# Patient Record
Sex: MALE | Race: BLACK OR AFRICAN AMERICAN | NOT HISPANIC OR LATINO | Employment: UNEMPLOYED | URBAN - METROPOLITAN AREA
[De-identification: names, ages, dates, MRNs, and addresses within clinical notes are randomized per-mention and may not be internally consistent; named-entity substitution may affect disease eponyms.]

---

## 2017-04-04 ENCOUNTER — TRANSFERRED RECORDS (OUTPATIENT)
Dept: HEALTH INFORMATION MANAGEMENT | Facility: CLINIC | Age: 42
End: 2017-04-04

## 2017-05-08 ENCOUNTER — TRANSFERRED RECORDS (OUTPATIENT)
Dept: HEALTH INFORMATION MANAGEMENT | Facility: CLINIC | Age: 42
End: 2017-05-08

## 2017-05-08 LAB
ALT SERPL-CCNC: 19 IU/L (ref 0–44)
AST SERPL-CCNC: 12 IU/L (ref 0–40)
CHOLEST SERPL-MCNC: 139 MG/DL (ref 100–199)
CREAT SERPL-MCNC: 0.93 MG/DL (ref 0.76–1.27)
GFR SERPL CREATININE-BSD FRML MDRD: 100 ML/MIN/1.73
GLUCOSE SERPL-MCNC: 181 MG/DL (ref 65–99)
HDLC SERPL-MCNC: 62 MG/DL
LDLC SERPL CALC-MCNC: 67 MG/DL (ref 0–99)
POTASSIUM SERPL-SCNC: 4.5 MMOL/L (ref 3.5–5.2)
TRIGL SERPL-MCNC: 51 MG/DL (ref 0–149)
TSH SERPL-ACNC: 1.59 UIU/ML (ref 0.45–4.5)

## 2017-07-17 ENCOUNTER — TRANSFERRED RECORDS (OUTPATIENT)
Dept: HEALTH INFORMATION MANAGEMENT | Facility: CLINIC | Age: 42
End: 2017-07-17

## 2017-09-27 ENCOUNTER — TRANSFERRED RECORDS (OUTPATIENT)
Dept: HEALTH INFORMATION MANAGEMENT | Facility: CLINIC | Age: 42
End: 2017-09-27

## 2017-10-04 ENCOUNTER — MEDICAL CORRESPONDENCE (OUTPATIENT)
Dept: HEALTH INFORMATION MANAGEMENT | Facility: CLINIC | Age: 42
End: 2017-10-04

## 2017-10-09 ENCOUNTER — TRANSFERRED RECORDS (OUTPATIENT)
Dept: HEALTH INFORMATION MANAGEMENT | Facility: CLINIC | Age: 42
End: 2017-10-09

## 2018-04-30 ENCOUNTER — TRANSFERRED RECORDS (OUTPATIENT)
Dept: HEALTH INFORMATION MANAGEMENT | Facility: CLINIC | Age: 43
End: 2018-04-30

## 2020-02-19 ENCOUNTER — OFFICE VISIT (OUTPATIENT)
Dept: FAMILY MEDICINE | Facility: CLINIC | Age: 45
End: 2020-02-19
Payer: COMMERCIAL

## 2020-02-19 VITALS
BODY MASS INDEX: 49.44 KG/M2 | WEIGHT: 315 LBS | OXYGEN SATURATION: 97 % | HEART RATE: 87 BPM | SYSTOLIC BLOOD PRESSURE: 136 MMHG | TEMPERATURE: 97.6 F | HEIGHT: 67 IN | DIASTOLIC BLOOD PRESSURE: 86 MMHG

## 2020-02-19 DIAGNOSIS — Z76.89 ENCOUNTER TO ESTABLISH CARE WITH NEW DOCTOR: ICD-10-CM

## 2020-02-19 DIAGNOSIS — G47.33 OSA (OBSTRUCTIVE SLEEP APNEA): ICD-10-CM

## 2020-02-19 DIAGNOSIS — E10.9 TYPE 1 DIABETES MELLITUS WITHOUT COMPLICATION (H): Primary | ICD-10-CM

## 2020-02-19 DIAGNOSIS — E66.01 MORBID OBESITY (H): ICD-10-CM

## 2020-02-19 DIAGNOSIS — Z23 NEED FOR VACCINATION: ICD-10-CM

## 2020-02-19 PROCEDURE — 99203 OFFICE O/P NEW LOW 30 MIN: CPT | Mod: 25 | Performed by: INTERNAL MEDICINE

## 2020-02-19 PROCEDURE — 90471 IMMUNIZATION ADMIN: CPT | Performed by: INTERNAL MEDICINE

## 2020-02-19 PROCEDURE — 90715 TDAP VACCINE 7 YRS/> IM: CPT | Performed by: INTERNAL MEDICINE

## 2020-02-19 SDOH — HEALTH STABILITY: MENTAL HEALTH: HOW OFTEN DO YOU HAVE A DRINK CONTAINING ALCOHOL?: NEVER

## 2020-02-19 ASSESSMENT — MIFFLIN-ST. JEOR: SCORE: 2264.06

## 2020-02-19 NOTE — PROGRESS NOTES
Chief Complaint:       Jose Apodaca is a 44 year old male who presents to clinic today for the following health issues:       New Patient/Transfer of Care  Establish care   Type 1 Diabetes   Request for Sleep clinic Referral for PHUONG        HPI:   Patient Jose Apodaca is a very pleasant 44 year old male with history of Type 1 Diabetes on insulin pump therapy who presents to Internal Medicine clinic today to establish care and for evaluation of multiple concerns including Type 1 Diabetes, PHUONG and morbid obeseity. Regarding the patient's Type 1 Diabetes, the patient reports that he was diagnosed with Type 1 Diabetes at age of 36. He last was diagnosed with DKA and required hospitalization in 1/2016. No DKA episodes since then. Regarding the patient's chronic morbid obesity, the patient reports difficulty with losing weight through diet and exercise in the past. He is interested in an evaluation by the nutrition clinic to help develop a diet and exercise treatment going forward. He reports that he does not require a refill of his insulin medication at this time. He is also interested in a sleep clinic evaluation for PHUONG going forward.      Current Medications:     Current Outpatient Medications   Medication Sig Dispense Refill     insulin aspart (NOVOLOG VIAL) 100 UNITS/ML SC vial Inject 70 units TID           Allergies:    No Known Allergies         Past Medical History:     Past Medical History:   Diagnosis Date     Morbid obesity (H)      PHUONG (obstructive sleep apnea)      T1DM (type 1 diabetes mellitus) (H)          Past Surgical History:   No past surgical history on file.      Family Medical History:     Family History   Problem Relation Age of Onset     Diabetes Maternal Grandmother      Diabetes Paternal Grandmother          Social History:     Social History     Socioeconomic History     Marital status:      Spouse name: Not on file     Number of children: Not on file     Years of  education: Not on file     Highest education level: Not on file   Occupational History     Not on file   Social Needs     Financial resource strain: Not on file     Food insecurity:     Worry: Not on file     Inability: Not on file     Transportation needs:     Medical: Not on file     Non-medical: Not on file   Tobacco Use     Smoking status: Never Smoker     Smokeless tobacco: Never Used   Substance and Sexual Activity     Alcohol use: Never     Frequency: Never     Drug use: Never     Sexual activity: Not on file   Lifestyle     Physical activity:     Days per week: Not on file     Minutes per session: Not on file     Stress: Not on file   Relationships     Social connections:     Talks on phone: Not on file     Gets together: Not on file     Attends Judaism service: Not on file     Active member of club or organization: Not on file     Attends meetings of clubs or organizations: Not on file     Relationship status: Not on file     Intimate partner violence:     Fear of current or ex partner: Not on file     Emotionally abused: Not on file     Physically abused: Not on file     Forced sexual activity: Not on file   Other Topics Concern     Not on file   Social History Narrative     Not on file           Review of System:     Constitutional: Negative for fever or chills, positive for chronic morbid obesity  Skin: Negative for rashes  Ears/Nose/Throat: Negative for nasal congestion, sore throat  Respiratory: No shortness of breath, dyspnea on exertion, cough, or hemoptysis  Cardiovascular: Negative for chest pain  Gastrointestinal: Negative for nausea, vomiting  Genitourinary: Negative for dysuria, hematuria  Musculoskeletal: Negative for myalgias  Neurologic: Negative for headaches  Psychiatric: Negative for depression, anxiety  Hematologic/Lymphatic/Immunologic: Negative  Endocrine: Negative for recent hypoglycemia events. Positive for chronic Type 1 Diabetes on insulin pump therapy.  Behavioral: Negative for  "tobacco use       Physical Exam:   /86 (BP Location: Right arm, Patient Position: Sitting, Cuff Size: Adult Large)   Pulse 87   Temp 97.6  F (36.4  C) (Tympanic)   Ht 1.689 m (5' 6.5\")   Wt 143.3 kg (316 lb)   SpO2 97%   BMI 50.24 kg/m      GENERAL: morbidly obese, alert and no distress  EYES: eyes grossly normal to inspection, and conjunctivae and sclerae normal  HENT: Normocephalic atraumatic. Nose and mouth without ulcers or lesions  NECK: supple  RESP: lungs clear to auscultation   CV: regular rate and rhythm, normal S1 S2  LYMPH: no peripheral edema   ABDOMEN: obese  MS: no gross musculoskeletal defects noted  SKIN: no suspicious lesions or rashes  NEURO: Alert & Oriented x 3.   PSYCH: mentation appears normal, affect normal        Diagnostic Test Results:     Diagnostic Test Results:  Outside labs reviewed today obtained at [facility] shows Hgb A1c of 8.6 from 9/4/2019.    DIABETES   Component Name  9/4/2019     8.6 (H)   Comment (A)   0.21 (L)   HEMOGLOBIN A1C MONITORING (POCT)   VELVET 65   C-PEPTIDE                       ASSESSMENT/PLAN:       (Z76.89) Encounter to establish care with new doctor  (primary encounter diagnosis)  (E10.9) Type 1 diabetes mellitus without complication (H)  Comment: Type 1 Diabetes on insulin pump therapy, no recent hypoglycemia events. No recent DKA episodes since last DKA hospitalization in 1/2016.   Plan: MED THERAPY MANAGE REFERRAL, ENDOCRINOLOGY         ADULT REFERRAL, AMBULATORY ADULT DIABETES         EDUCATOR REFERRAL      (G47.33) PHUONG (obstructive sleep apnea)  Comment: chronic PHUONG with snoring in the setting of morbid obesity not well controlled.  Plan: SLEEP EVALUATION & MANAGEMENT REFERRAL - [facility]         [phone]  (Age 18 and up)      (E66.01) Morbid obesity (H)  Comment: poorly controlled chronic morbid obesity with difficulty losing weight through diet and exercise.  Plan: NUTRITION " REFERRAL      (Z23) Need for vaccination  Comment: patient is due for a TDAP vaccine  Plan: TDAP VACCINE (ADACEL) [66233.002], 1st          Administration  [19951], CANCELED: TDAP VACCINE        (BOOSTRIX) [82700]    Follow Up Plan:     Patient is instructed to return to Internal Medicine clinic for follow-up visit in 1 week.        Radha Milligan MD  Internal Medicine  [facility]

## 2020-02-19 NOTE — PROGRESS NOTES
Prior to immunization administration, verified patients identity using patient s name and date of birth. Please see Immunization Activity for additional information.     Screening Questionnaire for Adult Immunization    Are you sick today?   No   Do you have allergies to medications, food, a vaccine component or latex?   No   Have you ever had a serious reaction after receiving a vaccination?   No   Do you have a long-term health problem with heart, lung, kidney, or metabolic disease (e.g., diabetes), asthma, a blood disorder, no spleen, complement component deficiency, a cochlear implant, or a spinal fluid leak?  Are you on long-term aspirin therapy?   Yes   Do you have cancer, leukemia, HIV/AIDS, or any other immune system problem?   No   Do you have a parent, brother, or sister with an immune system problem?   No   In the past 3 months, have you taken medications that affect  your immune system, such as prednisone, other steroids, or anticancer drugs; drugs for the treatment of rheumatoid arthritis, Crohn s disease, or psoriasis; or have you had radiation treatments?   No   Have you had a seizure, or a brain or other nervous system problem?   No   During the past year, have you received a transfusion of blood or blood    products, or been given immune (gamma) globulin or antiviral drug?   No   For women: Are you pregnant or is there a chance you could become       pregnant during the next month?   No   Have you received any vaccinations in the past 4 weeks?   No     Immunization questionnaire was positive for at least one answer.  Notified Chel.        Per orders of Dr. Milligan, injection of Tdap given by Nato Martin CMA. Patient instructed to remain in clinic for 15 minutes afterwards, and to report any adverse reaction to me immediately.       Screening performed by Nato Martin CMA on 2/19/2020 at 4:33 PM.

## 2020-02-24 ENCOUNTER — TELEPHONE (OUTPATIENT)
Dept: PHARMACY | Facility: CLINIC | Age: 45
End: 2020-02-24

## 2020-02-24 NOTE — TELEPHONE ENCOUNTER
Called patient to assess appropriateness of MTM appointment. He is only on insulin and no other medications, his primary questions are around diet and nutrition. He already has a visit scheduled with Diabetes Education next week. He is more appropriate to follow with them. MTM will look at diabetes ed visit notes next week to reassess if he is an appropriate patient to follow with us.    Madnie Bean, PharmD  PGY1 Medication Therapy Management Resident   [phone]

## 2020-03-01 ENCOUNTER — HEALTH MAINTENANCE LETTER (OUTPATIENT)
Age: 45
End: 2020-03-01

## 2020-03-03 ENCOUNTER — ALLIED HEALTH/NURSE VISIT (OUTPATIENT)
Dept: EDUCATION SERVICES | Facility: CLINIC | Age: 45
End: 2020-03-03
Payer: COMMERCIAL

## 2020-03-03 VITALS — BODY MASS INDEX: 50.05 KG/M2 | WEIGHT: 314.8 LBS

## 2020-03-03 DIAGNOSIS — E10.9 TYPE 1 DIABETES MELLITUS WITHOUT COMPLICATION (H): Primary | ICD-10-CM

## 2020-03-03 PROCEDURE — G0108 DIAB MANAGE TRN  PER INDIV: HCPCS

## 2020-03-03 NOTE — PATIENT INSTRUCTIONS
Connect with our [place] on [email or web address] - Go to Account Settings, My Practices & enter Practice ID [id number]    Upload your Tandem pump when you have a chance - send me a MyChart after you've uploaded    Try to eat breakfast every day - some carb with some protein and a little fat - and work towards consistent meal schedule     Call or send a InnerRewardshart message with any questions or concerns    Debra Rogers RD, LD, CDE   Diabetes Education Triage Line: [phone]  Diabetes Education Appointment [phone]

## 2020-03-03 NOTE — PROGRESS NOTES
"Diabetes Self-Management Education & Support    Presents for: Insulin Pump Review    SUBJECTIVE/OBJECTIVE:  Presents for: Insulin Pump Review  Accompanied by: Self  Diabetes education in the past 24mo: (P) No  Focus of Visit: Insulin Pump  Type of Pump visit: Pump Review  Diabetes type: (P) Type 1  Disease course: (P) Getting harder to manage  How confident are you filling out medical forms by yourself:: Not Assessed  Diabetes management related comments/concerns: I don't know how to eat healthfully   Transportation concerns: No  Other concerns:: None  Cultural Influences/Ethnic Background:  American    Diabetes Symptoms & Complications:  Fatigue: (P) No  Polydipsia: (P) Sometimes  Polyphagia: (P) Yes  Polyuria: (P) No  Visual change: (P) No  Slow healing wounds: (P) No  Autonomic neuropathy: (P) No  CVA: (P) No  Heart disease: (P) No  Nephropathy: (P) No  Peripheral neuropathy: (P) No  Peripheral Vascular Disease: (P) No  Retinopathy: (P) No  Sexual dysfunction: (P) No    Patient Problem List and Family Medical History reviewed for relevant medical history, current medical status, and diabetes risk factors.    Vitals:  Wt 142.8 kg (314 lb 12.8 oz)   BMI 50.05 kg/m    Estimated body mass index is 50.05 kg/m  as calculated from the following:    Height as of 2/19/20: 1.689 m (5' 6.5\").    Weight as of this encounter: 142.8 kg (314 lb 12.8 oz).   Last 3 BP:   BP Readings from Last 3 Encounters:   02/19/20 136/86       History   Smoking Status     Never Smoker   Smokeless Tobacco     Never Used       Labs:  No results found for: A1C  No results found for: GLC  No results found for: LDL  No results found for: HDL]  No results found for: GFRESTIMATED  No results found for: GFRESTBLACK  No results found for: CR  No results found for: MICROALBUMIN    Healthy Eating:  Healthy Eating Assessed Today: Yes  Cultural/Confucianist diet restrictions?: (P) No  Meal planning/habits: Carb counting, Frequent snacking  How many times a " week on average do you eat food made away from home (restaurant/take-out)?: (P) 2  Meals include: (P) Lunch, Dinner, Morning Snack, Afternoon Snack, Evening Snack  Breakfast: Hot chocolate ([place]) OR skips  Lunch: Cajun turkey sandwich with cheese, [place] Amanuel low carb bread (19g/2 slices), apple, prepackaged chips  Dinner: leftovers (roast with potatoes) OR steak, fries OR breaded chicken, ww noodles with pasta sauce OR Subway OR Mc's OR sandwich OR bowl of cereal OR pizza   Snacks: Christianity snack bars  Other: [place] cake for lows   Beverages: (P) Water, Diet soda  Has patient met with a dietitian in the past?: No    Being Active:  Being Active Assessed Today: Yes  Exercise:: Currently not exercising  Barrier to exercise: (P) None    Monitoring:  Monitoring Assessed Today: Yes  Did patient bring glucose meter to appointment? : Yes  Blood Glucose Meter: CGM  Times checking blood sugar at home (number): 4  Times checking blood sugar at home (per): (P) Day  Blood glucose trend: (P) Fluctuating    See pump report below.     Taking Medications:  Diabetes Medication(s)     Insulin       insulin aspart (NOVOLOG VIAL) 100 UNITS/ML SC vial    Inject 70 units TID          Taking Medication Assessed Today: Yes  Current Treatments: (P) Insulin Pump  Dose schedule: (P) Pre-breakfast, Pre-lunch, Pre-dinner  Given by: (P) Patient  Injection/Infusion sites: (P) Abdomen  Problems taking diabetes medications regularly?: No  Diabetes medication side effects?: Yes    Problem Solving:  Problem Solving Assessed Today: Yes  Is the patient at risk for hypoglycemia?: Yes  Hypoglycemia Frequency: Weekly  Hypoglycemia Treatment: Other food  Does patient have glucagon emergency kit?: Yes  Is the patient at risk for DKA?: Yes  Does patient have ketone test strips?: No  Does patient have DKA prevention plan?: No  Does patient have severe weather/disaster plan for diabetes management?: No  Does patient have sick day plan for diabetes  management?: No         Hypoglycemia Complications  Nocturnal hypoglycemia: Yes  Required assistance: Yes  Required glucagon injection: Yes    Reducing Risks:  Reducing Risks Assessed Today: Yes  Diabetes Risks: Sedentary Lifestyle, Ethnicity  CAD Risks: Diabetes Mellitus, Male sex, Obesity, Sedentary lifestyle  Has dilated eye exam at least once a year?: (P) Yes  Sees dentist every 6 months?: (P) Yes  Feet checked by healthcare provider in the last year?: (P) No    Healthy Coping:  Healthy Coping Assessed Today: Yes  Emotional response to diabetes: Fear/Anxiety, Helplessness, Guilt/Self-blame  Informal Support system:: (P) None  Stage of change: PREPARATION (Decided to change - considering how)  Support resources: None  Patient Activation Measure Survey Score:  No flowsheet data found.    Diabetes knowledge and skills assessment:   Patient is knowledgeable in diabetes management concepts related to: Healthy Eating, Monitoring, Taking Medication, Problem Solving and Insulin Pump Concepts Balancing glucose and insulin  Carbohydrate counting  Calculating boluses  Problem solving with insulin pump therapy (BG monitoring; hypoglycemia signs/symptoms, treatment (glucagon) and prevention; hyperglycemia signs/symptoms, treatment and prevention; ketones, DKA signs/symptoms and prevention)    Patient needs further education on the following diabetes management concepts: Healthy Eating, Being Active, Monitoring, Taking Medication, Problem Solving, Reducing Risks and Healthy Coping    Based on learning assessment above, most appropriate setting for further diabetes education would be: Group class or Individual setting.      INTERVENTIONS:    REPORTS:                      Insulin Pump Information  Insulin Pump Brand: Tandem  Infusion Set: Tandem  Does patient have an insulin multiple daily injection back-up plan?: No    Education provided today on:  AADE Self-Care Behaviors:  Healthy Eating: carbohydrate counting, consistency  "in amount, composition, and timing of food intake, weight reduction, portion control, plate planning method and label reading  Monitoring: individual blood glucose targets and frequency of monitoring  Problem Solving: low blood glucose - causes, signs/symptoms, treatment and prevention and carrying a carbohydrate source at all times  Healthy Coping: recognize feelings about diagnosis, benefits of making appropriate lifestyle changes, utilize support systems, methods for coping with stress and when to seek professional counseling    Education specific to insulin pump provided today on:   importance of bolusing before meals, importance of counting carbohydrates accurately and weight management, use of CGM (Dexcom) with T:slim pump - benefits of alarms, Basal/Control IQ    Opportunities for ongoing education and support in diabetes-self management were discussed.    Pt verbalized understanding of concepts discussed and recommendations provided today.       Education Materials Provided:  Carbohydrate Counting and My Plate Planner    Pump Education Materials: None    ASSESSMENT  Patient comes in today stating he grew up poor and so was never taught how to eat healthfully. As an adult, it's gotten better with having kids but still he doesn't always choose the right foods. He snacks frequently and feels this is getting worse. He is interested in trying Victoza or another GLP1 medication in the future to help with weight loss. He feels comfortable with carb counting. He frequently mentions things like \"bad foods\" or \"cheat days\" or food as reward for long day/week. Discussed challenges with emotional eating - not as easy as just having discipline and often requires further investigation with mental health professional. Patient is concerned that if he seeks out mental health care, that it could be possible that someone would \"take away my guns\" due to red flag laws. Explained that these laws exist to protect you and others " in the case that someone was concerned that he would harm himself or another person. Encouraged him to consider seeking out mental health. Additionally discussed use of pump + CGM options. Upload of both pump & Bhargav sensor happened after visit due to IT issues (see [employer] messages). He is looking forward to continuing to work on changes to pump settings to support improved blood sugar control + weight loss. Recommended he discuss use of GLP1 with Endocrinology at visit in May - he is agreeable.     Patient would benefit from increase in basal rate daytime and evening to see below.    Changes made to pump settings:  basal rate:   0500 1.75 --> 1.9  1200 2 --> 2.2  1600 1.75 --> 1.9   Pump report reflects changes made during visit today      PLAN  See Patient Instructions for co-developed, patient-stated behavior change goals.  AVS printed and provided to patient today. See Follow-Up section for recommended follow-up.  At next visit, could consider adjustment to correction factor - per rule of 1700, patient should be taking 1 unit: 13. Right now he is taking 1:25. Will hold off for now and see how trends look with adjustment in basal rates.     Debra Rogers, RD, LD, CDE   Time Spent: 60 minutes  Encounter Type: Individual    Any diabetes medication dose changes were made via the CDE Protocol and Collaborative Practice Agreement with the patient's referring provider. A copy of this encounter was shared with the provider.

## 2020-03-23 ENCOUNTER — MYC MEDICAL ADVICE (OUTPATIENT)
Dept: EDUCATION SERVICES | Facility: CLINIC | Age: 45
End: 2020-03-23

## 2020-03-26 NOTE — TELEPHONE ENCOUNTER
Diabetes Follow-up    Subjective/Objective:    Jose Apodaca sent in blood glucose log for review. Last date of communication was: 3/3.    Diabetes is being managed with   Diabetes Medications   Diabetes Medication(s)     Insulin       insulin aspart (NOVOLOG VIAL) 100 UNITS/ML SC vial    Inject 70 units TID          BG/Food Log:               Assessment:    Patient seeing consistently high blood sugars during the day. Would recommend increase in basal rates at 0500, 1200, 1600 by 0.2. Recommend strengthening of correction factor 1:20 mg/dL.     Plan/Response:  See above for recommendation & MyChart message for info to patient    Debra Rogers RD, LD, CDE     Any diabetes medication dose changes were made via the CDE Protocol and Collaborative Practice Agreement with the patient's referring provider. A copy of this encounter was shared with the provider.

## 2020-04-14 ENCOUNTER — ALLIED HEALTH/NURSE VISIT (OUTPATIENT)
Dept: EDUCATION SERVICES | Facility: CLINIC | Age: 45
End: 2020-04-14
Payer: COMMERCIAL

## 2020-04-14 DIAGNOSIS — E10.9 TYPE 1 DIABETES MELLITUS WITHOUT COMPLICATION (H): Primary | ICD-10-CM

## 2020-04-14 PROCEDURE — G0108 DIAB MANAGE TRN  PER INDIV: HCPCS

## 2020-04-14 NOTE — PROGRESS NOTES
"Diabetes Self-Management Education & Support    Presents for: Insulin Pump Review    SUBJECTIVE/OBJECTIVE:  Presents for: Insulin Pump Review  Accompanied by: Self(telephone visit)  Diabetes education in the past 24mo: Yes  Focus of Visit: Insulin Pump  Type of Pump visit: Pump Review  Diabetes type: Type 1  Disease course: Getting harder to manage  How confident are you filling out medical forms by yourself:: Not Assessed  Diabetes management related comments/concerns: It's hard to avoid snacking while at home.  Transportation concerns: No  Difficulty affording diabetes medication?: No  Difficulty affording diabetes testing supplies?: No  Other concerns:: None  Cultural Influences/Ethnic Background:  American      Diabetes Symptoms & Complications:  Fatigue: No  Polydipsia: Sometimes  Polyphagia: Yes  Polyuria: No  Visual change: No  Slow healing wounds: No  Autonomic neuropathy: No  CVA: No  Heart disease: No  Nephropathy: No  Peripheral neuropathy: No  Peripheral Vascular Disease: No  Retinopathy: No  Sexual dysfunction: No    Patient Problem List and Family Medical History reviewed for relevant medical history, current medical status, and diabetes risk factors.    Vitals:  There were no vitals taken for this visit.  Estimated body mass index is 50.05 kg/m  as calculated from the following:    Height as of 2/19/20: 1.689 m (5' 6.5\").    Weight as of 3/3/20: 142.8 kg (314 lb 12.8 oz).   Last 3 BP:   BP Readings from Last 3 Encounters:   02/19/20 136/86       History   Smoking Status     Never Smoker   Smokeless Tobacco     Never Used       Labs:  No results found for: A1C  No results found for: GLC  No results found for: LDL  No results found for: HDL]  No results found for: GFRESTIMATED  No results found for: GFRESTBLACK  No results found for: CR  No results found for: MICROALBUMIN    Healthy Eating:  Healthy Eating Assessed Today: Yes  Cultural/Moravian diet restrictions?: No  Meal planning/habits: Carb " counting, Frequent snacking  How many times a week on average do you eat food made away from home (restaurant/take-out)?: 2  Meals include: Lunch, Dinner, Morning Snack, Afternoon Snack, Evening Snack  Breakfast: Hot chocolate ([place]) OR skips  Lunch: Cajun turkey sandwich with cheese, [place] Amanuel low carb bread (19g/2 slices), apple, prepackaged chips  Dinner: leftovers (roast with potatoes) OR steak, fries OR breaded chicken, ww noodles with pasta sauce OR Subway OR Mc's OR sandwich OR bowl of cereal OR pizza   Snacks: Mandaen snack bars, candy bars, cereal  Other: Little Amna cake for lows   Beverages: Water, Diet soda  Has patient met with a dietitian in the past?: No    Being Active:  Being Active Assessed Today: Yes  Exercise:: Yes  How intense was your typical exercise? : Light (like stretching or slow walking)  Barrier to exercise: None    Monitoring:  Monitoring Assessed Today: Yes  Did patient bring glucose meter to appointment? : Yes  Blood Glucose Meter: CGM  Times checking blood sugar at home (number): 4  Times checking blood sugar at home (per): Day  Blood glucose trend: Fluctuating    See LibreView data below.     Taking Medications:  Diabetes Medication(s)     Insulin       insulin aspart (NOVOLOG VIAL) 100 UNITS/ML SC vial    Inject 70 units TID          Taking Medication Assessed Today: Yes  Current Treatments: Insulin Pump  Dose schedule: Pre-breakfast, Pre-lunch, Pre-dinner, At bedtime  Given by: Patient  Injection/Infusion sites: Abdomen  Problems taking diabetes medications regularly?: No  Diabetes medication side effects?: Yes    Problem Solving:  Problem Solving Assessed Today: Yes  Is the patient at risk for hypoglycemia?: Yes  Hypoglycemia Frequency: Weekly  Hypoglycemia Treatment: Other food  Does patient have glucagon emergency kit?: Yes  Is the patient at risk for DKA?: Yes  Does patient have ketone test strips?: No  Does patient have DKA prevention plan?: No  Does patient have  severe weather/disaster plan for diabetes management?: No  Does patient have sick day plan for diabetes management?: No      Hypoglycemia Complications  Nocturnal hypoglycemia: Yes  Required assistance: Yes  Required glucagon injection: Yes    Reducing Risks:  Reducing Risks Assessed Today: No  Diabetes Risks: Sedentary Lifestyle, Ethnicity  CAD Risks: Diabetes Mellitus, Male sex, Obesity, Sedentary lifestyle  Has dilated eye exam at least once a year?: Yes  Sees dentist every 6 months?: Yes  Feet checked by healthcare provider in the last year?: No    Healthy Coping:  Healthy Coping Assessed Today: Yes  Emotional response to diabetes: Fear/Anxiety, Helplessness, Guilt/Self-blame  Informal Support system:: None  Stage of change: PREPARATION (Decided to change - considering how)  Support resources: None  Patient Activation Measure Survey Score:  No flowsheet data found.    Diabetes knowledge and skills assessment:   Patient is knowledgeable in diabetes management concepts related to: Monitoring, Taking Medication, Problem Solving and Insulin Pump Concepts Carbohydrate counting  Calculating boluses  Problem solving with insulin pump therapy (BG monitoring; hypoglycemia signs/symptoms, treatment (glucagon) and prevention; hyperglycemia signs/symptoms, treatment and prevention; ketones, DKA signs/symptoms and prevention)    Patient needs further education on the following diabetes management concepts: Healthy Eating, Being Active and Monitoring    Based on learning assessment above, most appropriate setting for further diabetes education would be: Group class or Individual setting.      INTERVENTIONS:    REPORTS:                  Insulin Pump Information  Insulin Pump Brand: Tandem  Infusion Set: Tandem  Does patient have an insulin multiple daily injection back-up plan?: No    Education provided today on:  AADE Self-Care Behaviors:  Healthy Eating: consistency in amount, composition, and timing of food intake, weight  reduction, portion control and discussed strategies to avoid frequent snacking while at home - avoiding buying certain foods, finding another activity like exercise to fill time instead of snacking   Problem Solving: low blood glucose - causes, signs/symptoms, treatment and prevention and discussed potential for rebound highs     Education specific to insulin pump provided today on:   exercise recommendations and discussed Basal IQ, Control IQ technology as patient will be starting on Dexcom G6 CGM soon. He is familiar with this and is looking forward to getting started.     Opportunities for ongoing education and support in diabetes-self management were discussed.    Pt verbalized understanding of concepts discussed and recommendations provided today.       Education Materials Provided:  No new materials provided today    Pump Education Materials: none    ASSESSMENT  Patient is trying his best to stay home, avoid COVID-19. He is hoping to start walking soon and realizes that it is harder than he thought since it's been awhile since exercising. He is looking forward to getting started with Dexcom G6, expects it to arrive on MOnday. Has appointment with Endocrinology in ~1 month and is looking forward to establishing care with new doctor. Wants to utilize Victoza for weight loss & decreased appetite. Found someone selling this on jigl - informed him that he should not buy medications off of the internet as he cannot guarantee that it would be safe, that he should wait until he sees Endocrinology next month. He acknowledges risk.     Glucose Patterns & Trends:  Weekday Nocturnal hypoglycemia and Weekday Post-meal hyperglycemia    Patient would benefit from decrease in correction/sensitivity, bolusing before meals, treating low BG correctly and utilizing sensor data to better assess appropriateness of insulin dosing. Plan to review basal & bolus rates in ~4 weeks with Dexcom data.     Changes made to pump  settings:  correction/sensitivity: 1u:25 mg/dL at 2000 & 0000    Changes made to sensor settings:   Switch to Dexcom G6 ASAP, reviewed reuse of transmitter x 90 days, 10 day sensor wear, need to link with Tandem pump and initiation of Basal IQ technology.     PLAN  Start Dexcom G6 and link with pump  Avoid buying candy, sweets  Start walking ideally once daily for physical & mental health     Debra Rogers RD, LD, CDE   Time Spent: 45 minutes  Encounter Type: Individual    Any diabetes medication dose changes were made via the CDE Protocol and Collaborative Practice Agreement with the patient's referring provider. A copy of this encounter was shared with the provider.

## 2020-05-12 ENCOUNTER — ALLIED HEALTH/NURSE VISIT (OUTPATIENT)
Dept: EDUCATION SERVICES | Facility: CLINIC | Age: 45
End: 2020-05-12
Payer: COMMERCIAL

## 2020-05-12 DIAGNOSIS — E10.9 TYPE 1 DIABETES MELLITUS WITHOUT COMPLICATION (H): Primary | ICD-10-CM

## 2020-05-12 PROCEDURE — 98968 PH1 ASSMT&MGMT NQHP 21-30: CPT

## 2020-05-12 NOTE — PROGRESS NOTES
"Diabetes Self-Management Education & Support    Telephone Visit for: Insulin Pump Review    Patient verbally consented to the telephone visit service today: yes    SUBJECTIVE/OBJECTIVE:  Presents for: Insulin Pump Review  Accompanied by: Self(telephone visit)  Diabetes education in the past 24mo: Yes  Focus of Visit: Insulin Pump  Type of Pump visit: Pump Review  Diabetes type: Type 1  Disease course: Getting harder to manage  How confident are you filling out medical forms by yourself:: Not Assessed  Diabetes management related comments/concerns: It's hard to avoid snacking while at home.  Transportation concerns: No  Difficulty affording diabetes medication?: No  Difficulty affording diabetes testing supplies?: No  Other concerns:: None  Cultural Influences/Ethnic Background:  American    Diabetes Symptoms & Complications:  Fatigue: No  Polydipsia: Sometimes  Polyphagia: Yes  Polyuria: No  Visual change: No  Slow healing wounds: No  Autonomic neuropathy: No  CVA: No  Heart disease: No  Nephropathy: No  Peripheral neuropathy: No  Peripheral Vascular Disease: No  Retinopathy: No  Sexual dysfunction: No    Patient Problem List and Family Medical History reviewed for relevant medical history, current medical status, and diabetes risk factors.    Vitals:  There were no vitals taken for this visit.  Estimated body mass index is 50.05 kg/m  as calculated from the following:    Height as of 2/19/20: 1.689 m (5' 6.5\").    Weight as of 3/3/20: 142.8 kg (314 lb 12.8 oz).   Last 3 BP:   BP Readings from Last 3 Encounters:   02/19/20 136/86       History   Smoking Status     Never Smoker   Smokeless Tobacco     Never Used       Labs:  No results found for: A1C  No results found for: GLC  No results found for: LDL  No results found for: HDL]  No results found for: GFRESTIMATED  No results found for: GFRESTBLACK  No results found for: CR  No results found for: MICROALBUMIN    Healthy Eating:  Healthy Eating Assessed Today: " No  Cultural/Hinduism diet restrictions?: No  Meal planning/habits: Carb counting, Frequent snacking  How many times a week on average do you eat food made away from home (restaurant/take-out)?: 2  Meals include: Lunch, Dinner, Morning Snack, Afternoon Snack, Evening Snack  Breakfast: Hot chocolate ([place]) OR skips  Lunch: Cajun turkey sandwich with cheese, [place] Amanuel low carb bread (19g/2 slices), apple, prepackaged chips  Dinner: leftovers (roast with potatoes) OR steak, fries OR breaded chicken, ww noodles with pasta sauce OR Subway OR Mc's OR sandwich OR bowl of cereal OR pizza   Snacks: Episcopal snack bars, candy bars, cereal  Other: Little Amna cake for lows   Beverages: Water, Diet soda  Has patient met with a dietitian in the past?: No    Being Active:  Being Active Assessed Today: Yes  Exercise:: Yes  How intense was your typical exercise? : Light (like stretching or slow walking)  Barrier to exercise: None    Monitoring:  Monitoring Assessed Today: Yes  Did patient bring glucose meter to appointment? : Yes  Blood Glucose Meter: CGM  Times checking blood sugar at home (number): 4  Times checking blood sugar at home (per): Day  Blood glucose trend: Fluctuating    See pump report below.     Taking Medications:  Diabetes Medication(s)     Insulin       insulin aspart (NOVOLOG VIAL) 100 UNITS/ML SC vial    Inject 70 units TID          Taking Medication Assessed Today: Yes  Current Treatments: Insulin Pump  Dose schedule: Pre-breakfast, Pre-lunch, Pre-dinner, At bedtime, Other  Given by: Patient  Injection/Infusion sites: Abdomen  Problems taking diabetes medications regularly?: No  Diabetes medication side effects?: Yes    Problem Solving:  Problem Solving Assessed Today: Yes  Is the patient at risk for hypoglycemia?: Yes  Hypoglycemia Frequency: Weekly  Hypoglycemia Treatment: Other food  Does patient have glucagon emergency kit?: Yes  Is the patient at risk for DKA?: Yes  Does patient have ketone test  strips?: No  Does patient have DKA prevention plan?: No  Does patient have severe weather/disaster plan for diabetes management?: No  Does patient have sick day plan for diabetes management?: No         Hypoglycemia Complications  Nocturnal hypoglycemia: Yes  Required assistance: Yes  Required glucagon injection: Yes    Reducing Risks:  Reducing Risks Assessed Today: No  Diabetes Risks: Sedentary Lifestyle, Ethnicity  CAD Risks: Diabetes Mellitus, Male sex, Obesity, Sedentary lifestyle  Has dilated eye exam at least once a year?: Yes  Sees dentist every 6 months?: Yes  Feet checked by healthcare provider in the last year?: No    Healthy Coping:  Healthy Coping Assessed Today: No  Emotional response to diabetes: Fear/Anxiety, Helplessness, Guilt/Self-blame  Informal Support system:: None  Stage of change: PREPARATION (Decided to change - considering how)  Support resources: None  Patient Activation Measure Survey Score:  No flowsheet data found.    Diabetes knowledge and skills assessment:   Patient is knowledgeable in diabetes management concepts related to: Healthy Eating, Being Active, Monitoring, Taking Medication, Problem Solving and Insulin Pump Concepts Balancing glucose and insulin  Carbohydrate counting  Calculating boluses  Problem solving with insulin pump therapy (BG monitoring; hypoglycemia signs/symptoms, treatment (glucagon) and prevention; hyperglycemia signs/symptoms, treatment and prevention; ketones, DKA signs/symptoms and prevention)    Patient needs further education on the following diabetes management concepts: Healthy Eating, Taking Medication and Problem Solving    Based on learning assessment above, most appropriate setting for further diabetes education would be: Group class or Individual setting.      INTERVENTIONS:    REPORTS:                Insulin Pump Information  Insulin Pump Brand: Tandem  Infusion Set: Tandem  Does patient have an insulin multiple daily injection back-up plan?:  No    Education provided today on:  AADE Self-Care Behaviors:  Healthy Eating: carbohydrate counting, weight reduction, portion control and discussed always taking insulin with food, trying to bolus before he eats   Being Active: relationship to blood glucose, describe appropriate activity program, precautions to take and recommended working towards >150 minutes/week moderate physical activity   Taking Medication: use of Control IQ in pump, basal + bolus adjustments and need to take insulin with food, not rely on Control IQ auto-boluses.     Education specific to insulin pump provided today on:   importance of bolusing before meals and importance of counting carbohydrates accurately    Opportunities for ongoing education and support in diabetes-self management were discussed.    Pt verbalized understanding of concepts discussed and recommendations provided today.       Education Materials Provided:  No new materials provided today    Pump Education Materials: none    ASSESSMENT  Patient feels things are going well. He has enjoyed using the Dexcom G6. He has tight target range set - recommended adjusting range to  so he can see improvements in his blood sugars. Also discussed sleep activity and how it works, it has clearly helped to bring blood sugars into range in the morning, without dropping too low. He is agreeable to giving it more time to see where adjustments need to be made. Recommended he focus on always bolusing for foods, even when he knows he's not eating what he should be, to avoid hyperglycemia after breakfast and late night. He is agreeable.     Glucose Patterns & Trends:  Hyperglycemia, weekend- postmeal and nocturnal and weekday- postmeal    Patient would benefit from increase in BG target morning, daytime and evening, bolusing before meals and counting carbohydrates accurately.    Changes made to pump settings:  none    Changes made to sensor settings:   BG target range:  within Dexcom  Clarity     PLAN  Ensure bolusing before meals and bolusing for all carb containing meals & snacks   Increase activity   Adjust Dexcom Clarity target range to   Send [employer] message after next pump upload for review     Debra Rogers RD, LD, CDE   Time Spent: 24 minutes  Encounter Type: Individual, telephone    Any diabetes medication dose changes were made via the CDE Protocol and Collaborative Practice Agreement with the patient's referring provider. A copy of this encounter was shared with the provider.

## 2020-05-26 ENCOUNTER — VIRTUAL VISIT (OUTPATIENT)
Dept: ENDOCRINOLOGY | Facility: CLINIC | Age: 45
End: 2020-05-26
Payer: COMMERCIAL

## 2020-05-26 DIAGNOSIS — Z96.41 INSULIN PUMP STATUS: ICD-10-CM

## 2020-05-26 DIAGNOSIS — E10.9 TYPE 1 DIABETES MELLITUS WITHOUT COMPLICATION (H): Primary | ICD-10-CM

## 2020-05-26 PROCEDURE — 95251 CONT GLUC MNTR ANALYSIS I&R: CPT | Performed by: INTERNAL MEDICINE

## 2020-05-26 PROCEDURE — 99243 OFF/OP CNSLTJ NEW/EST LOW 30: CPT | Mod: 95 | Performed by: INTERNAL MEDICINE

## 2020-05-26 NOTE — PROGRESS NOTES
"Jose Apodaca is a 44 year old male who is being evaluated via a billable video visit.      The patient has been notified of following:     \"This video visit will be conducted via a call between you and your physician/provider. We have found that certain health care needs can be provided without the need for an in-person physical exam.  This service lets us provide the care you need with a video conversation.  If a prescription is necessary we can send it directly to your pharmacy.  If lab work is needed we can place an order for that and you can then stop by our lab to have the test done at a later time.    Video visits are billed at different rates depending on your insurance coverage.  Please reach out to your insurance provider with any questions.    If during the course of the call the physician/provider feels a video visit is not appropriate, you will not be charged for this service.\"    Patient has given verbal consent for Video visit? Yes    How would you like to obtain your AVS? Reviewed verbally    Patient would like the video invitation sent by: Text to cell phone: [phone]    Will anyone else be joining your video visit? No      Name: Jose Apodaca is a 44 year old man, seen at the request of Dr. ERIKA Milligan for evaluation of     Chief Complaint   Patient presents with     Diabetes       HPI:  Recent issues:  Here for evaluation of diabetes.  Reviewed medical history from patient and [facility] chart record        2011. Diagnosis of diabetes mellitus while living in [place]  Acute symptoms increased thirst, frequent urination, fatigue, weight loss (265 to 203#/30 days)  Hospitalized and diagnosis of DKA  Initial treatment with Apidra and Lantus insulin  Met with an endocrinologist Dr. Lina Fish  ~6 mo later, switched to [place] Ping pump    2012. Continued using this pump for 1 year, then moved to [facility]  Saw Dr. Beena Johnson/Endocrinologist in " [place]  Switched to Tandem insulin pump  Previous [facility] labs include:      Subsequent move back to [place]  Recalls taking Victoza medication along with pump management, success with significant weight loss  Moved to [place]  9/2019. Moved to [place], [place]    Medical evaluations with Dr. 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 Started Tandem insulin pump    Previous [facility] labs include:        2/19/20. Med evaluation with Dr. ERIKA Milligan/[facility]  No lab tests ordered  Started DexcomG6 CGMS sensor  4/2020. Upgrade to the Control IQ pump?    5/26/20. Initial diabetes evaluation with me,  [facility]  Using Tandem TSlim pump with the DexcomG6   Novolog in pump    Recent pump data:      Recent pump data:        Blood glucose (BG) meter:  Accu-check    Tests infrequently  Fam Hx DM: MA, Clement, MGM, PGM    Recent [facility] labs:  none    DM Complications: none     Recent symptoms:   Mild fatigue   Intemittent right leg paresthesias/numbness    Other chronic illnesses include:   Allergic rhinits:  Takes Allegra, Flonase meds, followed by PCP   PHUONG:     Followed by PCP   Cataracts:  Managed by ophthalmologist      , lives in [facility], works with computer networking  Sees Dr. ERIKA Milligan/[facility] for general medicine evaluations.    PMH/PSH:  Past Medical History:   Diagnosis Date     Cataracts, bilateral      DKA (diabetic ketoacidoses) (H)      Morbid obesity (H)      PHUONG (obstructive sleep apnea)      T1DM (type 1 diabetes mellitus) (H)      No past surgical history on file.    Family Hx:  Family History   Problem Relation Age of Onset     Diabetes Maternal Grandmother      Diabetes Paternal Grandmother          Social Hx:  Social History     Socioeconomic History     Marital status:      Spouse name: Not on file     Number of children: Not on file     Years of education: Not on file     Highest education  level: Not on file   Occupational History     Not on file   Social Needs     Financial resource strain: Not on file     Food insecurity     Worry: Not on file     Inability: Not on file     Transportation needs     Medical: Not on file     Non-medical: Not on file   Tobacco Use     Smoking status: Never Smoker     Smokeless tobacco: Never Used   Substance and Sexual Activity     Alcohol use: Never     Frequency: Never     Drug use: Never     Sexual activity: Not on file   Lifestyle     Physical activity     Days per week: Not on file     Minutes per session: Not on file     Stress: Not on file   Relationships     Social connections     Talks on phone: Not on file     Gets together: Not on file     Attends Quaker service: Not on file     Active member of club or organization: Not on file     Attends meetings of clubs or organizations: Not on file     Relationship status: Not on file     Intimate partner violence     Fear of current or ex partner: Not on file     Emotionally abused: Not on file     Physically abused: Not on file     Forced sexual activity: Not on file   Other Topics Concern     Not on file   Social History Narrative     Not on file          MEDICATIONS:  has a current medication list which includes the following prescription(s): dexcom g6 , dexcom g6 sensor, dexcom g6 transmitter, fexofenadine, fluticasone, and insulin aspart.    ROS:     ROS: 10 point ROS neg other than the symptoms noted above in the HPI.    GENERAL: mild fatigue, wt stable; denies fevers, chills, night sweats.   HEENT: no dysphagia, odonophagia, diplopia, neck pain  THYROID:  no apparent hyper or hypothyroid symptoms  CV: no chest pain, pressure, palpitations  LUNGS: no SOB, AGARWAL, cough, wheezing   ABDOMEN: no diarrhea, constipation, abdominal pain  EXTREMITIES: no rashes, ulcers, edema  NEUROLOGY: no headaches, denies changes in vision, tingling, extremitiy numbness   MSK: no muscle aches or pains, weakness  SKIN: no  rashes or lesions  : occasional nocturia  PSYCH:  stable mood, no significant anxiety or depression  ENDOCRINE: no heat or cold intolerance    Physical Exam (visual exam)  VS:  no vital signs taken for video visit  GENERAL: healthy, alert and NAD, well dressed, answering questions appropriately  EYES: eyes grossly normal to inspection, conjunctivae and sclerae normal, no exophthalmos or proptosis  THYROID:  no apparent nodules or goiter  LUNGS: no audible wheeze, cough or visible cyanosis, no visible retractions or increased work of breathing  ABDOMEN: abdomen obese  EXTREMITIES: no hand tremors, limited exam  NEUROLOGY: CN grossly intact, mentation intact and speech normal   PSYCH: mentation appears normal, affect normal/bright, judgement and insight intact, normal speech and appearance well groomed  SKIN:  Dark complexion, scalp balding    LABS:    All pertinent notes, labs, and images personally reviewed by me.     A/P:  Encounter Diagnosis   Name Primary?     Type 1 diabetes mellitus without complication (H) Yes     Comments:  Reviewed health history and diabetes issues.  Unclear why no diabetes review or lab testing with PCP in 2/2020.    Plan:  Reviewed the overall T1DM management and insulin pump use.  Discussed optimal BG testing to assess glucose trends.  We reviewed insulin pump settings, basal rate and bolus dosing  Use of automated pump bolus dosing for meal/snack carb & correction dosing  Reviewed recent Tandem pump and DexcomG6 CGMS glucose trend data, in detail    Recommend:  Continue insulin pump management plan.  No pump setting changes at this time  Confirm patient linked to our [employer] data base  Needs fasting lab testing  Keep focus on diet, exercise, and weight management  Would benefit from f/u Diabetes Education evaluation(s)  Briefly discussed use of off-label GLP1RA medication, such as Victoza  Consider use of antihypertensive medication (ACEI or ARB) and lipid (statin)  medications  Arrange annual dilated eye exam, fasting lipid panel testing.    Addressed patient's questions today.    Future labs ordered today:   Orders Placed This Encounter   Procedures     Hemoglobin A1c     Basic metabolic panel     Lipid panel reflex to direct LDL Fasting     TSH     Albumin Random Urine Quantitative with Creat Ratio     C-peptide     Glutamic acid decarboxylase antibody     Radiology/Consults ordered today: None    More than 50% of the time spent with Mr. Apodaca on counseling / coordinating his care.  Total appointment time was 30 minutes.    Follow-up:  6/3/2020 at 2:30 pm    DAVIN Heaton MD, MS  Endocrinology  [facility]    CC: ERIKA Milligan        Video-Visit Details    Type of service:  Video Visit    Video Start Time: 2:40 pm  Video End Time: 3:10 pm    Originating Location (pt. Location): home    Distant Location (provider location):  [facility]     Platform used for Video Visit: [employer]

## 2020-05-26 NOTE — LETTER
"    5/26/2020         RE: Jose Apodaca  [address] [place]  [facility] [address]        Dear Colleague,    Thank you for referring your patient, Jose Apodaca, to the [facility]. Please see a copy of my visit note below.    Jose Apodaca is a 44 year old male who is being evaluated via a billable video visit.      The patient has been notified of following:     \"This video visit will be conducted via a call between you and your physician/provider. We have found that certain health care needs can be provided without the need for an in-person physical exam.  This service lets us provide the care you need with a video conversation.  If a prescription is necessary we can send it directly to your pharmacy.  If lab work is needed we can place an order for that and you can then stop by our lab to have the test done at a later time.    Video visits are billed at different rates depending on your insurance coverage.  Please reach out to your insurance provider with any questions.    If during the course of the call the physician/provider feels a video visit is not appropriate, you will not be charged for this service.\"    Patient has given verbal consent for Video visit? Yes    How would you like to obtain your AVS? Reviewed verbally    Patient would like the video invitation sent by: Text to cell phone: [phone]    Will anyone else be joining your video visit? No      Name: Jose Apodaca is a 44 year old man, seen at the request of Dr. ERIKA Milligan for evaluation of     Chief Complaint   Patient presents with     Diabetes       HPI:  Recent issues:  Here for evaluation of diabetes.  Reviewed medical history from patient and [facility] chart record        2011. Diagnosis of diabetes mellitus while living in [place]  Acute symptoms increased thirst, frequent urination, fatigue, weight loss (265 to 203#/30 days)  Hospitalized and diagnosis of DKA  Initial treatment with Apidra " and Lantus insulin  Met with an endocrinologist Dr. Lina Fish  ~6 mo later, switched to [place] Ping pump    2012. Continued using this pump for 1 year, then moved to [facility]  Saw Dr. Beena Johnson/Endocrinologist in [place]  Switched to Tandem insulin pump  Previous [facility] labs include:      Subsequent move back to [place]  Recalls taking Victoza medication along with pump management, success with significant weight loss  Moved to [place]  9/2019. Moved to [place], [place]    Medical evaluations with Dr. 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 Started Tandem insulin pump    Previous [facility] labs include:        2/19/20. Med evaluation with Dr. ERIKA Milligan/[facility]  No lab tests ordered  Started DexcomG6 CGMS sensor  4/2020. Upgrade to the Control IQ pump?    5/26/20. Initial diabetes evaluation with me,  [facility]  Using Tandem TSlim pump with the DexcomG6   Novolog in pump    Recent pump data:      Recent pump data:        Blood glucose (BG) meter:  Accu-check    Tests infrequently  Fam Hx DM: MA, Clement, MGM, PGM    Recent [facility] labs:  none    DM Complications: none     Recent symptoms:   Mild fatigue   Intemittent right leg paresthesias/numbness    Other chronic illnesses include:   Allergic rhinits:  Takes Allegra, Flonase meds, followed by PCP   PHUONG:     Followed by PCP   Cataracts:  Managed by ophthalmologist      , lives in [facility], works with computer networking  Sees Dr. ERIKA Milligan/[facility] for general medicine evaluations.    PMH/PSH:  Past Medical History:   Diagnosis Date     Cataracts, bilateral      DKA (diabetic ketoacidoses) (H)      Morbid obesity (H)      PHUONG (obstructive sleep apnea)      T1DM (type 1 diabetes mellitus) (H)      No past surgical history on file.    Family Hx:  Family History   Problem Relation Age of Onset     Diabetes Maternal Grandmother       Diabetes Paternal Grandmother          Social Hx:  Social History     Socioeconomic History     Marital status:      Spouse name: Not on file     Number of children: Not on file     Years of education: Not on file     Highest education level: Not on file   Occupational History     Not on file   Social Needs     Financial resource strain: Not on file     Food insecurity     Worry: Not on file     Inability: Not on file     Transportation needs     Medical: Not on file     Non-medical: Not on file   Tobacco Use     Smoking status: Never Smoker     Smokeless tobacco: Never Used   Substance and Sexual Activity     Alcohol use: Never     Frequency: Never     Drug use: Never     Sexual activity: Not on file   Lifestyle     Physical activity     Days per week: Not on file     Minutes per session: Not on file     Stress: Not on file   Relationships     Social connections     Talks on phone: Not on file     Gets together: Not on file     Attends Episcopalian service: Not on file     Active member of club or organization: Not on file     Attends meetings of clubs or organizations: Not on file     Relationship status: Not on file     Intimate partner violence     Fear of current or ex partner: Not on file     Emotionally abused: Not on file     Physically abused: Not on file     Forced sexual activity: Not on file   Other Topics Concern     Not on file   Social History Narrative     Not on file          MEDICATIONS:  has a current medication list which includes the following prescription(s): dexcom g6 , dexcom g6 sensor, dexcom g6 transmitter, fexofenadine, fluticasone, and insulin aspart.    ROS:     ROS: 10 point ROS neg other than the symptoms noted above in the HPI.    GENERAL: mild fatigue, wt stable; denies fevers, chills, night sweats.   HEENT: no dysphagia, odonophagia, diplopia, neck pain  THYROID:  no apparent hyper or hypothyroid symptoms  CV: no chest pain, pressure, palpitations  LUNGS: no SOB, AGARWAL,  cough, wheezing   ABDOMEN: no diarrhea, constipation, abdominal pain  EXTREMITIES: no rashes, ulcers, edema  NEUROLOGY: no headaches, denies changes in vision, tingling, extremitiy numbness   MSK: no muscle aches or pains, weakness  SKIN: no rashes or lesions  : occasional nocturia  PSYCH:  stable mood, no significant anxiety or depression  ENDOCRINE: no heat or cold intolerance    Physical Exam (visual exam)  VS:  no vital signs taken for video visit  GENERAL: healthy, alert and NAD, well dressed, answering questions appropriately  EYES: eyes grossly normal to inspection, conjunctivae and sclerae normal, no exophthalmos or proptosis  THYROID:  no apparent nodules or goiter  LUNGS: no audible wheeze, cough or visible cyanosis, no visible retractions or increased work of breathing  ABDOMEN: abdomen obese  EXTREMITIES: no hand tremors, limited exam  NEUROLOGY: CN grossly intact, mentation intact and speech normal   PSYCH: mentation appears normal, affect normal/bright, judgement and insight intact, normal speech and appearance well groomed  SKIN:  Dark complexion, scalp balding    LABS:    All pertinent notes, labs, and images personally reviewed by me.     A/P:  Encounter Diagnosis   Name Primary?     Type 1 diabetes mellitus without complication (H) Yes     Comments:  Reviewed health history and diabetes issues.  Unclear why no diabetes review or lab testing with PCP in 2/2020.    Plan:  Reviewed the overall T1DM management and insulin pump use.  Discussed optimal BG testing to assess glucose trends.  We reviewed insulin pump settings, basal rate and bolus dosing  Use of automated pump bolus dosing for meal/snack carb & correction dosing  Reviewed recent Tandem pump and DexcomG6 CGMS glucose trend data, in detail    Recommend:  Continue insulin pump management plan.  No pump setting changes at this time  Confirm patient linked to our [employer] data base  Needs fasting lab testing  Keep focus on diet,  exercise, and weight management  Would benefit from f/u Diabetes Education evaluation(s)  Briefly discussed use of off-label GLP1RA medication, such as Victoza  Consider use of antihypertensive medication (ACEI or ARB) and lipid (statin) medications  Arrange annual dilated eye exam, fasting lipid panel testing.    Addressed patient's questions today.    Future labs ordered today:   Orders Placed This Encounter   Procedures     Hemoglobin A1c     Basic metabolic panel     Lipid panel reflex to direct LDL Fasting     TSH     Albumin Random Urine Quantitative with Creat Ratio     C-peptide     Glutamic acid decarboxylase antibody     Radiology/Consults ordered today: None    More than 50% of the time spent with Mr. Apodaca on counseling / coordinating his care.  Total appointment time was 30 minutes.    Follow-up:  6/3/2020 at 2:30 pm    DAVIN Heaton MD, MS  Endocrinology  [facility]    CC: ERIKA Milligan        Video-Visit Details    Type of service:  Video Visit    Video Start Time: 2:40 pm  Video End Time: 3:10 pm    Originating Location (pt. Location): home    Distant Location (provider location):  [facility]     Platform used for Video Visit: AmWell              Again, thank you for allowing me to participate in the care of your patient.        Sincerely,        Ryley Heaton MD

## 2020-05-26 NOTE — Clinical Note
Thanks for the referral.  Why didn't you order any labs to assist with the diabetes management in 2/2020?  TL

## 2020-05-28 DIAGNOSIS — E10.9 TYPE 1 DIABETES MELLITUS WITHOUT COMPLICATION (H): ICD-10-CM

## 2020-05-28 LAB
ANION GAP SERPL CALCULATED.3IONS-SCNC: 6 MMOL/L (ref 3–14)
BUN SERPL-MCNC: 10 MG/DL (ref 7–30)
CALCIUM SERPL-MCNC: 8.5 MG/DL (ref 8.5–10.1)
CHLORIDE SERPL-SCNC: 107 MMOL/L (ref 94–109)
CHOLEST SERPL-MCNC: 150 MG/DL
CO2 SERPL-SCNC: 25 MMOL/L (ref 20–32)
CREAT SERPL-MCNC: 0.77 MG/DL (ref 0.66–1.25)
CREAT UR-MCNC: 241 MG/DL
GFR SERPL CREATININE-BSD FRML MDRD: >90 ML/MIN/{1.73_M2}
GLUCOSE SERPL-MCNC: 164 MG/DL (ref 70–99)
HBA1C MFR BLD: 8.5 % (ref 0–5.6)
HDLC SERPL-MCNC: 62 MG/DL
LDLC SERPL CALC-MCNC: 78 MG/DL
MICROALBUMIN UR-MCNC: 12 MG/L
MICROALBUMIN/CREAT UR: 4.94 MG/G CR (ref 0–17)
NONHDLC SERPL-MCNC: 88 MG/DL
POTASSIUM SERPL-SCNC: 4.2 MMOL/L (ref 3.4–5.3)
SODIUM SERPL-SCNC: 138 MMOL/L (ref 133–144)
TRIGL SERPL-MCNC: 51 MG/DL
TSH SERPL DL<=0.005 MIU/L-ACNC: 1.16 MU/L (ref 0.4–4)

## 2020-05-28 PROCEDURE — 82043 UR ALBUMIN QUANTITATIVE: CPT | Performed by: INTERNAL MEDICINE

## 2020-05-28 PROCEDURE — 99000 SPECIMEN HANDLING OFFICE-LAB: CPT | Performed by: INTERNAL MEDICINE

## 2020-05-28 PROCEDURE — 84681 ASSAY OF C-PEPTIDE: CPT | Performed by: INTERNAL MEDICINE

## 2020-05-28 PROCEDURE — 84443 ASSAY THYROID STIM HORMONE: CPT | Performed by: INTERNAL MEDICINE

## 2020-05-28 PROCEDURE — 80061 LIPID PANEL: CPT | Performed by: INTERNAL MEDICINE

## 2020-05-28 PROCEDURE — 80048 BASIC METABOLIC PNL TOTAL CA: CPT | Performed by: INTERNAL MEDICINE

## 2020-05-28 PROCEDURE — 36415 COLL VENOUS BLD VENIPUNCTURE: CPT | Performed by: INTERNAL MEDICINE

## 2020-05-28 PROCEDURE — 86341 ISLET CELL ANTIBODY: CPT | Mod: 90 | Performed by: INTERNAL MEDICINE

## 2020-05-28 PROCEDURE — 83036 HEMOGLOBIN GLYCOSYLATED A1C: CPT | Performed by: INTERNAL MEDICINE

## 2020-05-29 LAB — C PEPTIDE SERPL-MCNC: <0.1 NG/ML (ref 0.9–6.9)

## 2020-05-30 LAB — GAD65 AB SER IA-ACNC: >250 IU/ML (ref 0–5)

## 2020-06-03 ENCOUNTER — VIRTUAL VISIT (OUTPATIENT)
Dept: ENDOCRINOLOGY | Facility: CLINIC | Age: 45
End: 2020-06-03
Payer: COMMERCIAL

## 2020-06-03 DIAGNOSIS — E66.01 MORBID OBESITY (H): ICD-10-CM

## 2020-06-03 DIAGNOSIS — E10.9 TYPE 1 DIABETES MELLITUS WITHOUT COMPLICATION (H): Primary | ICD-10-CM

## 2020-06-03 DIAGNOSIS — Z96.41 INSULIN PUMP STATUS: ICD-10-CM

## 2020-06-03 PROBLEM — E11.9 DIABETES MELLITUS, TYPE 2 (H): Status: ACTIVE | Noted: 2020-06-03

## 2020-06-03 PROCEDURE — 99214 OFFICE O/P EST MOD 30 MIN: CPT | Mod: 95 | Performed by: INTERNAL MEDICINE

## 2020-06-03 RX ORDER — FLURBIPROFEN SODIUM 0.3 MG/ML
SOLUTION/ DROPS OPHTHALMIC
Qty: 100 EACH | Refills: 3 | Status: SHIPPED | OUTPATIENT
Start: 2020-06-03 | End: 2021-01-20

## 2020-06-03 RX ORDER — LIRAGLUTIDE 6 MG/ML
INJECTION SUBCUTANEOUS
Qty: 6 ML | Refills: 5 | Status: SHIPPED | OUTPATIENT
Start: 2020-06-03 | End: 2020-06-15

## 2020-06-04 ENCOUNTER — TELEPHONE (OUTPATIENT)
Dept: ENDOCRINOLOGY | Facility: CLINIC | Age: 45
End: 2020-06-04

## 2020-06-04 NOTE — PROGRESS NOTES
"Jose Apodaca is a 44 year old male who is being evaluated via a billable video visit.      The patient has been notified of following:     \"This video visit will be conducted via a call between you and your physician/provider. We have found that certain health care needs can be provided without the need for an in-person physical exam.  This service lets us provide the care you need with a video conversation.  If a prescription is necessary we can send it directly to your pharmacy.  If lab work is needed we can place an order for that and you can then stop by our lab to have the test done at a later time.    Video visits are billed at different rates depending on your insurance coverage.  Please reach out to your insurance provider with any questions.    If during the course of the call the physician/provider feels a video visit is not appropriate, you will not be charged for this service.\"    Patient has given verbal consent for Video visit? Yes    How would you like to obtain your AVS? Reviewed verbally    Patient would like the video invitation sent by: Text to cell phone: [phone]     Will anyone else be joining your video visit? No      Recent issues:  Diabetes followup  Reviewed health history and recent lab results           2011. Diagnosis of diabetes mellitus while living in [place]  Acute symptoms increased thirst, frequent urination, fatigue, weight loss (265 to 203#/30 days)  Hospitalized and diagnosis of DKA  Initial treatment with Apidra and Lantus insulin  Met with an endocrinologist Dr. Lina Fish  ~6 mo later, switched to [place] Ping pump     2012. Continued using this pump for 1 year, then moved to [facility]  Saw Dr. Beena Johnson/Endocrinologist in [place]  Switched to Tandem insulin pump  Previous [facility] labs include:       Subsequent move back to [place]  Recalls taking Victoza medication along with pump management, success with significant weight " loss  Moved to [place]  9/2019. Moved to [place], [place]     Medical evaluations with Dr. 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 Started Tandem insulin pump     Previous [facility] labs include:          2/19/20. Med evaluation with Dr. ERIKA Milligan/[facility]  No lab tests ordered  Started DexcomG6 CGMS sensor  ~4/2020. Upgrade to the Control IQ pump     5/26/20. Initial diabetes evaluation with me, [facility]  Using Tandem TSlim pump with the DexcomG6              Novolog in pump     Previous pump data:  Recent pump data:        Blood glucose (BG) meter:  Accu-check               Tests infrequently  Fam Hx DM:    MA, Mcousin, MGM, PGM     Recent  labs include:  5/28/20 GAD65 Ab >250     Lab Results   Component Value Date    A1C 8.5 (H) 05/28/2020     05/28/2020    POTASSIUM 4.2 05/28/2020    CHLORIDE 107 05/28/2020    CO2 25 05/28/2020    ANIONGAP 6 05/28/2020     (H) 05/28/2020    BUN 10 05/28/2020    CR 0.77 05/28/2020    GFRESTIMATED >90 05/28/2020    GFRESTBLACK >90 05/28/2020    MICK 8.5 05/28/2020    CPEPT <0.1 (L) 05/28/2020    CHOL 150 05/28/2020    TRIG 51 05/28/2020    HDL 62 05/28/2020    LDL 78 05/28/2020    NHDL 88 05/28/2020    UCRR 241 05/28/2020    MICROL 12 05/28/2020    UMALCR 4.94 05/28/2020     DM Complications: none       , lives in [facility], works with [employer]  Sees Dr. ERIKA Milligan/[facility] for general medicine evaluations.       ROS:      ROS: 10 point ROS neg other than the symptoms noted above in the HPI.     GENERAL: mild fatigue, wt stable; denies fevers, chills, night sweats.   HEENT: no dysphagia, odonophagia, diplopia, neck pain  THYROID:  no apparent hyper or hypothyroid symptoms  CV: no chest pain, pressure, palpitations  LUNGS: no SOB, AGARWAL, cough, wheezing   ABDOMEN: no diarrhea, constipation, abdominal pain  EXTREMITIES: no rashes, ulcers, edema  NEUROLOGY: right  leg tingling; no headaches, denies changes in vision, extremitiy numbness   MSK: no muscle aches or pains, weakness  SKIN: no rashes or lesions  : occasional nocturia  PSYCH:  stable mood, no significant anxiety or depression  ENDOCRINE: no heat or cold intolerance     Physical Exam (visual exam)  VS:  no vital signs taken for video visit  GENERAL: healthy, alert and NAD, well dressed, answering questions appropriately  EYES: eyes grossly normal to inspection, conjunctivae and sclerae normal, no exophthalmos or proptosis  THYROID:  no apparent nodules or goiter  LUNGS: no audible wheeze, cough or visible cyanosis, no visible retractions or increased work of breathing  ABDOMEN: abdomen obese  EXTREMITIES: no hand tremors, limited exam  NEUROLOGY: CN grossly intact, mentation intact and speech normal   PSYCH: mentation appears normal, affect normal/bright, judgement and insight intact, normal speech and appearance well groomed  SKIN:  Dark complexion, scalp balding     LABS:     All pertinent notes, labs, and images personally reviewed by me.      A/P:  Encounter Diagnosis   Name      Type 1 diabetes mellitus without complication (H)  Insulin pump status  Morbid obesity      Comments:  Reviewed health history and diabetes issues.  Health history and lab testing indicates T1DM.     Plan:  Reviewed the overall T1DM management and insulin pump use.  Discussed optimal BG testing to assess glucose trends.  We reviewed insulin pump settings, basal rate and bolus dosing  Use of automated pump bolus dosing for meal/snack carb & correction dosing  Reviewed recent Tandem pump and DexcomG6 CGMS glucose trend data, in detail     Recommend:  Continue insulin pump management plan.  No pump setting changes now  Confirm patient linked to our [employer] data base  No lab testing needed at this time  Keep focus on diet, exercise, and weight management  Would benefit from f/u Diabetes Education evaluation(s)   Review pump  settings, Dexcom trends, Control IQ use   Diab Ed Referral placed  We reviewed his interest in restarting (off label) Victoza medication, and I agreed   Discussed the possible effects and side effects of GLP1RA meds such as Victoza   Start Victoza 0.6 mg subcutaneous daily, new Rx sent to pharmacy  Consider use of antihypertensive medication (ACEI or ARB) and lipid (statin) medications  Arrange annual dilated eye exam, fasting lipid panel testing.     Addressed patient's questions today.     More than 50% of the time spent with Mr. Apodaca on counseling / coordinating his care.  Total appointment time was 30 minutes.     Follow-up:   8/2020     DAVIN Heaton MD, MS  Endocrinology  [facility]        Video-Visit Details    Type of service:  Video Visit    Video Start Time: 2:35 pm  Video End Time: 3:05 pm    Originating Location (pt. Location): home    Distant Location (provider location):  [facility]    Platform used for Video Visit: Bindo

## 2020-06-04 NOTE — TELEPHONE ENCOUNTER
Prior Authorization Retail Medication Request    Medication/Dose: Victoza 18 mg/3mL  ICD code (if different than what is on RX):  E10.9, E66.01  Previously Tried and Failed:  None  Rationale:  Patient on 70 units TID of Novolog requiring additional medication to help bring blood sugars under control    Insurance Name:  [facility]  Insurance ID:  [id number]      Pharmacy Information (if different than what is on RX)  [id number]  Phone:  [phone]    [facility] [id number]

## 2020-06-05 NOTE — TELEPHONE ENCOUNTER
Central Prior Authorization Team   Phone: [phone]    PA Initiation    Medication: Victoza 18 mg/3mL  Insurance Company: [employer] - Phone [phone] Fax [phone]  Pharmacy Filling the Rx: [id number] - [place], [place] - [address] [facility] [address]  Filling Pharmacy Phone: [phone]  Filling Pharmacy Fax: [phone]  Start Date: 6/5/2020

## 2020-06-05 NOTE — TELEPHONE ENCOUNTER
PRIOR AUTHORIZATION DENIED    Medication: Victoza 18 mg/3mL-DENIED    Denial Date: 6/5/2020    Denial Rational: PATIENT MUST TRY/FAIL FORMULARY ALTERNATIVE - METFORMIN.        Appeal Information: N/A

## 2020-06-06 ENCOUNTER — MYC MEDICAL ADVICE (OUTPATIENT)
Dept: ENDOCRINOLOGY | Facility: CLINIC | Age: 45
End: 2020-06-06

## 2020-06-07 ENCOUNTER — MYC MEDICAL ADVICE (OUTPATIENT)
Dept: ENDOCRINOLOGY | Facility: CLINIC | Age: 45
End: 2020-06-07

## 2020-06-07 DIAGNOSIS — E10.9 TYPE 1 DIABETES MELLITUS WITHOUT COMPLICATION (H): Primary | ICD-10-CM

## 2020-06-07 DIAGNOSIS — E66.01 MORBID OBESITY (H): ICD-10-CM

## 2020-06-07 RX ORDER — METFORMIN HCL 500 MG
500 TABLET, EXTENDED RELEASE 24 HR ORAL
Qty: 14 TABLET | Refills: 1 | Status: SHIPPED | OUTPATIENT
Start: 2020-06-07 | End: 2020-06-15 | Stop reason: ALTCHOICE

## 2020-06-08 NOTE — TELEPHONE ENCOUNTER
Message noted.  Will try metformin medication to see if any benefit with his type of diabetes mellitus, then consider changing treatment to include Victoza medication.    DAVIN Heaton MD, MS  Endocrinology  [facility]

## 2020-06-13 ENCOUNTER — MYC MEDICAL ADVICE (OUTPATIENT)
Dept: ENDOCRINOLOGY | Facility: CLINIC | Age: 45
End: 2020-06-13

## 2020-06-15 DIAGNOSIS — E10.9 TYPE 1 DIABETES MELLITUS WITHOUT COMPLICATION (H): ICD-10-CM

## 2020-06-15 DIAGNOSIS — E66.01 MORBID OBESITY (H): ICD-10-CM

## 2020-06-15 RX ORDER — LIRAGLUTIDE 6 MG/ML
INJECTION SUBCUTANEOUS
Qty: 6 ML | Refills: 5 | Status: SHIPPED | OUTPATIENT
Start: 2020-06-15 | End: 2020-09-17

## 2020-06-16 ENCOUNTER — TELEPHONE (OUTPATIENT)
Dept: ENDOCRINOLOGY | Facility: CLINIC | Age: 45
End: 2020-06-16

## 2020-06-16 DIAGNOSIS — E10.9 TYPE 1 DIABETES MELLITUS WITHOUT COMPLICATION (H): Primary | ICD-10-CM

## 2020-06-16 DIAGNOSIS — E13.9 DIABETES MELLITUS TYPE 1.5, MANAGED AS TYPE 2 (H): ICD-10-CM

## 2020-07-01 ENCOUNTER — ALLIED HEALTH/NURSE VISIT (OUTPATIENT)
Dept: EDUCATION SERVICES | Facility: CLINIC | Age: 45
End: 2020-07-01
Payer: COMMERCIAL

## 2020-07-01 DIAGNOSIS — E10.9 TYPE 1 DIABETES MELLITUS WITHOUT COMPLICATION (H): Primary | ICD-10-CM

## 2020-07-01 PROCEDURE — G0108 DIAB MANAGE TRN  PER INDIV: HCPCS

## 2020-07-01 NOTE — Clinical Note
SAMANTHAI, basal rate increased at 12 am 1.270 --> 1.40 unit(s)/hr.     Also, he told me he tried metformin but did not tolerate it.  Massive diarrhea and horrible stomach pain.  SO then he stopped it about a week ago and bought victoza online (not sure from who) and he has been taking this for about a week. I did caution him against this from the standpoint of not knowing how it is stored or transported and if it is kept cold, etc.  Not sure if there is a way to try the PA again for the victoza to document his intolerance to Metformin to see if they approve it?  Let me know if you have questions.  I have a followup again in 4 weeks with him.     Xiomy Fitzgerald, RD LD CDE

## 2020-07-01 NOTE — PROGRESS NOTES
"    Diabetes Self-Management Education & Support    Presents for: Insulin Pump Review    Patient verbally consented to the telephone visit service today: yes        SUBJECTIVE/OBJECTIVE:  Presents for: Insulin Pump Review  Accompanied by: Self(telephone visit)  Diabetes education in the past 24mo: Yes  Focus of Visit: Insulin Pump  Type of Pump visit: Pump Review  Diabetes type: Type 1  Disease course: Stable  How confident are you filling out medical forms by yourself:: Not Assessed  Diabetes management related comments/concerns: Stopped taking the Metformin d/t side effects of it. Reports he knows Victoza works for him. Bought victoza from someone online. Has been taking 1 week  Transportation concerns: No  Difficulty affording diabetes medication?: No  Difficulty affording diabetes testing supplies?: No  Other concerns:: None  Cultural Influences/Ethnic Background:  American    Diabetes Symptoms & Complications:  Fatigue: No  Polydipsia: Sometimes  Polyphagia: No  Polyuria: No  Visual change: No  Slow healing wounds: No  Complications assessed today?: Yes  Autonomic neuropathy: No  CVA: No  Heart disease: No  Nephropathy: No  Peripheral neuropathy: No  Peripheral Vascular Disease: No  Retinopathy: No  Sexual dysfunction: No    Patient Problem List and Family Medical History reviewed for relevant medical history, current medical status, and diabetes risk factors.    Vitals:  There were no vitals taken for this visit.  Estimated body mass index is 50.05 kg/m  as calculated from the following:    Height as of 2/19/20: 1.689 m (5' 6.5\").    Weight as of 3/3/20: 142.8 kg (314 lb 12.8 oz).   Last 3 BP:   BP Readings from Last 3 Encounters:   02/19/20 136/86       History   Smoking Status     Never Smoker   Smokeless Tobacco     Never Used       Labs:  Lab Results   Component Value Date    A1C 8.5 05/28/2020     Lab Results   Component Value Date     05/28/2020     Lab Results   Component Value Date    LDL 78 " 05/28/2020     HDL Cholesterol   Date Value Ref Range Status   05/28/2020 62 >39 mg/dL Final   ]  GFR Estimate   Date Value Ref Range Status   05/28/2020 >90 >60 mL/min/[1.73_m2] Final     Comment:     Non  GFR Calc  Starting 12/18/2018, serum creatinine based estimated GFR (eGFR) will be   calculated using the Chronic Kidney Disease Epidemiology Collaboration   (CKD-EPI) equation.       GFR Estimate If Black   Date Value Ref Range Status   05/28/2020 >90 >60 mL/min/[1.73_m2] Final     Comment:      GFR Calc  Starting 12/18/2018, serum creatinine based estimated GFR (eGFR) will be   calculated using the Chronic Kidney Disease Epidemiology Collaboration   (CKD-EPI) equation.       Lab Results   Component Value Date    CR 0.77 05/28/2020     No results found for: MICROALBUMIN    Healthy Eating:  Healthy Eating Assessed Today: Yes  Cultural/Mormonism diet restrictions?: No  Meal planning/habits: Carb counting, Frequent snacking  How many times a week on average do you eat food made away from home (restaurant/take-out)?: 2  Meals include: Lunch, Dinner, Morning Snack, Afternoon Snack, Evening Snack  Breakfast: Hot chocolate (Ramer) OR skips  Lunch: Cajun turkey sandwich with cheese, Alexandria Amanuel low carb bread (19g/2 slices), apple, prepackaged chips  Dinner: leftovers (roast with potatoes) OR steak, fries OR breaded chicken, ww noodles with pasta sauce OR Subway OR Mc's OR sandwich OR bowl of cereal OR pizza   Snacks: Shinto snack bars, candy bars, cereal  Other: Little Amna cake for lows   Beverages: Water, Diet soda  Has patient met with a dietitian in the past?: No    Eating smaller meals. Does not eat junk food for dinner.  Reports he is a snacker though when working on his computer.     Being Active:  Being Active Assessed Today: Yes  Exercise:: Yes  How intense was your typical exercise? : Light (like stretching or slow walking)  Barrier to exercise:  None    Monitoring:  Monitoring Assessed Today: Yes  Did patient bring glucose meter to appointment? : Yes  Blood Glucose Meter: CGM  Times checking blood sugar at home (number): 4  Times checking blood sugar at home (per): Day  Blood glucose trend: Fluctuating    Taking Medications:  Diabetes Medication(s)     Biguanides       metFORMIN (GLUCOPHAGE) 1000 MG tablet    Take 1 tablet (1,000 mg) by mouth 2 times daily (with meals)    Insulin       insulin aspart (NOVOLOG VIAL) 100 UNITS/ML SC vial    Inject 70 units TID    Incretin Mimetic Agents (GLP-1 Receptor Agonists)       liraglutide (VICTOZA PEN) 18 MG/3ML solution    Inject 0.6 to 1.2 mg subcutaneous daily as directed        Current pump settings:        Taking Medication Assessed Today: Yes  Current Treatments: Insulin Pump  Dose schedule: Pre-breakfast, Pre-lunch, Pre-dinner, At bedtime, Other  Given by: Patient  Injection/Infusion sites: Abdomen  Problems taking diabetes medications regularly?: No  Diabetes medication side effects?: Yes    Problem Solving:  Problem Solving Assessed Today: Yes  Is the patient at risk for hypoglycemia?: Yes  Hypoglycemia Frequency: Weekly  Hypoglycemia Treatment: Other food  Does patient have glucagon emergency kit?: Yes  Is the patient at risk for DKA?: Yes  Does patient have ketone test strips?: No  Does patient have DKA prevention plan?: No  Does patient have severe weather/disaster plan for diabetes management?: No  Does patient have sick day plan for diabetes management?: No    Hypoglycemia Complications  Nocturnal hypoglycemia: Yes  Required assistance: Yes  Required glucagon injection: Yes    Reducing Risks:  Reducing Risks Assessed Today: No  Diabetes Risks: Sedentary Lifestyle, Ethnicity  CAD Risks: Diabetes Mellitus, Male sex, Obesity, Sedentary lifestyle  Has dilated eye exam at least once a year?: Yes  Sees dentist every 6 months?: Yes  Feet checked by healthcare provider in the last year?: No    Healthy  "Coping:  Healthy Coping Assessed Today: Yes  Emotional response to diabetes: Ready to learn  Informal Support system:: None  Stage of change: ACTION (Actively working towards change)  Support resources: None  Patient Activation Measure Survey Score:  No flowsheet data found.    Diabetes knowledge and skills assessment:   Patient is knowledgeable in diabetes management concepts related to: Healthy Eating, Being Active, Monitoring, Problem Solving, Reducing Risks and Healthy Coping    Patient needs further education on the following diabetes management concepts: Taking Medication    Based on learning assessment above, most appropriate setting for further diabetes education would be: Group class or Individual setting.      INTERVENTIONS:    REPORTS:                Insulin Pump Information  Insulin Pump Brand: Tandem  Infusion Set: Tandem  Does patient have an insulin multiple daily injection back-up plan?: No    Education provided today on:  AADE Self-Care Behaviors:  Taking Medication: storing injectable medications and the concerns with buying a medication such as a GLP1 online.     Education specific to insulin pump provided today on:   Helped him figure out how to change his BG target in his T connect account so it is no longer 120-130 (now ).  Reviewed his pump report with him and his time in range.      Opportunities for ongoing education and support in diabetes-self management were discussed.    Pt verbalized understanding of concepts discussed and recommendations provided today.       Education Materials Provided:  No new materials provided today    ASSESSMENT  Insurance wants him to try Metformin first before he can do Victoza. He started the Metformin but then stopped it about a week ago. Reports it was going \"terrible\". Made sure to eat with taking it and had very bad stomach pain. +diarrhea as well (\"massive\").  Also reports headaches with it.     Since stopping the Metformin, he has been taking " Victoza which he bought from someone online (unclear who or how).  Explained the dangers of this in that we don't know where it is coming from or how it has been stored.     His time in range was ~37% 2 weeks ago and that has improved to 67% this last week.  The main change has been stopping Metformin and starting Victoza.     Has been using control IQ with his pump and likes this.  Control IQ is often having to do increase his insulin overnight to help bring him down.    Glucose Patterns & Trends:  Was having some post meal hyperglycemia but this is improved with Victoza    Patient would benefit from increase in basal rate overnight slightly.    Changes made to pump settings:  basal rate: 12 am 1.270 --> 1.40 unit(s)/hr  Also changed his BG target to  in his Tconnect account so his TIR is accurate.     Changes made to sensor settings:   none    PLAN  See Patient Instructions for co-developed, patient-stated behavior change goals.  Follow up scheduled in 4 weeks.   AVS printed and provided to patient today. See Follow-Up section for recommended follow-up.    CARMINE Pereira CDE    Time Spent: 37 minutes  Encounter Type: Individual    Any diabetes medication dose changes were made via the CDE Protocol and Collaborative Practice Agreement with the patient's endocrinology provider. A copy of this encounter was shared with the provider.

## 2020-07-02 DIAGNOSIS — E10.9 TYPE 1 DIABETES MELLITUS WITHOUT COMPLICATION (H): Primary | ICD-10-CM

## 2020-07-03 NOTE — TELEPHONE ENCOUNTER
Recent diab.education and endo visits.  Novolog is listed as historical, as it has not been prescribed by [facility] provider. It IS on current medication list.  (last dispensed 6/14/20; previously authorized by Landy Dasilva)    To  to review and sign; future refills per RN protocol.  Thank you,  Catherine Latham RN on 7/3/2020 at 1:00 PM

## 2020-07-04 ENCOUNTER — MYC MEDICAL ADVICE (OUTPATIENT)
Dept: ENDOCRINOLOGY | Facility: CLINIC | Age: 45
End: 2020-07-04

## 2020-07-30 ENCOUNTER — PATIENT OUTREACH (OUTPATIENT)
Dept: EDUCATION SERVICES | Facility: CLINIC | Age: 45
End: 2020-07-30
Payer: COMMERCIAL

## 2020-07-30 DIAGNOSIS — E10.9 TYPE 1 DIABETES MELLITUS WITHOUT COMPLICATION (H): Primary | ICD-10-CM

## 2020-07-30 PROCEDURE — 97802 MEDICAL NUTRITION INDIV IN: CPT | Mod: 95 | Performed by: DIETITIAN, REGISTERED

## 2020-07-30 NOTE — PROGRESS NOTES
"  Diabetes Self-Management Education & Support    Presents for: Insulin Pump and CGM Review    Patient verbally consented to the telephone visit service today: yes        SUBJECTIVE/OBJECTIVE:  Presents for: Insulin Pump and CGM Review  Accompanied by: Self(telephone visit)  Diabetes education in the past 24mo: Yes  Focus of Visit: Insulin Pump  Type of Pump visit: Pump Review  Diabetes type: Type 1  Disease course: Stable  How confident are you filling out medical forms by yourself:: Not Assessed  Diabetes management related comments/concerns: Still taking the Victoza and is taking 1.2 mg/day now (buys online currently because insurance wanted him to try Metformin first but he did not tolerate this).   Transmitter  last night for Dexcom so is not wearing a sensor today.  Battery  in his pump the other night so he was quite high and > 400.     Transportation concerns: No  Difficulty affording diabetes medication?: No  Difficulty affording diabetes testing supplies?: No  Other concerns:: None  Cultural Influences/Ethnic Background:  American    Diabetes Symptoms & Complications:  Fatigue: No  Polydipsia: Sometimes  Polyphagia: No  Polyuria: No  Visual change: No  Slow healing wounds: No  Weight trend: Stable  Complications assessed today?: Yes  Autonomic neuropathy: No  CVA: No  Heart disease: No  Nephropathy: No  Peripheral neuropathy: No  Peripheral Vascular Disease: No  Retinopathy: No  Sexual dysfunction: No    Patient Problem List and Family Medical History reviewed for relevant medical history, current medical status, and diabetes risk factors.    Vitals:  There were no vitals taken for this visit.  Estimated body mass index is 50.05 kg/m  as calculated from the following:    Height as of 20: 1.689 m (5' 6.5\").    Weight as of 3/3/20: 142.8 kg (314 lb 12.8 oz).   Last 3 BP:   BP Readings from Last 3 Encounters:   20 136/86       History   Smoking Status     Never Smoker   Smokeless Tobacco "     Never Used       Labs:  Lab Results   Component Value Date    A1C 8.5 05/28/2020     Lab Results   Component Value Date     05/28/2020     Lab Results   Component Value Date    LDL 78 05/28/2020     HDL Cholesterol   Date Value Ref Range Status   05/28/2020 62 >39 mg/dL Final   ]  GFR Estimate   Date Value Ref Range Status   05/28/2020 >90 >60 mL/min/[1.73_m2] Final     Comment:     Non  GFR Calc  Starting 12/18/2018, serum creatinine based estimated GFR (eGFR) will be   calculated using the Chronic Kidney Disease Epidemiology Collaboration   (CKD-EPI) equation.       GFR Estimate If Black   Date Value Ref Range Status   05/28/2020 >90 >60 mL/min/[1.73_m2] Final     Comment:      GFR Calc  Starting 12/18/2018, serum creatinine based estimated GFR (eGFR) will be   calculated using the Chronic Kidney Disease Epidemiology Collaboration   (CKD-EPI) equation.       Lab Results   Component Value Date    CR 0.77 05/28/2020     No results found for: MICROALBUMIN    Healthy Eating:  Healthy Eating Assessed Today: Yes  Cultural/Episcopalian diet restrictions?: No  Meal planning/habits: Carb counting, Frequent snacking  How many times a week on average do you eat food made away from home (restaurant/take-out)?: 2  Meals include: Lunch, Dinner, Morning Snack, Afternoon Snack, Evening Snack  Breakfast: Hot chocolate (Donley) OR skips  Lunch: Cajun turkey sandwich with cheese, Alexandria Amanuel low carb bread (19g/2 slices), apple, prepackaged chips  Dinner: leftovers (roast with potatoes) OR steak, fries OR breaded chicken, ww noodles with pasta sauce OR Subway OR Mc's OR sandwich OR bowl of cereal OR pizza   Snacks: Adventism snack bars, candy bars, cereal  Other: Little Amna cake for lows   Beverages: Water, Diet soda  Has patient met with a dietitian in the past?: No    Being Active:  Being Active Assessed Today: Yes  Exercise:: Yes  How intense was your typical exercise? : Light (like  stretching or slow walking)  Barrier to exercise: None    Monitoring:  Monitoring Assessed Today: Yes  Did patient bring glucose meter to appointment? : Yes  Blood Glucose Meter: CGM  Times checking blood sugar at home (number): 4  Times checking blood sugar at home (per): Day  Blood glucose trend: Fluctuating    Taking Medications:  Diabetes Medication(s)     Biguanides       metFORMIN (GLUCOPHAGE) 1000 MG tablet    Take 1 tablet (1,000 mg) by mouth 2 times daily (with meals)    Insulin       insulin aspart (NOVOLOG VIAL) 100 UNITS/ML vial    Use with insulin pump, total daily dose approx 100 units    Incretin Mimetic Agents (GLP-1 Receptor Agonists)       liraglutide (VICTOZA PEN) 18 MG/3ML solution    Inject 0.6 to 1.2 mg subcutaneous daily as directed          Taking Medication Assessed Today: Yes  Current Treatments: Insulin Pump  Dose schedule: Pre-breakfast, Pre-lunch, Pre-dinner, At bedtime, Other  Given by: Patient  Injection/Infusion sites: Abdomen  Problems taking diabetes medications regularly?: No  Diabetes medication side effects?: Yes    Problem Solving:  Problem Solving Assessed Today: Yes  Is the patient at risk for hypoglycemia?: Yes  Hypoglycemia Frequency: Daily  Hypoglycemia Treatment: Candy  Does patient have glucagon emergency kit?: Yes  Is the patient at risk for DKA?: Yes  Does patient have ketone test strips?: No  Does patient have DKA prevention plan?: No  Does patient have severe weather/disaster plan for diabetes management?: No  Does patient have sick day plan for diabetes management?: No      Hypoglycemia Complications  Nocturnal hypoglycemia: Yes  Required assistance: Yes  Required glucagon injection: Yes    Reducing Risks:  Reducing Risks Assessed Today: No  Diabetes Risks: Sedentary Lifestyle, Ethnicity  CAD Risks: Diabetes Mellitus, Male sex, Obesity, Sedentary lifestyle  Has dilated eye exam at least once a year?: Yes  Sees dentist every 6 months?: Yes  Feet checked by healthcare  provider in the last year?: No    Healthy Coping:  Healthy Coping Assessed Today: Yes  Emotional response to diabetes: Ready to learn  Informal Support system:: None  Stage of change: ACTION (Actively working towards change)  Support resources: None  Patient Activation Measure Survey Score:  No flowsheet data found.    Diabetes knowledge and skills assessment:   Patient is knowledgeable in diabetes management concepts related to: Healthy Eating, Being Active, Monitoring, Taking Medication, Reducing Risks and Healthy Coping    Patient needs further education on the following diabetes management concepts: Problem Solving    Based on learning assessment above, most appropriate setting for further diabetes education would be: Group class or Individual setting.      INTERVENTIONS:    REPORTS:                Insulin Pump Information  Insulin Pump Brand: Tandem  Infusion Set: Tandem  Does patient have an insulin multiple daily injection back-up plan?: No    Education provided today on:  AADE Self-Care Behaviors:  Monitoring: reviewed his sensor data with him and TIR.     Education specific to insulin pump provided today on:   steps to take when blood glucose is about 250 mg/dL, educated on the T connect mobile joaquín and how it will connect to his pump so he no longer has to upload.     Opportunities for ongoing education and support in diabetes-self management were discussed.    Pt verbalized understanding of concepts discussed and recommendations provided today.       Education Materials Provided:  No new materials provided today    ASSESSMENT  Don is going low in the early evening (about 6 pm) when he eats dinner later even with the pump suspending his insulin and reducing his basal rate.     Current TIR is 58% the past 2 weeks.     Glucose Patterns & Trends:  post meal hyperglycemia following breakfast    Patient would benefit from reducing his basal rate in the last afternoon and strengthening his I:C at  breakfast.    Changes made to pump settings:  Basal at 4 pm reduced 2.30 --> 2.10 unit(s)/hr  I:C at 5 am 3 --> 2.7    Changes made to sensor settings:   none    PLAN  See Patient Instructions for co-developed, patient-stated behavior change goals.  Due for follow up with endo next month.  Will route to endo's MA to help schedule this.   Follow up scheduled with this writer in 2 months for pump review.  Pt to download T connect mobile joaquín to his phone and connect his pump so he does not have to upload in the future.   AVS printed and provided to patient today. See Follow-Up section for recommended follow-up.    CARMINE Pereira CDE    Time Spent: 25 minutes  Encounter Type: Individual    Any diabetes medication dose changes were made via the CDE Protocol and Collaborative Practice Agreement with the patient's endocrinology provider. A copy of this encounter was shared with the provider.

## 2020-08-14 ENCOUNTER — MYC MEDICAL ADVICE (OUTPATIENT)
Dept: ENDOCRINOLOGY | Facility: CLINIC | Age: 45
End: 2020-08-14

## 2020-08-14 DIAGNOSIS — R68.82 DECREASED LIBIDO: ICD-10-CM

## 2020-08-14 DIAGNOSIS — E10.9 TYPE 1 DIABETES MELLITUS WITHOUT COMPLICATION (H): Primary | ICD-10-CM

## 2020-08-20 ENCOUNTER — MYC MEDICAL ADVICE (OUTPATIENT)
Dept: ENDOCRINOLOGY | Facility: CLINIC | Age: 45
End: 2020-08-20

## 2020-08-20 DIAGNOSIS — R68.82 DECREASED LIBIDO: ICD-10-CM

## 2020-08-20 DIAGNOSIS — E10.9 TYPE 1 DIABETES MELLITUS WITHOUT COMPLICATION (H): ICD-10-CM

## 2020-08-20 LAB — HBA1C MFR BLD: 7.5 % (ref 0–5.6)

## 2020-08-20 PROCEDURE — 84403 ASSAY OF TOTAL TESTOSTERONE: CPT | Performed by: INTERNAL MEDICINE

## 2020-08-20 PROCEDURE — 84270 ASSAY OF SEX HORMONE GLOBUL: CPT | Performed by: INTERNAL MEDICINE

## 2020-08-20 PROCEDURE — 36415 COLL VENOUS BLD VENIPUNCTURE: CPT | Performed by: INTERNAL MEDICINE

## 2020-08-20 PROCEDURE — 83036 HEMOGLOBIN GLYCOSYLATED A1C: CPT | Performed by: INTERNAL MEDICINE

## 2020-08-24 LAB
SHBG SERPL-SCNC: 43 NMOL/L (ref 11–80)
TESTOST FREE SERPL-MCNC: 8.68 NG/DL (ref 4.7–24.4)
TESTOST SERPL-MCNC: 491 NG/DL (ref 240–950)

## 2020-08-24 NOTE — TELEPHONE ENCOUNTER
Dr. Heaton,    Since it hasn't been a full 3 months since previous prior authorization attempt, please write letter of medical necessity to file for appeal.    Chris Huff, CMA on 8/24/2020 at 9:12 AM

## 2020-08-25 NOTE — TELEPHONE ENCOUNTER
Dr Heaton - see below question from patient (and MyChart message 8/14) - testosterone levels were normal but asking about decreased sex drive - working on exercise/ weight loss and asking if any further recommendations?     Scarlett TOSCANO RN

## 2020-09-01 ENCOUNTER — OFFICE VISIT (OUTPATIENT)
Dept: OPHTHALMOLOGY | Facility: CLINIC | Age: 45
End: 2020-09-01
Attending: OPHTHALMOLOGY
Payer: COMMERCIAL

## 2020-09-01 DIAGNOSIS — H52.212 IRREGULAR ASTIGMATISM OF LEFT EYE: ICD-10-CM

## 2020-09-01 DIAGNOSIS — H01.01B ULCERATIVE BLEPHARITIS OF UPPER AND LOWER EYELIDS OF BOTH EYES: ICD-10-CM

## 2020-09-01 DIAGNOSIS — H01.01A ULCERATIVE BLEPHARITIS OF UPPER AND LOWER EYELIDS OF BOTH EYES: ICD-10-CM

## 2020-09-01 DIAGNOSIS — H25.813 COMBINED FORMS OF AGE-RELATED CATARACT OF BOTH EYES: ICD-10-CM

## 2020-09-01 DIAGNOSIS — H18.603 KERATOCONUS OF BOTH EYES: ICD-10-CM

## 2020-09-01 DIAGNOSIS — E10.69 TYPE 1 DIABETES MELLITUS WITH OTHER SPECIFIED COMPLICATION (H): Primary | ICD-10-CM

## 2020-09-01 PROCEDURE — G0463 HOSPITAL OUTPT CLINIC VISIT: HCPCS | Mod: ZF

## 2020-09-01 PROCEDURE — 92025 CPTRIZED CORNEAL TOPOGRAPHY: CPT | Mod: ZF | Performed by: OPHTHALMOLOGY

## 2020-09-01 PROCEDURE — 76519 ECHO EXAM OF EYE: CPT | Mod: ZF | Performed by: OPHTHALMOLOGY

## 2020-09-01 PROCEDURE — 92015 DETERMINE REFRACTIVE STATE: CPT | Mod: ZF

## 2020-09-01 ASSESSMENT — CUP TO DISC RATIO
OD_RATIO: 0.3
OS_RATIO: 0.3

## 2020-09-01 ASSESSMENT — REFRACTION_MANIFEST
OS_ADD: +1.50
OD_AXIS: 153
OS_SPHERE: -6.00
OD_CYLINDER: +1.00
OS_AXIS: 080
OD_SPHERE: -4.00
OD_ADD: +1.50
OS_CYLINDER: +1.00

## 2020-09-01 ASSESSMENT — CONF VISUAL FIELD
METHOD: COUNTING FINGERS
OS_NORMAL: 1
OD_NORMAL: 1

## 2020-09-01 ASSESSMENT — VISUAL ACUITY
OS_PH_CC: 20/20
OS_CC+: +2
OD_CC+: -3
CORRECTION_TYPE: CONTACTS
OS_CC: 20/30
OD_CC: 20/20
OS_PH_CC+: -3
METHOD: SNELLEN - LINEAR

## 2020-09-01 ASSESSMENT — TONOMETRY
OD_IOP_MMHG: 13
OS_IOP_MMHG: 14
IOP_METHOD: TONOPEN

## 2020-09-01 ASSESSMENT — EXTERNAL EXAM - RIGHT EYE: OD_EXAM: NORMAL

## 2020-09-01 NOTE — PROGRESS NOTES
"Chief Complaint(s) and History of Present Illness(es)     Diabetic Eye Exam     Vision: is stable    Associated symptoms: discharge.  Negative for tearing and flashes    Diabetes Type: Type 1    Blood Sugars: is controlled    Pain scale: 0/10              Comments     Pt notes last eye exam was 1 year ago at [facility].  Feels his vision BE is good - thinks he put his CTL is the opposite eyes   today by accident.  Complains of the last 2 weeks having more gunk in the corners of BE.  Denies any flashes, floaters, pain, pressure, irritation, and tearing.  Ocular meds: None  Type 1 DM- checks BS daily, was 123 this morning  Lab Results       Component                Value               Date                       A1C                      7.5                 08/20/2020                 A1C                      8.5                 05/28/2020              Uzma Gunn OT 9:58 AM September 1, 2020            Review of systems for the eyes was negative other than the pertinent positives/negatives listed in the HPI.      Assessment & Plan      Jose Apodaca is a 44 year old male with the following diagnoses:   1. Type 1 diabetes mellitus with other specified complication (H)    2. Ulcerative blepharitis of upper and lower eyelids of both eyes    3. Combined forms of age-related cataract of both eyes    4. Irregular astigmatism of left eye    5. Keratoconus of both eyes         Relocated one year ago to [place]. T1DM x 10 years, has insulin pump and latest A1C 7.5 8/20/20. No signs of diabetic retinopathy today on dilated eye exam.  Stressed good glycemic and hypertensive control  Monitor yearly     Left eye has always been his \"worse eye\" since childhood. No history of patching. Possible amblyopia in the left eye from uncorrected astigmatism.     Bilateral cortical cataracts left eye > right eye.   R/b/a of cataract surgery discussed, consent obtained, patient would like to proceed with cataract " surgery.   - IOL calcs and brianda today    Cataract, left eye > right eye   Visually significant both eyes   Risks, benefits and alternatives to cataract extraction/IOL implantation discussed; consent obtained.  Will phone patient to schedule surgery.     Special equipment/needs:     Anesthesia:Topical  Dilation:Good  Iris expansion:Not needed  Pseudoexfoliation: No pseudoexfoliation  Trypan Blue: yes, left eye   Goal -1.25 both eyes (call to confirm, uses computer 12+ hours per day - work/malena)  Left eye first  Limited visual potential reviewed, may still require contact lenses  Bilateral blepharitis and crusting. Start artificial tears and warm compresses daily.      Patient disposition:   Will phone to schedule cataract surgery    Emilie Nicole MD  Ophthalmology, PGY2    Attending Physician Attestation:  Complete documentation of historical and exam elements from today's encounter can be found in the full encounter summary report (not reduplicated in this progress note).  I personally obtained the chief complaint(s) and history of present illness.  I confirmed and edited as necessary the review of systems, past medical/surgical history, family history, social history, and examination findings as documented by others; and I examined the patient myself.  I personally reviewed the relevant tests, images, and reports as documented above.  I formulated and edited as necessary the assessment and plan and discussed the findings and management plan with the patient and family. .Attending Physician Image/Tesing Attestation: I personally reviewed the ophthalmic test(s) associated with this encounter, agree with the interpretation(s) as documented by the resident/fellow, and have edited the corresponding report(s) as necessary.   - Andre Jackson MD

## 2020-09-01 NOTE — NURSING NOTE
Chief Complaints and History of Present Illnesses   Patient presents with     Diabetic Eye Exam     Chief Complaint(s) and History of Present Illness(es)     Diabetic Eye Exam     Vision: is stable    Associated symptoms: discharge.  Negative for tearing and flashes    Diabetes Type: Type 1    Blood Sugars: is controlled    Pain scale: 0/10              Comments     Pt notes last eye exam was 1 year ago at [facility].  Feels his vision BE is good - thinks he put his CTL is the opposite eyes today by accident.  Complains of the last 2 weeks having more gunk in the corners of BE.  Denies any flashes, floaters, pain, pressure, irritation, and tearing.  Ocular meds: None  Type 1 DM- checks BS daily, was 123 this morning  Lab Results       Component                Value               Date                       A1C                      7.5                 08/20/2020                 A1C                      8.5                 05/28/2020              Uzma Gunn OT 9:58 AM September 1, 2020

## 2020-09-01 NOTE — LETTER
9/1/2020       RE: Jose Apodaca  [address] [facility] [address]     Dear Colleague,    Thank you for referring your patient, Jose Apodaca, to the EYE CLINIC at [facility]. Please see a copy of my visit note below.    Chief Complaint(s) and History of Present Illness(es)     Diabetic Eye Exam     Vision: is stable    Associated symptoms: discharge.  Negative for tearing and flashes    Diabetes Type: Type 1    Blood Sugars: is controlled    Pain scale: 0/10              Comments     Pt notes last eye exam was 1 year ago at [facility].  Feels his vision BE is good - thinks he put his CTL is the opposite eyes   today by accident.  Complains of the last 2 weeks having more gunk in the corners of BE.  Denies any flashes, floaters, pain, pressure, irritation, and tearing.  Ocular meds: None  Type 1 DM- checks BS daily, was 123 this morning  Lab Results       Component                Value               Date                       A1C                      7.5                 08/20/2020                 A1C                      8.5                 05/28/2020              Uzma Gunn OT 9:58 AM September 1, 2020            Review of systems for the eyes was negative other than the pertinent positives/negatives listed in the HPI.      Assessment & Plan      Jose Apodaca is a 44 year old male with the following diagnoses:   1. Type 1 diabetes mellitus with other specified complication (H)    2. Ulcerative blepharitis of upper and lower eyelids of both eyes    3. Combined forms of age-related cataract of both eyes    4. Irregular astigmatism of left eye    5. Keratoconus of both eyes         Relocated one year ago to [place]. T1DM x 10 years, has insulin pump and latest A1C 7.5 8/20/20. No signs of diabetic retinopathy today on dilated eye exam.  Stressed good glycemic and hypertensive control  Monitor yearly     Left eye has always been his  "\"worse eye\" since childhood. No history of patching. Possible amblyopia in the left eye from uncorrected astigmatism.     Bilateral cortical cataracts left eye > right eye.   R/b/a of cataract surgery discussed, consent obtained, patient would like to proceed with cataract surgery.   - IOL calcs and brianda today    Cataract, left eye > right eye   Visually significant both eyes   Risks, benefits and alternatives to cataract extraction/IOL implantation discussed; consent obtained.  Will phone patient to schedule surgery.     Special equipment/needs:     Anesthesia:Topical  Dilation:Good  Iris expansion:Not needed  Pseudoexfoliation: No pseudoexfoliation  Trypan Blue: yes, left eye   Goal -1.25 both eyes (call to confirm, uses computer 12+ hours per day - work/malena)  Left eye first  Limited visual potential reviewed, may still require contact lenses  Bilateral blepharitis and crusting. Start artificial tears and warm compresses daily.      Patient disposition:   Will phone to schedule cataract surgery    Emilie Nicole MD  Ophthalmology, PGY2    Attending Physician Attestation:  Complete documentation of historical and exam elements from today's encounter can be found in the full encounter summary report (not reduplicated in this progress note).  I personally obtained the chief complaint(s) and history of present illness.  I confirmed and edited as necessary the review of systems, past medical/surgical history, family history, social history, and examination findings as documented by others; and I examined the patient myself.  I personally reviewed the relevant tests, images, and reports as documented above.  I formulated and edited as necessary the assessment and plan and discussed the findings and management plan with the patient and family. .Attending Physician Image/Tesing Attestation: I personally reviewed the ophthalmic test(s) associated with this encounter, agree with the interpretation(s) as documented " by the resident/fellow, and have edited the corresponding report(s) as necessary.   - Andre Jackson MD       Again, thank you for allowing me to participate in the care of your patient.      Sincerely,    Andre Jackson MD

## 2020-09-02 ENCOUNTER — TELEPHONE (OUTPATIENT)
Dept: OPTOMETRY | Facility: CLINIC | Age: 45
End: 2020-09-02

## 2020-09-02 NOTE — TELEPHONE ENCOUNTER
SWP - per Dr. Jackson pt needs spare lenses until cataract surgery. He was previously in AV Lamar Astig.    Will get sample pair of AV Oasys Astig for pt based on yesterday's MR    Right eye: -3.00-0.75x060  Left eye: -4.50-0.75x170    If surgery delayed a while would recommend formal evaluation in office prior to any ordering/further trials.     He is leaving town for the weekend and would like to pick them up from clinic

## 2020-09-04 ENCOUNTER — TELEPHONE (OUTPATIENT)
Dept: OPHTHALMOLOGY | Facility: CLINIC | Age: 45
End: 2020-09-04

## 2020-09-04 NOTE — TELEPHONE ENCOUNTER
Called patient to schedule procedure with Dr. Jackson, there was no answer.  Left message with my direct line [phone].

## 2020-09-07 ENCOUNTER — MYC MEDICAL ADVICE (OUTPATIENT)
Dept: ENDOCRINOLOGY | Facility: CLINIC | Age: 45
End: 2020-09-07

## 2020-09-07 DIAGNOSIS — N52.9 ERECTILE DYSFUNCTION, UNSPECIFIED ERECTILE DYSFUNCTION TYPE: Primary | ICD-10-CM

## 2020-09-08 DIAGNOSIS — Z11.59 ENCOUNTER FOR SCREENING FOR OTHER VIRAL DISEASES: Primary | ICD-10-CM

## 2020-09-08 PROBLEM — H18.603 KERATOCONUS OF BOTH EYES: Status: ACTIVE | Noted: 2020-09-08

## 2020-09-08 PROBLEM — H25.813 COMBINED FORMS OF AGE-RELATED CATARACT OF BOTH EYES: Status: ACTIVE | Noted: 2020-09-08

## 2020-09-08 RX ORDER — SILDENAFIL CITRATE 20 MG/1
TABLET ORAL
Qty: 30 TABLET | Refills: 3 | Status: SHIPPED | OUTPATIENT
Start: 2020-09-08 | End: 2020-09-22

## 2020-09-08 NOTE — TELEPHONE ENCOUNTER
Spoke with patient to schedule left eye surgery with Dr. Andre Jackson.    Surgery was scheduled on 9/21/20 at [facility]  Patient will have H&P at [facility] on 9/17/20.     Patient is aware a COVID-19 test is needed before their procedure. The test should be with-in 4 days of their procedure.   Test Details: Date 9/17/20 Location  LAB.    Post-Op visit was scheduled on 9/21/20 and 10/13/20     Patient was advised a / is needed day of surgery. As well as, for 24 hours after their surgery procedure.    Surgery packet was mailed 9/8, patient has my direct contact information for any further questions [phone].

## 2020-09-08 NOTE — TELEPHONE ENCOUNTER
FUTURE VISIT INFORMATION      SURGERY INFORMATION:    Date: 9/21/20    [facility]    Surgeon:  Andre Jackson MD     Anesthesia Type:  Combined MAC with Topical     Procedure: PHACOEMULSIFICATION, CATARACT, WITH INTRAOCULAR LENS IMPLANT     Consult: ov 9/1    RECORDS REQUESTED FROM:       Primary Care Provider: Radha Milligan MD- [place]    Most recent EKG+ Tracing: 10/25/11- [facility]

## 2020-09-08 NOTE — TELEPHONE ENCOUNTER
Spoke with patient to schedule right eye surgery with Dr. Andre Jackson.    Surgery was scheduled on 9/28/20 at [facility]  Patient will have H&P at [facility] on 9/17/20.     Patient is aware a COVID-19 test is needed before their procedure. The test should be with-in 4 days of their procedure.   Test Details: Date 9/24/20 Location  LAB.    Post-Op visit was scheduled on 9/28/20 and 10/13/20.    Patient was advised a / is needed day of surgery. As well as, for 24 hours after their surgery procedure.     Surgery packet was mailed 9/8, patient has my direct contact information for any further questions [phone].

## 2020-09-08 NOTE — TELEPHONE ENCOUNTER
Dr Heaton - see below     Response to prior message regarding trying an ED med    Scarlett TOSCANO RN

## 2020-09-17 ENCOUNTER — ANESTHESIA EVENT (OUTPATIENT)
Dept: SURGERY | Facility: AMBULATORY SURGERY CENTER | Age: 45
End: 2020-09-17

## 2020-09-17 ENCOUNTER — OFFICE VISIT (OUTPATIENT)
Dept: SURGERY | Facility: CLINIC | Age: 45
End: 2020-09-17
Payer: COMMERCIAL

## 2020-09-17 ENCOUNTER — APPOINTMENT (OUTPATIENT)
Dept: LAB | Facility: CLINIC | Age: 45
End: 2020-09-17
Payer: COMMERCIAL

## 2020-09-17 ENCOUNTER — PRE VISIT (OUTPATIENT)
Dept: SURGERY | Facility: CLINIC | Age: 45
End: 2020-09-17

## 2020-09-17 ENCOUNTER — MYC MEDICAL ADVICE (OUTPATIENT)
Dept: ENDOCRINOLOGY | Facility: CLINIC | Age: 45
End: 2020-09-17

## 2020-09-17 VITALS
DIASTOLIC BLOOD PRESSURE: 83 MMHG | OXYGEN SATURATION: 98 % | HEIGHT: 67 IN | HEART RATE: 91 BPM | SYSTOLIC BLOOD PRESSURE: 123 MMHG | WEIGHT: 315 LBS | BODY MASS INDEX: 49.44 KG/M2 | TEMPERATURE: 99.2 F | RESPIRATION RATE: 24 BRPM

## 2020-09-17 DIAGNOSIS — N52.9 ERECTILE DYSFUNCTION, UNSPECIFIED ERECTILE DYSFUNCTION TYPE: ICD-10-CM

## 2020-09-17 DIAGNOSIS — E66.01 MORBID OBESITY (H): ICD-10-CM

## 2020-09-17 DIAGNOSIS — Z11.59 ENCOUNTER FOR SCREENING FOR OTHER VIRAL DISEASES: ICD-10-CM

## 2020-09-17 DIAGNOSIS — E10.9 TYPE 1 DIABETES MELLITUS WITHOUT COMPLICATION (H): ICD-10-CM

## 2020-09-17 DIAGNOSIS — Z01.818 PREOP EXAMINATION: Primary | ICD-10-CM

## 2020-09-17 RX ORDER — LIRAGLUTIDE 6 MG/ML
INJECTION SUBCUTANEOUS
Qty: 6 ML | Refills: 5 | Status: SHIPPED | OUTPATIENT
Start: 2020-09-17 | End: 2020-10-13

## 2020-09-17 RX ORDER — SILDENAFIL 100 MG/1
TABLET, FILM COATED ORAL
Qty: 15 TABLET | Refills: 3 | Status: SHIPPED | OUTPATIENT
Start: 2020-09-17 | End: 2021-04-20

## 2020-09-17 ASSESSMENT — MIFFLIN-ST. JEOR: SCORE: 2300.34

## 2020-09-17 ASSESSMENT — LIFESTYLE VARIABLES: TOBACCO_USE: 0

## 2020-09-17 ASSESSMENT — PAIN SCALES - GENERAL: PAINLEVEL: NO PAIN (0)

## 2020-09-17 NOTE — PATIENT INSTRUCTIONS
Preparing for Your Surgery      Name:  Jose Apodaca   MRN:  [id number]   :  1975   Today's Date:  2020         Arriving for surgery:  Surgery date:  20  Arrival time:  11 am    Restrictions due to COVID 19:  Allowed one visitor with you.  Visitor must wear a mask.  Visitor must be over the age of 18 years old.  Visitor must not be ill.   parking is not available     Please come to:    [facility]  [address] [place] [id number]    Please check in on the 5th floor at the Ambulatory Surgery [facility]     What can I eat or drink?    -  You may eat and drink normally until 8 hours before surgery. (Until 4:30 am)  -  You may have clear liquids up to 4 hours before surgery. (Until 8:30 am)  Examples of clear liquids:  Water  Clear broth  Juices (apple, white grape, white cranberry  and cider) without pulp  Noncarbonated, powder based beverages  (lemonade and Ld-Aid)  Sodas (Sprite, 7-Up, ginger ale and seltzer)  Coffee or tea (without milk or cream)  Gatorade    --No alcohol for at least 24 hours before surgery    Which medicines can I take?    Hold aspirin for 7 days before surgery.   Hold multivitamins for 7 days before surgery.  Hold supplements for 7 days before surgery.  Hold Ibuprofen (Advil, Motrin) for 1 day before surgery--unless otherwise directed by surgeon.  Hold Naproxen (Aleve) for 4 days before surgery.    Hold Sildenafil (Revatio) the day prior to surgery.    -  DO NOT take the following medications the day of surgery:  Liraglutide (Victoza),     -  PLEASE TAKE the following medications the day of surgery   Fexofenadine (Allegra), Fluticasone (Flonase) nasal spray,   Acetaminophen (Tylenol) if needed    How do I prepare myself?  -  Bring CPAP.  - Please take 2 showers before surgery using Scrubcare or Hibiclens soap.    Use this soap only from the neck to your toes.     Leave the soap on your skin for one minute--then rinse thoroughly.       You may use your own shampoo and conditioner; no other hair products.   - Please remove all jewelry and body piercings.  - No lotions, deodorants or fragrance.  - Bring your ID and insurance card.        - All patients are required to have a Covid-19 test within 4 days of surgery/procedure.      -Patients will be contacted by the [facility] scheduling team within 1 week of surgery to make an appointment.      - Patients may call the Scheduling team at [phone] if they have not been scheduled within 4 days of  surgery.      ALL PATIENTS ARE REQUIRED TO HAVE A RESPONSIBLE ADULT TO DRIVE AND BE IN ATTENDANCE WITH THEM FOR 24 HOURS FOLLOWING SURGERY       Questions or Concerns:    -For questions regarding the day of surgery please contact the Ambulatory Surgery Center at [phone].    -If you have health changes between today and your surgery please contact your surgeon.     For questions after surgery please call your surgeons office.    AFTER YOUR SURGERY  Breathing exercises   Breathing exercises help you recover faster. Take deep breaths and let the air out slowly. This will:     Help you wake up after surgery.    Help prevent complications like pneumonia.  Preventing complications will help you go home sooner.   Nausea and vomiting   You may feel sick to your stomach after surgery; if so, let your nurse know.    Pain control:  After surgery, you may have pain. Our goal is to help you manage your pain. Pain medicine will help you feel comfortable enough to do activities that will help you heal.  These activities may include breathing exercises, walking and physical therapy.   To help your health care team treat your pain we will ask: 1) If you have pain  2) where it is located 3) describe your pain in your words  Methods of pain control include medications given by mouth, vein or by nerve block for some surgeries.  Sequential Compression Device (SCD):  You may need to wear SCD S (also called pneumo  boots)on your legs or feet. These are wraps connected to a machine that pumps in air and releases it. The repeated pumping helps prevent blood clots from forming.

## 2020-09-17 NOTE — H&P
Pre-Operative H & P     CC:  Preoperative exam to assess for increased cardiopulmonary risk while undergoing surgery and anesthesia.    Date of Encounter: 9/17/2020  Primary Care Physician:  Radha Milligan  Associated Diagnosis: Keratoconus of both eyes, and Combined forms of age-related cataract of both eyes    HPI  Jose Apodaca is a 44 year old male who presents for pre-operative H & P in preparation for PHACOEMULSIFICATION, CATARACT, WITH INTRAOCULAR LENS IMPLANT, left eye on 9/21/20 and right eye on 9/28/20 with Dr. Jackson on at [facility]. MAC with topical.    This is a 44 year old male with PMH significant for obesity, PHUONG on CPAP, allergic rhinitis, and Type 1 DM.  No diabetic retinopathy on most recent exam.  Patient has bilateral cortical cataracts Left eye> Right eye.  He denies any flashes, floaters, pain or pressure of the eyes.  The above procedures are now planned.     History is obtained from the patient and medical record.    Past Medical History  Past Medical History:   Diagnosis Date     Cataracts, bilateral      DKA (diabetic ketoacidoses) (H)      Morbid obesity (H)      PHUONG (obstructive sleep apnea)      T1DM (type 1 diabetes mellitus) (H)        Past Surgical History  Past Surgical History:   Procedure Laterality Date     COLONOSCOPY       EXTRACTION(S) DENTAL       NECK SURGERY  2010    to remove a cyst       Hx of Blood transfusions/reactions: denies     Hx of abnormal bleeding or anti-platelet use: denies    Menstrual history: No LMP for male patient.:     Steroid use in the last year: denies    Personal or FH with difficulty with Anesthesia:  denies    Prior to Admission Medications  Current Outpatient Medications   Medication Sig Dispense Refill     fexofenadine (ALLEGRA) 180 MG tablet Take 1 tablet (180 mg) by mouth daily (Patient taking differently: Take 180 mg by mouth every morning ) 90 tablet 3     fluticasone (FLONASE) 50 MCG/ACT nasal spray Spray 1  spray into both nostrils daily (Patient taking differently: Spray 1 spray into both nostrils every morning ) 16 g 11     insulin aspart (NOVOLOG VIAL) 100 UNITS/ML vial Use with insulin pump, total daily dose approx 100 units 30 mL 11     Continuous Blood Gluc  (DEXCOM G6 ) TESHA Use to read blood sugars as per 's instructions. 1 each 0     Continuous Blood Gluc Sensor (DEXCOM G6 SENSOR) MISC Change every 10 days. 3 each 11     Continuous Blood Gluc Transmit (DEXCOM G6 TRANSMITTER) MISC Change every 3 months. 1 each 3     insulin pen needle (B-D U/F) 31G X 5 MM miscellaneous Use 1 pen needles daily or as directed. 100 each 3     liraglutide (VICTOZA PEN) 18 MG/3ML solution Inject 0.6 to 1.2 mg subcutaneous daily as directed (Patient not taking: Reported on 9/17/2020) 6 mL 5     sildenafil (REVATIO) 20 MG tablet Take 2-3 tablets by mouth pre intercourse, as directed (Patient not taking: Reported on 9/17/2020) 30 tablet 3       Allergies  Allergies   Allergen Reactions     Cats      Seasonal Allergies      grass       Social History  Social History     Socioeconomic History     Marital status:      Spouse name: Not on file     Number of children: Not on file     Years of education: Not on file     Highest education level: Not on file   Occupational History     Not on file   Social Needs     Financial resource strain: Not on file     Food insecurity     Worry: Not on file     Inability: Not on file     Transportation needs     Medical: Not on file     Non-medical: Not on file   Tobacco Use     Smoking status: Never Smoker     Smokeless tobacco: Never Used   Substance and Sexual Activity     Alcohol use: Never     Frequency: Never     Drug use: Never     Sexual activity: Not on file   Lifestyle     Physical activity     Days per week: Not on file     Minutes per session: Not on file     Stress: Not on file   Relationships     Social connections     Talks on phone: Not on file     Gets  together: Not on file     Attends Buddhism service: Not on file     Active member of club or organization: Not on file     Attends meetings of clubs or organizations: Not on file     Relationship status: Not on file     Intimate partner violence     Fear of current or ex partner: Not on file     Emotionally abused: Not on file     Physically abused: Not on file     Forced sexual activity: Not on file   Other Topics Concern     Not on file   Social History Narrative     Not on file       Family History  Family History   Problem Relation Age of Onset     Diabetes Maternal Grandmother      Diabetes Paternal Grandmother      Glaucoma Brother      Macular Degeneration No family hx of          Anesthesia Evaluation     . Pt has had prior anesthetic.     No history of anesthetic complications          ROS/MED HX  The complete review of systems is negative other than noted in the HPI or here.   ENT/Pulmonary:     (+)sleep apnea, allergic rhinitis, uses CPAP , . .   (-) tobacco use and asthma   Neurologic:  - neg neurologic ROS     Cardiovascular:  - neg cardiovascular ROS   (+) ----. : . . . :. . No previous cardiac testing       METS/Exercise Tolerance:  >4 METS   Hematologic:  - neg hematologic  ROS       Musculoskeletal:  - neg musculoskeletal ROS       GI/Hepatic:     (+) Other GI/Hepatic h/o anal fistula      Renal/Genitourinary:  - ROS Renal section negative       Endo:     (+) type I DM, Last HgA1c: 7.5 date: 8/20/20 Using insulin Obesity, .      Psychiatric:  - neg psychiatric ROS       Infectious Disease:  - neg infectious disease ROS       Malignancy:      - no malignancy   Other:    (+) no H/O Chronic Pain,           PHYSICAL EXAM:   Mental Status/Neuro: A/A/O; Age Appropriate   Airway: Facies: Thick Neck  Mallampati: II  Mouth/Opening: Full  TM distance: > 6 cm  Neck ROM: Full   Respiratory: Auscultation: CTAB     Resp. Rate: Normal     Resp. Effort: Normal      CV: Rhythm: Regular  Rate: Age  "appropriate  Heart: Normal Sounds  Edema: None   Comments:      Dental: Normal Dentition              Temp: 99.2  F (37.3  C) Temp src: Oral BP: 123/83 Pulse: 91   Resp: 24 SpO2: 98 %         324 lbs 0 oz  5' 6.5\"   Body mass index is 51.51 kg/m .       Physical Exam  Constitutional: Awake, alert, cooperative, no apparent distress, and appears stated age.  Eyes: Pupils equal, round and reactive to light, extra ocular muscles intact, sclera clear, conjunctiva normal.  HENT: Normocephalic, oral pharynx with moist mucus membranes, good dentition. Very thick neck.  No goiter appreciated.   Respiratory: Clear to auscultation bilaterally, no crackles or wheezing.  Cardiovascular: Regular rate and rhythm, normal S1 and S2, and no murmur noted.  Carotids +2, no bruits. No edema. Palpable pulses to radial  DP and PT arteries.   GI:not assessed  Lymph/Hematologic: No cervical lymphadenopathy and no supraclavicular lymphadenopathy.  Genitourinary:  deferred  Skin: Warm and dry.  No rashes at anticipated surgical site.   Musculoskeletal: Full ROM of neck. There is no redness, warmth, or swelling of the joints. Gross motor strength is normal.    Neurologic: Awake, alert, oriented to name, place and time. Cranial nerves II-XII are grossly intact. Gait is normal.   Neuropsychiatric: Calm, cooperative. Normal affect.     Labs: (personally reviewed)  8/20/2020  Hemoglobin A1C  0 - 5.6 %  7.5High          Outside records reviewed from: care everywhere    ASSESSMENT and PLAN  \"Don\" Paulie is a 44 year old male scheduled for PHACOEMULSIFICATION, CATARACT, WITH INTRAOCULAR LENS IMPLANT, left eye on 9/21/20 and right eye on 9/28/20 by Dr. Jackson in treatment of Keratoconus of both eyes, and Combined forms of age-related cataract of both eyes.  PAC referral for risk assessment and optimization for anesthesia with comorbid conditions of PHUONG, allergic rhinitis, and type 1 DM:    Pre-operative considerations:  1.  Cardiac:  Functional " status- METS >4.  Low risk surgery with 0.9% (RCRI #) risk of major adverse cardiac event. Denies cardiac history or symptoms.  2.  Pulm:  Airway feasible.  PHUONG risk: Known PHUONG, uses CPAP consistently.  Never smoker.  3.  GI:  Risk of PONV score = 1.  If > 2, anti-emetic intervention recommended.  4.  Endo: Type 1 DM, has insulin pump with Novolog.   A1c of 7.5% on 8/20/20.  Obesity, BMI ~ 51  5.  ENT: allergic rhinitis, continue Allegra and flonase, will use DOS      VTE risk: 0.5%    Patient is optimized and is acceptable candidate for the proposed procedure.  No further diagnostic evaluation is needed.     Note: I contacted Dr. Sushant Tamez with anesthesia to confirm patients with BMI >45 can have cataract surgery at [facility].  He confirmed yes.      Alexandria Trejo PA-C  Preoperative Assessment Center  [facility]  Phone: [phone]  Fax: [phone]

## 2020-09-17 NOTE — ANESTHESIA PREPROCEDURE EVALUATION
"Anesthesia Pre-Procedure Evaluation    Patient: Jose Apodaca   MRN:     [id number] Gender:   male   Age:    44 year old :      1975        Preoperative Diagnosis: * No surgery found *        LABS:  CBC: No results found for: WBC, HGB, HCT, PLT  BMP:   Lab Results   Component Value Date     2020    POTASSIUM 4.2 2020    CHLORIDE 107 2020    CO2 25 2020    BUN 10 2020    CR 0.77 2020     (H) 2020     COAGS: No results found for: PTT, INR, FIBR  POC: No results found for: BGM, HCG, HCGS  OTHER:   Lab Results   Component Value Date    A1C 7.5 (H) 2020    MICK 8.5 2020    TSH 1.16 2020        Preop Vitals    BP Readings from Last 3 Encounters:   20 136/86    Pulse Readings from Last 3 Encounters:   20 87      Resp Readings from Last 3 Encounters:   No data found for Resp    SpO2 Readings from Last 3 Encounters:   20 97%      Temp Readings from Last 1 Encounters:   20 97.6  F (36.4  C) (Tympanic)    Ht Readings from Last 1 Encounters:   20 1.689 m (5' 6.5\")      Wt Readings from Last 1 Encounters:   20 142.8 kg (314 lb 12.8 oz)    Estimated body mass index is 50.05 kg/m  as calculated from the following:    Height as of 20: 1.689 m (5' 6.5\").    Weight as of 3/3/20: 142.8 kg (314 lb 12.8 oz).     LDA:        Past Medical History:   Diagnosis Date     Cataracts, bilateral      DKA (diabetic ketoacidoses) (H)      Morbid obesity (H)      PHUONG (obstructive sleep apnea)      T1DM (type 1 diabetes mellitus) (H)       No past surgical history on file.   No Known Allergies     Anesthesia Evaluation     . Pt has had prior anesthetic.     No history of anesthetic complications          ROS/MED HX    ENT/Pulmonary:     (+)sleep apnea, allergic rhinitis, uses CPAP , . .   (-) tobacco use and asthma   Neurologic:  - neg neurologic ROS     Cardiovascular:  - neg cardiovascular ROS   (+) ----. : . . . :. . No " previous cardiac testing       METS/Exercise Tolerance:  >4 METS   Hematologic:  - neg hematologic  ROS       Musculoskeletal:  - neg musculoskeletal ROS       GI/Hepatic:     (+) Other GI/Hepatic h/o anal fistula      Renal/Genitourinary:  - ROS Renal section negative       Endo:     (+) type I DM, Last HgA1c: 7.5 date: 8/20/20 Using insulin Obesity, .      Psychiatric:  - neg psychiatric ROS       Infectious Disease:  - neg infectious disease ROS       Malignancy:      - no malignancy   Other:    (+) no H/O Chronic Pain,                       PHYSICAL EXAM:   Mental Status/Neuro: A/A/O; Age Appropriate   Airway: Facies: Thick Neck  Mallampati: II  Mouth/Opening: Full  TM distance: > 6 cm  Neck ROM: Full   Respiratory: Auscultation: CTAB     Resp. Rate: Normal     Resp. Effort: Normal      CV: Rhythm: Regular  Rate: Age appropriate  Heart: Normal Sounds  Edema: None   Comments:      Dental: Normal Dentition                Assessment:   ASA SCORE: 3    H&P: History and physical reviewed and following examination; no interval change.   Smoking Status:  Non-Smoker/Unknown   NPO Status: NPO Appropriate     Plan:   Anes. Type:  General   Pre-Medication: None   Induction:  IV (Standard)   Airway: ETT; Oral   Access/Monitoring: PIV   Maintenance: Balanced     Postop Plan:   Postop Pain: Opioids  Postop Sedation/Airway: Not planned  Disposition: Outpatient     PONV Management:   Adult Risk Factors:, Non-Smoker, Postop Opioids   Prevention: Ondansetron     CONSENT: Direct conversation   Plan and risks discussed with: Patient   Blood Products: N/a                PAC Discussion and Assessment    ASA Classification: 2  Case is suitable for: ASC  Anesthetic techniques and relevant risks discussed: MAC with GA as backup  Invasive monitoring and risk discussed: No  Types:   Possibility and Risk of blood transfusion discussed: No  NPO instructions given:   Additional anesthetic preparation and risks discussed:   Needs early  "admission to pre-op area:   Other:     PAC Resident/NP Anesthesia Assessment:  \"Don\" Paulie is a 44 year old male scheduled for PHACOEMULSIFICATION, CATARACT, WITH INTRAOCULAR LENS IMPLANT, left eye on 9/21/20 and right eye on 9/28/20 by Dr. Jackson in treatment of Keratoconus of both eyes, and Combined forms of age-related cataract of both eyes.  PAC referral for risk assessment and optimization for anesthesia with comorbid conditions of PHUONG, allergic rhinitis, and type 1 DM:    Pre-operative considerations:  1.  Cardiac:  Functional status- METS >4.  Low risk surgery with 0.9% (RCRI #) risk of major adverse cardiac event. Denies cardiac history or symptoms.  2.  Pulm:  Airway feasible.  PHUONG risk: Known PHUONG, uses CPAP consistently.  Never smoker.  3.  GI:  Risk of PONV score = 1.  If > 2, anti-emetic intervention recommended.  4.  Endo: Type 1 DM, has insulin pump with Novolog.   A1c of 7.5% on 8/20/20.    5.  ENT: allergic rhinitis, continue Allegra and flonase, will use DOS      VTE risk: 0.5%    Patient is optimized and is acceptable candidate for the proposed procedure.  No further diagnostic evaluation is needed.       **For further details of assessment, testing, and physical exam please see H and P completed on same date.          Alexandria Trejo PA-C, [facility]      Reviewed and Signed by PAC Mid-Level Provider/Resident  Mid-Level Provider/Resident: Alexandria Trejo  Date: 9/17/2020  Time:     Attending Anesthesiologist Anesthesia Assessment:        Anesthesiologist:   Date:   Time:   Pass/Fail:   Disposition:     PAC Pharmacist Assessment:        Pharmacist:   Date:   Time:    Alexandria Trejo PA-C  "

## 2020-09-17 NOTE — TELEPHONE ENCOUNTER
Dr Seth - see below message from patient - still unable to fill Victoza     Sildenafil was sent 9/8/2020 #30 with 3 refills 9/8/2020 [facility] #[id number] - [place], [place] - [address] [facility] [address] - should have refills     per prior messages an appeal letter was needed for the Victoza    Please advise     Scarlett TOSCANO RN

## 2020-09-18 ENCOUNTER — OFFICE VISIT (OUTPATIENT)
Dept: FAMILY MEDICINE | Facility: CLINIC | Age: 45
End: 2020-09-18
Payer: COMMERCIAL

## 2020-09-18 VITALS
HEIGHT: 67 IN | OXYGEN SATURATION: 98 % | TEMPERATURE: 97.1 F | WEIGHT: 315 LBS | BODY MASS INDEX: 49.44 KG/M2 | DIASTOLIC BLOOD PRESSURE: 78 MMHG | SYSTOLIC BLOOD PRESSURE: 122 MMHG | HEART RATE: 91 BPM

## 2020-09-18 DIAGNOSIS — Z12.5 SCREENING FOR PROSTATE CANCER: ICD-10-CM

## 2020-09-18 DIAGNOSIS — Z23 NEED FOR PROPHYLACTIC VACCINATION AND INOCULATION AGAINST INFLUENZA: ICD-10-CM

## 2020-09-18 DIAGNOSIS — K60.30 ANAL FISTULA: ICD-10-CM

## 2020-09-18 DIAGNOSIS — Z00.00 ROUTINE HISTORY AND PHYSICAL EXAMINATION OF ADULT: Primary | ICD-10-CM

## 2020-09-18 DIAGNOSIS — Z86.0100 HISTORY OF COLONIC POLYPS: ICD-10-CM

## 2020-09-18 LAB
SARS-COV-2 RNA SPEC QL NAA+PROBE: NOT DETECTED
SPECIMEN SOURCE: NORMAL

## 2020-09-18 PROCEDURE — 90686 IIV4 VACC NO PRSV 0.5 ML IM: CPT | Performed by: INTERNAL MEDICINE

## 2020-09-18 PROCEDURE — 90471 IMMUNIZATION ADMIN: CPT | Performed by: INTERNAL MEDICINE

## 2020-09-18 PROCEDURE — 99396 PREV VISIT EST AGE 40-64: CPT | Mod: 25 | Performed by: INTERNAL MEDICINE

## 2020-09-18 ASSESSMENT — MIFFLIN-ST. JEOR: SCORE: 2318.26

## 2020-09-18 NOTE — PROGRESS NOTES
SUBJECTIVE:   CC: Jose Apodaca is an 44 year old male who presents for preventative health visit.       Patient has been advised of split billing requirements and indicates understanding: Yes  Healthy Habits:     Getting at least 3 servings of Calcium per day:  NO    Bi-annual eye exam:  Yes    Dental care twice a year:  Yes    Sleep apnea or symptoms of sleep apnea:  Daytime drowsiness, Excessive snoring and Sleep apnea    Diet:  Carbohydrate counting    Frequency of exercise:  2-3 days/week    Duration of exercise:  45-60 minutes    Taking medications regularly:  Yes    Medication side effects:  None    PHQ-2 Total Score: 0    Additional concerns today:  No      Today's PHQ-2 Score:   PHQ-2 ( 1999 [employer]) 9/18/2020   Q1: Little interest or pleasure in doing things 0   Q2: Feeling down, depressed or hopeless 0   PHQ-2 Score 0   Q1: Little interest or pleasure in doing things Not at all   Q2: Feeling down, depressed or hopeless Not at all   PHQ-2 Score 0       Abuse: Current or Past(Physical, Sexual or Emotional)- No  Do you feel safe in your environment? Yes        Social History     Tobacco Use     Smoking status: Never Smoker     Smokeless tobacco: Never Used   Substance Use Topics     Alcohol use: Never     Frequency: Never     If you drink alcohol do you typically have >3 drinks per day or >7 drinks per week? No    Alcohol Use 9/18/2020   Prescreen: >3 drinks/day or >7 drinks/week? Not Applicable       Last PSA: No results found for: PSA    Reviewed orders with patient. Reviewed health maintenance and updated orders accordingly - Yes  Lab work is in process    Reviewed and updated as needed this visit by clinical staff  Tobacco  Allergies  Meds         Reviewed and updated as needed this visit by Provider        Past Medical History:   Diagnosis Date     Cataracts, bilateral      DKA (diabetic ketoacidoses) (H)      Morbid obesity (H)      PHUONG (obstructive sleep apnea)      T1DM (type 1 diabetes  "mellitus) (H)         Review of Systems  CONSTITUTIONAL: NEGATIVE for fever, chills, change in weight  INTEGUMENTARY/SKIN: NEGATIVE for worrisome rashes, moles or lesions  EYES: NEGATIVE for vision changes or irritation  ENT: NEGATIVE for ear, mouth and throat problems  RESP: NEGATIVE for significant cough or SOB  CV: NEGATIVE for chest pain, palpitations or peripheral edema  GI: NEGATIVE for nausea, abdominal pain, heartburn, or change in bowel habits   male: negative for dysuria, hematuria, decreased urinary stream, erectile dysfunction, urethral discharge  MUSCULOSKELETAL: NEGATIVE for significant arthralgias or myalgia  NEURO: NEGATIVE for weakness, dizziness or paresthesias  PSYCHIATRIC: NEGATIVE for changes in mood or affect  Endocrine: positive for Type 1 Diabetes     OBJECTIVE:   /78 (BP Location: Right arm, Patient Position: Sitting, Cuff Size: Adult Large)   Pulse 91   Temp 97.1  F (36.2  C) (Temporal)   Ht 1.702 m (5' 7\")   Wt 148 kg (326 lb 3.2 oz)   SpO2 98%   BMI 51.09 kg/m      Physical Exam  GENERAL: alert and no distress  EYES: Eyes grossly normal to inspection, PERRL and conjunctivae and sclerae normal  HENT: ear canals and TM's normal, nose and mouth without ulcers or lesions  NECK: no adenopathy, no asymmetry, masses, or scars and thyroid normal to palpation  RESP: lungs clear to auscultation - no rales, rhonchi or wheezes  CV: regular rate and rhythm, normal S1 S2, no S3 or S4, no murmur, click or rub, no peripheral edema and peripheral pulses strong  ABDOMEN: soft, nontender, no hepatosplenomegaly, no masses and bowel sounds normal  MS: no gross musculoskeletal defects noted, no edema  SKIN: no suspicious lesions or rashes  NEURO: Normal strength and tone, mentation intact and speech normal  PSYCH: mentation appears normal, affect normal/bright    Diagnostic Test Results:  Labs reviewed in Epic    ASSESSMENT/PLAN:   (Z00.00) Routine history and physical examination of adult  " "(primary encounter diagnosis)  (Z23) Need for prophylactic vaccination and inoculation against influenza  Comment: patient is due for flu vaccine.  Plan: INFLUENZA VACCINE IM > 6 MONTHS VALENT IIV4         [46097], Vaccine Administration, Initial         [02894]      (K60.3) Anal fistula  (Z86.010) History of colonic polyps  Comment: patient is due for colonoscopy  Plan: COLORECTAL SURGERY REFERRAL, GASTROENTEROLOGY         ADULT REF PROCEDURE ONLY      (Z12.5) Screening for prostate cancer  Comment: patient is due for PSA lab  Plan: **Prostate spec antigen screen FUTURE anytime      Patient has been advised of split billing requirements and indicates understanding: Yes  COUNSELING:   Reviewed preventive health counseling, as reflected in patient instructions  Special attention given to:        Regular exercise       Healthy diet/nutrition    Estimated body mass index is 51.09 kg/m  as calculated from the following:    Height as of this encounter: 1.702 m (5' 7\").    Weight as of this encounter: 148 kg (326 lb 3.2 oz).     Weight management plan: Discussed healthy diet and exercise guidelines    He reports that he has never smoked. He has never used smokeless tobacco.      Counseling Resources:  ATP IV Guidelines  Pooled Cohorts Equation Calculator  FRAX Risk Assessment  ICSI Preventive Guidelines  Dietary Guidelines for Americans, 2010  USDA's MyPlate  ASA Prophylaxis  Lung CA Screening    Radha Milligan MD  [facility]  "

## 2020-09-21 ENCOUNTER — OFFICE VISIT (OUTPATIENT)
Dept: OPHTHALMOLOGY | Facility: CLINIC | Age: 45
End: 2020-09-21
Payer: COMMERCIAL

## 2020-09-21 ENCOUNTER — ANESTHESIA (OUTPATIENT)
Dept: SURGERY | Facility: AMBULATORY SURGERY CENTER | Age: 45
End: 2020-09-21

## 2020-09-21 ENCOUNTER — HOSPITAL ENCOUNTER (OUTPATIENT)
Facility: AMBULATORY SURGERY CENTER | Age: 45
End: 2020-09-21
Attending: OPHTHALMOLOGY
Payer: COMMERCIAL

## 2020-09-21 ENCOUNTER — TELEPHONE (OUTPATIENT)
Dept: FAMILY MEDICINE | Facility: CLINIC | Age: 45
End: 2020-09-21

## 2020-09-21 VITALS
WEIGHT: 315 LBS | HEIGHT: 67 IN | RESPIRATION RATE: 16 BRPM | TEMPERATURE: 98 F | HEART RATE: 98 BPM | DIASTOLIC BLOOD PRESSURE: 74 MMHG | BODY MASS INDEX: 49.44 KG/M2 | OXYGEN SATURATION: 94 % | SYSTOLIC BLOOD PRESSURE: 108 MMHG

## 2020-09-21 DIAGNOSIS — Z98.890 POSTOPERATIVE EYE STATE: Primary | ICD-10-CM

## 2020-09-21 DIAGNOSIS — H25.813 COMBINED FORMS OF AGE-RELATED CATARACT OF BOTH EYES: ICD-10-CM

## 2020-09-21 DIAGNOSIS — H18.603 KERATOCONUS OF BOTH EYES: ICD-10-CM

## 2020-09-21 DEVICE — EYE IMP IOL AMO PCL TECNIS ZCB00 17.5: Type: IMPLANTABLE DEVICE | Site: EYE | Status: FUNCTIONAL

## 2020-09-21 RX ORDER — PROPARACAINE HYDROCHLORIDE 5 MG/ML
1 SOLUTION/ DROPS OPHTHALMIC ONCE
Status: COMPLETED | OUTPATIENT
Start: 2020-09-21 | End: 2020-09-21

## 2020-09-21 RX ORDER — TETRACAINE HYDROCHLORIDE 5 MG/ML
SOLUTION OPHTHALMIC PRN
Status: DISCONTINUED | OUTPATIENT
Start: 2020-09-21 | End: 2020-09-21 | Stop reason: HOSPADM

## 2020-09-21 RX ORDER — CYCLOPENTOLAT/TROPIC/PHENYLEPH 1%-1%-2.5%
1 DROPS (EA) OPHTHALMIC (EYE)
Status: COMPLETED | OUTPATIENT
Start: 2020-09-21 | End: 2020-09-21

## 2020-09-21 RX ORDER — LIDOCAINE HYDROCHLORIDE 10 MG/ML
INJECTION, SOLUTION EPIDURAL; INFILTRATION; INTRACAUDAL; PERINEURAL PRN
Status: DISCONTINUED | OUTPATIENT
Start: 2020-09-21 | End: 2020-09-21 | Stop reason: HOSPADM

## 2020-09-21 RX ORDER — MOXIFLOXACIN 5 MG/ML
1 SOLUTION/ DROPS OPHTHALMIC 3 TIMES DAILY
Qty: 1 BOTTLE | Refills: 1 | Status: SHIPPED | OUTPATIENT
Start: 2020-09-21 | End: 2021-03-22

## 2020-09-21 RX ORDER — TIMOLOL 5 MG/ML
SOLUTION/ DROPS OPHTHALMIC PRN
Status: DISCONTINUED | OUTPATIENT
Start: 2020-09-21 | End: 2020-09-21 | Stop reason: HOSPADM

## 2020-09-21 RX ORDER — LIDOCAINE 40 MG/G
CREAM TOPICAL
Status: DISCONTINUED | OUTPATIENT
Start: 2020-09-21 | End: 2020-09-21 | Stop reason: HOSPADM

## 2020-09-21 RX ORDER — MOXIFLOXACIN IN NACL,ISO-OS/PF 0.3MG/0.3
SYRINGE (ML) INTRAOCULAR PRN
Status: DISCONTINUED | OUTPATIENT
Start: 2020-09-21 | End: 2020-09-21 | Stop reason: HOSPADM

## 2020-09-21 RX ORDER — BALANCED SALT SOLUTION 6.4; .75; .48; .3; 3.9; 1.7 MG/ML; MG/ML; MG/ML; MG/ML; MG/ML; MG/ML
SOLUTION OPHTHALMIC PRN
Status: DISCONTINUED | OUTPATIENT
Start: 2020-09-21 | End: 2020-09-21 | Stop reason: HOSPADM

## 2020-09-21 RX ORDER — PREDNISOLONE ACETATE 10 MG/ML
1 SUSPENSION/ DROPS OPHTHALMIC 4 TIMES DAILY
Qty: 1 BOTTLE | Refills: 1 | Status: SHIPPED | OUTPATIENT
Start: 2020-09-21 | End: 2021-03-22

## 2020-09-21 RX ORDER — SODIUM CHLORIDE, SODIUM LACTATE, POTASSIUM CHLORIDE, CALCIUM CHLORIDE 600; 310; 30; 20 MG/100ML; MG/100ML; MG/100ML; MG/100ML
INJECTION, SOLUTION INTRAVENOUS CONTINUOUS
Status: DISCONTINUED | OUTPATIENT
Start: 2020-09-21 | End: 2020-09-22 | Stop reason: HOSPADM

## 2020-09-21 RX ORDER — FENTANYL CITRATE 50 UG/ML
INJECTION, SOLUTION INTRAMUSCULAR; INTRAVENOUS PRN
Status: DISCONTINUED | OUTPATIENT
Start: 2020-09-21 | End: 2020-09-21

## 2020-09-21 RX ORDER — SODIUM CHLORIDE, SODIUM LACTATE, POTASSIUM CHLORIDE, CALCIUM CHLORIDE 600; 310; 30; 20 MG/100ML; MG/100ML; MG/100ML; MG/100ML
INJECTION, SOLUTION INTRAVENOUS CONTINUOUS
Status: DISCONTINUED | OUTPATIENT
Start: 2020-09-21 | End: 2020-09-21 | Stop reason: HOSPADM

## 2020-09-21 RX ADMIN — SODIUM CHLORIDE, SODIUM LACTATE, POTASSIUM CHLORIDE, CALCIUM CHLORIDE: 600; 310; 30; 20 INJECTION, SOLUTION INTRAVENOUS at 11:50

## 2020-09-21 RX ADMIN — FENTANYL CITRATE 50 MCG: 50 INJECTION, SOLUTION INTRAMUSCULAR; INTRAVENOUS at 12:19

## 2020-09-21 RX ADMIN — Medication 1 DROP: at 11:31

## 2020-09-21 RX ADMIN — PROPARACAINE HYDROCHLORIDE 1 DROP: 5 SOLUTION/ DROPS OPHTHALMIC at 11:31

## 2020-09-21 RX ADMIN — SODIUM CHLORIDE, SODIUM LACTATE, POTASSIUM CHLORIDE, CALCIUM CHLORIDE: 600; 310; 30; 20 INJECTION, SOLUTION INTRAVENOUS at 11:39

## 2020-09-21 RX ADMIN — FENTANYL CITRATE 50 MCG: 50 INJECTION, SOLUTION INTRAMUSCULAR; INTRAVENOUS at 12:13

## 2020-09-21 RX ADMIN — Medication 1 DROP: at 11:46

## 2020-09-21 RX ADMIN — Medication 1 DROP: at 11:36

## 2020-09-21 ASSESSMENT — TONOMETRY
OD_IOP_MMHG: 14
OS_IOP_MMHG: 18
IOP_METHOD: TONOPEN

## 2020-09-21 ASSESSMENT — VISUAL ACUITY
OS_SC: 20/60
OD_SC: 20/30
METHOD: SNELLEN - LINEAR

## 2020-09-21 ASSESSMENT — MIFFLIN-ST. JEOR: SCORE: 2308.28

## 2020-09-21 ASSESSMENT — SLIT LAMP EXAM - LIDS: COMMENTS: NORMAL

## 2020-09-21 NOTE — OP NOTE
PREOPERATIVE DIAGNOSIS:   1. Keratoconus of both eyes    2. Combined forms of age-related cataract of both eyes    3.    4.       POSTOPERATIVE DIAGNOSIS: Same   PROCEDURES:   1. Complex cataract extraction with intraocular lens implant Left eye.  SURGEON: Andre Jackson M.D.  INDICATIONS: The patient Jose Apodaca presented to the eye clinic with decreased vision secondary to cataract in the Left eye. The risks, benefits and alternatives to cataract extraction were discussed. The patient elected to proceed. All questions were answered to the patient's satisfaction.   DESCRIPTION OF PROCEDURE:   Prior to the procedure, appropriate cardiac and respiratory monitors were applied to the patient.  In the pre-operative holding area, a drop of topical tetracaine followed by lidocaine gel followed by povidone iodine.  The patient was brought to the operating room where a surgical pause was carried out to identify with all members of the surgical team the correct surgical site.  With adequate anesthesia, the Left eye was prepped and draped in the usual sterile fashion. A lid speculum was placed, and the operating microscope was rotated into position. A paracentesis was created.  Through this limbal paracentesis, the anterior chamber was filled with preservative-free lidocaine followed by viscoelastic.  Trypan blue stain was injected under air to stain the anterior lens capsule.  Trypan blue stain was elected due to a poor red reflex in the setting of dense cataract. A temporal wound was created at the limbus using a 2.6 mm blade. A capsulorrhexis was initiated using a bent 25-gauge needle and was completed in continuous and circular fashion using the capsulorrhexis forceps. The lens nucleus was hydrodissected using balanced salt solution.  The lens nucleus was rotated and removed using phacoemulsification in a stop and chop technique.  Residual cortical material was removed using irrigation-aspiration.  The capsular  bag was reinflated to its maximal extent with cohesive viscoelastic.  A 17.5 diopter ZCBOO inserted into the capsular bag.  The lens power selected was reviewed using the intraocular lens power measurements that were obtained preoperatively to confirm that the correct lens was selected for the desired post-operative refractive state. The residual viscoelastic was removed in its entirety, the wound were hydrated and found to be self-sealing.  Intracameral moxifloxacin was administered. Tactile pressure was confirmed to be in a normal range.  The lid speculum was removed and a patch and shield were applied.  The patient tolerated the procedure well, and there were no complications.    PLAN: The patient will be discharged to home and will follow up tomorrow morning in the eye clinic.  EBL:  None  Complications:  None  Implant Name Type Inv. Item Serial No.  Lot No. LRB No. Used Action   EYE IMP IOL ALLEN PCL TECNIS ZCB00 17.5 Lens/Eye Implant EYE IMP IOL ALLEN PCL TECNIS ZCB00 17.5 [id number] [employer]  Left 1 Implanted

## 2020-09-21 NOTE — DISCHARGE INSTRUCTIONS
[facility]  Home Care Following Anesthesia  For 24 hours after surgery:  1. Get plenty of rest.  A responsible adult must stay with you for at least 24 hours after you leave the surgery center.  2. Do not drive or use heavy equipment.  If you have weakness or tingling, don't drive or use heavy equipment until this feeling goes away.   3. Do not drink alcohol.   4. Avoid strenuous or risky activities.  Ask for help when climbing stairs.  5. You may feel lightheaded.  IF so, sit for a few minutes before standing.  Have someone help you get up.   6. If you have nausea (feel sick to your stomach): Drink only clear liquids such as apple juice, ginger ale, broth or 7-Up.  Rest may also help.  Be sure to drink enough fluids.  Move to a regular diet as you feel able.   7. You may have a slight fever.  Call the doctor if your fever is over 100 F (37.7 C) (taken under the tongue) or lasts longer than 24 hours.  8. You may have a dry mouth, a sore throat, muscle aches or trouble sleeping. These should go away after 24 hours.  9. Do not make important or legal decisions.               Tips for taking pain medications  To get the best pain relief possible, remember these points:    Take pain medications as directed, before pain becomes severe.    Pain medication can upset your stomach: taking it with food may help.    Constipation is a common side effect of pain medication. Drink plenty of  fluids.    Eat foods high in fiber. Take a stool softener if recommended by your doctor or pharmacist.    Do not drink alcohol, drive or operate machinery while taking pain medications.    Ask about other ways to control pain, such as with heat, ice or relaxation.    Tylenol/Acetaminophen Consumption  To help encourage the safe use of acetaminophen, the makers of TYLENOL  have lowered the maximum daily dose for single-ingredient Extra Strength TYLENOL  (acetaminophen) products sold in the [place] from 8  pills per day (4,000 mg) to 6 pills per day (3,000 mg). The dosing interval has also changed from 2 pills every 4-6 hours to 2 pills every 6 hours.    If you feel your pain relief is insufficient, you may take Tylenol/Acetaminophen in addition to your narcotic pain medication.     Be careful not to exceed 3,000 mg of Tylenol/Acetaminophen in a 24 hour period from all sources.    If you are taking extra strength Tylenol/acetaminophen (500 mg), the maximum dose is 6 tablets in 24 hours.    If you are taking regular strength acetaminophen (325 mg), the maximum dose is 9 tablets in 24 hours.    Call a doctor for any of the followin. Signs of infection (fever, growing tenderness at the surgery site, a large amount of drainage or bleeding, severe pain, foul-smelling drainage, redness, swelling).  2. It has been over 8 to 10 hours since surgery and you are still not able to urinate (pass water).  3. Headache for over 24 hours.  4. Numbness, tingling or weakness the day after surgery (if you had spinal anesthesia).  5. Signs of Covid-19 infection (temperature over 100 degrees, shortness of breath, cough, loss of taste/smell, generalized body aches, persistent headache, chills, sore throat, nausea/vomiting/diarrhea)  Your doctor is:       Dr. Andre Jackson, Ophthalmology: [phone]               Or dial [phone] and ask for the resident on call for:  Ophthalmology  For emergency care, call the:  [facility]:  [phone] (TTY for hearing impaired: [phone])

## 2020-09-21 NOTE — ANESTHESIA POSTPROCEDURE EVALUATION
Anesthesia POST Procedure Evaluation    Patient: Jose Apodaca   MRN:     [id number] Gender:   male   Age:    44 year old :      1975        Preoperative Diagnosis: Keratoconus of both eyes [H18.603]  Combined forms of age-related cataract of both eyes [H25.813]   Procedure(s):  COMPLEX PHACOEMULSIFICATION, CATARACT, WITH INTRAOCULAR LENS IMPLANT   Postop Comments: No value filed.     Anesthesia Type: General       Disposition: Outpatient   Postop Pain Control: Uneventful            Sign Out: Well controlled pain   PONV: No   Neuro/Psych: Uneventful            Sign Out: Acceptable/Baseline neuro status   Airway/Respiratory: Uneventful            Sign Out: Acceptable/Baseline resp. status   CV/Hemodynamics: Uneventful            Sign Out: Acceptable CV status   Other NRE: NONE   DID A NON-ROUTINE EVENT OCCUR? No         Last Anesthesia Record Vitals:  CRNA VITALS  2020 1158 - 2020 1258      2020             Pulse:  76    Ht Rate:  74    SpO2:  100 %    Resp Rate (set):  10          Last PACU Vitals:  Vitals Value Taken Time   BP     Temp     Pulse     Resp     SpO2     Temp src     NIBP 89/51 2020 12:25 PM   Pulse 76 2020 12:28 PM   SpO2 100 % 2020 12:28 PM   Resp     Temp     Ht Rate 74 2020 12:28 PM   Temp 2           Electronically Signed By: Lázaro Benitez DO, 2020, 1:23 PM

## 2020-09-21 NOTE — ANESTHESIA CARE TRANSFER NOTE
Patient: Jose Apodaca    Procedure(s):  COMPLEX PHACOEMULSIFICATION, CATARACT, WITH INTRAOCULAR LENS IMPLANT    Diagnosis: Keratoconus of both eyes [H18.603]  Combined forms of age-related cataract of both eyes [H25.813]  Diagnosis Additional Information: No value filed.    Anesthesia Type:   General     Note:  Airway :Room Air  Patient transferred to:Phase II  Handoff Report: Identifed the Patient, Identified the Reponsible Provider, Reviewed the pertinent medical history, Discussed the surgical course, Reviewed Intra-OP anesthesia mangement and issues during anesthesia, Set expectations for post-procedure period and Allowed opportunity for questions and acknowledgement of understanding      Vitals: (Last set prior to Anesthesia Care Transfer)    CRNA VITALS  9/21/2020 1158 - 9/21/2020 1240      9/21/2020             Pulse:  76    Ht Rate:  74    SpO2:  100 %    Resp Rate (set):  10                Electronically Signed By: URBANO Agarwal CRNA  September 21, 2020  12:40 PM  
Adult

## 2020-09-21 NOTE — NURSING NOTE
Chief Complaints and History of Present Illnesses   Patient presents with     Post Op (Ophthalmology) Left Eye     Chief Complaint(s) and History of Present Illness(es)     Post Op (Ophthalmology) Left Eye     Laterality: left eye    Frequency: constantly    Course: stable    Pain scale: 0/10              Comments     S/p Complex cataract extraction with intraocular lens implant Left eye on 9/21/2020.    KAIDEN Monique COT 1:45 PM September 21, 2020

## 2020-09-21 NOTE — LETTER
September 28, 2020      Re: Jose Apodaca   1975    To Whom It May Concern:    This is to confirm that the above patient was seen on 9/21/2020.  Jose Apodaca is unable to return to work from 9/21/2020 to 10/5/2020.  He is ok to return without restrictions at that time    Thank you for your cooperation in this matter.  Please do not hesitate to contact me if you have any further questions.    Sincerely,        Andre Jackson MD  , Comprehensive Ophthalmology  Department of Ophthalmology and Visual Neurosciences  [facility]     
45 y/o F c/o HA x 4 weeks, presents at [facility] for neurology consult. Pt states she had imaging done this evening and was referred to see a neurologist based on the results. Pt states the headache has been intermittent for approximately 1 month, seeing movement in her peripheral vision, intermittent vertigo, denies diplopia, denies n/v/d/fever/chills/cp/sob. Pt in NAD.

## 2020-09-21 NOTE — TELEPHONE ENCOUNTER
Central Prior Authorization Team   Phone: [phone]      PA Initiation    Medication: liraglutide (VICTOZA PEN) 18 MG/3ML solution   Insurance Company: [employer] - Phone [phone] Fax [phone]  Pharmacy Filling the Rx: [id number] - [place], [place] - [address] AT [facility] [address]  Filling Pharmacy Phone: [phone]  Filling Pharmacy Fax:    Start Date: 9/21/2020

## 2020-09-21 NOTE — PROGRESS NOTES
PostOp, left eye, day 0    Doing well  Keep patch in place at night for 5 days  Start post-operative drops and taper according to instructions  Post-operative do's and don'ts reviewed, questions answered    Recheck 2-3 weeks with refraction    Angella High MD  Ophthalmology Resident, PGY-4      Not seen by staff during this visit, available should need have arisen.  Plan appropriate as above.    Andre Jackson MD  , Comprehensive Ophthalmology  Department of Ophthalmology and Visual Neurosciences  [facility]

## 2020-09-22 ENCOUNTER — VIRTUAL VISIT (OUTPATIENT)
Dept: SLEEP MEDICINE | Facility: CLINIC | Age: 45
End: 2020-09-22
Attending: INTERNAL MEDICINE
Payer: COMMERCIAL

## 2020-09-22 VITALS — BODY MASS INDEX: 49.44 KG/M2 | HEIGHT: 67 IN | WEIGHT: 315 LBS

## 2020-09-22 DIAGNOSIS — E10.9 TYPE 1 DIABETES MELLITUS WITHOUT COMPLICATION (H): Primary | ICD-10-CM

## 2020-09-22 DIAGNOSIS — G47.33 OSA (OBSTRUCTIVE SLEEP APNEA): Primary | ICD-10-CM

## 2020-09-22 PROCEDURE — 99203 OFFICE O/P NEW LOW 30 MIN: CPT | Mod: 95 | Performed by: INTERNAL MEDICINE

## 2020-09-22 ASSESSMENT — MIFFLIN-ST. JEOR: SCORE: 2308.28

## 2020-09-22 NOTE — PATIENT INSTRUCTIONS
Your BMI is Body mass index is 50.75 kg/m .  Weight management is a personal decision.  If you are interested in exploring weight loss strategies, the following discussion covers the approaches that may be successful. Body mass index (BMI) is one way to tell whether you are at a healthy weight, overweight, or obese. It measures your weight in relation to your height.  A BMI of 18.5 to 24.9 is in the healthy range. A person with a BMI of 25 to 29.9 is considered overweight, and someone with a BMI of 30 or greater is considered obese. More than two-thirds of American adults are considered overweight or obese.  Being overweight or obese increases the risk for further weight gain. Excess weight may lead to heart disease and diabetes.  Creating and following plans for healthy eating and physical activity may help you improve your health.  Weight control is part of healthy lifestyle and includes exercise, emotional health, and healthy eating habits. Careful eating habits lifelong are the mainstay of weight control. Though there are significant health benefits from weight loss, long-term weight loss with diet alone may be very difficult to achieve- studies show long-term success with dietary management in less than 10% of people. Attaining a healthy weight may be especially difficult to achieve in those with severe obesity. In some cases, medications, devices and surgical management might be considered.  What can you do?  If you are overweight or obese and are interested in methods for weight loss, you should discuss this with your provider.     Consider reducing daily calorie intake by 500 calories.     Keep a food journal.     Avoiding skipping meals, consider cutting portions instead.    Diet combined with exercise helps maintain muscle while optimizing fat loss. Strength training is particularly important for building and maintaining muscle mass. Exercise helps reduce stress, increase energy, and improves fitness.  Increasing exercise without diet control, however, may not burn enough calories to loose weight.       Start walking three days a week 10-20 minutes at a time    Work towards walking thirty minutes five days a week     Eventually, increase the speed of your walking for 1-2 minutes at time    In addition, we recommend that you review healthy lifestyles and methods for weight loss available through the National Institutes of Health patient information sites:  http://win.niddk.nih.gov/publications/index.htm    And look into health and wellness programs that may be available through your health insurance provider, employer, local community center, or lauren club.    Weight management plan: Patient was referred to their PCP to discuss a diet and exercise plan.

## 2020-09-22 NOTE — PROGRESS NOTES
"Jose Apodaca is a 44 year old male who is being evaluated via a billable video visit.      The patient has been notified of following:     \"This video visit will be conducted via a call between you and your physician/provider. We have found that certain health care needs can be provided without the need for an in-person physical exam.  This service lets us provide the care you need with a video conversation.  If a prescription is necessary we can send it directly to your pharmacy.  If lab work is needed we can place an order for that and you can then stop by our lab to have the test done at a later time.    Video visits are billed at different rates depending on your insurance coverage.  Please reach out to your insurance provider with any questions.    If during the course of the call the physician/provider feels a video visit is not appropriate, you will not be charged for this service.\"    Patient has given verbal consent for Video visit? Yes  How would you like to obtain your AVS? MyChart  If you are dropped from the video visit, the video invite should be resent to: Send to e-mail at: [email or web address]  Will anyone else be joining your video visit? No        Video-Visit Details    Type of service:  Video Visit    Video Start Time: 12:35 PM  Video End Time: 1:11 PM    Originating Location (pt. Location): Home    Distant Location (provider location):  [facility]     Platform used for Video Visit: Tasneem Salomon MD      Sleep Consultation:    Date on this visit: 9/22/2020    Jose Apodaca is sent by Radha Milligan for a sleep consultation regarding sleep apnea.    Primary Physician: Radha Milligan     Chief complaint: sleep apnea     Presenting History:     Jose Apodaca presents to clinic for management of sleep apnea.     Medical history is significant for DM-1, obesity and sleep apnea.     He was diagnosed with severe sleep apnea in 2004 with sleep study in " [place]. He had a good response to CPAP which resolved snoring and daytime sleepiness. Previous sleep study result is not available. He reports that he had another sleep study about 5 years ago in [place] which again confirmed severe sleep apnea.     Overall, he rates the experience with PAP as 10 (0 poor, 10 great). The mask is comfortable. The mask is not leaking.  He is not snoring with the mask on. He is not having gasp arousals.  He is not having significant oral/nasal dryness. The pressure settings are comfortable.     He uses full-face mask.     He does feel rested in the morning.    Respironics (formal download is not available. I was able to check report on his device)  CPAP 14 cmH2O download:   Average use 7.6 hours  per day.  100% days with >4 hours use. AHI 1.6.     Jose goes to sleep at 11:30 PM during the week. He wakes up at 6:45 AM with an alarm. He falls asleep in 15 minutes.  oJse denies difficulty falling asleep.  He wakes up 0 times a night . On weekends, Jose goes to sleep at 1:00- 2:00 AM.  He wakes up at 7:00 - 8:00 AM without an alarm. He falls asleep in 10 minutes.  Patient gets an average of 6 hours of sleep per night.     Patient does watch TV in bed.     He denies no morning headaches and restless legs. Jose denies any bruxism, sleep walking, sleep talking, dream enactment, sleep paralysis, cataplexy and hypnogogic/hypnopompic hallucinations.    Jose denies difficulty breathing through his nose.      Patient's [place] Sleepiness score 0/24 consistent with no daytime sleepiness.      Jose naps 0-1 times per week for 30-60 minutes, feels refreshed after naps. He takes no inadvertant naps.  He denies closing eyes, dozing and falling asleep while driving. Patient was counseled on the importance of driving while alert, to pull over if drowsy, or nap before getting into the vehicle if sleepy.        Allergies:    Allergies   Allergen Reactions     Cats      Seasonal  Allergies      grass       Medications:    Current Outpatient Medications   Medication Sig Dispense Refill     Continuous Blood Gluc  (DEXCOM G6 ) TESHA Use to read blood sugars as per 's instructions. 1 each 0     Continuous Blood Gluc Sensor (DEXCOM G6 SENSOR) MISC Change every 10 days. 3 each 11     Continuous Blood Gluc Transmit (DEXCOM G6 TRANSMITTER) MISC Change every 3 months. 1 each 3     fexofenadine (ALLEGRA) 180 MG tablet Take 1 tablet (180 mg) by mouth daily (Patient taking differently: Take 180 mg by mouth every morning ) 90 tablet 3     fluticasone (FLONASE) 50 MCG/ACT nasal spray Spray 1 spray into both nostrils daily (Patient taking differently: Spray 1 spray into both nostrils every morning ) 16 g 11     insulin aspart (NOVOLOG VIAL) 100 UNITS/ML vial Use with insulin pump, total daily dose approx 100 units 30 mL 11     insulin pen needle (B-D U/F) 31G X 5 MM miscellaneous Use 1 pen needles daily or as directed. 100 each 3     liraglutide (VICTOZA PEN) 18 MG/3ML solution Inject 0.6 to 1.2 mg subcutaneous daily as directed 6 mL 5     moxifloxacin (VIGAMOX) 0.5 % ophthalmic solution Apply 1 drop to eye 3 times daily To operative eye 1 Bottle 1     prednisoLONE acetate (PRED FORTE) 1 % ophthalmic suspension Apply 1 drop to eye 4 times daily To operative eye 1 Bottle 1     sildenafil (VIAGRA) 100 MG tablet Take 1/2 to full tablet by mouth 30 min prior to intercourse, as directed 15 tablet 3       Problem List:  Patient Active Problem List    Diagnosis Date Noted     Keratoconus of both eyes 09/08/2020     Priority: Medium     Added automatically from request for surgery [id number]       Combined forms of age-related cataract of both eyes 09/08/2020     Priority: Medium     Added automatically from request for surgery [id number]       Diabetes mellitus, type 2 (H) 06/03/2020     Priority: Medium     Morbid obesity (H) 02/19/2020     Priority: Medium        Past Medical/Surgical  History:  Past Medical History:   Diagnosis Date     Cataracts, bilateral      DKA (diabetic ketoacidoses) (H)      Morbid obesity (H)      PHUONG (obstructive sleep apnea)      T1DM (type 1 diabetes mellitus) (H)      Past Surgical History:   Procedure Laterality Date     COLONOSCOPY       ENT SURGERY  10 years ago    cyst on neck     EXTRACTION(S) DENTAL       NECK SURGERY  2010    to remove a cyst     PHACOEMULSIFICATION CLEAR CORNEA WITH STANDARD INTRAOCULAR LENS IMPLANT Left 9/21/2020    Procedure: COMPLEX PHACOEMULSIFICATION, CATARACT, WITH INTRAOCULAR LENS IMPLANT;  Surgeon: Andre Jackson MD;  Location:  OR       Social History:  Social History     Socioeconomic History     Marital status:      Spouse name: Not on file     Number of children: Not on file     Years of education: Not on file     Highest education level: Not on file   Occupational History     Not on file   Social Needs     Financial resource strain: Not on file     Food insecurity     Worry: Not on file     Inability: Not on file     Transportation needs     Medical: Not on file     Non-medical: Not on file   Tobacco Use     Smoking status: Never Smoker     Smokeless tobacco: Never Used   Substance and Sexual Activity     Alcohol use: Never     Frequency: Never     Drug use: Never     Sexual activity: Not on file   Lifestyle     Physical activity     Days per week: Not on file     Minutes per session: Not on file     Stress: Not on file   Relationships     Social connections     Talks on phone: Not on file     Gets together: Not on file     Attends Hoahaoism service: Not on file     Active member of club or organization: Not on file     Attends meetings of clubs or organizations: Not on file     Relationship status: Not on file     Intimate partner violence     Fear of current or ex partner: Not on file     Emotionally abused: Not on file     Physically abused: Not on file     Forced sexual activity: Not on file   Other Topics  "Concern     Parent/sibling w/ CABG, MI or angioplasty before 65F 55M? Not Asked   Social History Narrative     Not on file       Family History:  Family History   Problem Relation Age of Onset     Diabetes Maternal Grandmother      Other Cancer Maternal Grandmother         Lung Cancer     Diabetes Paternal Grandmother      Glaucoma Brother      Macular Degeneration No family hx of        Review of Systems:  A complete review of systems reviewed by me is negative with the exeption of what has been mentioned in the history of present illness.  CONSTITUTIONAL: NEGATIVE for weight gain/loss, fever, chills, sweats or night sweats, drug allergies.  EYES: NEGATIVE for changes in vision, blind spots, double vision.  ENT: NEGATIVE for ear pain, sore throat, sinus pain, post-nasal drip, runny nose, bloody nose  CARDIAC: NEGATIVE for fast heartbeats or fluttering in chest, chest pain or pressure, breathlessness when lying flat, swollen legs or swollen feet.  NEUROLOGIC: NEGATIVE headaches, weakness or numbness in the arms or legs.  DERMATOLOGIC: NEGATIVE for rashes, new moles or change in mole(s)  PULMONARY: NEGATIVE SOB at rest, SOB with activity, dry cough, productive cough, coughing up blood, wheezing or whistling when breathing.    GASTROINTESTINAL: NEGATIVE for nausea or vomitting, loose or watery stools, fat or grease in stools, constipation, abdominal pain, bowel movements black in color or blood noted.  GENITOURINARY: NEGATIVE for pain during urination, blood in urine, urinating more frequently than usual, irregular menstrual periods.  MUSCULOSKELETAL: NEGATIVE for muscle pain, bone or joint pain, swollen joints.  ENDOCRINE: NEGATIVE for increased thirst or urination, diabetes.  LYMPHATIC: NEGATIVE for swollen lymph nodes, lumps or bumps in the breasts or nipple discharge.    Physical Examination:  Vitals: Ht 1.702 m (5' 7\")   Wt 147 kg (324 lb)   BMI 50.75 kg/m    BMI= Body mass index is 50.75 kg/m .         [place] " Total Score 9/22/2020   Total score - [place] 0       HODAN Total Score: 2 (09/22/20 1200)    GENERAL APPEARANCE: healthy, alert, active and no distress  EYES: Eyes grossly normal to inspection  HENT: Normocephalic  RESP: lungs clear to auscultation - no rales, rhonchi or wheezes  MS: extremities normal- no gross deformities noted  SKIN: no suspicious lesions or rashes  NEURO: mentation intact and speech normal  PSYCH: mentation appears normal and affect normal/bright    Impression/Plan:    1. Obstructive sleep apnea    Patient is on CPAP therapy for obstructive sleep apnea. Initial diagnosis was in 2004 and per his report, he has severe sleep apnea. Previous test result are not available but has been requested. He is on CPAP therapy at 14 cm H2O which is adequately treating his sleep apnea symptoms. He is in need ofd replacement mask and other supplies. He was advised to bring his device to clinic for a download.     Plan:     1. Continue CPAP therapy. Replacement supplies ordered      Obstructive sleep apnea reviewed.  Complications of untreated sleep apnea were reviewed.    I spent a total of 30 minutes for this appointment today which include time spent before, during and after the visit for patient care, counseling and coordination of care.    Dr. Tobi Salomon     CC: Radha Milligan

## 2020-09-22 NOTE — TELEPHONE ENCOUNTER
PRIOR AUTHORIZATION DENIED    Medication: liraglutide (VICTOZA PEN) 18 MG/3ML solution     Denial Date: 9/22/2020    Denial Rational:  Need an updated A1C. Current A1C that is on file is from 8/20/2020. At that time patient was only on Metformin for two months. Insurance needs to know what the A1C is currently after the 3 month trial of Metformin.     Appeal Information:    If you would like to appeal, please supply P/A team with a letter of medical necessity with clinical reason.

## 2020-09-23 ENCOUNTER — TELEPHONE (OUTPATIENT)
Dept: SLEEP MEDICINE | Facility: CLINIC | Age: 45
End: 2020-09-23

## 2020-09-23 RX ORDER — LANCETS
EACH MISCELLANEOUS
Qty: 100 EACH | Refills: 3 | Status: SHIPPED | OUTPATIENT
Start: 2020-09-23 | End: 2020-12-17

## 2020-09-24 DIAGNOSIS — Z11.59 ENCOUNTER FOR SCREENING FOR OTHER VIRAL DISEASES: Primary | ICD-10-CM

## 2020-09-24 LAB
SARS-COV-2 RNA SPEC QL NAA+PROBE: NOT DETECTED
SPECIMEN SOURCE: NORMAL

## 2020-09-25 ENCOUNTER — ANESTHESIA EVENT (OUTPATIENT)
Dept: SURGERY | Facility: AMBULATORY SURGERY CENTER | Age: 45
End: 2020-09-25

## 2020-09-28 ENCOUNTER — OFFICE VISIT (OUTPATIENT)
Dept: OPHTHALMOLOGY | Facility: CLINIC | Age: 45
End: 2020-09-28
Payer: COMMERCIAL

## 2020-09-28 ENCOUNTER — ANESTHESIA (OUTPATIENT)
Dept: SURGERY | Facility: AMBULATORY SURGERY CENTER | Age: 45
End: 2020-09-28

## 2020-09-28 ENCOUNTER — HOSPITAL ENCOUNTER (OUTPATIENT)
Facility: AMBULATORY SURGERY CENTER | Age: 45
End: 2020-09-28
Attending: OPHTHALMOLOGY
Payer: COMMERCIAL

## 2020-09-28 VITALS
HEIGHT: 67 IN | BODY MASS INDEX: 49.44 KG/M2 | HEART RATE: 70 BPM | RESPIRATION RATE: 16 BRPM | TEMPERATURE: 98 F | WEIGHT: 315 LBS | SYSTOLIC BLOOD PRESSURE: 134 MMHG | OXYGEN SATURATION: 97 % | DIASTOLIC BLOOD PRESSURE: 83 MMHG

## 2020-09-28 DIAGNOSIS — H25.813 COMBINED FORMS OF AGE-RELATED CATARACT OF BOTH EYES: Primary | ICD-10-CM

## 2020-09-28 DIAGNOSIS — H18.603 KERATOCONUS OF BOTH EYES: ICD-10-CM

## 2020-09-28 DIAGNOSIS — Z98.890 POSTOPERATIVE EYE STATE: Primary | ICD-10-CM

## 2020-09-28 DEVICE — EYE IMP IOL AMO PCL TECNIS ZCB00 20.0: Type: IMPLANTABLE DEVICE | Site: EYE | Status: FUNCTIONAL

## 2020-09-28 RX ORDER — HYDROMORPHONE HYDROCHLORIDE 1 MG/ML
.3-.5 INJECTION, SOLUTION INTRAMUSCULAR; INTRAVENOUS; SUBCUTANEOUS EVERY 10 MIN PRN
Status: DISCONTINUED | OUTPATIENT
Start: 2020-09-28 | End: 2020-09-29 | Stop reason: HOSPADM

## 2020-09-28 RX ORDER — MOXIFLOXACIN IN NACL,ISO-OS/PF 0.3MG/0.3
SYRINGE (ML) INTRAOCULAR PRN
Status: DISCONTINUED | OUTPATIENT
Start: 2020-09-28 | End: 2020-09-28 | Stop reason: HOSPADM

## 2020-09-28 RX ORDER — SODIUM CHLORIDE, SODIUM LACTATE, POTASSIUM CHLORIDE, CALCIUM CHLORIDE 600; 310; 30; 20 MG/100ML; MG/100ML; MG/100ML; MG/100ML
INJECTION, SOLUTION INTRAVENOUS CONTINUOUS
Status: DISCONTINUED | OUTPATIENT
Start: 2020-09-28 | End: 2020-09-29 | Stop reason: HOSPADM

## 2020-09-28 RX ORDER — TETRACAINE HYDROCHLORIDE 5 MG/ML
SOLUTION OPHTHALMIC PRN
Status: DISCONTINUED | OUTPATIENT
Start: 2020-09-28 | End: 2020-09-28 | Stop reason: HOSPADM

## 2020-09-28 RX ORDER — SODIUM CHLORIDE, SODIUM LACTATE, POTASSIUM CHLORIDE, CALCIUM CHLORIDE 600; 310; 30; 20 MG/100ML; MG/100ML; MG/100ML; MG/100ML
INJECTION, SOLUTION INTRAVENOUS CONTINUOUS
Status: DISCONTINUED | OUTPATIENT
Start: 2020-09-28 | End: 2020-09-28 | Stop reason: HOSPADM

## 2020-09-28 RX ORDER — PREDNISOLONE ACETATE 10 MG/ML
1 SUSPENSION/ DROPS OPHTHALMIC 4 TIMES DAILY
Qty: 1 BOTTLE | Refills: 1 | Status: SHIPPED | OUTPATIENT
Start: 2020-09-28 | End: 2021-03-22

## 2020-09-28 RX ORDER — BALANCED SALT SOLUTION 6.4; .75; .48; .3; 3.9; 1.7 MG/ML; MG/ML; MG/ML; MG/ML; MG/ML; MG/ML
SOLUTION OPHTHALMIC PRN
Status: DISCONTINUED | OUTPATIENT
Start: 2020-09-28 | End: 2020-09-28 | Stop reason: HOSPADM

## 2020-09-28 RX ORDER — ONDANSETRON 4 MG/1
4 TABLET, ORALLY DISINTEGRATING ORAL EVERY 30 MIN PRN
Status: DISCONTINUED | OUTPATIENT
Start: 2020-09-28 | End: 2020-09-29 | Stop reason: HOSPADM

## 2020-09-28 RX ORDER — OXYCODONE HYDROCHLORIDE 5 MG/1
5 TABLET ORAL EVERY 4 HOURS PRN
Status: DISCONTINUED | OUTPATIENT
Start: 2020-09-28 | End: 2020-09-29 | Stop reason: HOSPADM

## 2020-09-28 RX ORDER — ACETAMINOPHEN 325 MG/1
975 TABLET ORAL ONCE
Status: COMPLETED | OUTPATIENT
Start: 2020-09-28 | End: 2020-09-28

## 2020-09-28 RX ORDER — KETOROLAC TROMETHAMINE 30 MG/ML
30 INJECTION, SOLUTION INTRAMUSCULAR; INTRAVENOUS EVERY 6 HOURS PRN
Status: DISCONTINUED | OUTPATIENT
Start: 2020-09-28 | End: 2020-09-29 | Stop reason: HOSPADM

## 2020-09-28 RX ORDER — PROPARACAINE HYDROCHLORIDE 5 MG/ML
1 SOLUTION/ DROPS OPHTHALMIC ONCE
Status: COMPLETED | OUTPATIENT
Start: 2020-09-28 | End: 2020-09-28

## 2020-09-28 RX ORDER — LIDOCAINE HYDROCHLORIDE 10 MG/ML
INJECTION, SOLUTION EPIDURAL; INFILTRATION; INTRACAUDAL; PERINEURAL PRN
Status: DISCONTINUED | OUTPATIENT
Start: 2020-09-28 | End: 2020-09-28 | Stop reason: HOSPADM

## 2020-09-28 RX ORDER — LIDOCAINE 40 MG/G
CREAM TOPICAL
Status: DISCONTINUED | OUTPATIENT
Start: 2020-09-28 | End: 2020-09-28 | Stop reason: HOSPADM

## 2020-09-28 RX ORDER — MOXIFLOXACIN 5 MG/ML
1 SOLUTION/ DROPS OPHTHALMIC 3 TIMES DAILY
Qty: 1 BOTTLE | Refills: 1 | Status: SHIPPED | OUTPATIENT
Start: 2020-09-28 | End: 2021-03-22

## 2020-09-28 RX ORDER — MEPERIDINE HYDROCHLORIDE 25 MG/ML
12.5 INJECTION INTRAMUSCULAR; INTRAVENOUS; SUBCUTANEOUS
Status: DISCONTINUED | OUTPATIENT
Start: 2020-09-28 | End: 2020-09-29 | Stop reason: HOSPADM

## 2020-09-28 RX ORDER — NALOXONE HYDROCHLORIDE 0.4 MG/ML
.1-.4 INJECTION, SOLUTION INTRAMUSCULAR; INTRAVENOUS; SUBCUTANEOUS
Status: DISCONTINUED | OUTPATIENT
Start: 2020-09-28 | End: 2020-09-29 | Stop reason: HOSPADM

## 2020-09-28 RX ORDER — TIMOLOL 5 MG/ML
SOLUTION/ DROPS OPHTHALMIC PRN
Status: DISCONTINUED | OUTPATIENT
Start: 2020-09-28 | End: 2020-09-28 | Stop reason: HOSPADM

## 2020-09-28 RX ORDER — CYCLOPENTOLAT/TROPIC/PHENYLEPH 1%-1%-2.5%
1 DROPS (EA) OPHTHALMIC (EYE)
Status: COMPLETED | OUTPATIENT
Start: 2020-09-28 | End: 2020-09-28

## 2020-09-28 RX ORDER — FENTANYL CITRATE 50 UG/ML
25-50 INJECTION, SOLUTION INTRAMUSCULAR; INTRAVENOUS
Status: DISCONTINUED | OUTPATIENT
Start: 2020-09-28 | End: 2020-09-28 | Stop reason: HOSPADM

## 2020-09-28 RX ORDER — ONDANSETRON 2 MG/ML
4 INJECTION INTRAMUSCULAR; INTRAVENOUS EVERY 30 MIN PRN
Status: DISCONTINUED | OUTPATIENT
Start: 2020-09-28 | End: 2020-09-29 | Stop reason: HOSPADM

## 2020-09-28 RX ORDER — FENTANYL CITRATE 50 UG/ML
INJECTION, SOLUTION INTRAMUSCULAR; INTRAVENOUS PRN
Status: DISCONTINUED | OUTPATIENT
Start: 2020-09-28 | End: 2020-09-28

## 2020-09-28 RX ADMIN — SODIUM CHLORIDE, SODIUM LACTATE, POTASSIUM CHLORIDE, CALCIUM CHLORIDE: 600; 310; 30; 20 INJECTION, SOLUTION INTRAVENOUS at 07:19

## 2020-09-28 RX ADMIN — Medication 1 DROP: at 06:46

## 2020-09-28 RX ADMIN — FENTANYL CITRATE 50 MCG: 50 INJECTION, SOLUTION INTRAMUSCULAR; INTRAVENOUS at 07:28

## 2020-09-28 RX ADMIN — ACETAMINOPHEN 975 MG: 325 TABLET ORAL at 07:07

## 2020-09-28 RX ADMIN — PROPARACAINE HYDROCHLORIDE 1 DROP: 5 SOLUTION/ DROPS OPHTHALMIC at 06:34

## 2020-09-28 RX ADMIN — Medication 1 DROP: at 07:03

## 2020-09-28 RX ADMIN — Medication 1 DROP: at 06:34

## 2020-09-28 ASSESSMENT — REFRACTION_MANIFEST
OS_CYLINDER: +1.00
OS_AXIS: 005
OS_SPHERE: +1.75

## 2020-09-28 ASSESSMENT — VISUAL ACUITY
OD_SC: 20/40
OS_PH_SC: 20/40
METHOD: SNELLEN - LINEAR
OS_SC: 20/50

## 2020-09-28 ASSESSMENT — TONOMETRY
OS_IOP_MMHG: 10
OD_IOP_MMHG: 20
IOP_METHOD: TONOPEN

## 2020-09-28 ASSESSMENT — EXTERNAL EXAM - RIGHT EYE: OD_EXAM: NORMAL

## 2020-09-28 ASSESSMENT — MIFFLIN-ST. JEOR: SCORE: 2300.34

## 2020-09-28 ASSESSMENT — SLIT LAMP EXAM - LIDS
COMMENTS: NORMAL
COMMENTS: NORMAL

## 2020-09-28 ASSESSMENT — EXTERNAL EXAM - LEFT EYE: OS_EXAM: NORMAL

## 2020-09-28 NOTE — PROGRESS NOTES
POD#0, status post cataract surgery, RIGHT eye    No complaints.  Denies eye pain.    Va sc 20/40    IOP 20 mm Hg by applanation    Impression/Plan:  Pseudophakia, OD: POD0, good post-operative appearance. IOP reasonable.    Start Vigamox/Ocuflox QID x7 days  Start Prednisolone 4/3/2/1 weekly taper OD    Eye protection at all times and eye shield at night for 1 week.    Limited activities with no exercise or heavy lifting for 1 week.    Instructed patient to contact us for decreasing vision, eye pain, new floaters or flashes of light or other concerning symptoms.    Written instructions given     Pseudophakia OS, POD7   - Surprise hyperopic refractive outcome of +1.75 +1.00 @ 005 but BCVA 20/20.    -Discussed results of refraction and MRx given. Advised patient he can fill prescription or wait until follow up appointment for OD MRx.    - Okay to continue prednisolone taper and stop vigamox OS    Return to clinic as scheduled on 10/13 for VT, MRx, DFE.    Zak Zazueta MD  Ophthalmology PGY-3  [facility]     Not seen by staff during this visit, available should need have arisen.  Plan appropriate as above.    Andre Jackson MD  , Comprehensive Ophthalmology  Department of Ophthalmology and Visual Neurosciences  [facility]

## 2020-09-28 NOTE — ANESTHESIA PREPROCEDURE EVALUATION
"Anesthesia Pre-Procedure Evaluation    Patient: Jose Apodaca   MRN:     [id number] Gender:   male   Age:    44 year old :      1975        Preoperative Diagnosis: Combined forms of age-related cataract of both eyes [H25.813]  Keratoconus of both eyes [H18.603]   Procedure(s):  PHACOEMULSIFICATION, CATARACT, WITH INTRAOCULAR LENS IMPLANT     LABS:  CBC: No results found for: WBC, HGB, HCT, PLT  BMP:   Lab Results   Component Value Date     2020    POTASSIUM 4.2 2020    CHLORIDE 107 2020    CO2 25 2020    BUN 10 2020    CR 0.77 2020     (H) 2020     COAGS: No results found for: PTT, INR, FIBR  POC: No results found for: BGM, HCG, HCGS  OTHER:   Lab Results   Component Value Date    A1C 7.5 (H) 2020    MICK 8.5 2020    TSH 1.16 2020        Preop Vitals    BP Readings from Last 3 Encounters:   20 (!) 164/96   20 108/74   20 122/78    Pulse Readings from Last 3 Encounters:   20 78   20 98   20 91      Resp Readings from Last 3 Encounters:   20 16   20 16   20 24    SpO2 Readings from Last 3 Encounters:   20 98%   20 94%   20 98%      Temp Readings from Last 1 Encounters:   20 36.9  C (98.5  F) (Oral)    Ht Readings from Last 1 Encounters:   20 1.689 m (5' 6.5\")      Wt Readings from Last 1 Encounters:   20 147 kg (324 lb)    Estimated body mass index is 51.51 kg/m  as calculated from the following:    Height as of this encounter: 1.689 m (5' 6.5\").    Weight as of this encounter: 147 kg (324 lb).     LDA:  Peripheral IV 20 Right Hand (Active)   Number of days: 0        Past Medical History:   Diagnosis Date     Cataracts, bilateral      DKA (diabetic ketoacidoses) (H)      Morbid obesity (H)      PHUONG (obstructive sleep apnea)      T1DM (type 1 diabetes mellitus) (H)       Past Surgical History:   Procedure Laterality Date     COLONOSCOPY   "     ENT SURGERY  10 years ago    cyst on neck     EXTRACTION(S) DENTAL       NECK SURGERY  2010    to remove a cyst     PHACOEMULSIFICATION CLEAR CORNEA WITH STANDARD INTRAOCULAR LENS IMPLANT Left 9/21/2020    Procedure: COMPLEX PHACOEMULSIFICATION, CATARACT, WITH INTRAOCULAR LENS IMPLANT;  Surgeon: Andre Jackson MD;  Location: [facility] OR      Allergies   Allergen Reactions     Cats      Seasonal Allergies      grass        Anesthesia Evaluation     . Pt has had prior anesthetic. Type: General and MAC           ROS/MED HX    ENT/Pulmonary:     (+)sleep apnea, , . .    Neurologic:  - neg neurologic ROS     Cardiovascular:  - neg cardiovascular ROS       METS/Exercise Tolerance:  4 - Raking leaves, gardening   Hematologic:         Musculoskeletal:  - neg musculoskeletal ROS       GI/Hepatic:  - neg GI/hepatic ROS       Renal/Genitourinary:  - ROS Renal section negative       Endo:     (+) type I DM, Obesity, .      Psychiatric:  - neg psychiatric ROS       Infectious Disease:  - neg infectious disease ROS       Malignancy:      - no malignancy   Other:    - neg other ROS                     PHYSICAL EXAM:   Mental Status/Neuro: A/A/O   Airway: Facies: Feasible  Mallampati: II  Mouth/Opening: Full  TM distance: > 6 cm  Neck ROM: Full   Respiratory: Auscultation: CTAB     Resp. Rate: Normal     Resp. Effort: Normal      CV: Rhythm: Regular  Rate: Age appropriate  Heart: Normal Sounds  Edema: None   Comments:      Dental: Normal Dentition                Assessment:   ASA SCORE: 2    H&P: History and physical reviewed and following examination; no interval change.   Smoking Status:  Non-Smoker/Unknown   NPO Status: NPO Appropriate     Plan:   Anes. Type:  MAC   Pre-Medication: None   Induction:  N/a   Airway: Native Airway   Access/Monitoring: PIV   Maintenance: N/a     Postop Plan:   Postop Pain: None  Postop Sedation/Airway: Not planned     PONV Management:   Adult Risk Factors:, Non-Smoker                   Prashanth  MD Pauline

## 2020-09-28 NOTE — OP NOTE
PREOPERATIVE DIAGNOSIS:   1. Combined forms of age-related cataract right eye    2. Keratoconus of both eyes      POSTOPERATIVE DIAGNOSIS: Same   PROCEDURES:   1. Cataract extraction with intraocular lens implant Right eye.  SURGEON: Andre Jackson M.D.  Assistant: Zak Zazueta MD          INDICATIONS: The patient Jose Apodaca presented to the eye clinic with decreased vision secondary to cataract in the Right eye. The risks, benefits and alternatives to cataract extraction were discussed. The patient elected to proceed. All questions were answered to the patient's satisfaction.   DESCRIPTION OF PROCEDURE:   Prior to the procedure, appropriate cardiac and respiratory monitors were applied to the patient.  In the pre-operative holding area, a drop of topical tetracaine followed by lidocaine gel followed by povidone iodine.  The patient was brought to the operating room where a surgical pause was carried out to identify with all members of the surgical team the correct surgical site.  With adequate anesthesia, the Right eye was prepped and draped in the usual sterile fashion. A lid speculum was placed, and the operating microscope was rotated into position. A paracentesis was created.  Through this limbal paracentesis, the anterior chamber was filled with preservative-free lidocaine followed by viscoelastic.  A temporal wound was created at the limbus using a 2.6 mm blade. A capsulorrhexis was initiated using a bent 25-gauge needle and was completed in continuous and circular fashion using the capsulorrhexis forceps. The lens nucleus was hydrodissected using balanced salt solution.  The lens nucleus was rotated and removed using phacoemulsification in a stop and chop technique.  Residual cortical material was removed using irrigation-aspiration.  The capsular bag was reinflated to its maximal extent with cohesive viscoelastic.  A 20.0 diopter ZCBOO inserted into the capsular bag.  The lens power selected was  reviewed using the intraocular lens power measurements that were obtained preoperatively to confirm that the correct lens was selected for the desired post-operative refractive state. The residual viscoelastic was removed in its entirety, the wound were hydrated and found to be self-sealing.  Intracameral moxifloxacin was administered. Tactile pressure was confirmed to be in a normal range.  The lid speculum was removed and a patch and shield were applied.  The patient tolerated the procedure well, and there were no complications.    PLAN: The patient will be discharged to home and will follow up tomorrow morning in the eye clinic.  EBL:  None  Complications:  None  Implant Name Type Inv. Item Serial No.  Lot No. LRB No. Used Action   EYE IMP IOL ALLEN PCL TECNIS ZCB00 20.0 Lens/Eye Implant EYE IMP IOL ALLEN PCL TECNIS ZCB00 20.0 [id number] [employer]  Right 1 Implanted        Attending Physician Procedure Attestation: I was present for the entire procedure       Andre Jackson MD  , Comprehensive Ophthalmology  Department of Ophthalmology and Visual Neurosciences  [facility]

## 2020-09-28 NOTE — DISCHARGE INSTRUCTIONS
[facility]  Home Care Following Anesthesia  For 24 hours after surgery:  1. Get plenty of rest.  A responsible adult must stay with you for at least 24 hours after you leave the surgery center.  2. Do not drive or use heavy equipment.  If you have weakness or tingling, don't drive or use heavy equipment until this feeling goes away.   3. Do not drink alcohol.   4. Avoid strenuous or risky activities.  Ask for help when climbing stairs.  5. You may feel lightheaded.  IF so, sit for a few minutes before standing.  Have someone help you get up.   6. If you have nausea (feel sick to your stomach): Drink only clear liquids such as apple juice, ginger ale, broth or 7-Up.  Rest may also help.  Be sure to drink enough fluids.  Move to a regular diet as you feel able.   7. You may have a slight fever.  Call the doctor if your fever is over 100 F (37.7 C) (taken under the tongue) or lasts longer than 24 hours.  8. You may have a dry mouth, a sore throat, muscle aches or trouble sleeping. These should go away after 24 hours.  9. Do not make important or legal decisions.               Tips for taking pain medications  To get the best pain relief possible, remember these points:    Take pain medications as directed, before pain becomes severe.    Pain medication can upset your stomach: taking it with food may help.    Constipation is a common side effect of pain medication. Drink plenty of  fluids.    Eat foods high in fiber. Take a stool softener if recommended by your doctor or pharmacist.    Do not drink alcohol, drive or operate machinery while taking pain medications.    Ask about other ways to control pain, such as with heat, ice or relaxation.    Tylenol/Acetaminophen Consumption  To help encourage the safe use of acetaminophen, the makers of TYLENOL  have lowered the maximum daily dose for single-ingredient Extra Strength TYLENOL  (acetaminophen) products sold in the [place] from 8  pills per day (4,000 mg) to 6 pills per day (3,000 mg). The dosing interval has also changed from 2 pills every 4-6 hours to 2 pills every 6 hours.    If you feel your pain relief is insufficient, you may take Tylenol/Acetaminophen in addition to your narcotic pain medication.     Be careful not to exceed 3,000 mg of Tylenol/Acetaminophen in a 24 hour period from all sources.    If you are taking extra strength Tylenol/acetaminophen (500 mg), the maximum dose is 6 tablets in 24 hours.    If you are taking regular strength acetaminophen (325 mg), the maximum dose is 9 tablets in 24 hours.    Call a doctor for any of the followin. Signs of infection (fever, growing tenderness at the surgery site, a large amount of drainage or bleeding, severe pain, foul-smelling drainage, redness, swelling).  2. It has been over 8 to 10 hours since surgery and you are still not able to urinate (pass water).  3. Headache for over 24 hours.  4. Numbness, tingling or weakness the day after surgery (if you had spinal anesthesia).  5. Signs of Covid-19 infection (temperature over 100 degrees, shortness of breath, cough, loss of taste/smell, generalized body aches, persistent headache, chills, sore throat, nausea/vomiting/diarrhea)  Your doctor is:       Dr. Andre Jackson, Ophthalmology: [phone]               Or dial [phone] and ask for the resident on call for:  Ophthalmology  For emergency care, call the:  [place] [facility]:  [phone] (TTY for hearing impaired: [phone])

## 2020-09-28 NOTE — ANESTHESIA POSTPROCEDURE EVALUATION
Anesthesia POST Procedure Evaluation    Patient: Jose Apodaca   MRN:     [id number] Gender:   male   Age:    44 year old :      1975        Preoperative Diagnosis: Combined forms of age-related cataract of both eyes [H25.813]  Keratoconus of both eyes [H18.603]   Procedure(s):  PHACOEMULSIFICATION, CATARACT, WITH INTRAOCULAR LENS IMPLANT   Postop Comments: No value filed.     Anesthesia Type: MAC          Postop Pain Control: Uneventful            Sign Out: Well controlled pain   PONV: No   Neuro/Psych: Uneventful            Sign Out: Acceptable/Baseline neuro status   Airway/Respiratory: Uneventful            Sign Out: Acceptable/Baseline resp. status   CV/Hemodynamics: Uneventful            Sign Out: Acceptable CV status   Other NRE: NONE   DID A NON-ROUTINE EVENT OCCUR? No         Last Anesthesia Record Vitals:  CRNA VITALS  2020 0715 - 2020 0815      2020             Resp Rate (set):  10          Last PACU Vitals:  Vitals Value Taken Time   BP     Temp     Pulse     Resp     SpO2     Temp src     NIBP 144/87 2020  7:40 AM   Pulse 74 2020  7:44 AM   SpO2 100 % 2020  7:44 AM   Resp     Temp     Ht Rate 71 2020  7:43 AM   Temp 2           Electronically Signed By: Prashanth Carpenter MD, 2020, 11:38 AM

## 2020-09-28 NOTE — NURSING NOTE
Chief Complaints and History of Present Illnesses   Patient presents with     Post Op (Ophthalmology) Right Eye     Chief Complaint(s) and History of Present Illness(es)     Post Op (Ophthalmology) Right Eye     Laterality: right eye    Onset: days ago    Frequency: constantly    Course: stable    Pain scale: 0/10              Comments     S/p Cataract extraction with intraocular lens implant Right eye 9/28/2020.    KAIDEN Monique COT 9:09 AM September 28, 2020

## 2020-09-28 NOTE — ANESTHESIA CARE TRANSFER NOTE
Patient: Jose Apodaca    Procedure(s):  PHACOEMULSIFICATION, CATARACT, WITH INTRAOCULAR LENS IMPLANT    Diagnosis: Combined forms of age-related cataract of both eyes [H25.813]  Keratoconus of both eyes [H18.603]  Diagnosis Additional Information: No value filed.    Anesthesia Type:   MAC     Note:  Airway :Room Air  Patient transferred to:Phase II  Comments: Pascual Report: Identifed the Patient, Identified the Reponsible Provider, Reviewed the pertinent medical history, Discussed the surgical course, Reviewed Intra-OP anesthesia mangement and issues during anesthesia, Set expectations for post-procedure period and Allowed opportunity for questions and acknowledgement of understanding      Vitals: (Last set prior to Anesthesia Care Transfer)    CRNA VITALS  9/28/2020 0715 - 9/28/2020 0745      9/28/2020             Resp Rate (set):  10                Electronically Signed By: URBANO Swanson CRNA  September 28, 2020  7:45 AM

## 2020-09-29 ENCOUNTER — VIRTUAL VISIT (OUTPATIENT)
Dept: EDUCATION SERVICES | Facility: CLINIC | Age: 45
End: 2020-09-29
Payer: COMMERCIAL

## 2020-09-29 DIAGNOSIS — E10.9 TYPE 1 DIABETES MELLITUS WITHOUT COMPLICATION (H): Primary | ICD-10-CM

## 2020-09-29 PROCEDURE — 97803 MED NUTRITION INDIV SUBSEQ: CPT | Mod: 95

## 2020-09-29 NOTE — PROGRESS NOTES
"Diabetes Self-Management Education & Support    Presents for: Insulin Pump and CGM Review  Patient verbally consented to the telephone visit service today: yes        SUBJECTIVE/OBJECTIVE:  Presents for: Insulin Pump and CGM Review  Accompanied by: Self  Diabetes education in the past 24mo: Yes  Focus of Visit: Insulin Pump  Type of Pump visit: Pump Review  Diabetes type: Type 1  Disease course: Stable  How confident are you filling out medical forms by yourself:: Not Assessed  Diabetes management related comments/concerns: Was not able to get the Victoza yet from his pharmacy.  Still getting it online.  Transportation concerns: No  Difficulty affording diabetes medication?: No  Difficulty affording diabetes testing supplies?: No  Other concerns:: None  Cultural Influences/Ethnic Background:  American    Diabetes Symptoms & Complications:  Fatigue: No  Polydipsia: Sometimes  Polyphagia: No  Polyuria: No  Visual change: No  Slow healing wounds: No  Weight trend: Stable  Complications assessed today?: Yes  Autonomic neuropathy: No  CVA: No  Heart disease: No  Nephropathy: No  Peripheral neuropathy: No  Peripheral Vascular Disease: No  Retinopathy: No  Sexual dysfunction: No    Patient Problem List and Family Medical History reviewed for relevant medical history, current medical status, and diabetes risk factors.    Vitals:  There were no vitals taken for this visit.  Estimated body mass index is 51.51 kg/m  as calculated from the following:    Height as of 9/28/20: 1.689 m (5' 6.5\").    Weight as of 9/28/20: 147 kg (324 lb).   Last 3 BP:   BP Readings from Last 3 Encounters:   09/28/20 134/83   09/21/20 108/74   09/18/20 122/78       History   Smoking Status     Never Smoker   Smokeless Tobacco     Never Used       Labs:  Lab Results   Component Value Date    A1C 7.5 08/20/2020     Lab Results   Component Value Date     05/28/2020     Lab Results   Component Value Date    LDL 78 05/28/2020     HDL Cholesterol "   Date Value Ref Range Status   05/28/2020 62 >39 mg/dL Final   ]  GFR Estimate   Date Value Ref Range Status   05/28/2020 >90 >60 mL/min/[1.73_m2] Final     Comment:     Non  GFR Calc  Starting 12/18/2018, serum creatinine based estimated GFR (eGFR) will be   calculated using the Chronic Kidney Disease Epidemiology Collaboration   (CKD-EPI) equation.       GFR Estimate If Black   Date Value Ref Range Status   05/28/2020 >90 >60 mL/min/[1.73_m2] Final     Comment:      GFR Calc  Starting 12/18/2018, serum creatinine based estimated GFR (eGFR) will be   calculated using the Chronic Kidney Disease Epidemiology Collaboration   (CKD-EPI) equation.       Lab Results   Component Value Date    CR 0.77 05/28/2020     No results found for: MICROALBUMIN    Healthy Eating:  Healthy Eating Assessed Today: Yes  Cultural/Taoist diet restrictions?: No  Meal planning/habits: Carb counting, Frequent snacking  How many times a week on average do you eat food made away from home (restaurant/take-out)?: 2  Meals include: Lunch, Dinner, Morning Snack, Afternoon Snack, Evening Snack  Breakfast: Hot chocolate ([place]) OR skips  Lunch: Cajun turkey sandwich with cheese, [place] Amanuel low carb bread (19g/2 slices), apple, prepackaged chips  Dinner: leftovers (roast with potatoes) OR steak, fries OR breaded chicken, ww noodles with pasta sauce OR Subway OR Mc's OR sandwich OR bowl of cereal OR pizza   Snacks: Restorationism snack bars, candy bars, cereal  Other: [place] cake for lows   Beverages: Water, Diet soda  Has patient met with a dietitian in the past?: No    Being Active:  Being Active Assessed Today: Yes  Exercise:: Yes  How intense was your typical exercise? : Light (like stretching or slow walking)  Barrier to exercise: None    Monitoring:  Monitoring Assessed Today: Yes  Did patient bring glucose meter to appointment? : Yes  Blood Glucose Meter: CGM  Times checking blood sugar at home (number):  4  Times checking blood sugar at home (per): Day  Blood glucose trend: Fluctuating      Taking Medications:  Diabetes Medication(s)     Insulin       insulin aspart (NOVOLOG VIAL) 100 UNITS/ML vial    Use with insulin pump, total daily dose approx 100 units    Incretin Mimetic Agents (GLP-1 Receptor Agonists)       liraglutide (VICTOZA PEN) 18 MG/3ML solution    Inject 0.6 to 1.2 mg subcutaneous daily as directed          Taking Medication Assessed Today: Yes  Current Treatments: Insulin Pump  Dose schedule: Pre-breakfast, Pre-lunch, Pre-dinner, At bedtime, Other  Given by: Patient  Injection/Infusion sites: Abdomen  Problems taking diabetes medications regularly?: No  Diabetes medication side effects?: Yes    Problem Solving:  Problem Solving Assessed Today: Yes  Is the patient at risk for hypoglycemia?: Yes  Hypoglycemia Frequency: Weekly  Hypoglycemia Treatment: Candy  Does patient have glucagon emergency kit?: Yes  Is the patient at risk for DKA?: Yes  Does patient have ketone test strips?: No  Does patient have DKA prevention plan?: No  Does patient have severe weather/disaster plan for diabetes management?: No  Does patient have sick day plan for diabetes management?: No         Hypoglycemia Complications  Nocturnal hypoglycemia: Yes  Required assistance: Yes  Required glucagon injection: Yes    Reducing Risks:  Reducing Risks Assessed Today: No  Diabetes Risks: Sedentary Lifestyle, Ethnicity  CAD Risks: Diabetes Mellitus, Male sex, Obesity, Sedentary lifestyle  Has dilated eye exam at least once a year?: Yes  Sees dentist every 6 months?: Yes  Feet checked by healthcare provider in the last year?: No    Healthy Coping:  Healthy Coping Assessed Today: Yes  Emotional response to diabetes: Ready to learn  Informal Support system:: None  Stage of change: ACTION (Actively working towards change)  Support resources: None  Patient Activation Measure Survey Score:  No flowsheet data found.    Diabetes knowledge and  skills assessment:   Patient is knowledgeable in diabetes management concepts related to: Healthy Eating, Being Active, Monitoring, Problem Solving, Reducing Risks and Healthy Coping    Patient needs further education on the following diabetes management concepts: Taking Medication    Based on learning assessment above, most appropriate setting for further diabetes education would be: Group class or Individual setting.      INTERVENTIONS:    REPORTS:                    Insulin Pump Information  Insulin Pump Brand: Tandem  Infusion Set: Tandem  Does patient have an insulin multiple daily injection back-up plan?: No    Education provided today on:  AADE Self-Care Behaviors:  Monitoring: reviewed his time in range with him and discussed that we would like to see it improve to > 70% if we can.  This last week it was 61% so it is getting better.  Praised him on the improvement.   Taking Medication: reviewed with him that it looks like his PA for Victoza was denied because they want another A1c drawn when he is at the end of his 3 month trial of Metformin.     Education specific to insulin pump provided today on:   Educated that the auto boluses with his pump are only 60% of a calculated bolus.  So if his BG remains high then would recommend he use his bolus calculator for a full bolus to help bring his BG back down.     Opportunities for ongoing education and support in diabetes-self management were discussed.    Pt verbalized understanding of concepts discussed and recommendations provided today.       Education Materials Provided:  No new materials provided today      ASSESSMENT  Don continues to use his Tandem pump with Control IQ and Victoza (which he buys online).  He has not been taking the Metformin as he does not tolerate it but his insurance requires him to try it for 3 months before they will cover the Victoza.     He is often just letting his pump do the auto boluses for correction. Explained that this is not  a full bolus but is actually just 60% of a calculated bolus so he should still use his calculator for corrections so he can get 100% of his calculated dose.  The auto corrections help to prevent how high he spikes but do not optimally bring down his BG when it is high.    Glucose Patterns & Trends:  post meal hyperglycemia regularly    Patient would benefit from decrease in correction/sensitivity and decrease in carbohydrate ratio.    Changes made to pump settings:  carb ratio: 12 pm, 4 pm, and 8 pm 3 --> 2.7  correction/sensitivity: overnight 25--> 20; daytime 20 --> 15    Changes made to sensor settings:   none    PLAN  See Patient Instructions for co-developed, patient-stated behavior change goals.  Follow up scheduled in 2 months to review his pump data again.   Recommend follow up with endo in the next month so he can follow up on the Metformin/Victoza. Will route to endo and also his LENNY Cat to help schedule this.   AVS printed and provided to patient today. See Follow-Up section for recommended follow-up.    CARMINE Pereira CDE    Time Spent: 18 minutes  Encounter Type: Individual    Any diabetes medication dose changes were made via the CDE Protocol and Collaborative Practice Agreement with the patient's endocrinology provider. A copy of this encounter was shared with the provider.

## 2020-09-29 NOTE — Clinical Note
Can you call this patient to try to schedule him with dr Heaton in the next month? Thanks!  Xiomy Fitzgerald, RD LD CDE

## 2020-09-29 NOTE — Clinical Note
FYI, it has been > 3 months since his last appt with you so I recommended he follow up.  It looks like the PA for victoza was denied and he needs a new A1c drawn (even though his last one was just a month ago).  I will ask raffy if she can call him to schedule him with you.   Also, I changed his noon, 4pm, 8 pm I:C from 3 to 2.7 and his ISF overnight from 25 to 20 and daytime 20 to 15.     He continues to get his victoza from online sources in the meantime.     Xiomy Fitzgerald, CARMINE LD CDE

## 2020-10-01 ENCOUNTER — MYC MEDICAL ADVICE (OUTPATIENT)
Dept: EDUCATION SERVICES | Facility: CLINIC | Age: 45
End: 2020-10-01

## 2020-10-01 DIAGNOSIS — E13.9 DIABETES MELLITUS TYPE 1.5, MANAGED AS TYPE 2 (H): Primary | ICD-10-CM

## 2020-10-01 DIAGNOSIS — E10.9 TYPE 1 DIABETES MELLITUS WITHOUT COMPLICATION (H): Primary | ICD-10-CM

## 2020-10-01 NOTE — TELEPHONE ENCOUNTER
Message noted.  I placed the future (hgbA1c) lab order in his chart this morning.  After we document the hgbA1c result, we can appeal the Victoza Rx coverage with his insurance plan.  Thanks.    DAVIN Heaton MD, MS  Endocrinology  [facility]

## 2020-10-01 NOTE — TELEPHONE ENCOUNTER
Xiomy,     I don't see an A1C order from Dr. Heaton -- will he need a recheck at his scheduled lab appt on 10/2?     Thank you,   Berenice BAH RN      Last checked:   Lab Results   Component Value Date    A1C 7.5 08/20/2020    A1C 8.5 05/28/2020

## 2020-10-02 DIAGNOSIS — E13.9 DIABETES MELLITUS TYPE 1.5, MANAGED AS TYPE 2 (H): ICD-10-CM

## 2020-10-02 DIAGNOSIS — Z12.5 SCREENING FOR PROSTATE CANCER: ICD-10-CM

## 2020-10-02 LAB
HBA1C MFR BLD: 7.3 % (ref 0–5.6)
PSA SERPL-ACNC: 1.51 UG/L (ref 0–4)

## 2020-10-02 PROCEDURE — G0103 PSA SCREENING: HCPCS | Performed by: INTERNAL MEDICINE

## 2020-10-02 PROCEDURE — 83036 HEMOGLOBIN GLYCOSYLATED A1C: CPT | Performed by: INTERNAL MEDICINE

## 2020-10-02 PROCEDURE — 36415 COLL VENOUS BLD VENIPUNCTURE: CPT | Performed by: INTERNAL MEDICINE

## 2020-10-06 ENCOUNTER — OFFICE VISIT (OUTPATIENT)
Dept: OPHTHALMOLOGY | Facility: CLINIC | Age: 45
End: 2020-10-06
Attending: OPHTHALMOLOGY
Payer: COMMERCIAL

## 2020-10-06 DIAGNOSIS — Z98.890 POSTOPERATIVE EYE STATE: Primary | ICD-10-CM

## 2020-10-06 DIAGNOSIS — Z98.890 POSTOPERATIVE EYE STATE: ICD-10-CM

## 2020-10-06 PROCEDURE — 99024 POSTOP FOLLOW-UP VISIT: CPT | Performed by: OPHTHALMOLOGY

## 2020-10-06 PROCEDURE — 92015 DETERMINE REFRACTIVE STATE: CPT

## 2020-10-06 PROCEDURE — 99207 PR NO BILLABLE SERVICE THIS VISIT: CPT | Performed by: OPHTHALMOLOGY

## 2020-10-06 PROCEDURE — G0463 HOSPITAL OUTPT CLINIC VISIT: HCPCS

## 2020-10-06 ASSESSMENT — REFRACTION_MANIFEST
OD_ADD: +2.50
OS_CYLINDER: +1.00
OS_ADD: +2.50
OD_CYLINDER: +1.50
OD_SPHERE: -1.50
OS_SPHERE: -2.75
OS_AXIS: 115
OD_AXIS: 145

## 2020-10-06 ASSESSMENT — REFRACTION_WEARINGRX
OS_CYLINDER: +1.00
OS_SPHERE: +1.75
OS_AXIS: 005
OD_SPHERE: PLANO

## 2020-10-06 ASSESSMENT — VISUAL ACUITY
OD_SC: 20/30
OS_SC+: -2
OS_SC: 20/70
METHOD: SNELLEN - LINEAR
OS_PH_SC: 20/50
OD_PH_SC+: +2
OD_PH_SC: 20/30
OD_SC+: -2

## 2020-10-06 ASSESSMENT — EXTERNAL EXAM - LEFT EYE: OS_EXAM: NORMAL

## 2020-10-06 ASSESSMENT — CONF VISUAL FIELD
OD_NORMAL: 1
METHOD: COUNTING FINGERS
OS_NORMAL: 1

## 2020-10-06 ASSESSMENT — EXTERNAL EXAM - RIGHT EYE: OD_EXAM: NORMAL

## 2020-10-06 ASSESSMENT — TONOMETRY
OD_IOP_MMHG: 15
OS_IOP_MMHG: 18
IOP_METHOD: TONOPEN

## 2020-10-06 ASSESSMENT — SLIT LAMP EXAM - LIDS
COMMENTS: MGD
COMMENTS: MGD

## 2020-10-06 NOTE — PROGRESS NOTES
Chief Complaint(s) and History of Present Illness(es)     Post Op (Ophthalmology) Right Eye     Laterality: both eyes    Comments: POW#1 s/p CE/IOL Right Eye 09/28/2020   POW#2 s/p CE/IOL Left Eye 09/21/2020              Comments     Pt states vision has been ok since last visit. No eye pain today. No   flashes or floaters.  No redness or dryness.  DM1 BS:101 currently.  Lab Results       Component                Value               Date                       A1C                      7.3                 10/02/2020                 A1C                      7.5                 08/20/2020                 A1C                      8.5                 05/28/2020              Wade Albarado,  COMT October 6, 2020 8:11 AM    Patient is a 44 y.o M with a hx of T2DM, hx of Keratoconus now s/p CE IOL each eye, with left eye performed 9/21/2020, and right eye 9/28/2020.    Patient reports that he is not having any eye pain. He reports that the vision isn't as sharp - he reports tat the left eye is always worse than the right. Overall the vision is much better.     Still using drops. Denies flashes of lights of floaters. No distortions.    OCT Mac 10/06/20  right eye: No edema seen  left eye: No edema seen      Review of systems for the eyes was negative other than the pertinent positives/negatives listed in the HPI.      Assessment & Plan      Jose Apodaca is a 44 year old male with the following diagnoses:   1. Postoperative eye state - Both Eyes    2. Postoperative eye state          Left eye had been noted to have hyperopic outcome at 1 week PostOp  Now closer to goal.    Visual acuity decrease unclear left eye.  No edema on OCT and anterior chamber looks to be healing well  Suspect dry eye syndrome and fluctuate refraction    Increase left eye prednisolone back to three times a day.  Taper simultaneously weekly with right eye schedule  Increase artificial tears to 3-4x daily   Ok to get temporary glasses for now  Ok  to resume normal activities next week      Patient disposition:   Return for Please coordinate next PO visit to 2-3 weeks on same day as contact eval with [facility].    Khalif Garcia MD  Department of Ophthalmology  Pager: [phone]    Attending Physician Attestation:  Complete documentation of historical and exam elements from today's encounter can be found in the full encounter summary report (not reduplicated in this progress note).  I personally obtained the chief complaint(s) and history of present illness.  I confirmed and edited as necessary the review of systems, past medical/surgical history, family history, social history, and examination findings as documented by others; and I examined the patient myself.  I personally reviewed the relevant tests, images, and reports as documented above.  I formulated and edited as necessary the assessment and plan and discussed the findings and management plan with the patient and family. . - Andre Jackson MD

## 2020-10-06 NOTE — NURSING NOTE
Chief Complaints and History of Present Illnesses   Patient presents with     Post Op (Ophthalmology) Right Eye     POW#1 s/p CE/IOL Right Eye 09/28/2020   POW#2 s/p CE/IOL Left Eye 09/21/2020     Chief Complaint(s) and History of Present Illness(es)     Post Op (Ophthalmology) Right Eye     Laterality: both eyes    Comments: POW#1 s/p CE/IOL Right Eye 09/28/2020   POW#2 s/p CE/IOL Left Eye 09/21/2020              Comments     Pt states vision has been ok since last visit. No eye pain today. No flashes or floaters.  No redness or dryness.  DM1 BS:101 currently.  Lab Results       Component                Value               Date                       A1C                      7.3                 10/02/2020                 A1C                      7.5                 08/20/2020                 A1C                      8.5                 05/28/2020              BRENNA Mendoza October 6, 2020 8:11 AM

## 2020-10-06 NOTE — LETTER
October 8, 2020 October 6, 2020      Re: Jose Apodaca   1975    To Whom It May Concern:    This is to confirm that the above patient was seen on 10/6/2020.  Jose Apodaca is able to return to work 10/19/2020.    Thank you for your cooperation in this matter.  Please do not hesitate to contact me if you have any further questions.    Sincerely,        Andre Jackson MD  , Comprehensive Ophthalmology  Department of Ophthalmology and Visual Neurosciences  [facility]

## 2020-10-06 NOTE — LETTER
October 6, 2020      Re: Jose Apodaca   1975    To Whom It May Concern:    This is to confirm that the above patient was seen on 10/6/2020.  Jose Apodaca is able to return to work 10/12/2020.    Thank you for your cooperation in this matter.  Please do not hesitate to contact me if you have any further questions.    Sincerely,        Andre Jackson MD  , Comprehensive Ophthalmology  Department of Ophthalmology and Visual Neurosciences  [facility]

## 2020-10-06 NOTE — TELEPHONE ENCOUNTER
Patient had hemoglobin A1C done on 10/02/2020. Please write letter of medical necessity now that patient has had A1C done after being on Metformin.    Chris Huff CMA on 10/6/2020 at 3:29 PM

## 2020-10-08 ENCOUNTER — TELEPHONE (OUTPATIENT)
Dept: ENDOCRINOLOGY | Facility: CLINIC | Age: 45
End: 2020-10-08

## 2020-10-08 NOTE — TELEPHONE ENCOUNTER
[address], [address]    [facility] [address]    Phone:  [phone]                                       After Visit Summary   4/13/2017    Sherita Kenney    MRN: [id number]           After Visit Summary Signature Page     I have received my discharge instructions, and my questions have been answered. I have discussed any challenges I see with this plan with the nurse or doctor.    ..........................................................................................................................................  Patient/Patient Representative Signature      ..........................................................................................................................................  Patient Representative Print Name and Relationship to Patient    ..................................................               ................................................  Date                                            Time    ..........................................................................................................................................  Reviewed by Signature/Title    ...................................................              ..............................................  Date                                                            Time           Prior Authorization Retail Medication Request    Medication/Dose: liraglutide (VICTOZA PEN) 18 MG/3ML solution  ICD code (if different than what is on RX):  E10.9, E66.01  Previously Tried and Failed:    Rationale:      Insurance Name:  [employer]  Insurance ID:  [id number]  Key: [id number]      Pharmacy Information (if different than what is on RX)  Name:  [facility]  Phone:  [phone]

## 2020-10-08 NOTE — TELEPHONE ENCOUNTER
Central Prior Authorization Team   Phone: [phone]      PA Initiation    Medication: liraglutide (VICTOZA PEN) 18 MG/3ML solution-Initiated  Insurance Company: [employer] - Phone [phone] Fax [phone]  Pharmacy Filling the Rx: [id number] - [place], [place] - [address] AT [facility] [address]  Filling Pharmacy Phone: [phone]  Filling Pharmacy Fax:    Start Date: 10/8/2020

## 2020-10-08 NOTE — TELEPHONE ENCOUNTER
Prior Authorization Approval    Authorization Effective Date: 10/8/2020  Authorization Expiration Date: 10/7/2021  Medication: liraglutide (VICTOZA PEN) 18 MG/3ML solution-APPROVED  Approved Dose/Quantity:   Reference #:     Insurance Company: [employer] - Phone [phone] Fax [phone]  Expected CoPay:       CoPay Card Available:      Foundation Assistance Needed:    Which Pharmacy is filling the prescription (Not needed for infusion/clinic administered): [id number] - [place], [place] - [address] [place] [facility] [address]  Pharmacy Notified: Yes  Patient Notified: No    Received a call from Lyle at [facility] with the approval.  Pharmacy will notify patient when medication is ready.

## 2020-10-13 ENCOUNTER — OFFICE VISIT (OUTPATIENT)
Dept: OPHTHALMOLOGY | Facility: CLINIC | Age: 45
End: 2020-10-13
Attending: OPHTHALMOLOGY
Payer: COMMERCIAL

## 2020-10-13 ENCOUNTER — VIRTUAL VISIT (OUTPATIENT)
Dept: ENDOCRINOLOGY | Facility: CLINIC | Age: 45
End: 2020-10-13
Payer: COMMERCIAL

## 2020-10-13 ENCOUNTER — OFFICE VISIT (OUTPATIENT)
Dept: OPTOMETRY | Facility: CLINIC | Age: 45
End: 2020-10-13
Payer: COMMERCIAL

## 2020-10-13 DIAGNOSIS — E10.69 TYPE 1 DIABETES MELLITUS WITH OTHER SPECIFIED COMPLICATION (H): ICD-10-CM

## 2020-10-13 DIAGNOSIS — E10.9 TYPE 1 DIABETES MELLITUS WITHOUT COMPLICATION (H): Primary | ICD-10-CM

## 2020-10-13 DIAGNOSIS — E66.01 MORBID OBESITY (H): ICD-10-CM

## 2020-10-13 DIAGNOSIS — H18.613 STABLE KERATOCONUS OF BOTH EYES: Primary | ICD-10-CM

## 2020-10-13 DIAGNOSIS — Z98.890 POSTOPERATIVE EYE STATE: Primary | ICD-10-CM

## 2020-10-13 DIAGNOSIS — Z96.1 PSEUDOPHAKIA: ICD-10-CM

## 2020-10-13 DIAGNOSIS — Z96.41 INSULIN PUMP STATUS: ICD-10-CM

## 2020-10-13 DIAGNOSIS — H18.603 KERATOCONUS OF BOTH EYES: ICD-10-CM

## 2020-10-13 PROCEDURE — 95251 CONT GLUC MNTR ANALYSIS I&R: CPT | Performed by: INTERNAL MEDICINE

## 2020-10-13 PROCEDURE — G0463 HOSPITAL OUTPT CLINIC VISIT: HCPCS

## 2020-10-13 PROCEDURE — 99213 OFFICE O/P EST LOW 20 MIN: CPT | Mod: 95 | Performed by: INTERNAL MEDICINE

## 2020-10-13 PROCEDURE — 99024 POSTOP FOLLOW-UP VISIT: CPT | Mod: GC | Performed by: OPHTHALMOLOGY

## 2020-10-13 RX ORDER — LIRAGLUTIDE 6 MG/ML
INJECTION SUBCUTANEOUS
Qty: 9 ML | Refills: 5 | Status: SHIPPED | OUTPATIENT
Start: 2020-10-13 | End: 2021-05-26

## 2020-10-13 ASSESSMENT — REFRACTION_CURRENTRX
OD_BRAND: BF TORIC TRIAL
OD_CYLINDER: -1.75
OS_SPHERE: -2.00
OS_BRAND: BF TORIC TRIAL
OD_BASECURVE: 8.7
OD_SPHERE: +1.50
OS_AXIS: 110
OS_CYLINDER: -1.25
OS_DIAMETER: 14.5
OS_BASECURVE: 8.7
OD_AXIS: 070
OD_DIAMETER: 14.5

## 2020-10-13 ASSESSMENT — EXTERNAL EXAM - LEFT EYE
OS_EXAM: NORMAL
OS_EXAM: NORMAL

## 2020-10-13 ASSESSMENT — REFRACTION_MANIFEST
OS_CYLINDER: +1.50
OS_AXIS: 025
OD_SPHERE: -0.75
OD_CYLINDER: +2.25
OS_SPHERE: -3.50
OD_AXIS: 160

## 2020-10-13 ASSESSMENT — SLIT LAMP EXAM - LIDS
COMMENTS: MGD

## 2020-10-13 ASSESSMENT — VISUAL ACUITY
OD_SC: 20/30-1
METHOD: SNELLEN - LINEAR
OS_SC: 20/50
OD_SC: 20/30
METHOD: SNELLEN - LINEAR
OS_SC: 20/50
OD_SC+: -1

## 2020-10-13 ASSESSMENT — CUP TO DISC RATIO
OS_RATIO: 0.3
OD_RATIO: 0.3

## 2020-10-13 ASSESSMENT — EXTERNAL EXAM - RIGHT EYE
OD_EXAM: NORMAL
OD_EXAM: NORMAL

## 2020-10-13 ASSESSMENT — TONOMETRY
IOP_METHOD: TONOPEN
OS_IOP_MMHG: 17
OD_IOP_MMHG: 17

## 2020-10-13 NOTE — PROGRESS NOTES
Review of systems for the eyes was negative other than the pertinent positives/negatives listed in the HPI.      Assessment & Plan      Jose Apodaca is a 44 year old male with the following diagnoses:   1. Postoperative eye state - Both Eyes    2. Type 1 diabetes mellitus with other specified complication (H)    3. Keratoconus of both eyes          Left eye had been noted to have hyperopic outcome at 1 week PostOp  Repeat MR today with Dr. Simon near target.   VA left eye improved today, 20/30 in CTLs, right eye 20/20-    Taper both eyes simultaneously weekly with right eye schedule, 2/1/STOP  Continue artificial tears to 3-4x daily   Ok to resume normal activities now  Received MRx for glasses and CTLs from Dr. Simon this morning    Patient disposition:   Return in about 1 year (around 10/13/2021) for Dr. Simon.  Manuel as needed     Angella High MD  Ophthalmology Resident, PGY-4      Attending Physician Attestation:  Complete documentation of historical and exam elements from today's encounter can be found in the full encounter summary report (not reduplicated in this progress note).  I personally obtained the chief complaint(s) and history of present illness.  I confirmed and edited as necessary the review of systems, past medical/surgical history, family history, social history, and examination findings as documented by others; and I examined the patient myself.  I personally reviewed the relevant tests, images, and reports as documented above.  I formulated and edited as necessary the assessment and plan and discussed the findings and management plan with the patient and family. . - Andre Jackson MD

## 2020-10-13 NOTE — LETTER
10/13/2020       RE: Jose Apodaca  [address] [place] [address]     Dear Dr. Simon,    Thank you for referring your patient, Jose Apodaca, to the [facility] at [facility]. Please see a copy of my visit note below.         Review of systems for the eyes was negative other than the pertinent positives/negatives listed in the HPI.      Assessment & Plan      Jose Apodaca is a 44 year old male with the following diagnoses:   1. Postoperative eye state - Both Eyes    2. Type 1 diabetes mellitus with other specified complication (H)    3. Keratoconus of both eyes          Left eye had been noted to have hyperopic outcome at 1 week PostOp  Repeat MR today with Dr. Simon near target.   VA left eye improved today, 20/30 in CTLs, right eye 20/20-    Taper both eyes simultaneously weekly with right eye schedule, 2/1/STOP  Continue artificial tears to 3-4x daily   Ok to resume normal activities now  Received MRx for glasses and CTLs from Dr. Simon this morning    Patient disposition:   Return in about 1 year (around 10/13/2021) for Dr. Simon.  Manuel as needed           Again, thank you for allowing me to participate in the care of your patient.      Sincerely,    Andre Jackson MD  , Comprehensive Ophthalmology  Department of Ophthalmology and Visual Neurosciences  [facility]

## 2020-10-13 NOTE — PROGRESS NOTES
A/P  1.) Keratoconus each eye, s/p CE/IOL each eye  -Previously in soft torics with good success. Needs refit after surgery  -Failed hybrid lens previously. BCVA with Rx/soft torics today 20/20 right eye, 20/30 left eye  -Reviewed left eye may improve with rigid lens if he is ever interested, but he is doing well with soft torics currently    CLRx updated, will mail trials in exact Rx. Okay to order if working well. If VA continues to change can RTC here prn    Seeing Dr. Jackson today for f/u

## 2020-10-13 NOTE — LETTER
"    10/13/2020         RE: Jose Apodaca  [address] [place] [address]        Dear Colleague,    Thank you for referring your patient, Jose Apodaca, to the [facility]. Please see a copy of my visit note below.    Jose Apodaca is a 44 year old male who is being evaluated via a billable video visit.      The patient has been notified of following:     \"This video visit will be conducted via a call between you and your physician/provider. We have found that certain health care needs can be provided without the need for an in-person physical exam.  This service lets us provide the care you need with a video conversation.  If a prescription is necessary we can send it directly to your pharmacy.  If lab work is needed we can place an order for that and you can then stop by our lab to have the test done at a later time.    Video visits are billed at different rates depending on your insurance coverage.  Please reach out to your insurance provider with any questions.    If during the course of the call the physician/provider feels a video visit is not appropriate, you will not be charged for this service.\"    Patient has given verbal consent for Video visit? Yes  How would you like to obtain your AVS? Verbally Reviewed  If you are dropped from the video visit, the video invite should be resent to: Text to cell phone: [phone]  Will anyone else be joining your video visit? No        Recent issues:  Diabetes follow-up evaluation  Has been using (off label) Victoza, tolerated well with perceived glucose and appetite benefits  Reviewed health history and recent lab results           2011. Diagnosis of diabetes mellitus while living in [place]  Acute symptoms increased thirst, frequent urination, fatigue, weight loss (265 to 203#/30 days)  Hospitalized and diagnosis of DKA  Initial treatment with Apidra and Lantus insulin  Met with an endocrinologist Dr. Lina Fish  ~6 mo " later, switched to [place] Ping pump     2012. Continued using this pump for 1 year, then moved to [facility]  Saw Dr. Beena Johnson/Endocrinologist in [place]  Switched to Tandem insulin pump    Previous [facility] labs include:       Subsequent move back to [place]  Recalls taking Victoza medication along with pump management, success with significant weight loss  Moved to [place]  9/2019. Moved to [place], [place]     Medical evaluations with Dr. 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 Started Tandem insulin pump     Previous [facility] labs include:          2/19/20. Med evaluation with Dr. ERIKA Milligan/[facility]  No lab tests ordered  Started DexcomG6 CGMS sensor  ~4/2020. Upgrade to the Control IQ pump       5/26/20. Initial diabetes evaluation with me, [facility]  Continued use of the Tandem TSlim pump with the DexcomG6  ~6/2020. Restarted (off label) Victoza  10/2020. Insurance coverage for Victoza    Using Tandem TSlim pump with DexcomG6 sensor              Novolog in pump   Victoza 1.2 mg subcutaneous daily    ~6/2020. Started (off label) Victoza  10/2020. Insurance coverage for Victoza     Current pump settings:      Recent pump data:           Blood glucose (BG) meter:  Accu-check               Tests infrequently  Fam Hx DM:    MA, Clement, MGM, PGM     Recent  labs include:  5/28/20 GAD65 Ab >250      Lab Results   Component Value Date    A1C 7.3 (H) 10/02/2020     05/28/2020    POTASSIUM 4.2 05/28/2020    CHLORIDE 107 05/28/2020    CO2 25 05/28/2020    ANIONGAP 6 05/28/2020     (H) 05/28/2020    BUN 10 05/28/2020    CR 0.77 05/28/2020    GFRESTIMATED >90 05/28/2020    GFRESTBLACK >90 05/28/2020    MICK 8.5 05/28/2020    CPEPT <0.1 (L) 05/28/2020    CHOL 150 05/28/2020    TRIG 51 05/28/2020    HDL 62 05/28/2020    LDL 78 05/28/2020    NHDL 88 05/28/2020    UCRR 241 05/28/2020    MICROL 12 05/28/2020     UMALCR 4.94 05/28/2020     DM Complications: none        , lives in [facility], works with computer networking  Sees Dr. ERIKA Milligan/[facility] for general medicine evaluations.          ROS: 10 point ROS neg other than the symptoms noted above in the HPI.     GENERAL: mild fatigue, wt stable; denies fevers, chills, night sweats.   HEENT: no dysphagia, odonophagia, diplopia, neck pain  THYROID:  no apparent hyper or hypothyroid symptoms  CV: no chest pain, pressure, palpitations  LUNGS: no SOB, AGARWAL, cough, wheezing   ABDOMEN: no diarrhea, constipation, abdominal pain  EXTREMITIES: no rashes, ulcers, edema  NEUROLOGY: right leg tingling; no headaches, denies changes in vision, extremitiy numbness   MSK: no muscle aches or pains, weakness  SKIN: no rashes or lesions  : occasional nocturia  PSYCH:  stable mood, no significant anxiety or depression  ENDOCRINE: no heat or cold intolerance     Physical Exam (visual exam)  VS:  no vital signs taken for video visit  GENERAL: healthy, alert and NAD, well dressed, answering questions appropriately  EYES: eyes grossly normal to inspection, conjunctivae and sclerae normal, no exophthalmos or proptosis  THYROID:  no apparent nodules or goiter  LUNGS: no audible wheeze, cough or visible cyanosis, no visible retractions or increased work of breathing  ABDOMEN: abdomen obese  EXTREMITIES: no hand tremors, limited exam  NEUROLOGY: CN grossly intact, mentation intact and speech normal   PSYCH: mentation appears normal, affect normal/bright, judgement and insight intact, normal speech and appearance well groomed  SKIN:  Dark complexion, scalp balding     LABS:     All pertinent notes, labs, and images personally reviewed by me.      A/P:       Encounter Diagnosis   Name       Type 1 diabetes mellitus without complication (H)  Insulin pump status  Morbid obesity        Comments:  Reviewed health history and diabetes issues.  Health history and lab testing  indicates T1DM.     Plan:  Reviewed the overall T1DM management and insulin pump use.  Discussed optimal BG testing to assess glucose trends.  We reviewed insulin pump settings, basal rate and bolus dosing  Use of automated pump bolus dosing for meal/snack carb & correction dosing  Reviewed recent Tandem pump and DexcomG6 CGMS glucose trend data, in detail     Recommend:  Continue insulin pump and diabetes management plan.  Pump setting changes:   Basals:  5a 2.7 to 2.6 unit(s)/hr   Bolus ICR:  12p 2.7 to 2.5, 4p 2.7 to 2.5    No lab testing needed at this time  Discussed goal target hgbA1c 6.5-7%  Monitor for symptom changes, including GI symptoms  Keep focus on diet, exercise, and weight management  Continue aerobic exercise.  He enjoys [employer]   Consider use of antihypertensive medication (ACEI or ARB) and lipid (statin) medications  Arrange annual dilated eye exam, fasting lipid panel testing.     Addressed patient's questions today.     More than 50% of the time spent with Mr. Apodaca on counseling / coordinating his care.  Total appointment time was 20 minutes.     Follow-up:  1/2021     DAVIN Heaton MD, MS  Endocrinology  [facility]      Video-Visit Details    Type of service:  Video Visit    Video Start Time: 11:30 am  Video End Time: 11:50 am    Originating Location (pt. Location): home    Distant Location (provider location):  [facility]/home     Platform used for Video Visit: Tasneem              Again, thank you for allowing me to participate in the care of your patient.        Sincerely,        Ryley Heaton MD

## 2020-10-13 NOTE — PROGRESS NOTES
"Jose Apodaca is a 44 year old male who is being evaluated via a billable video visit.      The patient has been notified of following:     \"This video visit will be conducted via a call between you and your physician/provider. We have found that certain health care needs can be provided without the need for an in-person physical exam.  This service lets us provide the care you need with a video conversation.  If a prescription is necessary we can send it directly to your pharmacy.  If lab work is needed we can place an order for that and you can then stop by our lab to have the test done at a later time.    Video visits are billed at different rates depending on your insurance coverage.  Please reach out to your insurance provider with any questions.    If during the course of the call the physician/provider feels a video visit is not appropriate, you will not be charged for this service.\"    Patient has given verbal consent for Video visit? Yes  How would you like to obtain your AVS? Verbally Reviewed  If you are dropped from the video visit, the video invite should be resent to: Text to cell phone: [phone]  Will anyone else be joining your video visit? No        Recent issues:  Diabetes follow-up evaluation  Has been using (off label) Victoza, tolerated well with perceived glucose and appetite benefits  Reviewed health history and recent lab results           2011. Diagnosis of diabetes mellitus while living in [place]  Acute symptoms increased thirst, frequent urination, fatigue, weight loss (265 to 203#/30 days)  Hospitalized and diagnosis of DKA  Initial treatment with Apidra and Lantus insulin  Met with an endocrinologist Dr. Lina Fish  ~6 mo later, switched to [place] Ping pump     2012. Continued using this pump for 1 year, then moved to [facility]  Saw Dr. Beena Johnson/Endocrinologist in [place]  Switched to Tandem insulin pump    Previous [facility] " include:       Subsequent move back to [place]  Recalls taking Victoza medication along with pump management, success with significant weight loss  Moved to [place]  9/2019. Moved to [place], [place]     Medical evaluations with Dr. 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 Started Tandem insulin pump     Previous [facility] labs include:          2/19/20. Med evaluation with Dr. ERIKA Milligan/[facility]  No lab tests ordered  Started DexcomG6 CGMS sensor  ~4/2020. Upgrade to the Control IQ pump       5/26/20. Initial diabetes evaluation with me, [facility]  Continued use of the Tandem TSlim pump with the DexcomG6  ~6/2020. Restarted (off label) Victoza  10/2020. Insurance coverage for Victoza    Using Tandem TSlim pump with DexcomG6 sensor              Novolog in pump   Victoza 1.2 mg subcutaneous daily    ~6/2020. Started (off label) Victoza  10/2020. Insurance coverage for Victoza     Current pump settings:      Recent pump data:           Blood glucose (BG) meter:  Accu-check               Tests infrequently  Fam Hx DM:    MA, Clement, MGM, PGM     Recent  labs include:  5/28/20 GAD65 Ab >250      Lab Results   Component Value Date    A1C 7.3 (H) 10/02/2020     05/28/2020    POTASSIUM 4.2 05/28/2020    CHLORIDE 107 05/28/2020    CO2 25 05/28/2020    ANIONGAP 6 05/28/2020     (H) 05/28/2020    BUN 10 05/28/2020    CR 0.77 05/28/2020    GFRESTIMATED >90 05/28/2020    GFRESTBLACK >90 05/28/2020    MICK 8.5 05/28/2020    CPEPT <0.1 (L) 05/28/2020    CHOL 150 05/28/2020    TRIG 51 05/28/2020    HDL 62 05/28/2020    LDL 78 05/28/2020    NHDL 88 05/28/2020    UCRR 241 05/28/2020    MICROL 12 05/28/2020    UMALCR 4.94 05/28/2020     DM Complications: none        , lives in [facility], works with computer networking  Sees Dr. ERIKA Milligan/[facility] for general medicine evaluations.          ROS: 10 point ROS neg other than  the symptoms noted above in the HPI.     GENERAL: mild fatigue, wt stable; denies fevers, chills, night sweats.   HEENT: no dysphagia, odonophagia, diplopia, neck pain  THYROID:  no apparent hyper or hypothyroid symptoms  CV: no chest pain, pressure, palpitations  LUNGS: no SOB, AGARWAL, cough, wheezing   ABDOMEN: no diarrhea, constipation, abdominal pain  EXTREMITIES: no rashes, ulcers, edema  NEUROLOGY: right leg tingling; no headaches, denies changes in vision, extremitiy numbness   MSK: no muscle aches or pains, weakness  SKIN: no rashes or lesions  : occasional nocturia  PSYCH:  stable mood, no significant anxiety or depression  ENDOCRINE: no heat or cold intolerance     Physical Exam (visual exam)  VS:  no vital signs taken for video visit  GENERAL: healthy, alert and NAD, well dressed, answering questions appropriately  EYES: eyes grossly normal to inspection, conjunctivae and sclerae normal, no exophthalmos or proptosis  THYROID:  no apparent nodules or goiter  LUNGS: no audible wheeze, cough or visible cyanosis, no visible retractions or increased work of breathing  ABDOMEN: abdomen obese  EXTREMITIES: no hand tremors, limited exam  NEUROLOGY: CN grossly intact, mentation intact and speech normal   PSYCH: mentation appears normal, affect normal/bright, judgement and insight intact, normal speech and appearance well groomed  SKIN:  Dark complexion, scalp balding     LABS:     All pertinent notes, labs, and images personally reviewed by me.      A/P:       Encounter Diagnosis   Name       Type 1 diabetes mellitus without complication (H)  Insulin pump status  Morbid obesity        Comments:  Reviewed health history and diabetes issues.  Health history and lab testing indicates T1DM.     Plan:  Reviewed the overall T1DM management and insulin pump use.  Discussed optimal BG testing to assess glucose trends.  We reviewed insulin pump settings, basal rate and bolus dosing  Use of automated pump bolus dosing for  meal/snack carb & correction dosing  Reviewed recent Tandem pump and DexcomG6 CGMS glucose trend data, in detail     Recommend:  Continue insulin pump and diabetes management plan.  Pump setting changes:   Basals:  5a 2.7 to 2.6 unit(s)/hr   Bolus ICR:  12p 2.7 to 2.5, 4p 2.7 to 2.5    No lab testing needed at this time  Discussed goal target hgbA1c 6.5-7%  Monitor for symptom changes, including GI symptoms  Keep focus on diet, exercise, and weight management  Continue aerobic exercise.  He enjoys [employer]   Consider use of antihypertensive medication (ACEI or ARB) and lipid (statin) medications  Arrange annual dilated eye exam, fasting lipid panel testing.     Addressed patient's questions today.     More than 50% of the time spent with Mr. Apodaca on counseling / coordinating his care.  Total appointment time was 20 minutes.     Follow-up:  1/2021     DAVIN Heaton MD, MS  Endocrinology  [facility]      Video-Visit Details    Type of service:  Video Visit    Video Start Time: 11:30 am  Video End Time: 11:50 am    Originating Location (pt. Location): home    Distant Location (provider location):  [facility]/home     Platform used for Video Visit: [email or web address]

## 2020-10-14 NOTE — TELEPHONE ENCOUNTER
Message noted.  I spoke with patient today at his virtual visit appointment and he said his pharmacy was able to dispense the Victoza medication... now covered by his insurance plan.    DAVIN Heaton MD, MS  Endocrinology  [facility]

## 2020-10-22 DIAGNOSIS — E66.01 MORBID OBESITY (H): ICD-10-CM

## 2020-10-22 DIAGNOSIS — E10.9 TYPE 1 DIABETES MELLITUS WITHOUT COMPLICATION (H): ICD-10-CM

## 2020-10-22 RX ORDER — FLURBIPROFEN SODIUM 0.3 MG/ML
SOLUTION/ DROPS OPHTHALMIC
Start: 2020-10-22

## 2020-10-23 ENCOUNTER — MYC MEDICAL ADVICE (OUTPATIENT)
Dept: SLEEP MEDICINE | Facility: CLINIC | Age: 45
End: 2020-10-23

## 2020-11-24 ENCOUNTER — VIRTUAL VISIT (OUTPATIENT)
Dept: EDUCATION SERVICES | Facility: CLINIC | Age: 45
End: 2020-11-24
Payer: COMMERCIAL

## 2020-11-24 DIAGNOSIS — E10.9 TYPE 1 DIABETES MELLITUS WITHOUT COMPLICATION (H): Primary | ICD-10-CM

## 2020-11-24 PROCEDURE — 98967 PH1 ASSMT&MGMT NQHP 11-20: CPT

## 2020-11-24 NOTE — Clinical Note
FYI, no pump changes made today as I can't see his recent data. Sent an Email to [employer] asking why this is. Will follow up with pt via Flipps when I hear from [employer] and also scheduled a follow up in 1 month.   Xiomy Fitzgerald, RD LD CDE

## 2020-11-24 NOTE — PATIENT INSTRUCTIONS
I will e-mail [employer] to see if there is something else that needs to be done to see your pump data. Will keep you posted.     Remember to call to make an appointment with Dr Heaton for January. Let me know if you have troubles getting in this month.     Keep up the great work with your diet and cooking more at home and smaller portions!

## 2020-11-24 NOTE — PROGRESS NOTES
"  Diabetes Self-Management Education & Support    Presents for: Insulin Pump and CGM Review  Patient verbally consented to the telephone visit service today: yes      SUBJECTIVE/OBJECTIVE:  Presents for: Insulin Pump and CGM Review  Accompanied by: Self (telephone visit)  Diabetes education in the past 24mo: Yes  Focus of Visit: Insulin Pump  Type of Pump visit: Pump Review  Diabetes type: Type 1  Disease course: Stable  How confident are you filling out medical forms by yourself:: Not Assessed  Diabetes management related comments/concerns: A few days where he was higher than normal but otherwise feels like things are going well.  Transportation concerns: No  Difficulty affording diabetes medication?: No  Difficulty affording diabetes testing supplies?: No  Other concerns:: None  Cultural Influences/Ethnic Background:  American    Diabetes Symptoms & Complications:  Fatigue: No  Polydipsia: Sometimes  Polyphagia: No  Polyuria: No  Visual change: No  Slow healing wounds: No  Weight trend: Stable  Complications assessed today?: Yes  Autonomic neuropathy: No  CVA: No  Heart disease: No  Nephropathy: No  Peripheral neuropathy: No  Peripheral Vascular Disease: No  Retinopathy: No  Sexual dysfunction: No    Patient Problem List and Family Medical History reviewed for relevant medical history, current medical status, and diabetes risk factors.    Vitals:  There were no vitals taken for this visit.  Estimated body mass index is 51.51 kg/m  as calculated from the following:    Height as of 9/28/20: 1.689 m (5' 6.5\").    Weight as of 9/28/20: 147 kg (324 lb).     Last 3 BP:   BP Readings from Last 3 Encounters:   09/28/20 134/83   09/21/20 108/74   09/18/20 122/78       History   Smoking Status     Never Smoker   Smokeless Tobacco     Never Used       Labs:  Lab Results   Component Value Date    A1C 7.3 10/02/2020     Lab Results   Component Value Date     05/28/2020     Lab Results   Component Value Date    LDL 78 " 05/28/2020     HDL Cholesterol   Date Value Ref Range Status   05/28/2020 62 >39 mg/dL Final   ]  GFR Estimate   Date Value Ref Range Status   05/28/2020 >90 >60 mL/min/[1.73_m2] Final     Comment:     Non  GFR Calc  Starting 12/18/2018, serum creatinine based estimated GFR (eGFR) will be   calculated using the Chronic Kidney Disease Epidemiology Collaboration   (CKD-EPI) equation.       GFR Estimate If Black   Date Value Ref Range Status   05/28/2020 >90 >60 mL/min/[1.73_m2] Final     Comment:      GFR Calc  Starting 12/18/2018, serum creatinine based estimated GFR (eGFR) will be   calculated using the Chronic Kidney Disease Epidemiology Collaboration   (CKD-EPI) equation.       Lab Results   Component Value Date    CR 0.77 05/28/2020     No results found for: MICROALBUMIN    Healthy Eating:  Healthy Eating Assessed Today: Yes  Cultural/[employer] diet restrictions?: No  Meal planning/habits: Carb counting, Smaller portions  How many times a week on average do you eat food made away from home (restaurant/take-out)?: 2  Meals include: Lunch, Dinner, Morning Snack, Afternoon Snack, Evening Snack  Breakfast: Hot chocolate ([place]) OR skips  Lunch: Cajun turkey sandwich with cheese, Alexandria Amanuel low carb bread (19g/2 slices), apple, prepackaged chips OR might just have a sandwich  Dinner: chicken and dumpling stew OR other meals in his crock pot  Snacks: having more Kind bars instead of his bag of chips  Other: Little Amna cake for lows   Beverages: Water, Diet soda  Has patient met with a dietitian in the past?: No    Has not weighed himself recently to see if he has lost weight.     Has reduced his eating out/junk food. Doing more home meals and smaller portions. Would like to get a grill for his patio now. Is using his crock pot a lot now.     Being Active:  Being Active Assessed Today: Yes  Exercise:: Yes  How intense was your typical exercise? : Light (like stretching or slow  walking)(tries to go for walks)  Barrier to exercise: None    Monitoring:  Monitoring Assessed Today: Yes  Did patient bring glucose meter to appointment? : Yes  Blood Glucose Meter: CGM  Times checking blood sugar at home (number): 4  Times checking blood sugar at home (per): Day  Blood glucose trend: Fluctuating      Taking Medications:  Diabetes Medication(s)     Insulin       insulin aspart (NOVOLOG VIAL) 100 UNITS/ML vial    Use with insulin pump, total daily dose approx 100 units    Incretin Mimetic Agents (GLP-1 Receptor Agonists)       liraglutide (VICTOZA PEN) 18 MG/3ML solution    Inject 1.8 mg subcutaneous daily as directed          Taking Medication Assessed Today: Yes  Current Treatments: Insulin Pump  Dose schedule: Pre-breakfast, Pre-lunch, Pre-dinner, At bedtime, Other  Given by: Patient  Injection/Infusion sites: Abdomen  Problems taking diabetes medications regularly?: No  Diabetes medication side effects?: Yes    Problem Solving:  Problem Solving Assessed Today: Yes  Is the patient at risk for hypoglycemia?: Yes  Hypoglycemia Frequency: Weekly  Hypoglycemia Treatment: Candy  Does patient have glucagon emergency kit?: Yes  Is the patient at risk for DKA?: Yes  Does patient have ketone test strips?: No  Does patient have DKA prevention plan?: No  Does patient have severe weather/disaster plan for diabetes management?: No  Does patient have sick day plan for diabetes management?: No         Hypoglycemia Complications  Nocturnal hypoglycemia: Yes  Required assistance: Yes  Required glucagon injection: Yes    Reducing Risks:  Reducing Risks Assessed Today: No  Diabetes Risks: Sedentary Lifestyle, Ethnicity  CAD Risks: Diabetes Mellitus, Male sex, Obesity, Sedentary lifestyle  Has dilated eye exam at least once a year?: Yes  Sees dentist every 6 months?: Yes  Feet checked by healthcare provider in the last year?: No    Healthy Coping:  Healthy Coping Assessed Today: Yes  Emotional response to diabetes:  Ready to learn  Informal Support system:: None  Stage of change: ACTION (Actively working towards change)  Support resources: None  Patient Activation Measure Survey Score:  No flowsheet data found.    Diabetes knowledge and skills assessment:   Patient is knowledgeable in diabetes management concepts related to: Healthy Eating, Being Active, Monitoring, Taking Medication, Problem Solving, Reducing Risks and Healthy Coping    Patient needs further education on the following diabetes management concepts: Insulin Pump Concepts T connect mobile joaquín    Based on learning assessment above, most appropriate setting for further diabetes education would be: Individual setting.      INTERVENTIONS:    REPORTS:  No data to review    Insulin Pump Information  Insulin Pump Brand: Tandem  Infusion Set: Tandem  Does patient have an insulin multiple daily injection back-up plan?: No    Education provided today on:  AADE Self-Care Behaviors:  Healthy Eating: Praised him on cooking more at home, eating less junk food, and also eating smaller portions. Encouraged him to keep this up.   Being Active: Praised him on trying to walk more.     Education specific to insulin pump provided today on:   Educated that there was an update done to the T connect mobile joaquín and he needs to re-login to it.  He did this while we were on the phone.     Opportunities for ongoing education and support in diabetes-self management were discussed.    Pt verbalized understanding of concepts discussed and recommendations provided today.       Education Materials Provided:  No new materials provided today    ASSESSMENT  Spring is now covered by his insurance which he is excited about.     Had him re-login to the Tandem mobile joaquín but still cannot see his recent pump data (data is from 2 months ago).  Will e-mail [employer] about this to see if he needs to call IT or if we just need to wait longer to have the system update his data.     He has been making positive  diet changes lately. Would benefit from keeping this up.    Changes made to pump settings:  none    Changes made to sensor settings:   none    PLAN  See Patient Instructions for co-developed, patient-stated behavior change goals.  E-mail sent to Tandem reps Rolly and Teodora to see why we are not able to see his recent Tandem data on T-Connect. Will update pt via [employer] when I hear back.  Follow up scheduled in 1 month.   Pt to schedule an endo follow up for January.  Continue to cook more at home and eat smaller portions.   AVS printed and provided to patient today. See Follow-Up section for recommended follow-up.    CARMINE Pereira CDE    Time Spent: 20 minutes  Encounter Type: Individual    Any diabetes medication dose changes were made via the CDE Protocol and Collaborative Practice Agreement with the patient's endocrinology provider. A copy of this encounter was shared with the provider.

## 2020-12-16 DIAGNOSIS — E10.9 TYPE 1 DIABETES MELLITUS WITHOUT COMPLICATION (H): ICD-10-CM

## 2020-12-17 RX ORDER — LANCETS
EACH MISCELLANEOUS
Qty: 400 EACH | Refills: 3 | Status: SHIPPED | OUTPATIENT
Start: 2020-12-17 | End: 2022-03-24

## 2020-12-18 ENCOUNTER — OFFICE VISIT (OUTPATIENT)
Dept: OPTOMETRY | Facility: CLINIC | Age: 45
End: 2020-12-18
Payer: COMMERCIAL

## 2020-12-18 DIAGNOSIS — Z96.1 PSEUDOPHAKIA: ICD-10-CM

## 2020-12-18 DIAGNOSIS — H18.613 STABLE KERATOCONUS OF BOTH EYES: Primary | ICD-10-CM

## 2020-12-18 ASSESSMENT — REFRACTION_CURRENTRX
OS_DIAMETER: 14.5
OS_CYLINDER: -1.25
OS_BASECURVE: 8.6
OS_BASECURVE: 7.1
OD_SPHERE: +1.50
OD_SPHERE: -3.00
OD_BRAND: ACUVUE OASYS ASTIGMATISM
OD_DIAMETER: 14.5
OS_AXIS: 110
OD_BRAND: ACUVUE OASYS ASTIGMATISM
OD_AXIS: 070
OS_DIAMETER: 14.5
OS_BRAND: ACUVUE OASYS ASTIGMATISM
OS_BASECURVE: 8.6
OD_DIAMETER: 14.5
OS_BRAND: DUETTE TRIAL
OD_CYLINDER: -2.25
OD_BASECURVE: 8.6
OD_BASECURVE: 7.5
OS_DIAMETER: 14.5
OS_SPHERE: -2.00
OD_SPHERE: +1.00
OD_CYLINDER: -1.75
OS_SPHERE: -2.50
OS_CYLINDER: -1.25
OD_AXIS: 070
OS_BRAND: ACUVUE OASYS ASTIGMATISM
OS_AXIS: 110
OD_BASECURVE: 8.6
OS_SPHERE: -3.00
OD_BRAND: DUETTE TRIAL
OD_DIAMETER: 14.5

## 2020-12-18 ASSESSMENT — VISUAL ACUITY
OS_CC+: +2
OS_CC: 20/40
METHOD: SNELLEN - LINEAR
CORRECTION_TYPE: CONTACTS
OD_CC: 20/30
OD_CC+: -1

## 2020-12-18 ASSESSMENT — REFRACTION_MANIFEST
OS_AXIS: 025
OD_CYLINDER: +2.00
OD_AXIS: 160
OS_SPHERE: -3.75
OS_CYLINDER: +1.25
OD_SPHERE: -1.00

## 2020-12-18 ASSESSMENT — SLIT LAMP EXAM - LIDS
COMMENTS: MGD
COMMENTS: MGD

## 2020-12-18 ASSESSMENT — REFRACTION_WEARINGRX
OD_CYLINDER: +2.25
OD_AXIS: 160
OD_SPHERE: -0.75
OS_SPHERE: -3.50
OS_AXIS: 025
OS_CYLINDER: +1.50

## 2020-12-18 ASSESSMENT — EXTERNAL EXAM - RIGHT EYE: OD_EXAM: NORMAL

## 2020-12-18 ASSESSMENT — EXTERNAL EXAM - LEFT EYE: OS_EXAM: NORMAL

## 2020-12-18 NOTE — PATIENT INSTRUCTIONS
"We are switching to \"hybrid lenses\" - these have a hard middle part and soft outside part. With keratoconus, most people see much better out of some kind of hard lens compared to glasses or soft contact lenses. Please see instruction sheet on insertion/removal or their website if you have any issues    You will still need reading glasses over for things up close. You liked a +2.00 over-the-counter reading glasses. If you would like you can also get a progressive (no line) bifocal for use over them which will allow you to see distance and near through one pair of glasses and not have to take them on an off all the time.   "

## 2020-12-18 NOTE — PROGRESS NOTES
A/P  1.) Keratoconus each eye, s/p CE/IOL each eye  -Previously in soft torics prior to surgery. Refit after surgery but noticing blurred vision overall. No change in glasses Rx noted today  -Vision does improve somewhat with +0.50 OR in soft torics but still only 20/30 right, 20/40 left  -Rigid lens trial today, BCVA 20/25 right eye and left eye, improved clarity per pt trial. He would like to pursue  -Rec starting with +2.00 OTC readers, option for progressives overif doing well  -He demo'd removal in office easily today    Order hybrid lenses and mail direct. Rec f/u 1 month for recheck    Mail to:    [id number]   [address]  [place] [address]    I have confirmed the patient's CC, HPI and reviewed Past Medical History, Past Surgical History, Social History, Family History, Problem List, Medication List and agree with Tech note.     Adalgisa Simon, OD FAAO FSLS    Contact Lens Billing  V-Code:  - CL, other  Final Contact Lens Rx       Brand Base Curve Diameter Sphere Lens    Right Duette 7.5 14.5 +0.25 Flat skirt    Left Ultrahealth 350 14.5 [place] Flat skirt         # of units: 1 right lens @ $95 per lens, 1 left lens @ $200 per lens    This patient requires contact lenses that are medically necessary for either improvement in vision over spectacles, support of the ocular surface, or other therapeutic benefit. These are not cosmetic contact lenses.     Encounter Diagnoses   Name Primary?     Stable keratoconus of both eyes Yes     Pseudophakia

## 2020-12-23 ENCOUNTER — VIRTUAL VISIT (OUTPATIENT)
Dept: EDUCATION SERVICES | Facility: CLINIC | Age: 45
End: 2020-12-23
Payer: COMMERCIAL

## 2020-12-23 DIAGNOSIS — E10.9 TYPE 1 DIABETES MELLITUS WITHOUT COMPLICATION (H): Primary | ICD-10-CM

## 2020-12-23 PROCEDURE — 98967 PH1 ASSMT&MGMT NQHP 11-20: CPT

## 2020-12-23 NOTE — Clinical Note
PIPPA, Changes made to pump settings:  carb ratio: 12 am 3 --> 2.8, 5 am 3 -->2.8, 12 pm 2.5 --> 2.2, 4 pm 2.5 --> 2.2  Correction factor 12 am 20 --> 15, 8 pm 25 --> 15    Told him to schedule a follow up with you in Jan/feb as he will be due and I scheduled a follow up with him in April.     Xiomy Fitzgerald, RD LD CDE

## 2020-12-23 NOTE — PROGRESS NOTES
"    Diabetes Self-Management Education & Support    Presents for: Insulin Pump and CGM Review  Patient verbally consented to the telephone visit service today: yes      SUBJECTIVE/OBJECTIVE:  Presents for: Insulin Pump and CGM Review  Accompanied by: Self  Diabetes education in the past 24mo: Yes  Focus of Visit: Insulin Pump  Type of Pump visit: Pump Review  Diabetes type: Type 1  Disease course: Stable  How confident are you filling out medical forms by yourself:: Not Assessed  Diabetes management related comments/concerns: no issues today. Reports lows are not bad. Reports he just pays attention to his pump when it \"yells\" at him to do something.  Transportation concerns: No  Difficulty affording diabetes medication?: No  Difficulty affording diabetes testing supplies?: No  Other concerns:: None  Cultural Influences/Ethnic Background:  American    Diabetes Symptoms & Complications:  Fatigue: No  Polydipsia: Sometimes  Polyphagia: No  Polyuria: No  Visual change: No  Slow healing wounds: No  Weight trend: Stable  Complications assessed today?: Yes  Autonomic neuropathy: No  CVA: No  Heart disease: No  Nephropathy: No  Peripheral neuropathy: No  Peripheral Vascular Disease: No  Retinopathy: No  Sexual dysfunction: No    Patient Problem List and Family Medical History reviewed for relevant medical history, current medical status, and diabetes risk factors.    Vitals:  There were no vitals taken for this visit.  Estimated body mass index is 51.51 kg/m  as calculated from the following:    Height as of 9/28/20: 1.689 m (5' 6.5\").    Weight as of 9/28/20: 147 kg (324 lb).     Last 3 BP:   BP Readings from Last 3 Encounters:   09/28/20 134/83   09/21/20 108/74   09/18/20 122/78       History   Smoking Status     Never Smoker   Smokeless Tobacco     Never Used       Labs:  Lab Results   Component Value Date    A1C 7.3 10/02/2020     Lab Results   Component Value Date     05/28/2020     Lab Results   Component " Value Date    LDL 78 05/28/2020     HDL Cholesterol   Date Value Ref Range Status   05/28/2020 62 >39 mg/dL Final   ]  GFR Estimate   Date Value Ref Range Status   05/28/2020 >90 >60 mL/min/[1.73_m2] Final     Comment:     Non  GFR Calc  Starting 12/18/2018, serum creatinine based estimated GFR (eGFR) will be   calculated using the Chronic Kidney Disease Epidemiology Collaboration   (CKD-EPI) equation.       GFR Estimate If Black   Date Value Ref Range Status   05/28/2020 >90 >60 mL/min/[1.73_m2] Final     Comment:      GFR Calc  Starting 12/18/2018, serum creatinine based estimated GFR (eGFR) will be   calculated using the Chronic Kidney Disease Epidemiology Collaboration   (CKD-EPI) equation.       Lab Results   Component Value Date    CR 0.77 05/28/2020     No results found for: MICROALBUMIN    Healthy Eating:  Healthy Eating Assessed Today: Yes  Cultural/[facility] diet restrictions?: No  Meal planning/habits: Carb counting, Smaller portions  How many times a week on average do you eat food made away from home (restaurant/take-out)?: 2  Meals include: Lunch, Dinner, Morning Snack, Afternoon Snack, Evening Snack  Breakfast: Hot chocolate ([place]) OR skips  Lunch: Cajun turkey sandwich with cheese, [place] Amanuel low carb bread (19g/2 slices), apple, prepackaged chips OR might just have a sandwich  Dinner: chicken and dumpling stew OR other meals in his crock pot  Snacks: having more Kind bars instead of his bag of chips  Other: Little Amna cake for lows   Beverages: Water, Diet soda  Has patient met with a dietitian in the past?: No    Being Active:  Being Active Assessed Today: Yes  Exercise:: Yes  How intense was your typical exercise? : Light (like stretching or slow walking)(tries to go for walks)  Barrier to exercise: None    Monitoring:  Monitoring Assessed Today: Yes  Did patient bring glucose meter to appointment? : Yes  Blood Glucose Meter: CGM  Blood glucose trend:  Fluctuating      Taking Medications:  Diabetes Medication(s)     Insulin       insulin aspart (NOVOLOG VIAL) 100 UNITS/ML vial    Use with insulin pump, total daily dose approx 100 units    Incretin Mimetic Agents (GLP-1 Receptor Agonists)       liraglutide (VICTOZA PEN) 18 MG/3ML solution    Inject 1.8 mg subcutaneous daily as directed          Taking Medication Assessed Today: Yes  Current Treatments: Insulin Pump  Dose schedule: Pre-breakfast, Pre-lunch, Pre-dinner, At bedtime, Other  Given by: Patient  Injection/Infusion sites: Abdomen  Problems taking diabetes medications regularly?: No  Diabetes medication side effects?: Yes    Problem Solving:  Problem Solving Assessed Today: Yes  Is the patient at risk for hypoglycemia?: Yes  Hypoglycemia Frequency: Weekly  Hypoglycemia Treatment: Candy  Does patient have glucagon emergency kit?: Yes  Is the patient at risk for DKA?: Yes  Does patient have ketone test strips?: No  Does patient have DKA prevention plan?: No  Does patient have severe weather/disaster plan for diabetes management?: No  Does patient have sick day plan for diabetes management?: No         Hypoglycemia Complications  Nocturnal hypoglycemia: Yes  Required assistance: Yes  Required glucagon injection: Yes    Reducing Risks:  Reducing Risks Assessed Today: No  Diabetes Risks: Sedentary Lifestyle, Ethnicity  CAD Risks: Diabetes Mellitus, Male sex, Obesity, Sedentary lifestyle  Has dilated eye exam at least once a year?: Yes  Sees dentist every 6 months?: Yes  Feet checked by healthcare provider in the last year?: No    Healthy Coping:  Healthy Coping Assessed Today: Yes  Emotional response to diabetes: Ready to learn  Informal Support system:: None  Stage of change: ACTION (Actively working towards change)  Support resources: None  Patient Activation Measure Survey Score:  No flowsheet data found.    Diabetes knowledge and skills assessment:   Patient is knowledgeable in diabetes management concepts  related to: Healthy Eating, Being Active, Monitoring, Taking Medication, Problem Solving, Reducing Risks and Healthy Coping    Patient needs further education on the following diabetes management concepts: Healthy Eating    Based on learning assessment above, most appropriate setting for further diabetes education would be: Group class or Individual setting.      INTERVENTIONS:    REPORTS:                  Insulin Pump Information  Insulin Pump Brand: Tandem  Infusion Set: Tandem  Does patient have an insulin multiple daily injection back-up plan?: No    Education provided today on:  AADE Self-Care Behaviors:  Monitoring: reviewed his CGM results with him    Education specific to insulin pump provided today on:   Walked him through the pump changes made today.     Opportunities for ongoing education and support in diabetes-self management were discussed.    Pt verbalized understanding of concepts discussed and recommendations provided today.       Education Materials Provided:  No new materials provided today    ASSESSMENT  Don's lows on his pump report are labeled as < 80 mg/dL but it appears that often this is when his BG's are in the 70's so his hypoglycemia % is a little skewed.     Overall, his glucose spikes after meals. He does well with entering his carbs into his pump.     Unsure why has 3 correction factors so will simplify this to just 1.     Glucose Patterns & Trends:  Hyperglycemia, weekend- postmeal and weekday- postmeal    Patient would benefit from decrease in carbohydrate ratio.    Changes made to pump settings:  carb ratio: 12 am 3 --> 2.8, 5 am 3 -->2.8, 12 pm 2.5 --> 2.2, 4 pm 2.5 --> 2.2  Correction factor 12 am 20 --> 15, 8 pm 25 --> 15    Changes made to sensor settings:   none    PLAN  See Patient Instructions for co-developed, patient-stated behavior change goals.  Pt to call and schedule an endo follow up for jan/feb.   CDE follow up scheduled in April.   Updated med list with pump settings  today.  AVS printed and provided to patient today. See Follow-Up section for recommended follow-up.    CARMINE Pereira CDE    Time Spent: 18 minutes  Encounter Type: Individual    Any diabetes medication dose changes were made via the CDE Protocol and Collaborative Practice Agreement with the patient's endocrinology provider. A copy of this encounter was shared with the provider.

## 2021-01-11 ENCOUNTER — OFFICE VISIT (OUTPATIENT)
Dept: OPTOMETRY | Facility: CLINIC | Age: 46
End: 2021-01-11
Payer: COMMERCIAL

## 2021-01-11 DIAGNOSIS — Z96.1 PSEUDOPHAKIA: ICD-10-CM

## 2021-01-11 DIAGNOSIS — H18.613 STABLE KERATOCONUS OF BOTH EYES: Primary | ICD-10-CM

## 2021-01-11 ASSESSMENT — SLIT LAMP EXAM - LIDS
COMMENTS: MGD
COMMENTS: MGD

## 2021-01-11 ASSESSMENT — REFRACTION_CURRENTRX
OS_SPHERE: PLANO
OS_BRAND: ULTRAHEALTH
OD_DIAMETER: 14.5
OD_BASECURVE: 7.5
OS_DIAMETER: 14.5
OD_BRAND: DUETTE
OS_BASECURVE: 350
OD_SPHERE: +0.25

## 2021-01-11 ASSESSMENT — VISUAL ACUITY
OS_CC: 20/100
METHOD: SNELLEN - LINEAR
CORRECTION_TYPE: CONTACTS
OD_CC: 20/50
OD_CC+: +1

## 2021-01-11 ASSESSMENT — EXTERNAL EXAM - LEFT EYE: OS_EXAM: NORMAL

## 2021-01-11 ASSESSMENT — EXTERNAL EXAM - RIGHT EYE: OD_EXAM: NORMAL

## 2021-01-11 NOTE — PROGRESS NOTES
A/P  1.) Keratoconus each eye, s/p CE/IOL each eye  -Previously in soft torics prior to surgery. Refit after surgery but noticing blurred vision overall. No change in glasses Rx noted today  -BCVA in soft torics only 20/30 right, 20/40 left  -Fit in hybrid lenses last exam. Largely doing well but some issues with removal each eye and monocular diplopia left eye  -Monocular diplopia left eye 2' to insertion bubble. Cannot reduce vault further. Reviewed how to insert (similar to scleral ) with few drops saline inside. If any monocular diplopia remove and reinsert again  -Small OR right eye>left eye. Will loosen skirt curve left eye  -BCVA 20/25 right eye and left eye, 20/20-3 OU with rigid lens optics  -Rec starting with +2.00 OTC readers, option for progressives overif doing well    Order hybrid lenses and mail direct. Rec f/u 1-2 month for recheck    Mail to:    [id number]   [address]  [facility] [address]    I have confirmed the patient's CC, HPI and reviewed Past Medical History, Past Surgical History, Social History, Family History, Problem List, Medication List and agree with Tech note.     Adalgisa Simon, [place]APAO FSLS

## 2021-01-15 ENCOUNTER — HEALTH MAINTENANCE LETTER (OUTPATIENT)
Age: 46
End: 2021-01-15

## 2021-01-19 DIAGNOSIS — E66.01 MORBID OBESITY (H): ICD-10-CM

## 2021-01-19 DIAGNOSIS — E10.9 TYPE 1 DIABETES MELLITUS WITHOUT COMPLICATION (H): ICD-10-CM

## 2021-01-20 RX ORDER — FLURBIPROFEN SODIUM 0.3 MG/ML
SOLUTION/ DROPS OPHTHALMIC
Qty: 100 EACH | Refills: 1 | Status: SHIPPED | OUTPATIENT
Start: 2021-01-20 | End: 2021-12-15

## 2021-01-20 NOTE — TELEPHONE ENCOUNTER
insulin pen needle b-d mini 31g x 5 mm    Summary: Use 1 pen needles daily or as directed.  Disp-100 each,R-3  E-Prescribe   Start: 6/3/2020  Ord/Sold: 6/3/2020

## 2021-02-22 ENCOUNTER — OFFICE VISIT (OUTPATIENT)
Dept: OPTOMETRY | Facility: CLINIC | Age: 46
End: 2021-02-22
Payer: COMMERCIAL

## 2021-02-22 DIAGNOSIS — H18.613 STABLE KERATOCONUS OF BOTH EYES: Primary | ICD-10-CM

## 2021-02-22 DIAGNOSIS — Z96.1 PSEUDOPHAKIA: ICD-10-CM

## 2021-02-22 ASSESSMENT — VISUAL ACUITY
OS_CC: 20/40+2
OD_CC: 20/30-2
METHOD: SNELLEN - LINEAR
CORRECTION_TYPE: CONTACTS

## 2021-02-22 ASSESSMENT — REFRACTION_CURRENTRX
OD_BASECURVE: 7.5
OS_DIAMETER: 15.2
OD_BRAND: ONEFIT
OS_SPHERE: +0.50
OD_BRAND: DUETTE
OD_DIAMETER: 14.5
OS_SPHERE: -3.00
OS_BASECURVE: 350
OD_SPHERE: +1.25
OD_BASECURVE: 7.7
OS_BRAND: ONEFIT
OD_SPHERE: -2.50
OD_DIAMETER: 15.2
OS_DIAMETER: 14.5
OS_BRAND: ULTRAHEALTH
OS_BASECURVE: 7.5

## 2021-02-22 ASSESSMENT — REFRACTION_WEARINGRX
OS_SPHERE: PLANO
OD_ADD: +2.25
OD_SPHERE: PLANO
OS_ADD: +2.25

## 2021-02-22 ASSESSMENT — TONOMETRY
OD_IOP_MMHG: 11
IOP_METHOD: ICARE
OS_IOP_MMHG: 10

## 2021-02-22 ASSESSMENT — EXTERNAL EXAM - RIGHT EYE: OD_EXAM: NORMAL

## 2021-02-22 ASSESSMENT — SLIT LAMP EXAM - LIDS
COMMENTS: MGD
COMMENTS: MGD

## 2021-02-22 ASSESSMENT — EXTERNAL EXAM - LEFT EYE: OS_EXAM: NORMAL

## 2021-02-22 NOTE — PROGRESS NOTES
A/P  1.) Keratoconus each eye, s/p CE/IOL each eye  -Previously in soft torics prior to surgery. Refit after surgery but noticing blurred vision overall.   -BCVA in soft torics only 20/30 right, 20/40 left  -Fit in hybrid lenses last exam. Doing okay right eye, but irritated left eye. This is likely due to air bubble under lens, as he had one on initial presentation today. Removal still difficult sometimes  -Reviewed findings with pt including other options. We should consider scleral lenses each eye, which he is interested in trying. Good initial vision/comfort/fit. Successful I&R today with scleral lens. Reviewed CL care and hygiene with pt.    Order lenses, mail direct to pt. RTC 3 weeks wearing lenses for fit recheck. Call/MyChart with any concerns in the meantime    30min spent in direct care/counseling/Rx lens prescribing

## 2021-03-16 ENCOUNTER — IMMUNIZATION (OUTPATIENT)
Dept: NURSING | Facility: CLINIC | Age: 46
End: 2021-03-16
Payer: COMMERCIAL

## 2021-03-16 DIAGNOSIS — E10.9 TYPE 1 DIABETES MELLITUS WITHOUT COMPLICATION (H): ICD-10-CM

## 2021-03-16 PROCEDURE — 0001A PR COVID VAC PFIZER DIL RECON 30 MCG/0.3 ML IM: CPT

## 2021-03-16 PROCEDURE — 91300 PR COVID VAC PFIZER DIL RECON 30 MCG/0.3 ML IM: CPT

## 2021-03-17 ENCOUNTER — MYC MEDICAL ADVICE (OUTPATIENT)
Dept: ENDOCRINOLOGY | Facility: CLINIC | Age: 46
End: 2021-03-17

## 2021-03-18 NOTE — TELEPHONE ENCOUNTER
pt is overdue for appt OK to work in or first available ?    Please advise.    Thanks!    Stephania Jamison MA

## 2021-03-18 NOTE — TELEPHONE ENCOUNTER
Routing refill request to provider for review/approval because:  CGM not on the [facility] refill protocol

## 2021-03-19 RX ORDER — PROCHLORPERAZINE 25 MG/1
SUPPOSITORY RECTAL
Qty: 3 EACH | Refills: 11 | Status: SHIPPED | OUTPATIENT
Start: 2021-03-19 | End: 2022-02-17

## 2021-03-19 RX ORDER — PROCHLORPERAZINE 25 MG/1
SUPPOSITORY RECTAL
Qty: 1 EACH | Refills: 3 | Status: SHIPPED | OUTPATIENT
Start: 2021-03-19 | End: 2022-04-13

## 2021-03-22 ENCOUNTER — OFFICE VISIT (OUTPATIENT)
Dept: OPTOMETRY | Facility: CLINIC | Age: 46
End: 2021-03-22
Payer: COMMERCIAL

## 2021-03-22 DIAGNOSIS — H18.613 STABLE KERATOCONUS OF BOTH EYES: Primary | ICD-10-CM

## 2021-03-22 ASSESSMENT — REFRACTION_WEARINGRX
OD_ADD: +1.50
OD_AXIS: 160
OS_AXIS: 025
OD_CYLINDER: +2.25
OS_ADD: +1.50
OD_SPHERE: PLANO
OS_ADD: +2.25
OS_SPHERE: -3.50
OS_CYLINDER: +1.50
OD_SPHERE: -0.75
OS_SPHERE: PLANO
OD_ADD: +2.25

## 2021-03-22 ASSESSMENT — REFRACTION_CURRENTRX
OD_SPHERE: +2.00
OS_BASECURVE: 7.5
OD_BASECURVE: 7.7
OS_SPHERE: +2.00
OD_ADDL_SPECS: OPT EXTRA CLEAR
OS_BRAND: ONEFIT
OD_BRAND: ONEFIT
OS_ADDL_SPECS: OPT EXTRA BLUE
OD_DIAMETER: 15.2
OS_DIAMETER: 15.2

## 2021-03-22 ASSESSMENT — VISUAL ACUITY
CORRECTION_TYPE: CONTACTS
OD_CC: 20/40-2
OS_CC: 20/40
METHOD: SNELLEN - LINEAR

## 2021-03-22 ASSESSMENT — SLIT LAMP EXAM - LIDS
COMMENTS: MGD
COMMENTS: MGD

## 2021-03-22 ASSESSMENT — EXTERNAL EXAM - LEFT EYE: OS_EXAM: NORMAL

## 2021-03-22 ASSESSMENT — EXTERNAL EXAM - RIGHT EYE: OD_EXAM: NORMAL

## 2021-03-22 NOTE — PROGRESS NOTES
A/P  1.) Keratoconus each eye, s/p CE/IOL each eye  -Previously in soft torics prior to surgery. Refit after surgery but noticing blurred vision overall.   -BCVA in soft torics was only 20/30 right, 20/40 left  -Overall doing well with switch to scleral lenses. Excellent comfort, easier I&R. Mild fit changes only today to help with lens removal.  -Overall decline in VA over past several months - this may be related to PCO formation (viewed undilated only), as he previously corrected better in both glasses and CL's  -No prescription change recommended in CL's    Reviewed with pt, rec eval with Dr. Jackson in 1-2 months to dilate/eval if visually significant PCO present. Will order new lenses with adjusted fit/same Rx and mail direct. Keep f/u with me in fall.

## 2021-03-22 NOTE — TELEPHONE ENCOUNTER
"Message noted.  Patient was due back for a follow-up evaluation in 1/2021.  Please offer him the first routine follow-up virtual visit appointment on my schedule... not a workin since not an \"urgent\" issue.  His DexcomG6 supplies Rx's have already been sent.  Thanks.    DAVIN Heaton MD, MS  Endocrinology  [facility]        "

## 2021-03-26 DIAGNOSIS — J30.2 SEASONAL ALLERGIC RHINITIS, UNSPECIFIED TRIGGER: ICD-10-CM

## 2021-03-26 RX ORDER — FLUTICASONE PROPIONATE 50 MCG
1 SPRAY, SUSPENSION (ML) NASAL DAILY
Qty: 16 G | Refills: 11 | Status: SHIPPED | OUTPATIENT
Start: 2021-03-26 | End: 2022-04-22

## 2021-04-06 ENCOUNTER — IMMUNIZATION (OUTPATIENT)
Dept: NURSING | Facility: CLINIC | Age: 46
End: 2021-04-06
Attending: INTERNAL MEDICINE
Payer: COMMERCIAL

## 2021-04-06 PROCEDURE — 91300 PR COVID VAC PFIZER DIL RECON 30 MCG/0.3 ML IM: CPT

## 2021-04-06 PROCEDURE — 0002A PR COVID VAC PFIZER DIL RECON 30 MCG/0.3 ML IM: CPT

## 2021-04-17 ENCOUNTER — HEALTH MAINTENANCE LETTER (OUTPATIENT)
Age: 46
End: 2021-04-17

## 2021-04-20 ENCOUNTER — VIRTUAL VISIT (OUTPATIENT)
Dept: ENDOCRINOLOGY | Facility: CLINIC | Age: 46
End: 2021-04-20
Payer: COMMERCIAL

## 2021-04-20 DIAGNOSIS — E10.9 TYPE 1 DIABETES MELLITUS WITHOUT COMPLICATION (H): Primary | ICD-10-CM

## 2021-04-20 DIAGNOSIS — Z96.41 INSULIN PUMP STATUS: ICD-10-CM

## 2021-04-20 DIAGNOSIS — E66.01 MORBID OBESITY (H): ICD-10-CM

## 2021-04-20 DIAGNOSIS — N52.9 ERECTILE DYSFUNCTION, UNSPECIFIED ERECTILE DYSFUNCTION TYPE: ICD-10-CM

## 2021-04-20 PROCEDURE — 99214 OFFICE O/P EST MOD 30 MIN: CPT | Mod: 95 | Performed by: INTERNAL MEDICINE

## 2021-04-20 PROCEDURE — 95251 CONT GLUC MNTR ANALYSIS I&R: CPT | Performed by: INTERNAL MEDICINE

## 2021-04-20 RX ORDER — SILDENAFIL 100 MG/1
TABLET, FILM COATED ORAL
Qty: 15 TABLET | Refills: 3 | Status: SHIPPED | OUTPATIENT
Start: 2021-04-20 | End: 2021-04-29

## 2021-04-20 NOTE — PROGRESS NOTES
Don is a 45 year old who is being evaluated via a billable video visit.      How would you like to obtain your AVS? Verbally Reviewed  If the video visit is dropped, the invitation should be resent by: Cellphone  Will anyone else be joining your video visit? No      Video Start Time:  2:40 pm    Video-Visit Details    Type of service:  Video Visit    Video End Time:  3:00 pm    Originating Location (pt. Location): home    Distant Location (provider location):  [facility]    Platform used for Video Visit:  Tasneem    Recent issues:  Diabetes follow-up evaluation  Has been using (off label) Victoza, tolerated well with perceived glucose and appetite benefits  Reviewed health history and recent lab results           2011. Diagnosis of diabetes mellitus while living in [place]  Acute symptoms increased thirst, frequent urination, fatigue, weight loss (265 to 203#/30 days)  Hospitalized and diagnosis of DKA  Initial treatment with Apidra and Lantus insulin  Met with an endocrinologist Dr. Lina Fish  ~6 mo later, switched to [place] Ping pump     2012. Continued using this pump for 1 year, then moved to [facility]  Saw Dr. Beena Johnson/Endocrinologist in [place]  Switched to Tandem insulin pump    Previous [facility] labs include:       Subsequent move back to [place]  Recalls taking Victoza medication along with pump management, success with significant weight loss  Moved to [place]  9/2019. Moved to [place], [place]     Medical evaluations with Dr. 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 Started Tandem insulin pump     Previous [facility] labs include:          2/19/20. Med evaluation with Dr. ERIKA Milligan/[facility]  No lab tests ordered  Started DexcomG6 CGMS sensor  ~4/2020. Upgrade to the Control IQ pump       5/26/20. Initial diabetes evaluation with me,  [facility]  Continued use of the  Tandem TSlim pump with the DexcomG6  ~6/2020. Restarted (off label) Victoza  10/2020. Insurance coverage for Victoza    Using Tandem TSlim pump with DexcomG6 sensor              Novolog in pump   Victoza 1.8 mg subcutaneous daily    ~6/2020. Started (off label) Victoza  10/2020. Insurance coverage for Victoza     Current pump settings:      Recent pump and sensor data:             Blood glucose (BG) meter:  Accu-check               Tests infrequently  Fam Hx DM:    MA, Mcousin, MGM, PGM     Recent [facility] labs include:  5/28/20 GAD65 Ab >250      Lab Results   Component Value Date    A1C 7.3 (H) 10/02/2020     05/28/2020    POTASSIUM 4.2 05/28/2020    CHLORIDE 107 05/28/2020    CO2 25 05/28/2020    ANIONGAP 6 05/28/2020     (H) 05/28/2020    BUN 10 05/28/2020    CR 0.77 05/28/2020    GFRESTIMATED >90 05/28/2020    GFRESTBLACK >90 05/28/2020    MICK 8.5 05/28/2020    CPEPT <0.1 (L) 05/28/2020    CHOL 150 05/28/2020    TRIG 51 05/28/2020    HDL 62 05/28/2020    LDL 78 05/28/2020    NHDL 88 05/28/2020    UCRR 241 05/28/2020    MICROL 12 05/28/2020    UMALCR 4.94 05/28/2020     DM Complications: none        , lives in [facility], works with computer networking  Sees Dr. ERIKA Milligan/[facility] for general medicine evaluations.          ROS: 10 point ROS neg other than the symptoms noted above in the HPI.     GENERAL: mild fatigue, wt stable; denies fevers, chills, night sweats.   HEENT: no dysphagia, odonophagia, diplopia, neck pain  THYROID:  no apparent hyper or hypothyroid symptoms  CV: no chest pain, pressure, palpitations  LUNGS: no SOB, AGARWAL, cough, wheezing   ABDOMEN: no diarrhea, constipation, abdominal pain  EXTREMITIES: no rashes, ulcers, edema  NEUROLOGY: right leg tingling; no headaches, denies changes in vision, extremitiy numbness   MSK: no muscle aches or pains, weakness  SKIN: no rashes or lesions  : decreased erection function; occasional nocturia  PSYCH:  stable mood, no  significant anxiety or depression  ENDOCRINE: no heat or cold intolerance     Physical Exam (visual exam)  VS:  no vital signs taken for video visit  GENERAL: healthy, alert and NAD, well dressed, answering questions appropriately  EYES: eyes grossly normal to inspection, conjunctivae and sclerae normal, no exophthalmos or proptosis  THYROID:  no apparent nodules or goiter  LUNGS: no audible wheeze, cough or visible cyanosis, no visible retractions or increased work of breathing  ABDOMEN: abdomen obese  EXTREMITIES: no hand tremors, limited exam  NEUROLOGY: CN grossly intact, mentation intact and speech normal   PSYCH: mentation appears normal, affect normal/bright, judgement and insight intact, normal speech and appearance well groomed  SKIN:  Dark complexion, scalp balding     LABS:     All pertinent notes, labs, and images personally reviewed by me.      A/P:       Encounter Diagnosis   Name       Type 1 diabetes mellitus without complication (H)  Insulin pump status  Morbid obesity        Comments:  Reviewed health history and diabetes issues.  Health history and lab testing indicates T1DM.  Reviewed and interpreted tests that I previously ordered.   Ordered appropriate tests for the endocrinology disease management.    Management options discussed and implemented after shared medical decision making with the patient.     Plan:  Reviewed the overall T1DM management and insulin pump use.  Discussed optimal BG testing to assess glucose trends.  We reviewed insulin pump settings, basal rate and bolus dosing  Use of automated pump bolus dosing for meal/snack carb & correction dosing  Reviewed recent Tandem pump and DexcomG6 CGMS glucose trend data, in detail     Recommend:  Continue the Tandem insulin pump, DexcomG6 sensor, and diabetes management plan.  No pump setting changes at this time.    Check fasting labs soon   Discussed the [facility] clinic lab option   Lab orders placed  Discussed goal target hgbA1c  6.5-7%  Monitor for symptom changes, including GI symptoms  Keep focus on diet, exercise, and weight management  Continue aerobic exercise with the [employer] or walking plan   Consider use of antihypertensive medication (ACEI or ARB) and lipid (statin) medications  Arrange annual dilated eye exam, fasting lipid panel testing.     Addressed patient's questions today.     Total time spent in with the patient evaluation:  20 min  Additional time spent reviewing pertinent lab tests and chart notes, and documentation:  10 min    Follow-up:  7/2021    DAVIN Heaton MD, MS  Endocrinology  [facility]    CC:  ERIKA Milligan

## 2021-04-20 NOTE — LETTER
4/20/2021         RE: Jose Apodaca  [address]  [address]  [place] [address]        Dear Colleague,    Thank you for referring your patient, Jose Apodaca, to the [facility]. Please see a copy of my visit note below.    Don is a 45 year old who is being evaluated via a billable video visit.      How would you like to obtain your AVS? Verbally Reviewed  If the video visit is dropped, the invitation should be resent by: Cellphone  Will anyone else be joining your video visit? No      Video Start Time:  2:40 pm    Video-Visit Details    Type of service:  Video Visit    Video End Time:  3:00 pm    Originating Location (pt. Location): home    Distant Location (provider location):  [facility]    Platform used for Video Visit:  Embedly    Recent issues:  Diabetes follow-up evaluation  Has been using (off label) Victoza, tolerated well with perceived glucose and appetite benefits  Reviewed health history and recent lab results           2011. Diagnosis of diabetes mellitus while living in [place]  Acute symptoms increased thirst, frequent urination, fatigue, weight loss (265 to 203#/30 days)  Hospitalized and diagnosis of DKA  Initial treatment with Apidra and Lantus insulin  Met with an endocrinologist Dr. Lina Fish  ~6 mo later, switched to [place] Ping pump     2012. Continued using this pump for 1 year, then moved to [facility]  Saw Dr. Beena Johnson/Endocrinologist in [place]  Switched to Tandem insulin pump    Previous [facility] labs include:       Subsequent move back to [place]  Recalls taking Victoza medication along with pump management, success with significant weight loss  Moved to [place]  9/2019. Moved to [place], [place]     Medical evaluations with Dr. 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 Started Tandem insulin pump     Previous [facility] labs  include:          2/19/20. Med evaluation with Dr. ERIKA Milligan/[email or web address]  No lab tests ordered  Started DexcomG6 CGMS sensor  ~4/2020. Upgrade to the Control IQ pump       5/26/20. Initial diabetes evaluation with me, [facility]  Continued use of the Tandem TSlim pump with the DexcomG6  ~6/2020. Restarted (off label) Victoza  10/2020. Insurance coverage for Victoza    Using Tandem TSlim pump with DexcomG6 sensor              Novolog in pump   Victoza 1.8 mg subcutaneous daily    ~6/2020. Started (off label) Victoza  10/2020. Insurance coverage for Victoza     Current pump settings:      Recent pump and sensor data:             Blood glucose (BG) meter:  Accu-check               Tests infrequently  Fam Hx DM:    MA, Mcousin, MGM, PGM     Recent [facility] labs include:  5/28/20 GAD65 Ab >250      Lab Results   Component Value Date    A1C 7.3 (H) 10/02/2020     05/28/2020    POTASSIUM 4.2 05/28/2020    CHLORIDE 107 05/28/2020    CO2 25 05/28/2020    ANIONGAP 6 05/28/2020     (H) 05/28/2020    BUN 10 05/28/2020    CR 0.77 05/28/2020    GFRESTIMATED >90 05/28/2020    GFRESTBLACK >90 05/28/2020    MICK 8.5 05/28/2020    CPEPT <0.1 (L) 05/28/2020    CHOL 150 05/28/2020    TRIG 51 05/28/2020    HDL 62 05/28/2020    LDL 78 05/28/2020    NHDL 88 05/28/2020    UCRR 241 05/28/2020    MICROL 12 05/28/2020    UMALCR 4.94 05/28/2020     DM Complications: none        , lives in [facility], works with computer networking  Sees Dr. ERIKA Milligan/[email or web address] for general medicine evaluations.          ROS: 10 point ROS neg other than the symptoms noted above in the HPI.     GENERAL: mild fatigue, wt stable; denies fevers, chills, night sweats.   HEENT: no dysphagia, odonophagia, diplopia, neck pain  THYROID:  no apparent hyper or hypothyroid symptoms  CV: no chest pain, pressure, palpitations  LUNGS: no SOB, AGARWAL, cough, wheezing   ABDOMEN: no diarrhea, constipation, abdominal  pain  EXTREMITIES: no rashes, ulcers, edema  NEUROLOGY: right leg tingling; no headaches, denies changes in vision, extremitiy numbness   MSK: no muscle aches or pains, weakness  SKIN: no rashes or lesions  : decreased erection function; occasional nocturia  PSYCH:  stable mood, no significant anxiety or depression  ENDOCRINE: no heat or cold intolerance     Physical Exam (visual exam)  VS:  no vital signs taken for video visit  GENERAL: healthy, alert and NAD, well dressed, answering questions appropriately  EYES: eyes grossly normal to inspection, conjunctivae and sclerae normal, no exophthalmos or proptosis  THYROID:  no apparent nodules or goiter  LUNGS: no audible wheeze, cough or visible cyanosis, no visible retractions or increased work of breathing  ABDOMEN: abdomen obese  EXTREMITIES: no hand tremors, limited exam  NEUROLOGY: CN grossly intact, mentation intact and speech normal   PSYCH: mentation appears normal, affect normal/bright, judgement and insight intact, normal speech and appearance well groomed  SKIN:  Dark complexion, scalp balding     LABS:     All pertinent notes, labs, and images personally reviewed by me.      A/P:       Encounter Diagnosis   Name       Type 1 diabetes mellitus without complication (H)  Insulin pump status  Morbid obesity        Comments:  Reviewed health history and diabetes issues.  Health history and lab testing indicates T1DM.  Reviewed and interpreted tests that I previously ordered.   Ordered appropriate tests for the endocrinology disease management.    Management options discussed and implemented after shared medical decision making with the patient.     Plan:  Reviewed the overall T1DM management and insulin pump use.  Discussed optimal BG testing to assess glucose trends.  We reviewed insulin pump settings, basal rate and bolus dosing  Use of automated pump bolus dosing for meal/snack carb & correction dosing  Reviewed recent Tandem pump and DexcomG6 CGMS glucose  trend data, in detail     Recommend:  Continue the Tandem insulin pump, DexcomG6 sensor, and diabetes management plan.  No pump setting changes at this time.    Check fasting labs soon   Discussed the [facility] lab option   Lab orders placed  Discussed goal target hgbA1c 6.5-7%  Monitor for symptom changes, including GI symptoms  Keep focus on diet, exercise, and weight management  Continue aerobic exercise with the [employer]) or walking plan   Consider use of antihypertensive medication (ACEI or ARB) and lipid (statin) medications  Arrange annual dilated eye exam, fasting lipid panel testing.     Addressed patient's questions today.     Total time spent in with the patient evaluation:  20 min  Additional time spent reviewing pertinent lab tests and chart notes, and documentation:  10 min    Follow-up:  7/2021    DAVIN Heaton MD, MS  Endocrinology  [facility]    CC:  ERIKA Milligan                          Again, thank you for allowing me to participate in the care of your patient.        Sincerely,        Ryley Heaton MD

## 2021-04-27 ENCOUNTER — OFFICE VISIT (OUTPATIENT)
Dept: OPHTHALMOLOGY | Facility: CLINIC | Age: 46
End: 2021-04-27
Attending: OPTOMETRIST
Payer: COMMERCIAL

## 2021-04-27 DIAGNOSIS — Z96.1 PSEUDOPHAKIA OF BOTH EYES: Primary | ICD-10-CM

## 2021-04-27 DIAGNOSIS — H26.493 PCO (POSTERIOR CAPSULAR OPACIFICATION), BILATERAL: ICD-10-CM

## 2021-04-27 PROCEDURE — 92014 COMPRE OPH EXAM EST PT 1/>: CPT | Mod: GC | Performed by: OPHTHALMOLOGY

## 2021-04-27 PROCEDURE — G0463 HOSPITAL OUTPT CLINIC VISIT: HCPCS

## 2021-04-27 ASSESSMENT — VISUAL ACUITY
OD_CC+: -1
OS_PH_CC+: -2
OS_PH_CC: 20/25
OD_CC: 20/40
OS_CC: 20/40
METHOD: SNELLEN - LINEAR
CORRECTION_TYPE: GLASSES, CONTACTS

## 2021-04-27 ASSESSMENT — CONF VISUAL FIELD
OS_NORMAL: 1
OD_NORMAL: 1
METHOD: COUNTING FINGERS

## 2021-04-27 ASSESSMENT — REFRACTION_WEARINGRX
OS_ADD: +2.25
OD_SPHERE: PLANO
OD_ADD: +2.25
OS_SPHERE: PLANO

## 2021-04-27 ASSESSMENT — TONOMETRY
OD_IOP_MMHG: 15
OS_IOP_MMHG: 20
IOP_METHOD: TONOPEN

## 2021-04-27 ASSESSMENT — EXTERNAL EXAM - LEFT EYE: OS_EXAM: NORMAL

## 2021-04-27 ASSESSMENT — CUP TO DISC RATIO
OS_RATIO: 0.3
OD_RATIO: 0.3

## 2021-04-27 ASSESSMENT — EXTERNAL EXAM - RIGHT EYE: OD_EXAM: NORMAL

## 2021-04-27 ASSESSMENT — SLIT LAMP EXAM - LIDS
COMMENTS: MGD
COMMENTS: MGD

## 2021-04-27 NOTE — NURSING NOTE
Chief Complaints and History of Present Illnesses   Patient presents with     Consult For     Posterior capsule opacities (PCO)     Chief Complaint(s) and History of Present Illness(es)     Consult For     Laterality: both eyes    Course: stable    Associated symptoms: Negative for eye pain, dryness, redness and tearing    Treatments tried: artificial tears    Pain scale: 0/10    Comments: Posterior capsule opacities (PCO)              Comments     Patient was referred by Dr Simon for PCO in both eyes.  He states that his vision has seemed fairly stable in both eyes over the past 2 months.  Glare seems to have improved with the cataract surgeries.     Eliana Recio, COT 3:26 PM  April 27, 2021

## 2021-04-27 NOTE — PROGRESS NOTES
Chief Complaint(s) and History of Present Illness(es)     Consult For     Laterality: both eyes    Course: stable    Associated symptoms: Negative for eye pain, dryness, redness and tearing    Treatments tried: artificial tears    Pain scale: 0/10    Comments: Posterior capsule opacities (PCO)              Comments     Patient was referred by Dr Simon for PCO in both eyes.  He states   that his vision has seemed fairly stable in both eyes over the past 2   months.  Glare seems to have improved with the cataract surgeries.     Eliana Recio, COT 3:26 PM  April 27, 2021               Review of systems for the eyes was negative other than the pertinent positives/negatives listed in the HPI.      Assessment & Plan      Jose Apodaca is a 45 year old male with the following diagnoses:   1. Pseudophakia of both eyes    2. PCO (posterior capsular opacification), bilateral         Referral to assess possible posterior capsular opacity (PCO)   Doing well with new scleral lenses and reading overwear  Not visually significant posterior capsular opacity (PCO) at this time  Return precautions reviewed   Monitor         Attending Physician Attestation:  Complete documentation of historical and exam elements from today's encounter can be found in the full encounter summary report (not reduplicated in this progress note).  I personally obtained the chief complaint(s) and history of present illness.  I confirmed and edited as necessary the review of systems, past medical/surgical history, family history, social history, and examination findings as documented by others; and I examined the patient myself.  I personally reviewed the relevant tests, images, and reports as documented above.  I formulated and edited as necessary the assessment and plan and discussed the findings and management plan with the patient and family. . - Andre Jackson MD

## 2021-04-28 ENCOUNTER — VIRTUAL VISIT (OUTPATIENT)
Dept: EDUCATION SERVICES | Facility: CLINIC | Age: 46
End: 2021-04-28
Payer: COMMERCIAL

## 2021-04-28 DIAGNOSIS — E10.9 TYPE 1 DIABETES MELLITUS WITHOUT COMPLICATION (H): Primary | ICD-10-CM

## 2021-04-28 PROCEDURE — G0108 DIAB MANAGE TRN  PER INDIV: HCPCS | Mod: 95

## 2021-04-28 NOTE — Clinical Note
FYI, lowered his basal rate from 4-8 pm 2.1 --> 1.9 unit(s)/hr and strengthened his IC at breakfast from 3 to 2.8. will follow up in 4 weeks. Also suggested he try the exercise function when he goes for walks. Can you place a new CDE referral for him? Thanks!  CARMINE Pereira CDE

## 2021-04-28 NOTE — PATIENT INSTRUCTIONS
Keep up your walking!  Let's try to use the exercise mode when you go for a walk.     Today we lowered your carb ratio at breakfast form 3 to 2.8.   We also lowered your basal rate a bit between 4-8 pm. If you notice you are too high with using the exercise function when walking, we can always raise this back up. We lowered it from 2.1 to 1.9 unit(s)/hr.     Check out [email or web address] or [email or web address] for some recipe/meal ideas

## 2021-04-28 NOTE — PROGRESS NOTES
"    Diabetes Self-Management Education & Support    Presents for: Insulin Pump and CGM Review  Type of Service: Telephone Visit    How would patient like to obtain AVS? Gisella        SUBJECTIVE/OBJECTIVE:  Presents for: Insulin Pump and CGM Review  Accompanied by: Self  Diabetes education in the past 24mo: Yes  Focus of Visit: Insulin Pump, CGM, Being Active  Type of Pump visit: Pump Review  Type of CGM visit: Personal CGM Follow-up  Diabetes type: Type 1  How confident are you filling out medical forms by yourself:: Extremely  Diabetes management related comments/concerns: Trying to choose healthier foods and smaller amounts. Overall, feels better. Wants to reduce his A1c to 6.5% if he can. Is nervous about his A1c will be now.  Difficulty affording diabetes medication?: No  Difficulty affording diabetes testing supplies?: No  Cultural Influences/Ethnic Background:  American    Diabetes Symptoms & Complications:  Weight trend: Stable  Complications assessed today?: Yes  Autonomic neuropathy: No  CVA: No  Heart disease: No  Nephropathy: No  Peripheral neuropathy: No  Peripheral Vascular Disease: No  Retinopathy: No    Patient Problem List and Family Medical History reviewed for relevant medical history, current medical status, and diabetes risk factors.    Vitals:  There were no vitals taken for this visit.  Estimated body mass index is 51.51 kg/m  as calculated from the following:    Height as of 9/28/20: 1.689 m (5' 6.5\").    Weight as of 9/28/20: 147 kg (324 lb). Weight has been evening out since starting the Victoza.     Last 3 BP:   BP Readings from Last 3 Encounters:   09/28/20 134/83   09/21/20 108/74   09/18/20 122/78       History   Smoking Status     Never Smoker   Smokeless Tobacco     Never Used       Labs:  Lab Results   Component Value Date    A1C 7.3 10/02/2020     Lab Results   Component Value Date     05/28/2020     Lab Results   Component Value Date    LDL 78 05/28/2020     HDL Cholesterol "   Date Value Ref Range Status   05/28/2020 62 >39 mg/dL Final   ]  GFR Estimate   Date Value Ref Range Status   05/28/2020 >90 >60 mL/min/[1.73_m2] Final     Comment:     Non  GFR Calc  Starting 12/18/2018, serum creatinine based estimated GFR (eGFR) will be   calculated using the Chronic Kidney Disease Epidemiology Collaboration   (CKD-EPI) equation.       GFR Estimate If Black   Date Value Ref Range Status   05/28/2020 >90 >60 mL/min/[1.73_m2] Final     Comment:      GFR Calc  Starting 12/18/2018, serum creatinine based estimated GFR (eGFR) will be   calculated using the Chronic Kidney Disease Epidemiology Collaboration   (CKD-EPI) equation.       Lab Results   Component Value Date    CR 0.77 05/28/2020     No results found for: MICROALBUMIN    Healthy Eating:  Healthy Eating Assessed Today: Yes  Cultural/Mandaen diet restrictions?: No  Meal planning/habits: Carb counting  Often eats the same things for meals. Works long hours and does not have a lot of time to make things.     Being Active:  Being Active Assessed Today: Yes  Exercise:: Yes  How intense was your typical exercise? : Moderate (like brisk walking)  Barrier to exercise: Time(works long days ())  Goes for a mile walk daily. Usually between 4-8 pm.     Monitoring:  Monitoring Assessed Today: Yes  Did patient bring glucose meter to appointment? : Yes  Blood Glucose Meter: CGM  Blood glucose trend: Fluctuating    Taking Medications:  Diabetes Medication(s)     Insulin       insulin aspart (NOVOLOG VIAL) 100 UNITS/ML vial    Use with insulin pump, total daily dose approx 100 units     INSULIN PUMP - OUTPATIENT    Date Last Updated: 12/23/20  Tandem X2 with control IQ and Dexcom  BASAL RATES and times:  12   AM (midnight): 1.40 units/hour    5     AM: 2.6 units/hour   12  PM (noon): 2.5 units/hour   4    PM: 2.1 units/hour   8    PM: 2.2 units/hour   CARB RATIO and times:  12   AM (midnight): 2.8  12  PM (noon):   2.2  8    PM:  2.7  Corection Factor (Sensitivity) and times:  12   AM (midnight): 15 mg/dL  BG target 125 mg/dL  Active Insulin Time: 4 hours    Incretin Mimetic Agents (GLP-1 Receptor Agonists)       liraglutide (VICTOZA PEN) 18 MG/3ML solution    Inject 1.8 mg subcutaneous daily as directed          Taking Medication Assessed Today: Yes  Current Treatments: Insulin Pump  Problems taking diabetes medications regularly?: No  Diabetes medication side effects?: No    Problem Solving:  Problem Solving Assessed Today: Yes  Is the patient at risk for hypoglycemia?: Yes  Hypoglycemia Frequency: Daily  Hypoglycemia Treatment: Other food     Reducing Risks:  Reducing Risks Assessed Today: No    Healthy Coping:  Healthy Coping Assessed Today: Yes  Emotional response to diabetes: Ready to learn, Concern for health and well-being  Stage of change: ACTION (Actively working towards change)  Support resources: Websites  Patient Activation Measure Survey Score:  No flowsheet data found.    Diabetes knowledge and skills assessment:   Patient is knowledgeable in diabetes management concepts related to: Being Active, Monitoring, Taking Medication, Reducing Risks and Healthy Coping    Patient needs further education on the following diabetes management concepts: Healthy Eating and Problem Solving    Based on learning assessment above, most appropriate setting for further diabetes education would be: Individual setting.      INTERVENTIONS:    REPORTS:                Insulin Pump Information  Insulin Pump Brand: Tandem with control IQ  Infusion Set: Tandem    Education provided today on:  AADE Self-Care Behaviors:  Healthy Eating: discussed some healthy recipe websites for recipes and some different meal ideas today. Encouraged him to continue to work on smaller portions but maybe try some new things.   Being Active: praised him on starting to walk more. Encouraged him to keep this up.  Monitoring: reviewed his CGM data.   Problem  Solving: encouraged treating lows with less carbs. Could try checking his blood glucose after 15 min to see if that is corrected vs just watching the sensor glucose. Reiterated that the sensor glucose will lag behind a bit and take longer to look corrected.     Education specific to insulin pump provided today on:   exercise recommendations and using the exercise mode for his walking since it seems like this is the time of day he has the most lows.    Opportunities for ongoing education and support in diabetes-self management were discussed.    Pt verbalized understanding of concepts discussed and recommendations provided today.       Education Materials Provided:  No new materials provided today    Pump Education Materials: none    ASSESSMENT  Overall, pt is having some highs after his breakfast and then also some evening lows.  After talking more, he reports he has been trying to walk more lately and this is usually in that 4-8 pm time frame where he has most of his lows. He currently is not adjusting for exercise with his pump. Discussed trying the exercise function to see if this helps.  Will also lower his basal rate a bit. Discussed that we can always raise that rate back up if needed with him using the exercise function.     Glucose Patterns & Trends:  post meal hyperglycemia following breakfast and early evening hypoglycemia     Patient would benefit from decrease in basal rate evening during 4-8 pm and stronger carbohydrate ratio at breakfast. Would also benefit from trying the exercise function when walking.     Changes made to pump settings:  basal rate: 4 pm 2.1 --> 1.90 unit(s)/hr  carb ratio: 5 am 3.0 --> 2.8    Changes made to sensor settings:   none    PLAN  See Patient Instructions for co-developed, patient-stated behavior change goals.  Will request a new CDE referral from endo.   Follow up scheduled in 4 weeks.   Lab order placed by endo last week. Pt plans to have them done today.   AVS printed  and provided to patient today. See Follow-Up section for recommended follow-up.    CARMINE Pereira CDE    Time Spent: 37 minutes  Encounter Type: Individual    Any diabetes medication dose changes were made via the CDE Protocol and Collaborative Practice Agreement with the patient's endocrinology provider. A copy of this encounter was shared with the provider.

## 2021-04-29 DIAGNOSIS — E10.9 TYPE 1 DIABETES MELLITUS WITHOUT COMPLICATION (H): ICD-10-CM

## 2021-04-29 DIAGNOSIS — E13.9 DIABETES MELLITUS TYPE 1.5, MANAGED AS TYPE 2 (H): Primary | ICD-10-CM

## 2021-04-29 DIAGNOSIS — Z96.41 INSULIN PUMP STATUS: ICD-10-CM

## 2021-05-03 ENCOUNTER — VIRTUAL VISIT (OUTPATIENT)
Dept: SLEEP MEDICINE | Facility: CLINIC | Age: 46
End: 2021-05-03
Payer: COMMERCIAL

## 2021-05-03 DIAGNOSIS — G47.33 OSA (OBSTRUCTIVE SLEEP APNEA): Primary | ICD-10-CM

## 2021-05-03 PROCEDURE — 99213 OFFICE O/P EST LOW 20 MIN: CPT | Mod: 95 | Performed by: INTERNAL MEDICINE

## 2021-05-03 NOTE — PATIENT INSTRUCTIONS
Your There is no height or weight on file to calculate BMI.  Weight management is a personal decision.  If you are interested in exploring weight loss strategies, the following discussion covers the approaches that may be successful. Body mass index (BMI) is one way to tell whether you are at a healthy weight, overweight, or obese. It measures your weight in relation to your height.  A BMI of 18.5 to 24.9 is in the healthy range. A person with a BMI of 25 to 29.9 is considered overweight, and someone with a BMI of 30 or greater is considered obese. More than two-thirds of American adults are considered overweight or obese.  Being overweight or obese increases the risk for further weight gain. Excess weight may lead to heart disease and diabetes.  Creating and following plans for healthy eating and physical activity may help you improve your health.  Weight control is part of healthy lifestyle and includes exercise, emotional health, and healthy eating habits. Careful eating habits lifelong are the mainstay of weight control. Though there are significant health benefits from weight loss, long-term weight loss with diet alone may be very difficult to achieve- studies show long-term success with dietary management in less than 10% of people. Attaining a healthy weight may be especially difficult to achieve in those with severe obesity. In some cases, medications, devices and surgical management might be considered.  What can you do?  If you are overweight or obese and are interested in methods for weight loss, you should discuss this with your provider.     Consider reducing daily calorie intake by 500 calories.     Keep a food journal.     Avoiding skipping meals, consider cutting portions instead.    Diet combined with exercise helps maintain muscle while optimizing fat loss. Strength training is particularly important for building and maintaining muscle mass. Exercise helps reduce stress, increase energy, and  improves fitness. Increasing exercise without diet control, however, may not burn enough calories to loose weight.       Start walking three days a week 10-20 minutes at a time    Work towards walking thirty minutes five days a week     Eventually, increase the speed of your walking for 1-2 minutes at time    In addition, we recommend that you review healthy lifestyles and methods for weight loss available through the National Institutes of Health patient information sites:  http://win.niddk.nih.gov/publications/index.htm    And look into health and wellness programs that may be available through your health insurance provider, employer, local community center, or lauren club.

## 2021-05-03 NOTE — PROGRESS NOTES
Don is a 45 year old who is being evaluated via a billable video visit.      How would you like to obtain your AVS? MyChart  If the video visit is dropped, the invitation should be resent by: Text to cell phone: [phone]  Will anyone else be joining your video visit? Gracie Ferrera MA    Video Start Time: 3:10 PM  Video-Visit Details    Type of service:  Video Visit    Video End Time:3:19 PM    Originating Location (pt. Location): Home    Distant Location (provider location):  [facility] [place]     Platform used for Video Visit: [facility]  Obstructive Sleep Apnea - PAP Follow-Up Visit:    Chief Complaint   Patient presents with     RECHECK     PHUONG, discuss travel dental device        Jose Apodaca comes in today for follow-up of their severe sleep apnea, managed with CPAP.     PSG on 9/27/2017 at [place] showed an AHI of 31 per hour.     Overall, he rates the experience with PAP as 10 (0 poor, 10 great). The mask is comfortable. The mask is not leaking.  He is not snoring with the mask on. He is not having gasp arousals.  He is not having significant oral/nasal dryness. The pressure settings are comfortable.     He uses full-face mask.     Bedtime is typically 11 pm. Usually it takes about 10 minutes to fall asleep with the mask on. Wake time is typically 7 am.  Patient is using PAP therapy 8 hours per night. The patient is usually getting 8 hours of sleep per night.    He does feel rested in the morning.    Total score - [place]: 0 (5/3/2021  1:54 PM)    [employer]ironics  CPAP 14 cmH2O download:  30/30 total days of use. 0 nonuse days.  Average use 7 hours 58 minutes per day.  100% days with >4 hours use.  Large leak 20 mins per day average.  AHI 1.3.       Reviewed by team: Tobacco  Allergies  Meds            Reviewed by provider:                Problem List:  Patient Active Problem List    Diagnosis Date Noted     Keratoconus of both eyes 09/08/2020     Priority: Medium     Added  automatically from request for surgery [id number]       Combined forms of age-related cataract of both eyes 09/08/2020     Priority: Medium     Added automatically from request for surgery [id number]       Diabetes mellitus, type 2 (H) 06/03/2020     Priority: Medium     Morbid obesity (H) 02/19/2020     Priority: Medium          There were no vitals taken for this visit.    Impression/Plan:     Severe sleep apnea.     -  Tolerating PAP well. Daytime symptoms are improved..     - I reviewed data from his CPAP which shows regular compliance and normal AHI.     - Patient had questions about a backup oral appliance for use on a rare night when he did not remember to bring his CPAP device when visiting his daughter. We compared response rate with CPAP & dental appliance. This can be appropriate for an occasional night but I recommend contuing CPAP for regular therapy.     Plan:     1. Continue CPAP therapy at 14 cm H2O    Jose Apodaca will follow up in about 1 year(s).     I spent a total of 20 minutes for this appointment today which include time spent before, during and after the visit for patient care, counseling and coordination of care.      Tobi Salomon MD    CC:  Radha Milligan,

## 2021-05-05 DIAGNOSIS — E10.9 TYPE 1 DIABETES MELLITUS WITHOUT COMPLICATION (H): ICD-10-CM

## 2021-05-05 LAB
ANION GAP SERPL CALCULATED.3IONS-SCNC: 1 MMOL/L (ref 3–14)
BUN SERPL-MCNC: 8 MG/DL (ref 7–30)
CALCIUM SERPL-MCNC: 9 MG/DL (ref 8.5–10.1)
CHLORIDE SERPL-SCNC: 106 MMOL/L (ref 94–109)
CHOLEST SERPL-MCNC: 138 MG/DL
CO2 SERPL-SCNC: 31 MMOL/L (ref 20–32)
CREAT SERPL-MCNC: 0.85 MG/DL (ref 0.66–1.25)
GFR SERPL CREATININE-BSD FRML MDRD: >90 ML/MIN/{1.73_M2}
GLUCOSE SERPL-MCNC: 204 MG/DL (ref 70–99)
HBA1C MFR BLD: 7 % (ref 0–5.6)
HDLC SERPL-MCNC: 56 MG/DL
LDLC SERPL CALC-MCNC: 75 MG/DL
NONHDLC SERPL-MCNC: 82 MG/DL
POTASSIUM SERPL-SCNC: 4.1 MMOL/L (ref 3.4–5.3)
SODIUM SERPL-SCNC: 138 MMOL/L (ref 133–144)
TRIGL SERPL-MCNC: 37 MG/DL
TSH SERPL DL<=0.005 MIU/L-ACNC: 1.09 MU/L (ref 0.4–4)

## 2021-05-05 PROCEDURE — 80048 BASIC METABOLIC PNL TOTAL CA: CPT | Performed by: INTERNAL MEDICINE

## 2021-05-05 PROCEDURE — 80061 LIPID PANEL: CPT | Performed by: INTERNAL MEDICINE

## 2021-05-05 PROCEDURE — 82043 UR ALBUMIN QUANTITATIVE: CPT | Performed by: INTERNAL MEDICINE

## 2021-05-05 PROCEDURE — 84443 ASSAY THYROID STIM HORMONE: CPT | Performed by: INTERNAL MEDICINE

## 2021-05-05 PROCEDURE — 36415 COLL VENOUS BLD VENIPUNCTURE: CPT | Performed by: INTERNAL MEDICINE

## 2021-05-05 PROCEDURE — 83036 HEMOGLOBIN GLYCOSYLATED A1C: CPT | Performed by: INTERNAL MEDICINE

## 2021-05-06 LAB
CREAT UR-MCNC: 164 MG/DL
MICROALBUMIN UR-MCNC: 10 MG/L
MICROALBUMIN/CREAT UR: 6.34 MG/G CR (ref 0–17)

## 2021-05-24 ENCOUNTER — TELEPHONE (OUTPATIENT)
Dept: ENDOCRINOLOGY | Facility: CLINIC | Age: 46
End: 2021-05-24

## 2021-05-24 DIAGNOSIS — E66.01 MORBID OBESITY (H): ICD-10-CM

## 2021-05-24 DIAGNOSIS — E10.9 TYPE 1 DIABETES MELLITUS WITHOUT COMPLICATION (H): ICD-10-CM

## 2021-05-24 NOTE — TELEPHONE ENCOUNTER
Last Written Prescription Date:  10/13/2020  Last Fill Quantity: 9 mL,  # refills: 5   Last office visit: Visit date not found with prescribing provider:  4/20/2021   Future Office Visit:      Requested Prescriptions   Pending Prescriptions Disp Refills     liraglutide (VICTOZA PEN) 18 MG/3ML solution 9 mL 5     Sig: Inject 1.8 mg subcutaneous daily as directed       There is no refill protocol information for this order

## 2021-05-26 RX ORDER — LIRAGLUTIDE 6 MG/ML
INJECTION SUBCUTANEOUS
Qty: 9 ML | Refills: 2 | Status: SHIPPED | OUTPATIENT
Start: 2021-05-26 | End: 2021-08-23

## 2021-05-27 ENCOUNTER — VIRTUAL VISIT (OUTPATIENT)
Dept: EDUCATION SERVICES | Facility: CLINIC | Age: 46
End: 2021-05-27
Payer: COMMERCIAL

## 2021-05-27 DIAGNOSIS — E10.9 TYPE 1 DIABETES MELLITUS WITHOUT COMPLICATION (H): ICD-10-CM

## 2021-05-27 DIAGNOSIS — Z96.41 INSULIN PUMP STATUS: ICD-10-CM

## 2021-05-27 PROCEDURE — 98967 PH1 ASSMT&MGMT NQHP 11-20: CPT

## 2021-05-27 NOTE — Clinical Note
FYI, increased his midnight basal 1.4 to 1.5 unit(s)/hr and changed his I:C 2.8 to 2.5 at breakfast. Will follow up again in 1 month. Thanks!  Xiomy Fitzgerald, CARMINE LD CDE

## 2021-05-27 NOTE — PROGRESS NOTES
"    Diabetes Self-Management Education & Support    Presents for: Insulin Pump and CGM Review  Type of Service: Telephone Visit    How would patient like to obtain AVS? Gisella        SUBJECTIVE/OBJECTIVE:  Presents for: Insulin Pump and CGM Review  Accompanied by: Self  Diabetes education in the past 24mo: Yes  Focus of Visit: Insulin Pump, CGM, Being Active  Type of Pump visit: Pump Review  Type of CGM visit: Personal CGM Follow-up  Diabetes type: Type 1  How confident are you filling out medical forms by yourself:: Extremely  Diabetes management related comments/concerns: A couple out of control days but one was a kinked cannula and one was running out of insulin overnight.  Difficulty affording diabetes medication?: No  Difficulty affording diabetes testing supplies?: No  Cultural Influences/Ethnic Background:  Not  or     Diabetes Symptoms & Complications:  Weight trend: Stable  Complications assessed today?: Yes  Autonomic neuropathy: No  CVA: No  Heart disease: No  Nephropathy: No  Peripheral neuropathy: No  Peripheral Vascular Disease: No  Retinopathy: No    Patient Problem List and Family Medical History reviewed for relevant medical history, current medical status, and diabetes risk factors.    Vitals:  There were no vitals taken for this visit.  Estimated body mass index is 51.51 kg/m  as calculated from the following:    Height as of 9/28/20: 1.689 m (5' 6.5\").    Weight as of 9/28/20: 147 kg (324 lb).   Last 3 BP:   BP Readings from Last 3 Encounters:   09/28/20 134/83   09/21/20 108/74   09/18/20 122/78       History   Smoking Status     Never Smoker   Smokeless Tobacco     Never Used       Labs:  Lab Results   Component Value Date    A1C 7.0 05/05/2021     Lab Results   Component Value Date     05/05/2021     Lab Results   Component Value Date    LDL 75 05/05/2021     HDL Cholesterol   Date Value Ref Range Status   05/05/2021 56 >39 mg/dL Final   ]  GFR Estimate   Date Value Ref " Range Status   05/05/2021 >90 >60 mL/min/[1.73_m2] Final     Comment:     Non  GFR Calc  Starting 12/18/2018, serum creatinine based estimated GFR (eGFR) will be   calculated using the Chronic Kidney Disease Epidemiology Collaboration   (CKD-EPI) equation.       GFR Estimate If Black   Date Value Ref Range Status   05/05/2021 >90 >60 mL/min/[1.73_m2] Final     Comment:      GFR Calc  Starting 12/18/2018, serum creatinine based estimated GFR (eGFR) will be   calculated using the Chronic Kidney Disease Epidemiology Collaboration   (CKD-EPI) equation.       Lab Results   Component Value Date    CR 0.85 05/05/2021     No results found for: MICROALBUMIN    Healthy Eating:  Healthy Eating Assessed Today: Yes  Cultural/Roman Catholic diet restrictions?: No  Meal planning/habits: Carb counting    Being Active:  Being Active Assessed Today: Yes  Exercise:: Yes  Days per week of moderate to strenuous exercise (like a brisk walk): 3  On average, minutes per day of exercise at this level: 20  How intense was your typical exercise? : Moderate (like brisk walking)  Exercise Minutes per Week: 60  Barrier to exercise: Time(works long days ())  Wants to try to go for 2 miles now. Has a estevan that likes to work out which will hopefully help. He usually struggles with exercise because he finds it boring.     Monitoring:  Monitoring Assessed Today: Yes  Did patient bring glucose meter to appointment? : Yes  Blood Glucose Meter: CGM  Blood glucose trend: Fluctuating    Taking Medications:  Diabetes Medication(s)     Insulin       insulin aspart (NOVOLOG VIAL) 100 UNITS/ML vial    Use with insulin pump, total daily dose approx 100 units    Incretin Mimetic Agents (GLP-1 Receptor Agonists)       liraglutide (VICTOZA PEN) 18 MG/3ML solution    Inject 1.8 mg subcutaneous daily as directed          Taking Medication Assessed Today: Yes  Current Treatments: Insulin Pump  Problems taking diabetes  medications regularly?: No  Diabetes medication side effects?: No    Problem Solving:  Problem Solving Assessed Today: Yes  Is the patient at risk for hypoglycemia?: Yes  Hypoglycemia Frequency: Daily  Hypoglycemia Treatment: Other food      Reducing Risks:  Reducing Risks Assessed Today: No    Healthy Coping:  Healthy Coping Assessed Today: Yes  Emotional response to diabetes: Ready to learn, Concern for health and well-being  Stage of change: ACTION (Actively working towards change)  Support resources: Websites  Patient Activation Measure Survey Score:  No flowsheet data found.    Diabetes knowledge and skills assessment:   Patient is knowledgeable in diabetes management concepts related to: Healthy Eating, Being Active, Monitoring, Taking Medication, Problem Solving, Reducing Risks and Healthy Coping    Patient needs further education on the following diabetes management concepts: Healthy Eating    Based on learning assessment above, most appropriate setting for further diabetes education would be: Individual setting.      INTERVENTIONS:    REPORTS:                  Insulin Pump Information  Insulin Pump Brand: Tandem  Infusion Set: Tandem with control IQ and Dexcom sensor    Education provided today on:  Monitoring: Praised him on his A1c improvement and reviewed his time in range with him today.     Education specific to insulin pump provided today on:   Encouraged him to continue to use the exercise function with his pump.     Opportunities for ongoing education and support in diabetes-self management were discussed.    Pt verbalized understanding of concepts discussed and recommendations provided today.       Education Materials Provided:  No new materials provided today    Pump Education Materials: none    ASSESSMENT  Still having some hyperglycemia post breakfast and some overnight highs as well. Time in range has increased 5% since last time and his time spent low has reduced to 3% from last visit. A1c was  checked and has continued to go down nicely (7.0% now!). Praised him on all these improvements.     Glucose Patterns & Trends:  Hyperglycemia, post meal and hyperglycemia nocturnal    Patient would benefit from increase in basal rate overnight to 1.50 unit(s)/hr and strengthen his carbohydrate ratio at breakfast.    Changes made to pump settings:  basal rate: 12 am 1.4 --> 1.50 unit(s)/hr  carb ratio: 12 am and 5 am 2.8 --> 2.5    Changes made to sensor settings:   none    PLAN  See Patient Instructions for co-developed, patient-stated behavior change goals.  Follow up scheduled in 1 month.   AVS printed and provided to patient today. See Follow-Up section for recommended follow-up.    CARMINE Pereira CDE    Time Spent: 18 minutes  Encounter Type: Individual    Any diabetes medication dose changes were made via the CDE Protocol and Collaborative Practice Agreement with the patient's endocrinology provider. A copy of this encounter was shared with the provider.

## 2021-07-19 ENCOUNTER — TELEPHONE (OUTPATIENT)
Dept: ENDOCRINOLOGY | Facility: CLINIC | Age: 46
End: 2021-07-19

## 2021-07-19 DIAGNOSIS — E10.9 TYPE 1 DIABETES MELLITUS WITHOUT COMPLICATION (H): ICD-10-CM

## 2021-07-29 ENCOUNTER — MEDICAL CORRESPONDENCE (OUTPATIENT)
Dept: HEALTH INFORMATION MANAGEMENT | Facility: CLINIC | Age: 46
End: 2021-07-29

## 2021-07-29 ENCOUNTER — TELEPHONE (OUTPATIENT)
Dept: ENDOCRINOLOGY | Facility: CLINIC | Age: 46
End: 2021-07-29

## 2021-07-29 NOTE — TELEPHONE ENCOUNTER
Received form(s) from [employer]  Placed form(s) in/on TL's incoming basket.  Forms need to be filled out and signed and faxed to number listed on form.     Copy needs to be sent for scanning after completion: Yes     YOBANY SaucedoA

## 2021-07-29 NOTE — TELEPHONE ENCOUNTER
Message reviewed.  I have received and reviewed this [employer] form, completed it today.  It will be faxed back ASA.    DAVIN Heaton MD, MS  Endocrinology  [facility]

## 2021-08-07 ENCOUNTER — HEALTH MAINTENANCE LETTER (OUTPATIENT)
Age: 46
End: 2021-08-07

## 2021-08-22 DIAGNOSIS — E66.01 MORBID OBESITY (H): ICD-10-CM

## 2021-08-22 DIAGNOSIS — E10.9 TYPE 1 DIABETES MELLITUS WITHOUT COMPLICATION (H): ICD-10-CM

## 2021-08-22 RX ORDER — LIRAGLUTIDE 6 MG/ML
INJECTION SUBCUTANEOUS
Qty: 9 ML | Refills: 2 | Status: CANCELLED | OUTPATIENT
Start: 2021-08-22

## 2021-08-23 RX ORDER — LIRAGLUTIDE 6 MG/ML
INJECTION SUBCUTANEOUS
Qty: 9 ML | Refills: 0 | Status: SHIPPED | OUTPATIENT
Start: 2021-08-23 | End: 2021-10-07

## 2021-08-28 ENCOUNTER — MYC REFILL (OUTPATIENT)
Dept: ENDOCRINOLOGY | Facility: CLINIC | Age: 46
End: 2021-08-28

## 2021-08-28 DIAGNOSIS — E10.9 TYPE 1 DIABETES MELLITUS WITHOUT COMPLICATION (H): ICD-10-CM

## 2021-09-03 ENCOUNTER — VIRTUAL VISIT (OUTPATIENT)
Dept: ENDOCRINOLOGY | Facility: CLINIC | Age: 46
End: 2021-09-03
Payer: COMMERCIAL

## 2021-09-03 DIAGNOSIS — E66.01 MORBID OBESITY (H): ICD-10-CM

## 2021-09-03 DIAGNOSIS — E10.9 TYPE 1 DIABETES MELLITUS WITHOUT COMPLICATION (H): Primary | ICD-10-CM

## 2021-09-03 DIAGNOSIS — Z96.41 INSULIN PUMP STATUS: ICD-10-CM

## 2021-09-03 PROCEDURE — 99214 OFFICE O/P EST MOD 30 MIN: CPT | Mod: 95 | Performed by: INTERNAL MEDICINE

## 2021-09-03 PROCEDURE — 95251 CONT GLUC MNTR ANALYSIS I&R: CPT | Performed by: INTERNAL MEDICINE

## 2021-09-03 NOTE — LETTER
9/3/2021         RE: Jose Apodaca  [address]  [address]  [facility] [address]        Dear Colleague,    Thank you for referring your patient, Jose Apodaca, to the [facility] SPECIALTY [facility]. Please see a copy of my visit note below.    Patient is being evaluated via a billable video visit.      How would you like to obtain your AVS? Reviewed verbally  If the video visit is dropped, the invitation should be resent by cell phone  Will anyone else be joining your video visit? no      Video Start Time: 2:35 pm    Video-Visit Details    Type of service:  Video Visit    Video End Time: 2:55 pm    Originating Location (pt. Location): home    Distant Location (provider location):  [facility]/home    Platform used for Video Visit:  [employer]        Recent issues:  Diabetes follow-up evaluation  Has been using (off label) Victoza, tolerated well with perceived glucose and appetite benefits  Feeling well overall, no new health issues reported  Reviewed health history           2011. Diagnosis of diabetes mellitus while living in [place]  Acute symptoms increased thirst, frequent urination, fatigue, weight loss (265 to 203#/30 days)  Hospitalized and diagnosis of DKA  Initial treatment with Apidra and Lantus insulin  Met with an endocrinologist Dr. Lina Fish  ~6 mo later, switched to [place] Ping pump     2012. Continued using this pump for 1 year, then moved to [facility]  Saw Dr. Beena Johnson/Endocrinologist in [place]  Switched to Tandem insulin pump    Previous [facility] labs include:       Subsequent move back to [place]  Recalls taking Victoza medication along with pump management, success with significant weight loss  Moved to [place]  9/2019. Moved to [place], [place]     Medical evaluations with Dr. 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 Started Tandem insulin pump     Previous  [facility] labs include:          2/19/20. Med evaluation with Dr. ERIKA Milligan/[facility]  No lab tests ordered  Started DexcomG6 CGMS sensor  ~4/2020. Upgrade to the Control IQ pump       5/26/20. Initial diabetes evaluation with me, [facility]  Continued use of the Tandem TSlim pump with the DexcomG6  ~6/2020. Restarted (off label) Victoza  10/2020. Insurance coverage for Victoza    Using Tandem TSlim pump with DexcomG6 sensor              Novolog in pump   Victoza 1.8 mg subcutaneous daily    ~6/2020. Started (off label) Victoza  10/2020. Insurance coverage for Victoza     Current pump settings:      Recent pump and sensor data:          Recent DexcomG6 data:        Blood glucose (BG) meter:  Accu-check               Tests infrequently  Fam Hx DM:    MA, Clement, MGM, PGM     Previous  labs include:          Lab Results   Component Value Date    A1C 7.0 (H) 05/05/2021     05/05/2021    POTASSIUM 4.1 05/05/2021    CHLORIDE 106 05/05/2021    CO2 31 05/05/2021    ANIONGAP 1 (L) 05/05/2021     (H) 05/05/2021    BUN 8 05/05/2021    CR 0.85 05/05/2021    GFRESTIMATED >90 05/05/2021    GFRESTBLACK >90 05/05/2021    MICK 9.0 05/05/2021    CPEPT <0.1 (L) 05/28/2020    CHOL 138 05/05/2021    TRIG 37 05/05/2021    HDL 56 05/05/2021    LDL 75 05/05/2021    NHDL 82 05/05/2021    UCRR 164 05/05/2021    MICROL 10 05/05/2021    UMALCR 6.34 05/05/2021     Lab Results   Component Value Date    TSH 1.09 05/05/2021     Last eye exam 10/2020, no DR noted  DM Complications: none        , lives in [facility], works with computer networking  Sees Dr. ERIKA Milligan/[facility] for general medicine evaluations.          ROS: 10 point ROS neg other than the symptoms noted above in the HPI.     GENERAL: mild fatigue, wt stable; denies fevers, chills, night sweats.   HEENT: no dysphagia, odonophagia, diplopia, neck pain  THYROID:  no apparent hyper or hypothyroid symptoms  CV: no chest pain,  pressure, palpitations  LUNGS: no SOB, AGARWAL, cough, wheezing   ABDOMEN: no diarrhea, constipation, abdominal pain  EXTREMITIES: no rashes, ulcers, edema  NEUROLOGY: right leg tingling; no headaches, denies changes in vision, extremitiy numbness   MSK: no muscle aches or pains, weakness  SKIN: no rashes or lesions  : decreased erection function; occasional nocturia  PSYCH:  stable mood, no significant anxiety or depression  ENDOCRINE: no heat or cold intolerance     Physical Exam (visual exam)  VS:  no vital signs taken for video visit  GENERAL: healthy, alert and NAD, well dressed, answering questions appropriately  EYES: eyes grossly normal to inspection, conjunctivae and sclerae normal, no exophthalmos or proptosis  THYROID:  no apparent nodules or goiter  LUNGS: no audible wheeze, cough or visible cyanosis, no visible retractions or increased work of breathing  ABDOMEN: abdomen obese  EXTREMITIES: no hand tremors, limited exam  NEUROLOGY: CN grossly intact, mentation intact and speech normal   PSYCH: mentation appears normal, affect normal/bright, judgement and insight intact, normal speech and appearance well groomed  SKIN:  Dark complexion, scalp balding     LABS:     All pertinent notes, labs, and images personally reviewed by me.     A/P:  Encounter Diagnoses   Name Primary?     Type 1 diabetes mellitus without complication (H) Yes     Insulin pump status      Morbid obesity (H)        Comments:  Reviewed health history and diabetes issues.  Health history and lab testing indicates T1DM.  Reviewed and interpreted tests that I previously ordered.   Ordered appropriate tests for the endocrinology disease management.    Management options discussed and implemented after shared medical decision making with the patient.  T1DM problem is chronic-stable    Plan:  Reviewed the overall T1DM management and insulin pump use.  Discussed optimal BG testing to assess glucose trends.  We reviewed insulin pump settings,  basal rate and bolus dosing  Use of automated pump bolus dosing for meal/snack carb & correction dosing  Reviewed recent Tandem pump and DexcomG6 CGMS glucose trend data, in detail     Recommend:  Continue the Tandem insulin pump, DexcomG6 sensor, and diabetes management plan.  Pump setting changes:   Basal:  Add 5-8a as 2.0 unit(s)/hr  Continue Victoza medication daily dosing, since helpful  Check non-fasting labs soon   Discussed the [facility] clinic lab option   Lab orders placed  Monitor for symptom changes, including GI symptoms  Keep focus on diet, exercise, and weight management  Continue aerobic exercise with the [employer] or walking plan   Consider use of antihypertensive medication (ACEI or ARB) and lipid (statin) medications  Arrange annual dilated eye exam, fasting lipid panel testing.     Addressed patient's questions today.     There are no Patient Instructions on file for this visit.    Future labs ordered today:   Orders Placed This Encounter   Procedures     GLUCOSE MONITOR, 72 HOUR, PHYS INTERP     Basic metabolic panel     ALT     Hemoglobin A1c     Radiology/Consults ordered today: None    Total time spent in with the patient evaluation:  20 min  Additional time spent reviewing pertinent lab tests and chart notes, and documentation:  10 min    Follow-up:  12/15/21 at 10:30 am, Return    DAVIN Heaton MD, MS  Endocrinology  [facility]    CC:  ERIKA Milligan                        Again, thank you for allowing me to participate in the care of your patient.        Sincerely,        Ryley Heaton MD

## 2021-09-03 NOTE — PROGRESS NOTES
Patient is being evaluated via a billable video visit.      How would you like to obtain your AVS? Reviewed verbally  If the video visit is dropped, the invitation should be resent by cell phone  Will anyone else be joining your video visit? no      Video Start Time: 2:35 pm    Video-Visit Details    Type of service:  Video Visit    Video End Time: 2:55 pm    Originating Location (pt. Location): home    Distant Location (provider location):  [facility] SPECIALTY [facility]    Platform used for Video Visit:  [employer]        Recent issues:  Diabetes follow-up evaluation  Has been using (off label) Victoza, tolerated well with perceived glucose and appetite benefits  Feeling well overall, no new health issues reported  Reviewed health history           2011. Diagnosis of diabetes mellitus while living in [place]  Acute symptoms increased thirst, frequent urination, fatigue, weight loss (265 to 203#/30 days)  Hospitalized and diagnosis of DKA  Initial treatment with Apidra and Lantus insulin  Met with an endocrinologist Dr. Lina Fish  ~6 mo later, switched to [place] Ping pump     2012. Continued using this pump for 1 year, then moved to [facility]  Saw Dr. Beena Johnson/Endocrinologist in [place]  Switched to Tandem insulin pump    Previous [facility] labs include:       Subsequent move back to [place]  Recalls taking Victoza medication along with pump management, success with significant weight loss  Moved to [place]  9/2019. Moved to [place], [place]     Medical evaluations with Dr. 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 Started Tandem insulin pump     Previous [facility] labs include:          2/19/20. Med evaluation with Dr. ERIKA Milligan/[facility]  No lab tests ordered  Started DexcomG6 CGMS sensor  ~4/2020. Upgrade to the Control IQ pump       5/26/20. Initial diabetes evaluation with me, [facility]  [facility]  Continued use of the Tandem TSlim pump with the DexcomG6  ~6/2020. Restarted (off label) Victoza  10/2020. Insurance coverage for Victoza    Using Tandem TSlim pump with DexcomG6 sensor              Novolog in pump   Victoza 1.8 mg subcutaneous daily    ~6/2020. Started (off label) Victoza  10/2020. Insurance coverage for Victoza     Current pump settings:      Recent pump and sensor data:          Recent DexcomG6 data:        Blood glucose (BG) meter:  Accu-check               Tests infrequently  Fam Hx DM:    MA, [place], MGM, PGM     Previous [facility] labs include:          Lab Results   Component Value Date    A1C 7.0 (H) 05/05/2021     05/05/2021    POTASSIUM 4.1 05/05/2021    CHLORIDE 106 05/05/2021    CO2 31 05/05/2021    ANIONGAP 1 (L) 05/05/2021     (H) 05/05/2021    BUN 8 05/05/2021    CR 0.85 05/05/2021    GFRESTIMATED >90 05/05/2021    GFRESTBLACK >90 05/05/2021    MICK 9.0 05/05/2021    CPEPT <0.1 (L) 05/28/2020    CHOL 138 05/05/2021    TRIG 37 05/05/2021    HDL 56 05/05/2021    LDL 75 05/05/2021    NHDL 82 05/05/2021    UCRR 164 05/05/2021    MICROL 10 05/05/2021    UMALCR 6.34 05/05/2021     Lab Results   Component Value Date    TSH 1.09 05/05/2021     Last eye exam 10/2020, no DR noted  DM Complications: none        , lives in [facility], works with computer networking  Sees Dr. ERIKA Milligan/[facility] for general medicine evaluations.          ROS: 10 point ROS neg other than the symptoms noted above in the HPI.     GENERAL: mild fatigue, wt stable; denies fevers, chills, night sweats.   HEENT: no dysphagia, odonophagia, diplopia, neck pain  THYROID:  no apparent hyper or hypothyroid symptoms  CV: no chest pain, pressure, palpitations  LUNGS: no SOB, AGARWAL, cough, wheezing   ABDOMEN: no diarrhea, constipation, abdominal pain  EXTREMITIES: no rashes, ulcers, edema  NEUROLOGY: right leg tingling; no headaches, denies changes in vision, extremitiy numbness   MSK: no muscle  aches or pains, weakness  SKIN: no rashes or lesions  : decreased erection function; occasional nocturia  PSYCH:  stable mood, no significant anxiety or depression  ENDOCRINE: no heat or cold intolerance     Physical Exam (visual exam)  VS:  no vital signs taken for video visit  GENERAL: healthy, alert and NAD, well dressed, answering questions appropriately  EYES: eyes grossly normal to inspection, conjunctivae and sclerae normal, no exophthalmos or proptosis  THYROID:  no apparent nodules or goiter  LUNGS: no audible wheeze, cough or visible cyanosis, no visible retractions or increased work of breathing  ABDOMEN: abdomen obese  EXTREMITIES: no hand tremors, limited exam  NEUROLOGY: CN grossly intact, mentation intact and speech normal   PSYCH: mentation appears normal, affect normal/bright, judgement and insight intact, normal speech and appearance well groomed  SKIN:  Dark complexion, scalp balding     LABS:     All pertinent notes, labs, and images personally reviewed by me.     A/P:  Encounter Diagnoses   Name Primary?     Type 1 diabetes mellitus without complication (H) Yes     Insulin pump status      Morbid obesity (H)        Comments:  Reviewed health history and diabetes issues.  Health history and lab testing indicates T1DM.  Reviewed and interpreted tests that I previously ordered.   Ordered appropriate tests for the endocrinology disease management.    Management options discussed and implemented after shared medical decision making with the patient.  T1DM problem is chronic-stable    Plan:  Reviewed the overall T1DM management and insulin pump use.  Discussed optimal BG testing to assess glucose trends.  We reviewed insulin pump settings, basal rate and bolus dosing  Use of automated pump bolus dosing for meal/snack carb & correction dosing  Reviewed recent Tandem pump and DexcomG6 CGMS glucose trend data, in detail     Recommend:  Continue the Tandem insulin pump, DexcomG6 sensor, and diabetes  management plan.  Pump setting changes:   Basal:  Add 5-8a as 2.0 unit(s)/hr  Continue Victoza medication daily dosing, since helpful  Check non-fasting labs soon   Discussed the [facility] clinic lab option   Lab orders placed  Monitor for symptom changes, including GI symptoms  Keep focus on diet, exercise, and weight management  Continue aerobic exercise with the [employer] or walking plan   Consider use of antihypertensive medication (ACEI or ARB) and lipid (statin) medications  Arrange annual dilated eye exam, fasting lipid panel testing.     Addressed patient's questions today.     There are no Patient Instructions on file for this visit.    Future labs ordered today:   Orders Placed This Encounter   Procedures     GLUCOSE MONITOR, 72 HOUR, PHYS INTERP     Basic metabolic panel     ALT     Hemoglobin A1c     Radiology/Consults ordered today: None    Total time spent in with the patient evaluation:  20 min  Additional time spent reviewing pertinent lab tests and chart notes, and documentation:  10 min    Follow-up:  12/15/21 at 10:30 am, Return    DAVIN Heaton MD, MS  Endocrinology  [facility]    CC:  ERIKA Milligan

## 2021-09-07 ENCOUNTER — MYC MEDICAL ADVICE (OUTPATIENT)
Dept: ENDOCRINOLOGY | Facility: CLINIC | Age: 46
End: 2021-09-07

## 2021-09-07 ENCOUNTER — MYC REFILL (OUTPATIENT)
Dept: ENDOCRINOLOGY | Facility: CLINIC | Age: 46
End: 2021-09-07

## 2021-09-07 DIAGNOSIS — N52.9 ERECTILE DYSFUNCTION, UNSPECIFIED ERECTILE DYSFUNCTION TYPE: ICD-10-CM

## 2021-09-07 DIAGNOSIS — E10.9 TYPE 1 DIABETES MELLITUS WITHOUT COMPLICATION (H): ICD-10-CM

## 2021-09-07 RX ORDER — SILDENAFIL 100 MG/1
TABLET, FILM COATED ORAL
Qty: 15 TABLET | Refills: 0 | Status: SHIPPED | OUTPATIENT
Start: 2021-09-07 | End: 2021-10-11

## 2021-09-08 ENCOUNTER — OFFICE VISIT (OUTPATIENT)
Dept: FAMILY MEDICINE | Facility: CLINIC | Age: 46
End: 2021-09-08
Payer: COMMERCIAL

## 2021-09-08 VITALS
WEIGHT: 294.9 LBS | BODY MASS INDEX: 46.89 KG/M2 | SYSTOLIC BLOOD PRESSURE: 119 MMHG | RESPIRATION RATE: 16 BRPM | OXYGEN SATURATION: 99 % | DIASTOLIC BLOOD PRESSURE: 81 MMHG | TEMPERATURE: 97.1 F | HEART RATE: 87 BPM

## 2021-09-08 DIAGNOSIS — E10.9 TYPE 1 DIABETES MELLITUS WITHOUT COMPLICATION (H): ICD-10-CM

## 2021-09-08 DIAGNOSIS — Z23 NEED FOR PROPHYLACTIC VACCINATION AND INOCULATION AGAINST INFLUENZA: ICD-10-CM

## 2021-09-08 DIAGNOSIS — E66.01 MORBID OBESITY (H): ICD-10-CM

## 2021-09-08 DIAGNOSIS — R21 RASH: ICD-10-CM

## 2021-09-08 DIAGNOSIS — L30.9 ECZEMA, UNSPECIFIED TYPE: Primary | ICD-10-CM

## 2021-09-08 LAB
ALT SERPL W P-5'-P-CCNC: 29 U/L (ref 0–70)
ANION GAP SERPL CALCULATED.3IONS-SCNC: 3 MMOL/L (ref 3–14)
BUN SERPL-MCNC: 9 MG/DL (ref 7–30)
CALCIUM SERPL-MCNC: 8.8 MG/DL (ref 8.5–10.1)
CHLORIDE BLD-SCNC: 109 MMOL/L (ref 94–109)
CO2 SERPL-SCNC: 28 MMOL/L (ref 20–32)
CREAT SERPL-MCNC: 0.9 MG/DL (ref 0.66–1.25)
GFR SERPL CREATININE-BSD FRML MDRD: >90 ML/MIN/1.73M2
GLUCOSE BLD-MCNC: 134 MG/DL (ref 70–99)
HBA1C MFR BLD: 6.8 % (ref 0–5.6)
POTASSIUM BLD-SCNC: 4.1 MMOL/L (ref 3.4–5.3)
SODIUM SERPL-SCNC: 140 MMOL/L (ref 133–144)

## 2021-09-08 PROCEDURE — 90471 IMMUNIZATION ADMIN: CPT | Performed by: INTERNAL MEDICINE

## 2021-09-08 PROCEDURE — 99214 OFFICE O/P EST MOD 30 MIN: CPT | Mod: 25 | Performed by: INTERNAL MEDICINE

## 2021-09-08 PROCEDURE — 90686 IIV4 VACC NO PRSV 0.5 ML IM: CPT | Performed by: INTERNAL MEDICINE

## 2021-09-08 PROCEDURE — 80048 BASIC METABOLIC PNL TOTAL CA: CPT | Performed by: INTERNAL MEDICINE

## 2021-09-08 PROCEDURE — 83036 HEMOGLOBIN GLYCOSYLATED A1C: CPT | Performed by: INTERNAL MEDICINE

## 2021-09-08 PROCEDURE — 84460 ALANINE AMINO (ALT) (SGPT): CPT | Performed by: INTERNAL MEDICINE

## 2021-09-08 PROCEDURE — 36415 COLL VENOUS BLD VENIPUNCTURE: CPT | Performed by: INTERNAL MEDICINE

## 2021-09-08 RX ORDER — CLOBETASOL PROPIONATE 0.5 MG/G
CREAM TOPICAL 2 TIMES DAILY
Qty: 15 G | Refills: 1 | Status: SHIPPED | OUTPATIENT
Start: 2021-09-08 | End: 2022-10-12

## 2021-09-08 RX ORDER — TRIAMCINOLONE ACETONIDE 1 MG/G
CREAM TOPICAL 2 TIMES DAILY
Qty: 15 G | Refills: 3 | Status: SHIPPED | OUTPATIENT
Start: 2021-09-08 | End: 2023-07-25

## 2021-09-08 NOTE — PROGRESS NOTES
Conner Ochoa is a 45 year old who presents for the following health issues     HPI       Chief Complaint   Patient presents with     Derm Problem     Patient report itchiness in arms with humitity, does not use any creams/meds         HPI:   Patient Jose Apodaca is a very pleasant 45 year old male with history of Type 1 Diabetes on insulin pump therapy who presents to Internal Medicine clinic today for evaluation of multiple concerns including recent arm rash symptoms concerning for eczema, Type 1 Diabetes, PHUONG and morbid obeseity. Regarding the patient's Type 1 Diabetes, the patient reports that he was diagnosed with Type 1 Diabetes at age of 36. He last was diagnosed with DKA and required hospitalization in 1/2016. No DKA episodes since then. Regarding the patient's chronic morbid obesity, the patient reports difficulty with losing weight through diet and exercise in the past. He reports that he does not require a refill of his insulin medication at this time.       Current Medications:     Current Outpatient Medications   Medication Sig Dispense Refill     blood glucose monitoring (SOFTCLIX) lancets AS DIRECTED FOUR TIMES DAILY 400 each 3     clobetasol (TEMOVATE) 0.05 % external cream Apply topically 2 times daily 15 g 1     Continuous Blood Gluc  (DEXCOM G6 ) TESHA Use to read blood sugars as per 's instructions. 1 each 0     Continuous Blood Gluc Sensor (DEXCOM G6 SENSOR) MISC Change every 10 days. 3 each 11     Continuous Blood Gluc Transmit (DEXCOM G6 TRANSMITTER) MISC Change every 3 months. 1 each 3     fexofenadine (ALLEGRA) 180 MG tablet Take 1 tablet (180 mg) by mouth daily 90 tablet 3     fluticasone (FLONASE) 50 MCG/ACT nasal spray Spray 1 spray into both nostrils daily 16 g 11     insulin pen needle (B-D U/F) 31G X 5 MM miscellaneous Use 1 pen needles daily or as directed. 100 each 1     INSULIN PUMP - OUTPATIENT Date Last Updated: 5/27/21 Tandem X2 with control  IQ and Dexcom BASAL RATES and times: 12   AM (midnight): 1.50 units/hour   5     AM: 2.6 units/hour  12  PM (noon): 2.5 units/hour  4    PM: 1.9 units/hour  8    PM: 2.2 units/hour  CARB RATIO and times: 12   AM (midnight): 2.5 12  PM (noon):  2.2 8    PM:  2.7 Corection Factor (Sensitivity) and times: 12   AM (midnight): 15 mg/dL BG target 125 mg/dL Active Insulin Time: 4 hours       liraglutide (VICTOZA PEN) 18 MG/3ML solution ADMINISTER 1.8 MG UNDER THE SKIN DAILY AS DIRECTED 9 mL 0     sildenafil (VIAGRA) 100 MG tablet TAKE 1/2 TABLET BY MOUTH 30 MINUTES PRIOR TO INTERCOURSE AS DIRECTED. 15 tablet 0     triamcinolone (KENALOG) 0.1 % external cream Apply topically 2 times daily 15 g 3     insulin aspart (NOVOLOG VIAL) 100 UNITS/ML vial USE WITH INSULIN PUMP, TOTAL DAILY DOSE OF APPROXIMATELY 110 UNITS 100 mL 3         Allergies:      Allergies   Allergen Reactions     Cats      Seasonal Allergies      grass            Past Medical History:     Past Medical History:   Diagnosis Date     Cataracts, bilateral      DKA (diabetic ketoacidoses) (H)      Morbid obesity (H)      PHUONG (obstructive sleep apnea)      T1DM (type 1 diabetes mellitus) (H)          Past Surgical History:     Past Surgical History:   Procedure Laterality Date     COLONOSCOPY       ENT SURGERY  10 years ago    cyst on neck     EXTRACTION(S) DENTAL       NECK SURGERY  2010    to remove a cyst     PHACOEMULSIFICATION CLEAR CORNEA WITH STANDARD INTRAOCULAR LENS IMPLANT Left 9/21/2020    Procedure: COMPLEX PHACOEMULSIFICATION, CATARACT, WITH INTRAOCULAR LENS IMPLANT;  Surgeon: Andre Jackson MD;  Location:  OR     PHACOEMULSIFICATION CLEAR CORNEA WITH STANDARD INTRAOCULAR LENS IMPLANT Right 9/28/2020    Procedure: PHACOEMULSIFICATION, CATARACT, WITH INTRAOCULAR LENS IMPLANT;  Surgeon: Andre Jackson MD;  Location:  OR         Family Medical History:     Family History   Problem Relation Age of Onset     Diabetes Maternal Grandmother       Other Cancer Maternal Grandmother         Lung Cancer     Diabetes Paternal Grandmother      Glaucoma Brother      Macular Degeneration No family hx of          Social History:     Social History     Socioeconomic History     Marital status:      Spouse name: Not on file     Number of children: Not on file     Years of education: Not on file     Highest education level: Not on file   Occupational History     Not on file   Tobacco Use     Smoking status: Never Smoker     Smokeless tobacco: Never Used   Substance and Sexual Activity     Alcohol use: Never     Drug use: Never     Sexual activity: Not on file   Other Topics Concern     Parent/sibling w/ CABG, MI or angioplasty before 65F 55M? Not Asked   Social History Narrative     Not on file     Social Determinants of Health     Financial Resource Strain:      Difficulty of Paying Living Expenses:    Food Insecurity:      Worried About Running Out of Food in the Last Year:      Ran Out of Food in the Last Year:    Transportation Needs:      Lack of Transportation (Medical):      Lack of Transportation (Non-Medical):    Physical Activity:      Days of Exercise per Week:      Minutes of Exercise per Session:    Stress:      Feeling of Stress :    Social Connections:      Frequency of Communication with Friends and Family:      Frequency of Social Gatherings with Friends and Family:      Attends Mu-[employer] Services:      Active Member of Clubs or Organizations:      Attends Club or Organization Meetings:      Marital Status:    Intimate Partner Violence:      Fear of Current or Ex-Partner:      Emotionally Abused:      Physically Abused:      Sexually Abused:            Review of System:     Constitutional: Negative for fever or chills, positive for chronic morbid obesity  Skin: positive for rashes  Ears/Nose/Throat: Negative for nasal congestion, sore throat  Respiratory: No shortness of breath, dyspnea on exertion, cough, or hemoptysis  Cardiovascular:  Negative for chest pain  Gastrointestinal: Negative for nausea, vomiting  Genitourinary: Negative for dysuria, hematuria  Musculoskeletal: Negative for myalgias  Neurologic: Negative for headaches  Psychiatric: Negative for depression, anxiety  Hematologic/Lymphatic/Immunologic: Negative  Endocrine: Negative for recent hypoglycemia events. Positive for chronic Type 1 Diabetes on insulin pump therapy.  Behavioral: Negative for tobacco use       Physical Exam:   /81   Pulse 87   Temp 97.1  F (36.2  C) (Temporal)   Resp 16   Wt 133.8 kg (294 lb 14.4 oz)   SpO2 99%   BMI 46.89 kg/m      GENERAL: morbidly obese, alert and no distress  EYES: eyes grossly normal to inspection, and conjunctivae and sclerae normal  HENT: Normocephalic atraumatic. Nose and mouth without ulcers or lesions  NECK: supple  RESP: lungs clear to auscultation   CV: regular rate and rhythm, normal S1 S2  LYMPH: no peripheral edema   ABDOMEN: obese  MS: no gross musculoskeletal defects noted  SKIN: arm rash symptoms concerning for eczema present  NEURO: Alert & Oriented x 3.   PSYCH: mentation appears normal, affect normal        Diagnostic Test Results:     Diagnostic Test Results:  Lab Results   Component Value Date    A1C 6.8 09/08/2021    A1C 7.0 05/05/2021    A1C 7.3 10/02/2020    A1C 7.5 08/20/2020    A1C 8.5 05/28/2020         ASSESSMENT/PLAN:     (R21) Rash  (L30.9) Eczema, unspecified type  (primary encounter diagnosis)  Comment: recent eczema rash symptoms of the arm  Plan: clobetasol (TEMOVATE) 0.05 % external cream,         triamcinolone (KENALOG) 0.1 % external cream      (Z23) Need for prophylactic vaccination and inoculation against influenza  Comment: patient is due for flu vaccine  Plan: INFLUENZA VACCINE IM > 6 MONTHS VALENT IIV4         (AFLURIA/FLUZONE), VACCINE ADMINISTRATION,         INITIAL        (E10.9) Type 1 diabetes mellitus without complication (H)  Comment: Type 1 Diabetes on insulin pump therapy, no recent  hypoglycemia events.   Plan: continue ENDOCRINOLOGY         ADULT REFERRAL      (G47.33) PHUONG (obstructive sleep apnea)  (E66.01) Morbid obesity (H)  Comment: chronic morbid obesity with difficulty losing weight through diet and exercise.  Plan: continue NUTRITION REFERRAL, sleep clinic follow up going forward       Follow Up Plan:     Patient is instructed to return to Internal Medicine clinic for follow-up visit in 1 month.        Radha Milligan MD  Internal Medicine  [facility]

## 2021-09-09 ENCOUNTER — MYC MEDICAL ADVICE (OUTPATIENT)
Dept: ENDOCRINOLOGY | Facility: CLINIC | Age: 46
End: 2021-09-09

## 2021-09-09 DIAGNOSIS — Z96.41 INSULIN PUMP STATUS: ICD-10-CM

## 2021-09-09 DIAGNOSIS — E10.9 TYPE 1 DIABETES MELLITUS WITHOUT COMPLICATION (H): ICD-10-CM

## 2021-09-09 DIAGNOSIS — E88.819 INSULIN RESISTANCE: Primary | ICD-10-CM

## 2021-09-14 NOTE — TELEPHONE ENCOUNTER
Central Prior Authorization Team  Phone: [phone]    PA Initiation    Medication: Novolog vial, PA  Insurance Company: Other (see comments)Comment:  [facility] ph: [phone]  Pharmacy Filling the Rx: [id number] - [place], [place] - [address] AT [facility] [address]  Filling Pharmacy Phone: [phone]  Filling Pharmacy Fax:    Start Date: 9/14/2021

## 2021-09-14 NOTE — TELEPHONE ENCOUNTER
This patient's aspart (Novolog) vial Rx was denied.  He needs to use Novolog in his insulin pump.    I recommend the patient have a Prior Authorization (PA) for the Novolog vial insulin. In clinical studies, Novolog insulin was compared with Humalog insulin and showed less insulin pump occlusions (Aaron et al, J. Diabetes Sci Technol. 2013, Nov 7(6): 3656-7793).  I feel that Novolog insulin is more effective when used in insulin pumps.  Thanks.      DAVIN Heaton MD, MS  Endocrinology  [facility]

## 2021-09-14 NOTE — TELEPHONE ENCOUNTER
Prior Authorization Approval    Authorization Effective Date: 9/14/2021  Authorization Expiration Date:  until further notice   Medication: Novolog vial- APPROVED   Approved Dose/Quantity:   Reference #:     Insurance Company: Other (see comments)Comment:  [facility] ph: [phone]  Expected CoPay:       CoPay Card Available:      Foundation Assistance Needed:    Which Pharmacy is filling the prescription (Not needed for infusion/clinic administered): [id number] - [place], [place] - [address] AT [facility] [address]  Pharmacy Notified: Yes  Patient Notified:  **Instructed pharmacy to notify patient when script is ready to /ship.**

## 2021-09-21 ENCOUNTER — TELEPHONE (OUTPATIENT)
Dept: ENDOCRINOLOGY | Facility: CLINIC | Age: 46
End: 2021-09-21

## 2021-09-21 ENCOUNTER — TELEPHONE (OUTPATIENT)
Dept: SLEEP MEDICINE | Facility: CLINIC | Age: 46
End: 2021-09-21

## 2021-09-21 NOTE — TELEPHONE ENCOUNTER
Form(s)/Records have been faxed.  Faxed or mailed to: number listed on form.  Form(s) in accordion file for 1 month then to scan.    Stephania Jamison MA

## 2021-10-06 DIAGNOSIS — E10.9 TYPE 1 DIABETES MELLITUS WITHOUT COMPLICATION (H): ICD-10-CM

## 2021-10-06 DIAGNOSIS — E66.01 MORBID OBESITY (H): ICD-10-CM

## 2021-10-07 RX ORDER — LIRAGLUTIDE 6 MG/ML
INJECTION SUBCUTANEOUS
Qty: 27 ML | Refills: 0 | Status: SHIPPED | OUTPATIENT
Start: 2021-10-07 | End: 2021-12-15

## 2021-10-09 DIAGNOSIS — N52.9 ERECTILE DYSFUNCTION, UNSPECIFIED ERECTILE DYSFUNCTION TYPE: ICD-10-CM

## 2021-10-11 RX ORDER — SILDENAFIL 100 MG/1
TABLET, FILM COATED ORAL
Qty: 15 TABLET | Refills: 0 | Status: SHIPPED | OUTPATIENT
Start: 2021-10-11

## 2021-10-13 DIAGNOSIS — G47.33 OSA (OBSTRUCTIVE SLEEP APNEA): Primary | ICD-10-CM

## 2021-10-15 ENCOUNTER — MYC MEDICAL ADVICE (OUTPATIENT)
Dept: SLEEP MEDICINE | Facility: CLINIC | Age: 46
End: 2021-10-15

## 2021-10-15 ENCOUNTER — OFFICE VISIT (OUTPATIENT)
Dept: OPTOMETRY | Facility: CLINIC | Age: 46
End: 2021-10-15
Payer: COMMERCIAL

## 2021-10-15 ENCOUNTER — MYC MEDICAL ADVICE (OUTPATIENT)
Dept: ENDOCRINOLOGY | Facility: CLINIC | Age: 46
End: 2021-10-15

## 2021-10-15 DIAGNOSIS — Z96.1 PSEUDOPHAKIA: ICD-10-CM

## 2021-10-15 DIAGNOSIS — H18.613 STABLE KERATOCONUS OF BOTH EYES: Primary | ICD-10-CM

## 2021-10-15 ASSESSMENT — REFRACTION_CURRENTRX
OS_DIAMETER: 15.2
OS_ADDL_SPECS: OPT EXTRA BLUE
OS_BRAND: ONEFIT
OD_ADDL_SPECS: OPT EXTRA CLEAR
OD_SPHERE: +3.50
OD_BASECURVE: 8.0
OD_BRAND: ONEFIT
OS_BASECURVE: 7.8
OS_SPHERE: +3.75
OD_DIAMETER: 15.5

## 2021-10-15 ASSESSMENT — REFRACTION_WEARINGRX
OS_AXIS: 025
OS_SPHERE: PLANO
OS_ADD: +2.25
OD_ADD: +2.25
OS_ADD: +1.50
OS_CYLINDER: +1.50
OD_SPHERE: PLANO
OD_SPHERE: -0.75
OS_SPHERE: -3.50
OD_AXIS: 160
OD_CYLINDER: +2.25
OD_ADD: +1.50

## 2021-10-15 ASSESSMENT — TONOMETRY
OS_IOP_MMHG: 12
IOP_METHOD: ICARE
OD_IOP_MMHG: 16

## 2021-10-15 ASSESSMENT — SLIT LAMP EXAM - LIDS
COMMENTS: MGD
COMMENTS: MGD

## 2021-10-15 ASSESSMENT — VISUAL ACUITY
OS_CC: 20/30-2+1
METHOD: SNELLEN - LINEAR
OD_CC: 20/25+3
CORRECTION_TYPE: CONTACTS

## 2021-10-15 ASSESSMENT — CUP TO DISC RATIO
OS_RATIO: 0.3
OD_RATIO: 0.3

## 2021-10-15 ASSESSMENT — EXTERNAL EXAM - LEFT EYE: OS_EXAM: NORMAL

## 2021-10-15 ASSESSMENT — EXTERNAL EXAM - RIGHT EYE: OD_EXAM: NORMAL

## 2021-10-15 NOTE — TELEPHONE ENCOUNTER
Phone call made to patient to discuss details of CPAP recall.  Risk was reviewed.    Patient has severe obstructive sleep apnea and has daytime sleepiness if he is without treatment.  He has registered his device.  Due to symptomatic burden, patient decides to continue using his CPAP device and wait for a replacement from [facility].

## 2021-10-15 NOTE — PROGRESS NOTES
A/P  1.) Keratoconus each eye, s/p CE/IOL each eye  -Previously in soft torics prior to surgery. Refit after surgery but noticing blurred vision overall.   -Excellent response to scleral lenses overall. His vision is the best I have seen today, BCVA 20/20- right eye and 20/25- left eye with hard lenses. Suspect slow healing after sx  -Doing well with PAL's over. Wearing Rx glasses prn when out of lenses  -Current lenses largely fitting well. Slightly tight inferior but tolerating well without irritation or difficulty on removing. Would need refit to different design if this becomes an issue  -Left eye takes small plus OR for improved vision  -Stable GHULAM on brianda today    2.) Type 1 DM without h/o ophthalmic manifestation   -Undilated today, no post pole hemes  -Last DFE with Dr. Jackson in 4/2021 normal  -Can follow annually here with dilation    Order new left lens and mail direct. F/u 6 months for vision/cornea recheck and dilated diabetic eye exam    30+ min spent on the date of encounter in direct care/counseling/review of charts/prescribing/documentation

## 2021-11-08 ENCOUNTER — TELEPHONE (OUTPATIENT)
Dept: ENDOCRINOLOGY | Facility: CLINIC | Age: 46
End: 2021-11-08
Payer: COMMERCIAL

## 2021-11-09 NOTE — TELEPHONE ENCOUNTER
Central Prior Authorization Team   Phone: [phone]    PA Initiation    Medication: Victoza  Insurance Company:    Pharmacy Filling the Rx: [id number] - [address] [facility]  Filling Pharmacy Phone: [phone]  Filling Pharmacy Fax:    Start Date: 11/9/2021

## 2021-11-11 NOTE — TELEPHONE ENCOUNTER
Called [facility] [phone] for status update.    Per rep this is still in progress.  We will receive a faxed determination once insurance has reached a decision.

## 2021-11-16 NOTE — TELEPHONE ENCOUNTER
Prior Authorization Approval    Authorization Effective Date: 11/9/2021  Authorization Expiration Date: 11/9/2022  Medication: Victoza  Approved Dose/Quantity:   Reference #:     Insurance Company:    Expected CoPay:       CoPay Card Available:      Foundation Assistance Needed:    Which Pharmacy is filling the prescription (Not needed for infusion/clinic administered): [id number] [address] [facility]  Pharmacy Notified: Yes  Patient Notified: Yes    Per call to insurance this was approved.  If we receive approval will copy into chart.

## 2021-11-16 NOTE — TELEPHONE ENCOUNTER
Called [facility] [phone] for status update.     Approval    Effective dates 11/9/2021-11/9/2022

## 2021-11-25 ENCOUNTER — MYC MEDICAL ADVICE (OUTPATIENT)
Dept: ENDOCRINOLOGY | Facility: CLINIC | Age: 46
End: 2021-11-25
Payer: COMMERCIAL

## 2021-11-27 ENCOUNTER — HEALTH MAINTENANCE LETTER (OUTPATIENT)
Age: 46
End: 2021-11-27

## 2021-12-09 ENCOUNTER — HOSPITAL ENCOUNTER (EMERGENCY)
Facility: CLINIC | Age: 46
Discharge: HOME | End: 2021-12-09
Attending: EMERGENCY MEDICINE | Admitting: EMERGENCY MEDICINE
Payer: COMMERCIAL

## 2021-12-09 VITALS
RESPIRATION RATE: 9 BRPM | HEART RATE: 80 BPM | SYSTOLIC BLOOD PRESSURE: 153 MMHG | DIASTOLIC BLOOD PRESSURE: 102 MMHG | OXYGEN SATURATION: 99 % | TEMPERATURE: 98.3 F

## 2021-12-09 DIAGNOSIS — E16.2 HYPOGLYCEMIA: ICD-10-CM

## 2021-12-09 LAB
ALBUMIN SERPL-MCNC: 3.5 G/DL (ref 3.4–5)
ALP SERPL-CCNC: 96 U/L (ref 40–150)
ALT SERPL W P-5'-P-CCNC: 36 U/L (ref 0–70)
ANION GAP SERPL CALCULATED.3IONS-SCNC: 4 MMOL/L (ref 3–14)
AST SERPL W P-5'-P-CCNC: 24 U/L (ref 0–45)
BASOPHILS # BLD AUTO: 0 10E3/UL (ref 0–0.2)
BASOPHILS NFR BLD AUTO: 0 %
BILIRUB SERPL-MCNC: 0.8 MG/DL (ref 0.2–1.3)
BUN SERPL-MCNC: 10 MG/DL (ref 7–30)
CALCIUM SERPL-MCNC: 9.3 MG/DL (ref 8.5–10.1)
CHLORIDE BLD-SCNC: 108 MMOL/L (ref 94–109)
CO2 SERPL-SCNC: 27 MMOL/L (ref 20–32)
CREAT SERPL-MCNC: 0.8 MG/DL (ref 0.66–1.25)
EOSINOPHIL # BLD AUTO: 0 10E3/UL (ref 0–0.7)
EOSINOPHIL NFR BLD AUTO: 1 %
ERYTHROCYTE [DISTWIDTH] IN BLOOD BY AUTOMATED COUNT: 13.2 % (ref 10–15)
GFR SERPL CREATININE-BSD FRML MDRD: >90 ML/MIN/1.73M2
GLUCOSE BLD-MCNC: 107 MG/DL (ref 70–99)
GLUCOSE BLDC GLUCOMTR-MCNC: 164 MG/DL (ref 70–99)
GLUCOSE BLDC GLUCOMTR-MCNC: 232 MG/DL (ref 70–99)
GLUCOSE BLDC GLUCOMTR-MCNC: 240 MG/DL (ref 70–99)
GLUCOSE BLDC GLUCOMTR-MCNC: 68 MG/DL (ref 70–99)
HCT VFR BLD AUTO: 45.5 % (ref 40–53)
HGB BLD-MCNC: 14.4 G/DL (ref 13.3–17.7)
IMM GRANULOCYTES # BLD: 0 10E3/UL
IMM GRANULOCYTES NFR BLD: 0 %
LYMPHOCYTES # BLD AUTO: 0.9 10E3/UL (ref 0.8–5.3)
LYMPHOCYTES NFR BLD AUTO: 20 %
MCH RBC QN AUTO: 27 PG (ref 26.5–33)
MCHC RBC AUTO-ENTMCNC: 31.6 G/DL (ref 31.5–36.5)
MCV RBC AUTO: 85 FL (ref 78–100)
MONOCYTES # BLD AUTO: 0.3 10E3/UL (ref 0–1.3)
MONOCYTES NFR BLD AUTO: 7 %
NEUTROPHILS # BLD AUTO: 3.3 10E3/UL (ref 1.6–8.3)
NEUTROPHILS NFR BLD AUTO: 72 %
NRBC # BLD AUTO: 0 10E3/UL
NRBC BLD AUTO-RTO: 0 /100
PLAT MORPH BLD: ABNORMAL
PLATELET # BLD AUTO: NORMAL 10*3/UL
POTASSIUM BLD-SCNC: 4.4 MMOL/L (ref 3.4–5.3)
PROT SERPL-MCNC: 7.5 G/DL (ref 6.8–8.8)
RBC # BLD AUTO: 5.33 10E6/UL (ref 4.4–5.9)
RBC MORPH BLD: ABNORMAL
SODIUM SERPL-SCNC: 139 MMOL/L (ref 133–144)
WBC # BLD AUTO: 4.6 10E3/UL (ref 4–11)

## 2021-12-09 PROCEDURE — 82040 ASSAY OF SERUM ALBUMIN: CPT | Performed by: EMERGENCY MEDICINE

## 2021-12-09 PROCEDURE — 96360 HYDRATION IV INFUSION INIT: CPT

## 2021-12-09 PROCEDURE — 36415 COLL VENOUS BLD VENIPUNCTURE: CPT | Performed by: EMERGENCY MEDICINE

## 2021-12-09 PROCEDURE — 258N000003 HC RX IP 258 OP 636: Performed by: EMERGENCY MEDICINE

## 2021-12-09 PROCEDURE — 99284 EMERGENCY DEPT VISIT MOD MDM: CPT | Mod: 25

## 2021-12-09 PROCEDURE — 85048 AUTOMATED LEUKOCYTE COUNT: CPT | Performed by: EMERGENCY MEDICINE

## 2021-12-09 RX ORDER — DEXTROSE MONOHYDRATE 25 G/50ML
50 INJECTION, SOLUTION INTRAVENOUS ONCE
Status: DISCONTINUED | OUTPATIENT
Start: 2021-12-09 | End: 2021-12-09

## 2021-12-09 RX ADMIN — SODIUM CHLORIDE 1000 ML: 9 INJECTION, SOLUTION INTRAVENOUS at 17:27

## 2021-12-09 ASSESSMENT — ENCOUNTER SYMPTOMS
VOMITING: 1
DIARRHEA: 0
FEVER: 0
COUGH: 0
DIAPHORESIS: 0
CHILLS: 0
FATIGUE: 1

## 2021-12-09 NOTE — ED TRIAGE NOTES
Type 1 diabetic, has insulin pump. Over last 4 hours pt has been hypoglycemic, sugars in 40s-60s. Pt drank coke, and ate chocolate. Bedside glucose: 68. Pt clammy and speech delayed. Pt vomited pta.

## 2021-12-09 NOTE — ED PROVIDER NOTES
History   Chief Complaint:  Hypoglycemia       The history is provided by the patient.      Jose Apodaca is a 46 year old male with history of type 1 diabetes mellitus with an insulin pump who presents for evaluation of hypoglycemia. Jose has had type 1 diabetes mellitus for the past 10 years diagnosed after an episode of DKA. He has been on an insulin pump for 9.5 years and is currently using Novolog. His current site is his left abdomen, placed two days ago and things have been running smoothly until today. He noticed his blood sugar was low this morning around 930AM when he began to feel fatigued. Blood sugar was 56 per pump so he drank orange juice, waited 2 minutes, drank Coke, waited 15 minutes, drank another Coke, ate lunch, went home, ate 7 chocolate truffles and then vomited. The highest his blood sugar got over the morning was 68 and is 68 on arrival to the ED. He was given two more orange juice containers in the ER and his insulin monitor shows he is rising to 190 on the time of my evaluation, about 45 minutes after the initial glucose result. His pump delivers a basal rate of 8.25 units per day over 24 hours. He entered a manual bolus of 23 units this morning to balance a hot chocolate. His monitor currently shows he has received 48 units so far which he notes seems typical and his average is 50 units daily. His hemoglobin A1c runs around 6.7. No fever, chills, diaphoresis, diarrhea, or cough. His kidney function are usually normal. He has a family history of type 1 diabetes in his maternal aunt and type 2 diabetes in his paternal grandmother, but no immediate family history.     Review of Systems   Constitutional: Positive for fatigue. Negative for chills, diaphoresis and fever.   Respiratory: Negative for cough.    Gastrointestinal: Positive for vomiting. Negative for diarrhea.   All other systems reviewed and are negative.      Allergies:  Cats  Seasonal  Allergies    Medications:  Fexofenadine   Insulin aspart   Liraglutide     Past Medical History:     Cataracts, bilateral  DKA  Morbid obesity  PHUONG on CPAP  Type 1 diabetes mellitus    Past Surgical History:    Colonoscopy  ENT surgery, cyst on neck  Extractions dental  Phacoemulsification clear cornea with standard intraocular lens implant bilateral    Family History:    Diabetes  Lung cancer    Social History:  Presents unaccompanied  Denies tobacco and alcohol use    Physical Exam     Patient Vitals for the past 24 hrs:   BP Temp Pulse Resp SpO2   12/09/21 1800 (!) 153/102 -- 80 9 99 %   12/09/21 1730 (!) 161/93 -- 90 10 98 %   12/09/21 1700 (!) 154/92 -- 87 (!) 36 100 %   12/09/21 1630 (!) 149/94 -- 83 30 99 %   12/09/21 1600 (!) 142/88 -- 85 (!) 31 97 %   12/09/21 1530 (!) 148/78 -- 83 25 100 %   12/09/21 1500 -- -- 96 18 98 %   12/09/21 1435 -- 98.3  F (36.8  C) -- -- 100 %   12/09/21 1432 (!) 151/86 -- 99 26 --       Physical Exam  General: Resting comfortably on the gurney. Elevated BMI. Articulate historian.   Head:  The scalp, face, and head appear normal  Eyes:  The pupils are equal, round, and reactive to light    There is no nystagmus    Extraocular muscles are intact    Conjunctivae and sclerae are normal  ENT:    The nose is normal    Pinnae are normal    The oropharynx is normal    Uvula is in the midline  Neck:  Normal range of motion    There is no rigidity noted    There is no midline cervical spine pain/tenderness    Trachea is in the midline    No mass is detected  CV:  Regular rate and underlying rhythm     Normal S1/S2, no S3/S4    No pathological murmur detected  Resp:  Lungs are clear    There is no tachypnea    Non-labored    No rales    No wheezing   GI:  Abdomen is soft, there is no rigidity    No distension    No tympani    No rebound tenderness     Non-surgical without peritoneal features  MS:  Normal muscular tone    Symmetric motor strength    No major joint effusions    No asymmetric  leg swelling, no calf tenderness  Skin:  No rash or acute skin lesions noted  Neuro: Speech is normal and fluent  Psych:  Awake. Alert.      Normal affect.  Appropriate interactions.  Lymph: No anterior cervical lymphadenopathy noted    Emergency Department Course     Laboratory:  Labs Ordered and Resulted from Time of ED Arrival to Time of ED Departure   GLUCOSE BY METER - Abnormal       Result Value    GLUCOSE BY METER POCT 68 (*)    COMPREHENSIVE METABOLIC PANEL - Abnormal    Sodium 139      Potassium 4.4      Chloride 108      Carbon Dioxide (CO2) 27      Anion Gap 4      Urea Nitrogen 10      Creatinine 0.80      Calcium 9.3      Glucose 107 (*)     Alkaline Phosphatase 96      AST 24      ALT 36      Protein Total 7.5      Albumin 3.5      Bilirubin Total 0.8      GFR Estimate >90     GLUCOSE BY METER - Abnormal    GLUCOSE BY METER POCT 164 (*)    RBC AND PLATELET MORPHOLOGY - Abnormal    Platelet Assessment Platelets Clumped (*)     RBC Morphology Confirmed RBC Indices     GLUCOSE BY METER - Abnormal    GLUCOSE BY METER POCT 240 (*)    GLUCOSE BY METER - Abnormal    GLUCOSE BY METER POCT 232 (*)    CBC WITH PLATELETS AND DIFFERENTIAL    WBC Count 4.6      RBC Count 5.33      Hemoglobin 14.4      Hematocrit 45.5      MCV 85      MCH 27.0      MCHC 31.6      RDW 13.2      Platelet Count        % Neutrophils 72      % Lymphocytes 20      % Monocytes 7      % Eosinophils 1      % Basophils 0      % Immature Granulocytes 0      NRBCs per 100 WBC 0      Absolute Neutrophils 3.3      Absolute Lymphocytes 0.9      Absolute Monocytes 0.3      Absolute Eosinophils 0.0      Absolute Basophils 0.0      Absolute Immature Granulocytes 0.0      Absolute NRBCs 0.0     GLUCOSE MONITOR NURSING POCT      Emergency Department Course:    Reviewed:  I reviewed nursing notes, vitals, past medical history and Care Everywhere    Assessments:  1503 I obtained history and examined the patient as noted above.   1808 I rechecked the  patient and explained findings.     Interventions:  1727 0.9% sodium chloride bolus, 1,000 ml, IV     Disposition:  The patient was discharged to home.     Impression & Plan     Medical Decision Making:  This very pleasant patient presents with an episode of recalcitrant hypoglycemia this morning.  The patient has an insulin pump, and he manages his diabetes in a meticulous fashion.  He noticed this morning after his bolus dose that he was significantly hypoglycemic.  Despite the ingestion of numerous sugar-containing foods, the patient could not get his blood sugar up.  He discontinued his insulin pump.  Over several hours in the emergency department, the patient's blood sugars gradually improved.  The main intervention was really a liter of IV fluid, and insulin discontinuation.  The exact etiology is not clear.  He only was receiving his basal dosing and a bolus dose to cover his breakfast this morning but that is when things were deteriorated.  There is a possibility that the patient's insulin went in a bolus dose systemically causing abrupt hypoglycemia that was difficult to manage.  The patient was asked to change his insulin pump site and resume his normal glycemic control.  He does have an appointment to see his endocrinologist next week.  He is feeling much better at this time.  His sugars have been up into the 200s as he is been drinking orange juice in the emergency department.  I originally was going to order D50 intravenously but the patient was noting that his glucose levels were trending higher so we held off on this and that was not required in the emergency department.  Screening laboratories are normal.  There is no evidence of infection.  There is no evidence of DKA.  The patient has no evidence of worsening or significant renal dysfunction that would lead to increased insulin effect.      Diagnosis:    ICD-10-CM    1. Hypoglycemia  E16.2        Scribe Disclosure:  Halina LÓPEZ, am serving  as a scribe at 2:41 PM on 12/9/2021 to document services personally performed by Iván Hatch MD based on my observations and the provider's statements to me.              Iván Hatch MD  12/09/21 1936

## 2021-12-10 NOTE — DISCHARGE INSTRUCTIONS
Please change her insulin pump site  Reestablish your insulin pump  Keep a close eye on your blood sugars  Follow-up with your endocrinologist next week as planned

## 2021-12-15 ENCOUNTER — OFFICE VISIT (OUTPATIENT)
Dept: ENDOCRINOLOGY | Facility: CLINIC | Age: 46
End: 2021-12-15
Payer: COMMERCIAL

## 2021-12-15 VITALS
WEIGHT: 289.5 LBS | HEART RATE: 82 BPM | SYSTOLIC BLOOD PRESSURE: 128 MMHG | BODY MASS INDEX: 46.03 KG/M2 | DIASTOLIC BLOOD PRESSURE: 85 MMHG

## 2021-12-15 DIAGNOSIS — Z96.41 INSULIN PUMP STATUS: Primary | ICD-10-CM

## 2021-12-15 DIAGNOSIS — E66.01 MORBID OBESITY (H): ICD-10-CM

## 2021-12-15 DIAGNOSIS — E10.9 TYPE 1 DIABETES MELLITUS WITHOUT COMPLICATION (H): ICD-10-CM

## 2021-12-15 PROCEDURE — 99214 OFFICE O/P EST MOD 30 MIN: CPT | Performed by: INTERNAL MEDICINE

## 2021-12-15 PROCEDURE — 95251 CONT GLUC MNTR ANALYSIS I&R: CPT | Performed by: INTERNAL MEDICINE

## 2021-12-15 RX ORDER — FLURBIPROFEN SODIUM 0.3 MG/ML
SOLUTION/ DROPS OPHTHALMIC
Qty: 100 EACH | Refills: 3 | Status: SHIPPED | OUTPATIENT
Start: 2021-12-15 | End: 2022-11-15

## 2021-12-15 RX ORDER — LIRAGLUTIDE 6 MG/ML
INJECTION SUBCUTANEOUS
Qty: 27 ML | Refills: 3 | Status: SHIPPED | OUTPATIENT
Start: 2021-12-15 | End: 2023-01-24

## 2021-12-15 NOTE — PROGRESS NOTES
Recent issues:  Diabetes follow-up evaluation  Continues with Tandem pump and DexcomG6 sensor use  Had used (off label) Victoza, tolerated well with perceived glucose and appetite benefits, but ran out ~6 weeks ago  Last week, had hypoglycemia reaction           2011. Diagnosis of diabetes mellitus while living in [place]  Acute symptoms increased thirst, frequent urination, fatigue, weight loss (265 to 203#/30 days)  Hospitalized and diagnosis of DKA  Initial treatment with Apidra and Lantus insulin  Met with an endocrinologist Dr. Lina Fish  ~6 mo later, switched to [place] Ping pump     2012. Continued using this pump for 1 year, then moved to [facility]  Saw Dr. Beena Johnson/Endocrinologist in [place]  Switched to Tandem insulin pump    Previous [facility] labs include:       Subsequent move back to [place]  Recalls taking Victoza medication along with pump management, success with significant weight loss  Moved to [place]  9/2019. Moved to [place], [place]     Medical evaluations with Dr. 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 Started Tandem insulin pump     Previous [facility] labs include:          2/19/20. Med evaluation with Dr. ERIKA Milligan/[facility] [place] [facility]  No lab tests ordered  Started DexcomG6 CGMS sensor  ~4/2020. Upgrade to the Control IQ pump       5/26/20. Initial diabetes evaluation with me,  [place] [facility]  Continued use of the Tandem TSlim pump with the DexcomG6  ~6/2020. Restarted (off label) Victoza  10/2020. Insurance coverage for Victoza, used 1.8 mg daily  11/2021. Ran out of Victoza    Using Tandem TSlim pump with DexcomG6 sensor              Novolog in pump    Current pump settings:      Recent pump and sensor data:          Recent DexcomG6 data:        Blood glucose (BG) meter:  Accu-check               Tests infrequently  Fam Hx DM:    MA, Clement, MGM, PGM     Previous  labs include:          Lab  Results   Component Value Date    A1C 6.8 (H) 09/08/2021     12/09/2021    POTASSIUM 4.4 12/09/2021    CHLORIDE 108 12/09/2021    CO2 27 12/09/2021    ANIONGAP 4 12/09/2021     (H) 12/09/2021    BUN 10 12/09/2021    CR 0.80 12/09/2021    GFRESTIMATED >90 12/09/2021    GFRESTBLACK >90 05/05/2021    MICK 9.3 12/09/2021    CPEPT <0.1 (L) 05/28/2020    CHOL 138 05/05/2021    TRIG 37 05/05/2021    HDL 56 05/05/2021    LDL 75 05/05/2021    NHDL 82 05/05/2021    UCRR 164 05/05/2021    MICROL 10 05/05/2021    UMALCR 6.34 05/05/2021     Lab Results   Component Value Date    TSH 1.09 05/05/2021     Last eye exam 10/2020, no DR noted  DM Complications: none        , lives in [facility], works with [email or web address]  Sees Dr. ERIKA Milligan/[facility] for general medicine evaluations.       PMH/PSH:  Past Medical History:   Diagnosis Date     Cataracts, bilateral      DKA (diabetic ketoacidoses)      Morbid obesity (H)      PHUONG (obstructive sleep apnea)      T1DM (type 1 diabetes mellitus) (H)      Past Surgical History:   Procedure Laterality Date     COLONOSCOPY       ENT SURGERY  10 years ago    cyst on neck     EXTRACTION(S) DENTAL       NECK SURGERY  2010    to remove a cyst     PHACOEMULSIFICATION CLEAR CORNEA WITH STANDARD INTRAOCULAR LENS IMPLANT Left 9/21/2020    Procedure: COMPLEX PHACOEMULSIFICATION, CATARACT, WITH INTRAOCULAR LENS IMPLANT;  Surgeon: Andre Jackson MD;  Location:  OR     PHACOEMULSIFICATION CLEAR CORNEA WITH STANDARD INTRAOCULAR LENS IMPLANT Right 9/28/2020    Procedure: PHACOEMULSIFICATION, CATARACT, WITH INTRAOCULAR LENS IMPLANT;  Surgeon: Andre Jackson MD;  Location:  OR       Family Hx:  Family History   Problem Relation Age of Onset     Diabetes Maternal Grandmother      Other Cancer Maternal Grandmother         Lung Cancer     Diabetes Paternal Grandmother      Glaucoma Brother      Macular Degeneration No family hx of          Social  Hx:  Social History     Socioeconomic History     Marital status:      Spouse name: Not on file     Number of children: Not on file     Years of education: Not on file     Highest education level: Not on file   Occupational History     Not on file   Tobacco Use     Smoking status: Never Smoker     Smokeless tobacco: Never Used   Substance and Sexual Activity     Alcohol use: Never     Drug use: Never     Sexual activity: Not on file   Other Topics Concern     Parent/sibling w/ CABG, MI or angioplasty before 65F 55M? Not Asked   Social History Narrative     Not on file     Social Determinants of Health     Financial Resource Strain: Not on file   Food Insecurity: Not on file   Transportation Needs: Not on file   Physical Activity: Not on file   Stress: Not on file   Social Connections: Not on file   Intimate Partner Violence: Not on file   Housing Stability: Not on file          MEDICATIONS:  has a current medication list which includes the following prescription(s): clobetasol, dexcom g6 , dexcom g6 sensor, dexcom g6 transmitter, fexofenadine, fluticasone, insulin aspart, b-d u/f, insulin pump, victoza pen, sildenafil, blood glucose monitoring, and triamcinolone.       ROS: 10 point ROS neg other than the symptoms noted above in the HPI.     GENERAL: mild fatigue, wt stable; denies fevers, chills, night sweats.   HEENT: no dysphagia, odonophagia, diplopia, neck pain  THYROID:  no apparent hyper or hypothyroid symptoms  CV: no chest pain, pressure, palpitations  LUNGS: no SOB, AGARWAL, cough, wheezing   ABDOMEN: no diarrhea, constipation, abdominal pain  EXTREMITIES: no rashes, ulcers, edema  NEUROLOGY: right leg tingling; no headaches, denies changes in vision, extremitiy numbness   MSK: no muscle aches or pains, weakness  SKIN: no rashes or lesions  : decreased erection function; occasional nocturia  PSYCH:  stable mood, no significant anxiety or depression  ENDOCRINE: no heat or cold  intolerance     Physical Exam   VS: /85   Pulse 82   Wt 131.3 kg (289 lb 8 oz)   BMI 46.03 kg/m    GENERAL: AXOX3, NAD, well dressed, answering questions appropriately, appears stated age.  ENT: no nose swelling or nasal discharge, mouth redness or gum changes.  EYES: eyes grossly normal to inspection, conjunctivae and sclerae normal, no exophthalmos or proptosis  THYROID:  no apparent nodules or goiter  LUNGS: no audible wheeze, cough or visible cyanosis, or increased work of breathing  ABDOMEN: abdomen significantly obeses size  EXTREMITIES: no edema noted  NEUROLOGY: CN grossly intact, no tremors  MSK: grossly intact  SKIN:  dark complexion, scalp balding; no apparent skin lesions, rash, or edema with visualized skin appearance  PSYCH: mentation appears normal, affect normal/bright, judgement and insight intact,   normal speech and appearance well groomed       LABS:     All pertinent notes, labs, and images personally reviewed by me.     A/P:  Encounter Diagnoses   Name Primary?     Type 1 diabetes mellitus without complication (H)      Morbid obesity (H)      Insulin pump status Yes       Comments:  Reviewed health history and diabetes issues.  Health history and lab testing indicates T1DM.  Reviewed and interpreted tests that I previously ordered.   Ordered appropriate tests for the endocrinology disease management.    Management options discussed and implemented after shared medical decision making with the patient.  T1DM problem is chronic-stable    Plan:  Reviewed the overall T1DM management and insulin pump use.  Discussed optimal BG testing to assess glucose trends.  We reviewed insulin pump settings, basal rate and bolus dosing  Use of automated pump bolus dosing for meal/snack carb & correction dosing  Reviewed recent Tandem pump and DexcomG6 CGMS glucose trend data, in detail     Recommend:  Continue the Tandem insulin pump, DexcomG6 sensor, and diabetes management plan.  Pump setting  changes:   Basals:  [place] 1.5 to 1.4, 5a 2.0 to 1.9, 12p 2.0 to 1.9, 4p 1.9 to 1.8 unit(s)/hr    Reasonable to restart the Victoza medication use   Updated [facility] pharmacy Rx   Discussed plan to start with 0.6 mg every day x few days, then 1.2 mg every day for few days, then consider 1.8 mg daily  Check non-fasting labs soon   Discussed the [facility] lab option   Lab orders placed    Resume walking exercise routine... helps his insulin sensitivity  Monitor for symptom changes, including GI symptoms  Keep focus on diet, exercise, and weight management  Would benefit from dietician evaluation, also commercial weight loss program or bariatric surgery  Consider use of antihypertensive medication (ACEI or ARB) and lipid (statin) medications  Arrange annual dilated eye exam, fasting lipid panel testing.     Addressed patient's questions today.     There are no Patient Instructions on file for this visit.    Future labs ordered today:   Orders Placed This Encounter   Procedures     GLUCOSE MONITOR, 72 HOUR, PHYS INTERP     Basic metabolic panel     ALT     Hemoglobin A1c     C-peptide     Radiology/Consults ordered today: None    Total time spent in with the patient evaluation:  22 min  Additional time spent reviewing pertinent lab tests and chart notes, and documentation:  10 min    Follow-up:  3/2022 or 4/2022    DAVIN Heaton MD, MS  Endocrinology  [facility]    CC:  ERIKA Milligan

## 2021-12-15 NOTE — LETTER
12/15/2021         RE: Jose Apodaca  [address]  [address]  [facility] [address]        Dear Colleague,    Thank you for referring your patient, Jose Apodaca, to the [facility] [place]. Please see a copy of my visit note below.        Recent issues:  Diabetes follow-up evaluation  Continues with Tandem pump and DexcomG6 sensor use  Had used (off label) Victoza, tolerated well with perceived glucose and appetite benefits, but ran out ~6 weeks ago  Last week, had hypoglycemia reaction           2011. Diagnosis of diabetes mellitus while living in [place]  Acute symptoms increased thirst, frequent urination, fatigue, weight loss (265 to 203#/30 days)  Hospitalized and diagnosis of DKA  Initial treatment with Apidra and Lantus insulin  Met with an endocrinologist Dr. Lina Fish  ~6 mo later, switched to [place] Ping pump     2012. Continued using this pump for 1 year, then moved to [facility]  Saw Dr. Beena Johnson/Endocrinologist in [place]  Switched to Tandem insulin pump    Previous [facility] labs include:       Subsequent move back to [place]  Recalls taking Victoza medication along with pump management, success with significant weight loss  Moved to [place]  9/2019. Moved to [place], [place]     Medical evaluations with Dr. 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 Started Tandem insulin pump     Previous [facility] labs include:          2/19/20. Med evaluation with Dr. ERIKA Milligan/[facility]  No lab tests ordered  Started DexcomG6 CGMS sensor  ~4/2020. Upgrade to the Control IQ pump       5/26/20. Initial diabetes evaluation with me, [facility]  Continued use of the Tandem TSlim pump with the DexcomG6  ~6/2020. Restarted (off label) Victoza  10/2020. Insurance coverage for Victoza, used 1.8 mg daily  11/2021. Ran out of Victoza    Using Tandem TSlim pump with DexcomG6  sensor              Novolog in pump    Current pump settings:      Recent pump and sensor data:          Recent DexcomG6 data:        Blood glucose (BG) meter:  Accu-check               Tests infrequently  Fam Hx DM:    MA, Mcousin, MGM, PGM     Previous [facility] labs include:          Lab Results   Component Value Date    A1C 6.8 (H) 09/08/2021     12/09/2021    POTASSIUM 4.4 12/09/2021    CHLORIDE 108 12/09/2021    CO2 27 12/09/2021    ANIONGAP 4 12/09/2021     (H) 12/09/2021    BUN 10 12/09/2021    CR 0.80 12/09/2021    GFRESTIMATED >90 12/09/2021    GFRESTBLACK >90 05/05/2021    MICK 9.3 12/09/2021    CPEPT <0.1 (L) 05/28/2020    CHOL 138 05/05/2021    TRIG 37 05/05/2021    HDL 56 05/05/2021    LDL 75 05/05/2021    NHDL 82 05/05/2021    UCRR 164 05/05/2021    MICROL 10 05/05/2021    UMALCR 6.34 05/05/2021     Lab Results   Component Value Date    TSH 1.09 05/05/2021     Last eye exam 10/2020, no DR noted  DM Complications: none        , lives in [facility], works with computer networking  Sees Dr. ERIKA Milligan/[facility] clinic for general medicine evaluations.       PMH/PSH:  Past Medical History:   Diagnosis Date     Cataracts, bilateral      DKA (diabetic ketoacidoses)      Morbid obesity (H)      PHUONG (obstructive sleep apnea)      T1DM (type 1 diabetes mellitus) (H)      Past Surgical History:   Procedure Laterality Date     COLONOSCOPY       ENT SURGERY  10 years ago    cyst on neck     EXTRACTION(S) DENTAL       NECK SURGERY  2010    to remove a cyst     PHACOEMULSIFICATION CLEAR CORNEA WITH STANDARD INTRAOCULAR LENS IMPLANT Left 9/21/2020    Procedure: COMPLEX PHACOEMULSIFICATION, CATARACT, WITH INTRAOCULAR LENS IMPLANT;  Surgeon: Andre Jackson MD;  Location:  OR     PHACOEMULSIFICATION CLEAR CORNEA WITH STANDARD INTRAOCULAR LENS IMPLANT Right 9/28/2020    Procedure: PHACOEMULSIFICATION, CATARACT, WITH INTRAOCULAR LENS IMPLANT;  Surgeon: Andre Jackson MD;  Location:  [facility] OR       Family Hx:  Family History   Problem Relation Age of Onset     Diabetes Maternal Grandmother      Other Cancer Maternal Grandmother         Lung Cancer     Diabetes Paternal Grandmother      Glaucoma Brother      Macular Degeneration No family hx of          Social Hx:  Social History     Socioeconomic History     Marital status:      Spouse name: Not on file     Number of children: Not on file     Years of education: Not on file     Highest education level: Not on file   Occupational History     Not on file   Tobacco Use     Smoking status: Never Smoker     Smokeless tobacco: Never Used   Substance and Sexual Activity     Alcohol use: Never     Drug use: Never     Sexual activity: Not on file   Other Topics Concern     Parent/sibling w/ CABG, MI or angioplasty before 65F 55M? Not Asked   Social History Narrative     Not on file     Social Determinants of Health     Financial Resource Strain: Not on file   Food Insecurity: Not on file   Transportation Needs: Not on file   Physical Activity: Not on file   Stress: Not on file   Social Connections: Not on file   Intimate Partner Violence: Not on file   Housing Stability: Not on file          MEDICATIONS:  has a current medication list which includes the following prescription(s): clobetasol, dexcom g6 , dexcom g6 sensor, dexcom g6 transmitter, fexofenadine, fluticasone, insulin aspart, b-d u/f, insulin pump, victoza pen, sildenafil, blood glucose monitoring, and triamcinolone.       ROS: 10 point ROS neg other than the symptoms noted above in the HPI.     GENERAL: mild fatigue, wt stable; denies fevers, chills, night sweats.   HEENT: no dysphagia, odonophagia, diplopia, neck pain  THYROID:  no apparent hyper or hypothyroid symptoms  CV: no chest pain, pressure, palpitations  LUNGS: no SOB, AGARWAL, cough, wheezing   ABDOMEN: no diarrhea, constipation, abdominal pain  EXTREMITIES: no rashes, ulcers, edema  NEUROLOGY: right leg tingling; no  headaches, denies changes in vision, extremitiy numbness   MSK: no muscle aches or pains, weakness  SKIN: no rashes or lesions  : decreased erection function; occasional nocturia  PSYCH:  stable mood, no significant anxiety or depression  ENDOCRINE: no heat or cold intolerance     Physical Exam   VS: /85   Pulse 82   Wt 131.3 kg (289 lb 8 oz)   BMI 46.03 kg/m    GENERAL: AXOX3, NAD, well dressed, answering questions appropriately, appears stated age.  ENT: no nose swelling or nasal discharge, mouth redness or gum changes.  EYES: eyes grossly normal to inspection, conjunctivae and sclerae normal, no exophthalmos or proptosis  THYROID:  no apparent nodules or goiter  LUNGS: no audible wheeze, cough or visible cyanosis, or increased work of breathing  ABDOMEN: abdomen significantly obeses size  EXTREMITIES: no edema noted  NEUROLOGY: CN grossly intact, no tremors  MSK: grossly intact  SKIN:  dark complexion, scalp balding; no apparent skin lesions, rash, or edema with visualized skin appearance  PSYCH: mentation appears normal, affect normal/bright, judgement and insight intact,   normal speech and appearance well groomed       LABS:     All pertinent notes, labs, and images personally reviewed by me.     A/P:  Encounter Diagnoses   Name Primary?     Type 1 diabetes mellitus without complication (H)      Morbid obesity (H)      Insulin pump status Yes       Comments:  Reviewed health history and diabetes issues.  Health history and lab testing indicates T1DM.  Reviewed and interpreted tests that I previously ordered.   Ordered appropriate tests for the endocrinology disease management.    Management options discussed and implemented after shared medical decision making with the patient.  T1DM problem is chronic-stable    Plan:  Reviewed the overall T1DM management and insulin pump use.  Discussed optimal BG testing to assess glucose trends.  We reviewed insulin pump settings, basal rate and bolus dosing  Use  of automated pump bolus dosing for meal/snack carb & correction dosing  Reviewed recent Tandem pump and DexcomG6 CGMS glucose trend data, in detail     Recommend:  Continue the Tandem insulin pump, DexcomG6 sensor, and diabetes management plan.  Pump setting changes:   Basals:  [place] 1.5 to 1.4, 5a 2.0 to 1.9, 12p 2.0 to 1.9, 4p 1.9 to 1.8 unit(s)/hr    Reasonable to restart the Victoza medication use   Updated  pharmacy Rx   Discussed plan to start with 0.6 mg every day x few days, then 1.2 mg every day for few days, then consider 1.8 mg daily  Check non-fasting labs soon   Discussed the [facility] lab option   Lab orders placed    Resume walking exercise routine... helps his insulin sensitivity  Monitor for symptom changes, including GI symptoms  Keep focus on diet, exercise, and weight management  Would benefit from dietician evaluation, also commercial weight loss program or bariatric surgery  Consider use of antihypertensive medication (ACEI or ARB) and lipid (statin) medications  Arrange annual dilated eye exam, fasting lipid panel testing.     Addressed patient's questions today.     There are no Patient Instructions on file for this visit.    Future labs ordered today:   Orders Placed This Encounter   Procedures     GLUCOSE MONITOR, 72 HOUR, PHYS INTERP     Basic metabolic panel     ALT     Hemoglobin A1c     C-peptide     Radiology/Consults ordered today: None    Total time spent in with the patient evaluation:  22 min  Additional time spent reviewing pertinent lab tests and chart notes, and documentation:  10 min    Follow-up:  3/2022 or 4/2022    DAVIN Heaton MD, MS  Endocrinology  [facility]    CC:  ERIKA Milligan                        Again, thank you for allowing me to participate in the care of your patient.        Sincerely,        Ryley Heaton MD

## 2021-12-21 ENCOUNTER — TELEPHONE (OUTPATIENT)
Dept: ENDOCRINOLOGY | Facility: CLINIC | Age: 46
End: 2021-12-21
Payer: COMMERCIAL

## 2021-12-21 NOTE — TELEPHONE ENCOUNTER
*Initiated by another source.      Prior Authorization Approval    Authorization Effective Date: 12/21/2021  Authorization Expiration Date: 12/22/2022  Medication: VICTOZA PEN 18 MG/3ML solution- APPROVED  Approved Dose/Quantity: 27 ml  Reference #:     Insurance Company: [employer] - Phone [phone] Fax [phone]  Expected CoPay:       CoPay Card Available:      Foundation Assistance Needed:    Which Pharmacy is filling the prescription (Not needed for infusion/clinic administered): [id number] - [place], [place] - [address] AT [facility] [address]  Pharmacy Notified: Yes, pharmacy has a paid claim  Patient Notified: Pharmacy will notify patient when ready      Approved per Vineet Durán Prior Authorization Team  Phone: [phone]

## 2022-01-20 ENCOUNTER — E-VISIT (OUTPATIENT)
Dept: FAMILY MEDICINE | Facility: CLINIC | Age: 47
End: 2022-01-20
Payer: COMMERCIAL

## 2022-01-20 ENCOUNTER — HOSPITAL ENCOUNTER (EMERGENCY)
Facility: CLINIC | Age: 47
Discharge: HOME | End: 2022-01-20
Attending: EMERGENCY MEDICINE | Admitting: EMERGENCY MEDICINE
Payer: COMMERCIAL

## 2022-01-20 VITALS
BODY MASS INDEX: 45 KG/M2 | HEIGHT: 66 IN | TEMPERATURE: 98.2 F | RESPIRATION RATE: 18 BRPM | OXYGEN SATURATION: 97 % | WEIGHT: 280 LBS | SYSTOLIC BLOOD PRESSURE: 132 MMHG | HEART RATE: 86 BPM | DIASTOLIC BLOOD PRESSURE: 83 MMHG

## 2022-01-20 DIAGNOSIS — Z53.9 ERRONEOUS ENCOUNTER--DISREGARD: ICD-10-CM

## 2022-01-20 DIAGNOSIS — U07.1 INFECTION DUE TO 2019 NOVEL CORONAVIRUS: Primary | ICD-10-CM

## 2022-01-20 DIAGNOSIS — J06.9 UPPER RESPIRATORY TRACT INFECTION, UNSPECIFIED TYPE: ICD-10-CM

## 2022-01-20 DIAGNOSIS — Z20.822 LAB TEST NEGATIVE FOR COVID-19 VIRUS: ICD-10-CM

## 2022-01-20 LAB
FLUAV RNA SPEC QL NAA+PROBE: NEGATIVE
FLUBV RNA RESP QL NAA+PROBE: NEGATIVE
SARS-COV-2 RNA RESP QL NAA+PROBE: NEGATIVE

## 2022-01-20 PROCEDURE — C9803 HOPD COVID-19 SPEC COLLECT: HCPCS

## 2022-01-20 PROCEDURE — 99283 EMERGENCY DEPT VISIT LOW MDM: CPT

## 2022-01-20 PROCEDURE — 87636 SARSCOV2 & INF A&B AMP PRB: CPT | Performed by: EMERGENCY MEDICINE

## 2022-01-20 RX ORDER — PREDNISONE 20 MG/1
TABLET ORAL
Qty: 20 TABLET | Refills: 0 | Status: SHIPPED | OUTPATIENT
Start: 2022-01-20 | End: 2023-07-25

## 2022-01-20 ASSESSMENT — MIFFLIN-ST. JEOR: SCORE: 2082.82

## 2022-01-20 ASSESSMENT — ENCOUNTER SYMPTOMS
FEVER: 0
SORE THROAT: 0
DIARRHEA: 0
FATIGUE: 1
VOMITING: 0
COUGH: 1

## 2022-01-20 NOTE — ED PROVIDER NOTES
"  History   Chief Complaint:  Covid 19 Testing       The history is provided by the patient.      Jose Apodaca is a 46 year old male with history of insulin-dependent diabetes who presents for COVID-19 testing. He reports fatigue beginning yesterday, as well as a mild scratchy cough. The patient denies any fevers, vomiting, diarrhea, and sore throat. He is vaccinated for both COVID-19 and influenza. The patient denies any known COVID-19 exposure. He confirms his history of type 1 diabetes and says his sugars have been good. The patient notes that upon waking this morning his blood sugar was 89.     Review of Systems   Constitutional: Positive for fatigue. Negative for fever.   HENT: Negative for sore throat.    Respiratory: Positive for cough.    Gastrointestinal: Negative for diarrhea and vomiting.   All other systems reviewed and are negative.        Allergies:  Cats  Seasonal Allergies    Medications:  Allegra  Flonase  Novolog   Liraglutide     Past Medical History:     Bilateral cataracts  Diabetic ketoacidosis  Morbid obesity  PHUONG  Type 1 diabetes mellitus  Keratoconus of both eyes      Past Surgical History:    Colonoscopy  Cyst removal on neck  Dental extraction  Phacoemulsification clear cornea w standard intraocular lens implant x2     Family History:    Glaucoma    Social History:  The patient presents alone.  He denies any known COVID-19 exposures.      Physical Exam     Patient Vitals for the past 24 hrs:   BP Temp Temp src Pulse Resp SpO2 Height Weight   01/20/22 1235 132/83 98.2  F (36.8  C) Oral 86 18 97 % 1.676 m (5' 6\") 127 kg (280 lb)       Physical Exam    Physical Exam   Constitutional:  Patient is oriented to person, place, and time. They appear well-developed and well-nourished. Sitting comfortably.   HENT:   Mouth/Throat:   Oropharynx is clear and moist.   Eyes:    Conjunctivae normal and EOM are normal. Pupils are equal, round, and reactive to light.   Neck:    Normal range of motion. "   Cardiovascular: Normal rate, regular rhythm and normal heart sounds.  Exam reveals no gallop and no friction rub.  No murmur heard.  Pulmonary/Chest:  Effort normal and breath sounds normal. Patient has no wheezes. Patient has no rales.   Musculoskeletal:  Normal range of motion. Patient exhibits no edema.   Neurological:   Patient is alert and oriented to person, place, and time. Patient has normal strength. No cranial nerve deficit or sensory deficit. GCS 15.  Skin:   Skin is warm and dry. No rash noted. No erythema.   Psychiatric:   Patient has a normal mood and affect. Patient's behavior is normal. Judgment and thought content normal.     Emergency Department Course     Laboratory:  Labs Ordered and Resulted from Time of ED Arrival to Time of ED Departure   INFLUENZA A/B & SARS-COV2 PCR MULTIPLEX - Normal       Result Value    Influenza A PCR Negative      Influenza B PCR Negative      SARS CoV2 PCR Negative        Emergency Department Course:     Reviewed:  I reviewed nursing notes, vitals, past medical history and Care Everywhere    Assessments:  1408 I obtained history and examined the patient as noted above. I explained findings. At this point I feel that the patient is safe for discharge, and the patient agrees.     Disposition:  The patient was discharged to home.     Impression & Plan     Medical Decision Making:  Jose Apodaca is a 46 year old male who presents for evaluation of mild cough and fatigue. Please refer to the HPI for full details. He is vitally stable and afebrile. COVID-19 test was negative in the ED today. He is well appearing and non septic. This is consistent with an upper respiratory tract infection. There is no signs at this point of bacterial infection such as OM, retropharyngeal abscess, epiglottitis, peritonsillar abscess, strep pharyngitis, pneumonia, sinusitis, meningitis, or bacteremia. The patient is well hydrated and otherwise well-appearing. Given clear lungs, no fever,  no hypoxia and no respiratory distress , I do not feel patient needs a CXR at this point as the probability of bacterial pneumonia is very unlikely. I recommended use of Ibuprofen and Tylenol, increased rest/hydration and repeat COVID-19 testing if symptoms worsen. Red flag symptoms, and reasons for return were discussed and understood. All questions were answered prior to discharge. The patient understands and agrees to this plan.    Covid-19  Jose Apodaca was evaluated during a global COVID-19 pandemic, which necessitated consideration that the patient might be at risk for infection with the SARS-CoV-2 virus that causes COVID-19.   Applicable protocols for evaluation were followed during the patient's care.   COVID-19 was considered as part of the patient's evaluation. The plan for testing is:  a test was obtained during this visit.    Diagnosis:    ICD-10-CM    1. Lab test negative for COVID-19 virus  Z20.822    2. Upper respiratory tract infection, unspecified type  J06.9        Discharge Medications:  Discharge Medication List as of 1/20/2022  2:27 PM      START taking these medications    Details   predniSONE (DELTASONE) 20 MG tablet Take 3 tabs by mouth daily x 3 days, then 2 tabs daily x 3 days, then 1 tab daily x 3 days, then 1/2 tab daily x 3 days., Disp-20 tablet, R-0, E-Prescribe             Scribe Disclosure:  I, Ilsa Brumfield, am serving as a scribe at 2:04 PM on 1/20/2022 to document services personally performed by Imelda Scott MD based on my observations and the provider's statements to me.        Imelda Scott MD  01/20/22 8101

## 2022-01-20 NOTE — ED TRIAGE NOTES
Feeling fatigued, loss of taste and intermittent dry cough since yesterday. Here to have Covid test done.

## 2022-01-22 ENCOUNTER — HEALTH MAINTENANCE LETTER (OUTPATIENT)
Age: 47
End: 2022-01-22

## 2022-01-27 ENCOUNTER — APPOINTMENT (OUTPATIENT)
Dept: URGENT CARE | Facility: CLINIC | Age: 47
End: 2022-01-27
Payer: COMMERCIAL

## 2022-02-07 ENCOUNTER — HOSPITAL ENCOUNTER (EMERGENCY)
Facility: CLINIC | Age: 47
Discharge: HOME | End: 2022-02-08
Attending: EMERGENCY MEDICINE | Admitting: EMERGENCY MEDICINE
Payer: COMMERCIAL

## 2022-02-07 DIAGNOSIS — E16.2 HYPOGLYCEMIA: ICD-10-CM

## 2022-02-07 LAB — GLUCOSE BLDC GLUCOMTR-MCNC: 80 MG/DL (ref 70–99)

## 2022-02-07 PROCEDURE — 99284 EMERGENCY DEPT VISIT MOD MDM: CPT | Mod: 25

## 2022-02-07 PROCEDURE — 96361 HYDRATE IV INFUSION ADD-ON: CPT

## 2022-02-07 PROCEDURE — 96374 THER/PROPH/DIAG INJ IV PUSH: CPT

## 2022-02-07 ASSESSMENT — MIFFLIN-ST. JEOR: SCORE: 2136.12

## 2022-02-08 VITALS
HEART RATE: 78 BPM | BODY MASS INDEX: 45.52 KG/M2 | HEIGHT: 67 IN | OXYGEN SATURATION: 99 % | RESPIRATION RATE: 18 BRPM | SYSTOLIC BLOOD PRESSURE: 129 MMHG | WEIGHT: 290 LBS | DIASTOLIC BLOOD PRESSURE: 85 MMHG

## 2022-02-08 LAB
ALBUMIN SERPL-MCNC: 3.5 G/DL (ref 3.4–5)
ALBUMIN UR-MCNC: 50 MG/DL
ALP SERPL-CCNC: 86 U/L (ref 40–150)
ALT SERPL W P-5'-P-CCNC: 43 U/L (ref 0–70)
ANION GAP SERPL CALCULATED.3IONS-SCNC: 6 MMOL/L (ref 3–14)
APPEARANCE UR: CLEAR
AST SERPL W P-5'-P-CCNC: 25 U/L (ref 0–45)
BASOPHILS # BLD AUTO: 0 10E3/UL (ref 0–0.2)
BASOPHILS NFR BLD AUTO: 0 %
BILIRUB SERPL-MCNC: 0.9 MG/DL (ref 0.2–1.3)
BILIRUB UR QL STRIP: NEGATIVE
BUN SERPL-MCNC: 12 MG/DL (ref 7–30)
CALCIUM SERPL-MCNC: 8.7 MG/DL (ref 8.5–10.1)
CHLORIDE BLD-SCNC: 106 MMOL/L (ref 94–109)
CO2 SERPL-SCNC: 27 MMOL/L (ref 20–32)
COLOR UR AUTO: YELLOW
CREAT SERPL-MCNC: 0.69 MG/DL (ref 0.66–1.25)
EOSINOPHIL # BLD AUTO: 0 10E3/UL (ref 0–0.7)
EOSINOPHIL NFR BLD AUTO: 0 %
ERYTHROCYTE [DISTWIDTH] IN BLOOD BY AUTOMATED COUNT: 12.8 % (ref 10–15)
GFR SERPL CREATININE-BSD FRML MDRD: >90 ML/MIN/1.73M2
GLUCOSE BLD-MCNC: 177 MG/DL (ref 70–99)
GLUCOSE BLDC GLUCOMTR-MCNC: 259 MG/DL (ref 70–99)
GLUCOSE UR STRIP-MCNC: NEGATIVE MG/DL
HCT VFR BLD AUTO: 45.7 % (ref 40–53)
HGB BLD-MCNC: 14.5 G/DL (ref 13.3–17.7)
HGB UR QL STRIP: NEGATIVE
IMM GRANULOCYTES # BLD: 0 10E3/UL
IMM GRANULOCYTES NFR BLD: 1 %
KETONES UR STRIP-MCNC: ABNORMAL MG/DL
LEUKOCYTE ESTERASE UR QL STRIP: NEGATIVE
LYMPHOCYTES # BLD AUTO: 0.7 10E3/UL (ref 0.8–5.3)
LYMPHOCYTES NFR BLD AUTO: 11 %
MCH RBC QN AUTO: 27.9 PG (ref 26.5–33)
MCHC RBC AUTO-ENTMCNC: 31.7 G/DL (ref 31.5–36.5)
MCV RBC AUTO: 88 FL (ref 78–100)
MONOCYTES # BLD AUTO: 0.4 10E3/UL (ref 0–1.3)
MONOCYTES NFR BLD AUTO: 7 %
MUCOUS THREADS #/AREA URNS LPF: PRESENT /LPF
NEUTROPHILS # BLD AUTO: 5.2 10E3/UL (ref 1.6–8.3)
NEUTROPHILS NFR BLD AUTO: 81 %
NITRATE UR QL: NEGATIVE
NRBC # BLD AUTO: 0 10E3/UL
NRBC BLD AUTO-RTO: 0 /100
PH UR STRIP: 6.5 [PH] (ref 5–7)
PLATELET # BLD AUTO: 312 10E3/UL (ref 150–450)
POTASSIUM BLD-SCNC: 3.8 MMOL/L (ref 3.4–5.3)
PROT SERPL-MCNC: 6.9 G/DL (ref 6.8–8.8)
RBC # BLD AUTO: 5.19 10E6/UL (ref 4.4–5.9)
RBC URINE: 0 /HPF
SODIUM SERPL-SCNC: 139 MMOL/L (ref 133–144)
SP GR UR STRIP: 1.03 (ref 1–1.03)
UROBILINOGEN UR STRIP-MCNC: 2 MG/DL
WBC # BLD AUTO: 6.3 10E3/UL (ref 4–11)
WBC URINE: 2 /HPF

## 2022-02-08 PROCEDURE — 80053 COMPREHEN METABOLIC PANEL: CPT | Performed by: EMERGENCY MEDICINE

## 2022-02-08 PROCEDURE — 85025 COMPLETE CBC W/AUTO DIFF WBC: CPT | Performed by: EMERGENCY MEDICINE

## 2022-02-08 PROCEDURE — 36415 COLL VENOUS BLD VENIPUNCTURE: CPT | Performed by: EMERGENCY MEDICINE

## 2022-02-08 PROCEDURE — 81001 URINALYSIS AUTO W/SCOPE: CPT | Performed by: EMERGENCY MEDICINE

## 2022-02-08 PROCEDURE — 258N000003 HC RX IP 258 OP 636: Performed by: EMERGENCY MEDICINE

## 2022-02-08 PROCEDURE — 250N000011 HC RX IP 250 OP 636: Performed by: EMERGENCY MEDICINE

## 2022-02-08 RX ORDER — ONDANSETRON 2 MG/ML
4 INJECTION INTRAMUSCULAR; INTRAVENOUS ONCE
Status: COMPLETED | OUTPATIENT
Start: 2022-02-08 | End: 2022-02-08

## 2022-02-08 RX ADMIN — SODIUM CHLORIDE 1000 ML: 9 INJECTION, SOLUTION INTRAVENOUS at 01:07

## 2022-02-08 RX ADMIN — ONDANSETRON 4 MG: 2 INJECTION INTRAMUSCULAR; INTRAVENOUS at 02:29

## 2022-02-08 ASSESSMENT — ENCOUNTER SYMPTOMS
MYALGIAS: 0
TREMORS: 1
FEVER: 0
CONFUSION: 1
ARTHRALGIAS: 0
VOMITING: 1

## 2022-02-08 NOTE — ED PROVIDER NOTES
History   Chief Complaint:  Hypoglycemia       The history is provided by the patient and the EMS personnel.      Jose Apodaca is a diabetic 46 year old male who presents with hypoglycemic symptoms. The patient reports that his Endocrinologist made changes to his pump a couple of weeks ago, since this time he has had several episodes of hypoglycemic symptoms including global tremors. Patient is unsure as to when his symptoms began today. He recalls having a regular day and went to work as per usual. When coming home he was not hungry and skipped dinner. Patient ultimately called 911 de to persisting hypoglycemic symptoms. When medics arrived on scene they observed the patient lying on the ground outside the door of his apartment. He was flailing, hyperventilating, and unresponsive to any questioning. Initial blood sugar was recorded to be 36 but edith to 110 after drinking D10 en route. EMS also gave 4 mg Risperidone. Unfortunately patient's behavior continued to be erratic en route to ED per EMS. He continues to have tremors here but is alert and cooperative. There is vomit on his shirt though patient dos not specifically recall throwing up. EMS observed white powder near openings of patient's nostrils upon initial evaluation, patient repeatedly denied any drug use and stated that he has severe seasonal allergies which he maintains here. No pain or fevers.    Review of Systems   Constitutional: Negative for fever.   Gastrointestinal: Positive for vomiting.   Musculoskeletal: Negative for arthralgias and myalgias.   Neurological: Positive for tremors.   Psychiatric/Behavioral: Positive for confusion (unable to remember certain events).   All other systems reviewed and are negative.    Allergies:  No Known Drug Allergies    Medications:  Fexofenadine  Liraglutide    Past Medical History:     Type 1 diabetes mellitus  Morbid obesity    Past Surgical History:    Colonoscopy  Neck cystectomy  Dental  "extraction  Bilateral phacoemulsification clear cornea with standard intraocular lens implant    Family History:    Brother - glaucoma    Social History:  The patient was accompanied to the ED by EMS.  Drug Use: Negative per patient    Physical Exam     Patient Vitals for the past 24 hrs:   BP Pulse Resp SpO2 Height Weight   02/08/22 0100 -- -- -- 99 % -- --   02/08/22 0000 -- -- -- 97 % -- --   02/07/22 2326 129/85 78 18 96 % 1.689 m (5' 6.5\") 131.5 kg (290 lb)       Physical Exam  Eye:  Pupils are equal, round, and reactive.  Extraocular movements intact.    ENT:  No rhinorrhea.  Moist mucus membranes.  Normal tongue and tonsil.    Cardiac:  Regular rate and rhythm.  No murmurs, gallops, or rubs.    Pulmonary:  Clear to auscultation bilaterally.  No wheezes, rales, or rhonchi.    Abdomen:  Positive bowel sounds.  Abdomen is soft and non-distended, without focal tenderness.    Musculoskeletal:  Normal movement of all extremities without evidence for deficit.    Skin:  Warm and dry without rashes.    Neurologic:  Non-focal exam without asymmetric weakness or numbness.     Psychiatric:  Normal affect with appropriate interaction with examiner. Patient amnestic to this evening's events but otherwise alert and appropriate.    Emergency Department Course     Laboratory:  CBC: WBC 6.3, HGB 14.5,   CMP (collected 0106): Glucose 177 (H), o/w WNL (Creatinine 0.69)  Glucose by meter (collected 2330): 80  Glucose by meter (collected 0255): 259 (H)    UA with microscopic: Trace ketones, Protein albumin 50, Mucus present,  o/w WNL      Emergency Department Course:     Reviewed:  I reviewed nursing notes, vitals, past medical history and Care Everywhere    Assessments:  2317 I obtained history and examined the patient in ED 08 as noted above.   0022 Patient rechecked. He is eating a sandwich.  0148 I rechecked the patient and explained findings. He is feeling better.  0241 Patient rechecked, discussed plan for discharge. " Patient states that he may have difficulty getting into his apartment and does not want to call his neighbors at this time of night.  0333 I rechecked the patient. He feels comfortable with being discharged to home.    Interventions:  0107 NS 1L IV Bolus  0229 Zofran 4 mg IV    Disposition:  The patient was discharged to home.     Impression & Plan     Medical Decision Making:  This delightful 46-year-old type I diabetic presents to us with a hypoglycemia spell.  The patient notes he recently had adjustments made to his insulin pump through his endocrinologist.  He remembers eating lunch but then did not eat any dinner.  He started to feel very shaky and his blood sugar was found to be in the 30s.  He called EMS and was found to be somewhat altered and anxious on his initial assessment, improved after he received dextrose and oral risperidone by the paramedic crew.    The time of my assessment, he appears clinically well.  His vital signs are reassuring.  His head to toe exam is otherwise unremarkable.  Laboratory investigation was pursued which was unremarkable.  He ate a meal here and received some Zofran with complete resolution of all symptoms.  At this juncture, I feel he is safe for discharge home.  He seems to understand how to check his sugars regularly with 10 years of diabetes and he was advised to never skip a meal as his basal rate will continue to run and make him hypoglycemic.  Questions were answered he is comfortable with the plan for discharge, understanding he can return at anytime for worsening of his condition or other emergent concerns.    Diagnosis:    ICD-10-CM    1. Hypoglycemia  E16.2        Scribe Disclosure:  I, Ashly Martinez, am serving as a scribe at 11:15 PM on 2/7/2022 to document services personally performed by Trierweiler, Chad A, MD based on my observations and the provider's statements to me.     This note was completed in part using Dragon voice recognition software. Although  reviewed after completion, some word and grammatical errors may occur.            Trierweiler, Chad A, MD  02/08/22 0359

## 2022-02-08 NOTE — ED NOTES
Bed: ED08  Expected date: 2/7/22  Expected time: 11:10 PM  Means of arrival: Ambulance  Comments:  Mulu M1 46M low glucose;  issues

## 2022-02-08 NOTE — ED TRIAGE NOTES
Called 911 for low blood glucose. Met EMS outside acting erratic. BG 36. They had him drink D10 and sugar went up to 110. Behavior continued to be erratic and out of control. Gave 4mg resperdone per EMS. Alert and oriented X 4 on arrival. Calm and cooperative. BG 80

## 2022-02-09 ENCOUNTER — PATIENT OUTREACH (OUTPATIENT)
Dept: CARE COORDINATION | Facility: CLINIC | Age: 47
End: 2022-02-09
Payer: COMMERCIAL

## 2022-02-09 DIAGNOSIS — Z71.89 OTHER SPECIFIED COUNSELING: ICD-10-CM

## 2022-02-09 NOTE — PROGRESS NOTES
Clinic Care Coordination Contact  [facility]/Voicemail       Clinical Data: Care Coordinator Outreach  Outreach attempted x 1.  Left message on patient's voicemail with call back information and requested return call.  Plan:  Care Coordinator will try to reach patient again in 1-2 business days.    Yara Bah  Care Transitions Assistant  [facility]

## 2022-02-10 NOTE — PROGRESS NOTES
Clinic Care Coordination Contact  [facility]/Voicemail       Clinical Data: Care Coordinator Outreach  Outreach attempted x 2.  Left message on patient's voicemail with call back information and requested return call.  Plan:  Care Coordinator will do no further outreaches at this time.    Yara Bah  Care Transitions Assistant  [facility]

## 2022-02-16 ENCOUNTER — MYC MEDICAL ADVICE (OUTPATIENT)
Dept: EDUCATION SERVICES | Facility: CLINIC | Age: 47
End: 2022-02-16

## 2022-02-16 ENCOUNTER — ALLIED HEALTH/NURSE VISIT (OUTPATIENT)
Dept: EDUCATION SERVICES | Facility: CLINIC | Age: 47
End: 2022-02-16
Attending: INTERNAL MEDICINE
Payer: COMMERCIAL

## 2022-02-16 DIAGNOSIS — E10.9 TYPE 1 DIABETES MELLITUS WITHOUT COMPLICATION (H): ICD-10-CM

## 2022-02-16 DIAGNOSIS — Z96.41 INSULIN PUMP STATUS: ICD-10-CM

## 2022-02-16 PROCEDURE — G0108 DIAB MANAGE TRN  PER INDIV: HCPCS | Mod: AE

## 2022-02-16 NOTE — PATIENT INSTRUCTIONS
"  Changes made to your pump today to give you less basal (background insulin) to prevent the low blood sugar episodes throughout the day and night. Try not to consume extra carbohydrates that you are not entering into the pump.    If you continue to see low blood sugars and are continuing to need to consume extra carbohydrates not entered into the pump then change to your \"Low\" pattern.    Treat low blood sugars with 15 grams of fast acting carbohydrate.     Carry a fast acting carbohydrate with you at all time, especially when you are more active and make sure to have a carbohydrate in your vehicle at all times.     Send a [facility] update in 1 week with how you are doing. Please reach out sooner with question or concerns.   "

## 2022-02-16 NOTE — LETTER
"    2/16/2022         RE: Jose Apodaca  [address]  [address]  [place] [address]        Dear Colleague,    Thank you for referring your patient, Jose Apodaca, to the [facility]. Please see a copy of my visit note below.    Diabetes Self-Management Education & Support    Presents for: Insulin Pump and CGM Review    SUBJECTIVE/OBJECTIVE:  Presents for: Insulin Pump and CGM Review  Accompanied by: Self  Diabetes education in the past 24mo: Yes  Focus of Visit: Insulin Pump, CGM  Type of Pump visit: Pump Review  Type of CGM visit: Personal CGM Follow-up  Diabetes type: Type 1  How confident are you filling out medical forms by yourself:: Extremely  Diabetes management related comments/concerns: alot of severe lows, had to call the paramedics and go to the ER, working on weight loss  Difficulty affording diabetes medication?: No  Difficulty affording diabetes testing supplies?: No  Other concerns:: None  Cultural Influences/Ethnic Background:  Not  or     Diabetes Symptoms & Complications:  Fatigue: No  Neuropathy: No  Polydipsia: No  Polyphagia: No  Polyuria: No  Visual change: No  Slow healing wounds: No  Weight trend: Stable  Complications assessed today?: Yes  Autonomic neuropathy: No  CVA: No  Heart disease: No  Nephropathy: No  Peripheral neuropathy: No  Peripheral Vascular Disease: No  Retinopathy: No    Patient Problem List and Family Medical History reviewed for relevant medical history, current medical status, and diabetes risk factors.    Vitals:  There were no vitals taken for this visit.  Estimated body mass index is 46.11 kg/m  as calculated from the following:    Height as of 2/7/22: 1.689 m (5' 6.5\").    Weight as of 2/7/22: 131.5 kg (290 lb).   Last 3 BP:   BP Readings from Last 3 Encounters:   02/07/22 129/85   01/20/22 132/83   12/15/21 128/85       History   Smoking Status     Never Smoker   Smokeless Tobacco     Never Used       Labs:  Lab " Results   Component Value Date    A1C 6.8 09/08/2021    A1C 7.0 05/05/2021     Lab Results   Component Value Date     02/08/2022     02/08/2022     05/05/2021     Lab Results   Component Value Date    LDL 75 05/05/2021     HDL Cholesterol   Date Value Ref Range Status   05/05/2021 56 >39 mg/dL Final   ]  GFR Estimate   Date Value Ref Range Status   02/08/2022 >90 >60 mL/min/1.73m2 Final     Comment:     Effective December 21, 2021 eGFRcr in adults is calculated using the 2021 CKD-EPI creatinine equation which includes age and gender (Flory et al., NEJ, DOI: 10.1056/UXUWzl6486444)   05/05/2021 >90 >60 mL/min/[1.73_m2] Final     Comment:     Non  GFR Calc  Starting 12/18/2018, serum creatinine based estimated GFR (eGFR) will be   calculated using the Chronic Kidney Disease Epidemiology Collaboration   (CKD-EPI) equation.       GFR Estimate If Black   Date Value Ref Range Status   05/05/2021 >90 >60 mL/min/[1.73_m2] Final     Comment:      GFR Calc  Starting 12/18/2018, serum creatinine based estimated GFR (eGFR) will be   calculated using the Chronic Kidney Disease Epidemiology Collaboration   (CKD-EPI) equation.       Lab Results   Component Value Date    CR 0.69 02/08/2022    CR 0.85 05/05/2021     No results found for: MICROALBUMIN    Healthy Eating:  Healthy Eating Assessed Today: No  Cultural/Hinduism diet restrictions?: No  Meal planning/habits: Carb counting    Being Active:  Being Active Assessed Today: Yes  Exercise:: Yes  Days per week of moderate to strenuous exercise (like a brisk walk): 3  On average, minutes per day of exercise at this level: 20  How intense was your typical exercise? : Moderate (like brisk walking) (also back working in the office, and walking/moving more at work)  Exercise Minutes per Week: 60  Barrier to exercise: Time (works long days ())    Monitoring:  Monitoring Assessed Today: Yes  Did patient bring glucose meter to  appointment? : Yes  Blood Glucose Meter: CGM  Blood glucose trend: Fluctuating    Taking Medications:  Diabetes Medication(s)     Insulin       insulin aspart (NOVOLOG VIAL) 100 UNITS/ML vial    USE WITH INSULIN PUMP, TOTAL DAILY DOSE OF APPROXIMATELY 110 UNITS    Incretin Mimetic Agents (GLP-1 Receptor Agonists)       liraglutide (VICTOZA PEN) 18 MG/3ML solution    ADMINISTER 1.8 MG UNDER THE SKIN DAILY AS DIRECTED          Taking Medication Assessed Today: Yes  Current Treatments: Insulin Pump  Problems taking diabetes medications regularly?: No  Diabetes medication side effects?: No    Problem Solving:  Problem Solving Assessed Today: Yes  Is the patient at risk for hypoglycemia?: Yes  Hypoglycemia Frequency: Daily  Hypoglycemia Treatment: Other food (consuming extra 100grams of carb/day to treat lows)    Hypoglycemia symptoms  Confusion: Yes  Sleepiness: Yes  Speech difficulty: Yes    Hypoglycemia Complications  Blackouts: Yes  Hospitalization: Yes  Nocturnal hypoglycemia: Yes  Required assistance: Yes    Reducing Risks:  Reducing Risks Assessed Today: No    Healthy Coping:  Healthy Coping Assessed Today: Yes  Emotional response to diabetes: Ready to learn, Concern for health and well-being  Stage of change: ACTION (Actively working towards change)  Support resources: Websites  Patient Activation Measure Survey Score:  No flowsheet data found.    Diabetes knowledge and skills assessment:   Patient is knowledgeable in diabetes management concepts related to: Insulin Pump Concepts Carbohydrate counting  Calculating boluses  Hands on practice with basic pump button use    Patient needs further education on the following diabetes management concepts: Insulin Pump Concepts Balancing glucose and insulin  Problem solving with insulin pump therapy (BG monitoring; hypoglycemia signs/symptoms, treatment (glucagon) and prevention; hyperglycemia signs/symptoms, treatment and prevention; ketones, DKA signs/symptoms and  prevention)    Based on learning assessment above, most appropriate setting for further diabetes education would be: Individual setting.      INTERVENTIONS:    REPORTS:                  Insulin Pump Information  Insulin Pump Brand: Tandem  Infusion Set: Tandem    Education provided today on:  AADE Self-Care Behaviors:  Diabetes Pathophysiology  Healthy Eating: consistency in amount, composition, and timing of food intake  Being Active: relationship to blood glucose  Monitoring: log and interpret results and individual blood glucose targets  Taking Medication: action of prescribed medication and when to take medications  Problem Solving: low blood glucose - causes, signs/symptoms, treatment and prevention and carrying a carbohydrate source at all times    Education specific to insulin pump provided today on:   pump button pressing, importance of bolusing before meals, importance of counting carbohydrates accurately, benefits of post-meal blood glucose testing , treating hypoglycemia correctly (Rule of 15), steps to take when blood glucose is about 250 mg/dL.    Opportunities for ongoing education and support in diabetes-self management were discussed.    Pt verbalized understanding of concepts discussed and recommendations provided today.       Education Materials Provided:  Hypoglycemia Signs and Symptoms    ASSESSMENT  Review of  Pump and CGM report. Glucose overall average 205mg/dl,  in target 43%, above target 53% and below target 4% of the time. Average .87 unit(s)/day. Average daily carbohydrates enter = 132g/day.      Patient has been experiencing significant hypoglyemia, he reports consuming an additional 100 grams of carbohydrate per day that he is not entering into the pump due to hypoglycemia, he always consumes carbohydrate at bedtime to reduce the risk of hypoglycemia. He lives alone and experienced a severe hypoglycemic event that his watch recognized and called 911 for him. He states he was in  an out of consciousness and did unlock his door so the paramedics were able to get to him and take him to the ER.  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 He feels confident in his carbohydrate counting.    Patient would benefit from decrease in basal rate by 20% and set a second basal pattern at decrease 10% form basal 1 program since his weight has changed more than 10% from the start of control IQ it would be best to enter current weight for calculation of TDD.      Changes made to pump settings:  basal rate:   12am: 1.4 ---> 1.1  3am: 1.9 ---> 1.5  5am: 1.9 ---> 1.5  8am: 2.6 ---> 2.05  12pm: 2.4 ---> 1.9  4pm: 1.8 ---> 1.45  8pm: 2.2 ---> 1.75    Basal 2 ( Low basal program)  12am: 1.1 -->0.9  3am:  1.5  -->1.3  5am:  1.5 --> 1.3  8am:  2.05 --> 1.95  12pm:  1.9 --> 1.7  4pm:  1.45 --> 1.25  8pm:  1.75 --> 1.55    Control IQ settings:  Weight: 308 ---> 280       Changes made to sensor settings:   Glucose Alerts: Low: 80 ---> 90 mg/dL    PLAN  See Patient Instructions for co-developed, patient-stated behavior change goals.  AVS printed and provided to patient today. See Follow-Up section for recommended follow-up.    Rosalva Flores RN, [facility]    Time Spent: 70 minutes  Encounter Type: Individual    Any diabetes medication dose changes were made via the CDE Protocol and Collaborative Practice Agreement with the patient's referring provider. A copy of this encounter was shared with the provider.

## 2022-02-16 NOTE — PROGRESS NOTES
"Diabetes Self-Management Education & Support    Presents for: Insulin Pump and CGM Review    SUBJECTIVE/OBJECTIVE:  Presents for: Insulin Pump and CGM Review  Accompanied by: Self  Diabetes education in the past 24mo: Yes  Focus of Visit: Insulin Pump, CGM  Type of Pump visit: Pump Review  Type of CGM visit: Personal CGM Follow-up  Diabetes type: Type 1  How confident are you filling out medical forms by yourself:: Extremely  Diabetes management related comments/concerns: alot of severe lows, had to call the paramedics and go to the ER, working on weight loss  Difficulty affording diabetes medication?: No  Difficulty affording diabetes testing supplies?: No  Other concerns:: None  Cultural Influences/Ethnic Background:  Not  or     Diabetes Symptoms & Complications:  Fatigue: No  Neuropathy: No  Polydipsia: No  Polyphagia: No  Polyuria: No  Visual change: No  Slow healing wounds: No  Weight trend: Stable  Complications assessed today?: Yes  Autonomic neuropathy: No  CVA: No  Heart disease: No  Nephropathy: No  Peripheral neuropathy: No  Peripheral Vascular Disease: No  Retinopathy: No    Patient Problem List and Family Medical History reviewed for relevant medical history, current medical status, and diabetes risk factors.    Vitals:  There were no vitals taken for this visit.  Estimated body mass index is 46.11 kg/m  as calculated from the following:    Height as of 2/7/22: 1.689 m (5' 6.5\").    Weight as of 2/7/22: 131.5 kg (290 lb).   Last 3 BP:   BP Readings from Last 3 Encounters:   02/07/22 129/85   01/20/22 132/83   12/15/21 128/85       History   Smoking Status     Never Smoker   Smokeless Tobacco     Never Used       Labs:  Lab Results   Component Value Date    A1C 6.8 09/08/2021    A1C 7.0 05/05/2021     Lab Results   Component Value Date     02/08/2022     02/08/2022     05/05/2021     Lab Results   Component Value Date    LDL 75 05/05/2021     HDL Cholesterol   Date " Value Ref Range Status   05/05/2021 56 >39 mg/dL Final   ]  GFR Estimate   Date Value Ref Range Status   02/08/2022 >90 >60 mL/min/1.73m2 Final     Comment:     Effective December 21, 2021 eGFRcr in adults is calculated using the 2021 CKD-EPI creatinine equation which includes age and gender (Flory mayen al., NE, DOI: 10.1056/BBTRio9394544)   05/05/2021 >90 >60 mL/min/[1.73_m2] Final     Comment:     Non  GFR Calc  Starting 12/18/2018, serum creatinine based estimated GFR (eGFR) will be   calculated using the Chronic Kidney Disease Epidemiology Collaboration   (CKD-EPI) equation.       GFR Estimate If Black   Date Value Ref Range Status   05/05/2021 >90 >60 mL/min/[1.73_m2] Final     Comment:      GFR Calc  Starting 12/18/2018, serum creatinine based estimated GFR (eGFR) will be   calculated using the Chronic Kidney Disease Epidemiology Collaboration   (CKD-EPI) equation.       Lab Results   Component Value Date    CR 0.69 02/08/2022    CR 0.85 05/05/2021     No results found for: MICROALBUMIN    Healthy Eating:  Healthy Eating Assessed Today: No  Cultural/Anabaptist diet restrictions?: No  Meal planning/habits: Carb counting    Being Active:  Being Active Assessed Today: Yes  Exercise:: Yes  Days per week of moderate to strenuous exercise (like a brisk walk): 3  On average, minutes per day of exercise at this level: 20  How intense was your typical exercise? : Moderate (like brisk walking) (also back working in the office, and walking/moving more at work)  Exercise Minutes per Week: 60  Barrier to exercise: Time (works long days ())    Monitoring:  Monitoring Assessed Today: Yes  Did patient bring glucose meter to appointment? : Yes  Blood Glucose Meter: CGM  Blood glucose trend: Fluctuating    Taking Medications:  Diabetes Medication(s)     Insulin       insulin aspart (NOVOLOG VIAL) 100 UNITS/ML vial    USE WITH INSULIN PUMP, TOTAL DAILY DOSE OF APPROXIMATELY 110 UNITS     Incretin Mimetic Agents (GLP-1 Receptor Agonists)       liraglutide (VICTOZA PEN) 18 MG/3ML solution    ADMINISTER 1.8 MG UNDER THE SKIN DAILY AS DIRECTED          Taking Medication Assessed Today: Yes  Current Treatments: Insulin Pump  Problems taking diabetes medications regularly?: No  Diabetes medication side effects?: No    Problem Solving:  Problem Solving Assessed Today: Yes  Is the patient at risk for hypoglycemia?: Yes  Hypoglycemia Frequency: Daily  Hypoglycemia Treatment: Other food (consuming extra 100grams of carb/day to treat lows)    Hypoglycemia symptoms  Confusion: Yes  Sleepiness: Yes  Speech difficulty: Yes    Hypoglycemia Complications  Blackouts: Yes  Hospitalization: Yes  Nocturnal hypoglycemia: Yes  Required assistance: Yes    Reducing Risks:  Reducing Risks Assessed Today: No    Healthy Coping:  Healthy Coping Assessed Today: Yes  Emotional response to diabetes: Ready to learn, Concern for health and well-being  Stage of change: ACTION (Actively working towards change)  Support resources: Websites  Patient Activation Measure Survey Score:  No flowsheet data found.    Diabetes knowledge and skills assessment:   Patient is knowledgeable in diabetes management concepts related to: Insulin Pump Concepts Carbohydrate counting  Calculating boluses  Hands on practice with basic pump button use    Patient needs further education on the following diabetes management concepts: Insulin Pump Concepts Balancing glucose and insulin  Problem solving with insulin pump therapy (BG monitoring; hypoglycemia signs/symptoms, treatment (glucagon) and prevention; hyperglycemia signs/symptoms, treatment and prevention; ketones, DKA signs/symptoms and prevention)    Based on learning assessment above, most appropriate setting for further diabetes education would be: Individual setting.      INTERVENTIONS:    REPORTS:                  Insulin Pump Information  Insulin Pump Brand: Tandem  Infusion Set:  Tandem    Education provided today on:  AADE Self-Care Behaviors:  Diabetes Pathophysiology  Healthy Eating: consistency in amount, composition, and timing of food intake  Being Active: relationship to blood glucose  Monitoring: log and interpret results and individual blood glucose targets  Taking Medication: action of prescribed medication and when to take medications  Problem Solving: low blood glucose - causes, signs/symptoms, treatment and prevention and carrying a carbohydrate source at all times    Education specific to insulin pump provided today on:   pump button pressing, importance of bolusing before meals, importance of counting carbohydrates accurately, benefits of post-meal blood glucose testing , treating hypoglycemia correctly (Rule of 15), steps to take when blood glucose is about 250 mg/dL.    Opportunities for ongoing education and support in diabetes-self management were discussed.    Pt verbalized understanding of concepts discussed and recommendations provided today.       Education Materials Provided:  Hypoglycemia Signs and Symptoms    ASSESSMENT  Review of  Pump and CGM report. Glucose overall average 205mg/dl,  in target 43%, above target 53% and below target 4% of the time. Average .87 unit(s)/day. Average daily carbohydrates enter = 132g/day.      Patient has been experiencing significant hypoglyemia, he reports consuming an additional 100 grams of carbohydrate per day that he is not entering into the pump due to hypoglycemia, he always consumes carbohydrate at bedtime to reduce the risk of hypoglycemia. He lives alone and experienced a severe hypoglycemic event that his watch recognized and called 911 for him. He states he was in an out of consciousness and did unlock his door so the paramedics were able to get to him and take him to the ER.  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 He feels confident in his carbohydrate counting.    Patient would benefit from decrease in basal rate by 20% and set a second basal pattern at decrease 10% form basal 1 program since his weight has changed more than 10% from the start of control IQ it would be best to enter current weight for calculation of TDD.      Changes made to pump settings:  basal rate:   12am: 1.4 ---> 1.1  3am: 1.9 ---> 1.5  5am: 1.9 ---> 1.5  8am: 2.6 ---> 2.05  12pm: 2.4 ---> 1.9  4pm: 1.8 ---> 1.45  8pm: 2.2 ---> 1.75    Basal 2 ( Low basal program)  12am: 1.1 -->0.9  3am:  1.5  -->1.3  5am:  1.5 --> 1.3  8am:  2.05 --> 1.95  12pm:  1.9 --> 1.7  4pm:  1.45 --> 1.25  8pm:  1.75 --> 1.55    Control IQ settings:  Weight: 308 ---> 280       Changes made to sensor settings:   Glucose Alerts: Low: 80 ---> 90 mg/dL    PLAN  See Patient Instructions for co-developed, patient-stated behavior change goals.  AVS printed and provided to patient today. See Follow-Up section for recommended follow-up.    Rosalva Flores RN, [facility]    Time Spent: 70 minutes  Encounter Type: Individual    Any diabetes medication dose changes were made via the CDE Protocol and Collaborative Practice Agreement with the patient's referring provider. A copy of this encounter was shared with the provider.

## 2022-02-16 NOTE — TELEPHONE ENCOUNTER
Medium [place]J at [facility] 45 grams  Hash [place] at [facility] 18 grams  Total of 63 grams of carbs and he bolused for 59 grams of carbs    Responded to pt's MyChart message.    Janet Gomez RN, [facility]

## 2022-02-17 RX ORDER — PROCHLORPERAZINE 25 MG/1
SUPPOSITORY RECTAL
Qty: 3 EACH | Refills: 11 | Status: SHIPPED | OUTPATIENT
Start: 2022-02-17 | End: 2023-01-26

## 2022-03-08 ENCOUNTER — LAB (OUTPATIENT)
Dept: LAB | Facility: CLINIC | Age: 47
End: 2022-03-08
Payer: COMMERCIAL

## 2022-03-08 DIAGNOSIS — E13.9 DIABETES MELLITUS TYPE 1.5, MANAGED AS TYPE 2 (H): ICD-10-CM

## 2022-03-08 LAB — HBA1C MFR BLD: 7.2 % (ref 0–5.6)

## 2022-03-08 PROCEDURE — 84460 ALANINE AMINO (ALT) (SGPT): CPT

## 2022-03-08 PROCEDURE — 83036 HEMOGLOBIN GLYCOSYLATED A1C: CPT

## 2022-03-08 PROCEDURE — 36415 COLL VENOUS BLD VENIPUNCTURE: CPT

## 2022-03-08 PROCEDURE — 80048 BASIC METABOLIC PNL TOTAL CA: CPT

## 2022-03-09 ENCOUNTER — VIRTUAL VISIT (OUTPATIENT)
Dept: ENDOCRINOLOGY | Facility: CLINIC | Age: 47
End: 2022-03-09
Payer: COMMERCIAL

## 2022-03-09 DIAGNOSIS — E88.819 INSULIN RESISTANCE: ICD-10-CM

## 2022-03-09 DIAGNOSIS — E10.9 TYPE 1 DIABETES MELLITUS WITHOUT COMPLICATION (H): Primary | ICD-10-CM

## 2022-03-09 DIAGNOSIS — Z96.41 INSULIN PUMP STATUS: ICD-10-CM

## 2022-03-09 DIAGNOSIS — E66.01 MORBID OBESITY (H): ICD-10-CM

## 2022-03-09 LAB
ALT SERPL W P-5'-P-CCNC: 37 U/L (ref 0–70)
ANION GAP SERPL CALCULATED.3IONS-SCNC: 6 MMOL/L (ref 3–14)
BUN SERPL-MCNC: 12 MG/DL (ref 7–30)
CALCIUM SERPL-MCNC: 9.4 MG/DL (ref 8.5–10.1)
CHLORIDE BLD-SCNC: 106 MMOL/L (ref 94–109)
CO2 SERPL-SCNC: 27 MMOL/L (ref 20–32)
CREAT SERPL-MCNC: 0.88 MG/DL (ref 0.66–1.25)
GFR SERPL CREATININE-BSD FRML MDRD: >90 ML/MIN/1.73M2
GLUCOSE BLD-MCNC: 152 MG/DL (ref 70–99)
POTASSIUM BLD-SCNC: 4.4 MMOL/L (ref 3.4–5.3)
SODIUM SERPL-SCNC: 139 MMOL/L (ref 133–144)

## 2022-03-09 PROCEDURE — 95251 CONT GLUC MNTR ANALYSIS I&R: CPT | Performed by: INTERNAL MEDICINE

## 2022-03-09 PROCEDURE — 99213 OFFICE O/P EST LOW 20 MIN: CPT | Mod: 95 | Performed by: INTERNAL MEDICINE

## 2022-03-09 NOTE — PROGRESS NOTES
Patient is being evaluated via a billable video visit.      How would you like to obtain your AVS? Reviewed verbally  If the video visit is dropped, the invitation should be resent by cell phone  Will anyone else be joining your video visit? no      Video Start Time:  1:05 pm    Video-Visit Details    Type of service:  Video Visit    Video End Time:  1:25 pm    Originating Location (pt. Location): home    Distant Location (provider location):  [facility] SPECIALTY [facility]/[place]    [facility] Visit:  [facility]          Recent issues:  Diabetes follow-up evaluation  Continues with Tandem pump and DexcomG6 sensor use, also the Victoza daily  Had issues with recurrent hypoglycemia levels, trips to hospital ED.  Met with NATALIE SIMPSONE (KANDI) 2/26/22 2011. Diagnosis of diabetes mellitus while living in [place]  Acute symptoms increased thirst, frequent urination, fatigue, weight loss (265 to 203#/30 days)  Hospitalized and diagnosis of DKA  Initial treatment with Apidra and Lantus insulin  Met with an endocrinologist Dr. Lina Fish  ~6 mo later, switched to [place] Ping pump     2012. Continued using this pump for 1 year, then moved to [facility]  Saw Dr. Beena Johnson/Endocrinologist in [place]  Switched to Tandem insulin pump    Previous [facility] labs include:       Subsequent move back to [place]  Recalls taking Victoza medication along with pump management, success with significant weight loss  Moved to [place]  9/2019. Moved to [place], [place]     Medical evaluations with Dr. 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 Started Tandem insulin pump     Previous [facility] labs include:          2/19/20. Med evaluation with Dr. ERIKA Milligan/[facility]  No lab tests ordered  Started DexcomG6 CGMS sensor  ~4/2020. Upgrade to the Control IQ pump       5/26/20. Initial diabetes evaluation with me, [facility]  VV  Continued use of the Tandem TSlim pump with the DexcomG6  ~6/2020. Restarted (off label) Victoza  10/2020. Insurance coverage for Victoza, used 1.8 mg daily  11/2021. Ran out of Victoza    Using Tandem TSlim pump with DexcomG6 sensor              Novolog in pump    Current Tandem pump settings:       Recent pump and sensor data:             Recent DexcomG6 data:        Blood glucose (BG) meter:  Accu-check               Tests infrequently  Fam Hx DM:    MA, Mcousin, MGM, PGM     Previous [facility] labs include:          Lab Results   Component Value Date    A1C 7.2 (H) 03/08/2022     02/08/2022    POTASSIUM 3.8 02/08/2022    CHLORIDE 106 02/08/2022    CO2 27 02/08/2022    ANIONGAP 6 02/08/2022     (H) 02/08/2022    BUN 12 02/08/2022    CR 0.69 02/08/2022    GFRESTIMATED >90 02/08/2022    GFRESTBLACK >90 05/05/2021    MICK 8.7 02/08/2022    CPEPT <0.1 (L) 05/28/2020    CHOL 138 05/05/2021    TRIG 37 05/05/2021    HDL 56 05/05/2021    LDL 75 05/05/2021    NHDL 82 05/05/2021    UCRR 164 05/05/2021    MICROL 10 05/05/2021    UMALCR 6.34 05/05/2021     Lab Results   Component Value Date    TSH 1.09 05/05/2021     Last eye exam 10/2020, no DR noted  DM Complications: none        , lives in [facility], 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 ERIKA Milligan/[facility] for general medicine evaluations.       PMH/PSH:  Past Medical History:   Diagnosis Date     Cataracts, bilateral      DKA (diabetic ketoacidoses)      Morbid obesity (H)      PHUONG (obstructive sleep apnea)      T1DM (type 1 diabetes mellitus) (H)      Past Surgical History:   Procedure Laterality Date     COLONOSCOPY       ENT SURGERY  10 years ago    cyst on neck     EXTRACTION(S) DENTAL       NECK SURGERY  2010    to remove a cyst     PHACOEMULSIFICATION CLEAR CORNEA WITH STANDARD INTRAOCULAR LENS IMPLANT Left 9/21/2020    Procedure: COMPLEX PHACOEMULSIFICATION, CATARACT, WITH INTRAOCULAR LENS IMPLANT;  Surgeon: Andre Jackson  MD Jennifer;  [facility]     PHACOEMULSIFICATION CLEAR CORNEA WITH STANDARD INTRAOCULAR LENS IMPLANT Right 9/28/2020    Procedure: PHACOEMULSIFICATION, CATARACT, WITH INTRAOCULAR LENS IMPLANT;  Surgeon: Andre Jackson MD;  Location: [facility] OR       Family Hx:  Family History   Problem Relation Age of Onset     Diabetes Maternal Grandmother      Other Cancer Maternal Grandmother         Lung Cancer     Diabetes Paternal Grandmother      Glaucoma Brother      Macular Degeneration No family hx of          Social Hx:  Social History     Socioeconomic History     Marital status:      Spouse name: Not on file     Number of children: Not on file     Years of education: Not on file     Highest education level: Not on file   Occupational History     Not on file   Tobacco Use     Smoking status: Never Smoker     Smokeless tobacco: Never Used   Substance and Sexual Activity     Alcohol use: Never     Drug use: Never     Sexual activity: Not on file   Other Topics Concern     Parent/sibling w/ CABG, MI or angioplasty before 65F 55M? Not Asked   Social History Narrative     Not on file     Social Determinants of Health     Financial Resource Strain: Not on file   Food Insecurity: Not on file   Transportation Needs: Not on file   Physical Activity: Not on file   Stress: Not on file   Social Connections: Not on file   Intimate Partner Violence: Not on file   Housing Stability: Not on file          MEDICATIONS:  has a current medication list which includes the following prescription(s): dexcom g6 transmitter, blood glucose monitoring, clobetasol, dexcom g6 , dexcom g6 sensor, fexofenadine, fluticasone, insulin aspart, b-d u/f, insulin pump, victoza pen, prednisone, sildenafil, and triamcinolone.       ROS: 10 point ROS neg other than the symptoms noted above in the HPI.     GENERAL: mild fatigue, wt stable; denies fevers, chills, night sweats.   HEENT: no dysphagia, odonophagia, diplopia, neck pain  THYROID:   no apparent hyper or hypothyroid symptoms  CV: no chest pain, pressure, palpitations  LUNGS: no SOB, AGARWAL, cough, wheezing   ABDOMEN: no diarrhea, constipation, abdominal pain  EXTREMITIES: no rashes, ulcers, edema  NEUROLOGY: right leg tingling; no headaches, denies changes in vision, extremitiy numbness   MSK: no muscle aches or pains, weakness  SKIN: no rashes or lesions  : decreased erection function; occasional nocturia  PSYCH:  stable mood, no significant anxiety or depression  ENDOCRINE: no heat or cold intolerance    Physical Exam (visual exam)  VS:  no vital signs taken for video visit  CONSTITUTIONAL: healthy, alert and NAD, well dressed, answering questions appropriately  ENT: no nose swelling or nasal discharge, mouth redness or gum changes.  EYES: eyes grossly normal to inspection, conjunctivae and sclerae normal, no exophthalmos or proptosis  THYROID:  no apparent nodules or goiter  LUNGS: no audible wheeze, cough or visible cyanosis, no visible retractions or increased work of breathing  ABDOMEN: abdomen not evaluated  EXTREMITIES: no hand tremors, limited exam  NEUROLOGY: CN grossly intact, mentation intact and speech normal   SKIN:  no apparent skin lesions, rash, or edema with visualized skin appearance  PSYCH: mentation appears normal, affect normal/bright, judgement and insight intact,   normal speech and appearance well groomed       LABS:     All pertinent notes, labs, and images personally reviewed by me.     A/P:  Encounter Diagnoses   Name Primary?     Type 1 diabetes mellitus without complication (H) Yes     Insulin pump status      Insulin resistance      Morbid obesity (H)        Comments:  Reviewed health history and diabetes issues.  Health history and lab testing indicates T1DM.  I suspect the Victoza med use enhanced his glucose control, contributed to the hypoglycemia events prior to pump setting changes  Reviewed and interpreted tests that I previously ordered.   Ordered  appropriate tests for the endocrinology disease management.    Management options discussed and implemented after shared medical decision making with the patient.  T1DM problem is chronic-stable    Plan:  Reviewed the overall T1DM management and insulin pump use.  Discussed optimal BG testing to assess glucose trends.  We reviewed insulin pump settings, basal rate and bolus dosing  Use of automated pump bolus dosing for meal/snack carb & correction dosing  Reviewed recent Tandem pump and DexcomG6 CGM glucose trend data, in detail     Recommend:  Continue the Tandem insulin pump, DexcomG6 sensor, and diabetes management plan.  Pump setting changes:   Basals 3a and 5a 1.5 to 1.4, 12p 1.9 to 1.8 unit(s)/hr    Continue the current Victoza 1.8 mg subcutaneous daily dose plan  No labs ordered at this time  Resume walking exercise routine... helps his insulin sensitivity  Monitor for symptom changes, including GI symptoms  Keep focus on diet, exercise, and weight management  Would benefit from dietician evaluation, also commercial weight loss program or bariatric surgery  Consider use of antihypertensive medication (ACEI or ARB) and lipid (statin) medications  Arrange annual dilated eye exam, fasting lipid panel testing.     Addressed patient's questions today.     There are no Patient Instructions on file for this visit.    Future labs ordered today:   Orders Placed This Encounter   Procedures     GLUCOSE MONITOR, 72 HOUR, PHYS INTERP     Radiology/Consults ordered today: None    Total time spent in with the patient evaluation:  20 min  Additional time spent reviewing pertinent lab tests and chart notes, and documentation:  5 min    Follow-up:  7/2022, office appt if possible    DAVIN Heaton MD, MS  Endocrinology  [facility]    CC:  ERIKA Milligan

## 2022-03-09 NOTE — LETTER
3/9/2022         RE: Jose Apodaca  [address]  [address]  [facility] [address]        Dear Colleague,    Thank you for referring your patient, Jose Apodaca, to the [facility] SPECIALTY [facility]. Please see a copy of my visit note below.    Patient is being evaluated via a billable video visit.      How would you like to obtain your AVS? Reviewed verbally  If the video visit is dropped, the invitation should be resent by cell phone  Will anyone else be joining your video visit? no      Video Start Time:  1:05 pm    Video-Visit Details    Type of service:  Video Visit    Video End Time:  1:25 pm    Originating Location (pt. Location): home    Distant Location (provider location):  [facility]/home    Platform used for Video Visit:  [employer]          Recent issues:  Diabetes follow-up evaluation  Continues with Tandem pump and DexcomG6 sensor use, also the Victoza daily  Had issues with recurrent hypoglycemia levels, trips to hospital ED.  Met with ROSLYN LUND () 2/26/22 2011. Diagnosis of diabetes mellitus while living in [place]  Acute symptoms increased thirst, frequent urination, fatigue, weight loss (265 to 203#/30 days)  Hospitalized and diagnosis of DKA  Initial treatment with Apidra and Lantus insulin  Met with an endocrinologist Dr. Lina Fish  ~6 mo later, switched to [place] Ping pump     2012. Continued using this pump for 1 year, then moved to [facility]  Saw Dr. Beena Johnson/Endocrinologist in [place]  Switched to Tandem insulin pump    Previous [facility] labs include:       Subsequent move back to [place]  Recalls taking Victoza medication along with pump management, success with significant weight loss  Moved to [place]  9/2019. Moved to [place], [place]     Medical evaluations with Dr. 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 Started Tandem insulin  pump     Previous [facility] labs include:          2/19/20. Med evaluation with Dr. ERIKA Milligan/[facility]  No lab tests ordered  Started DexcomG6 CGMS sensor  ~4/2020. Upgrade to the Control IQ pump       5/26/20. Initial diabetes evaluation with me, [facility]  Continued use of the Tandem TSlim pump with the DexcomG6  ~6/2020. Restarted (off label) Victoza  10/2020. Insurance coverage for Victoza, used 1.8 mg daily  11/2021. Ran out of Victoza    Using Tandem TSlim pump with DexcomG6 sensor              Novolog in pump    Current Tandem pump settings:       Recent pump and sensor data:             Recent DexcomG6 data:        Blood glucose (BG) meter:  Accu-check               Tests infrequently  Fam Hx DM:    Clement GALVEZ, MGM, PGM     Previous  labs include:          Lab Results   Component Value Date    A1C 7.2 (H) 03/08/2022     02/08/2022    POTASSIUM 3.8 02/08/2022    CHLORIDE 106 02/08/2022    CO2 27 02/08/2022    ANIONGAP 6 02/08/2022     (H) 02/08/2022    BUN 12 02/08/2022    CR 0.69 02/08/2022    GFRESTIMATED >90 02/08/2022    GFRESTBLACK >90 05/05/2021    MICK 8.7 02/08/2022    CPEPT <0.1 (L) 05/28/2020    CHOL 138 05/05/2021    TRIG 37 05/05/2021    HDL 56 05/05/2021    LDL 75 05/05/2021    NHDL 82 05/05/2021    UCRR 164 05/05/2021    MICROL 10 05/05/2021    UMALCR 6.34 05/05/2021     Lab Results   Component Value Date    TSH 1.09 05/05/2021     Last eye exam 10/2020, no DR noted  DM Complications: none        , lives in [facility], works with computer networking  Sees Dr. ERIKA Milligan/[facility] for general medicine evaluations.       PMH/PSH:  Past Medical History:   Diagnosis Date     Cataracts, bilateral      DKA (diabetic ketoacidoses)      Morbid obesity (H)      PHUONG (obstructive sleep apnea)      T1DM (type 1 diabetes mellitus) (H)      Past Surgical History:   Procedure Laterality Date     COLONOSCOPY       ENT SURGERY  10 years ago    cyst on  neck     EXTRACTION(S) DENTAL       NECK SURGERY  2010    to remove a cyst     PHACOEMULSIFICATION CLEAR CORNEA WITH STANDARD INTRAOCULAR LENS IMPLANT Left 9/21/2020    Procedure: COMPLEX PHACOEMULSIFICATION, CATARACT, WITH INTRAOCULAR LENS IMPLANT;  Surgeon: Andre Jackson MD;  [facility]     PHACOEMULSIFICATION CLEAR CORNEA WITH STANDARD INTRAOCULAR LENS IMPLANT Right 9/28/2020    Procedure: PHACOEMULSIFICATION, CATARACT, WITH INTRAOCULAR LENS IMPLANT;  Surgeon: Andre Jackson MD;  [facility] OR       Family Hx:  Family History   Problem Relation Age of Onset     Diabetes Maternal Grandmother      Other Cancer Maternal Grandmother         Lung Cancer     Diabetes Paternal Grandmother      Glaucoma Brother      Macular Degeneration No family hx of          Social Hx:  Social History     Socioeconomic History     Marital status:      Spouse name: Not on file     Number of children: Not on file     Years of education: Not on file     Highest education level: Not on file   Occupational History     Not on file   Tobacco Use     Smoking status: Never Smoker     Smokeless tobacco: Never Used   Substance and Sexual Activity     Alcohol use: Never     Drug use: Never     Sexual activity: Not on file   Other Topics Concern     Parent/sibling w/ CABG, MI or angioplasty before 65F 55M? Not Asked   Social History Narrative     Not on file     Social Determinants of Health     Financial Resource Strain: Not on file   Food Insecurity: Not on file   Transportation Needs: Not on file   Physical Activity: Not on file   Stress: Not on file   Social Connections: Not on file   Intimate Partner Violence: Not on file   Housing Stability: Not on file          MEDICATIONS:  has a current medication list which includes the following prescription(s): dexcom g6 transmitter, blood glucose monitoring, clobetasol, dexcom g6 , dexcom g6 sensor, fexofenadine, fluticasone, insulin aspart, b-d u/f, insulin pump,  victoza pen, prednisone, sildenafil, and triamcinolone.       ROS: 10 point ROS neg other than the symptoms noted above in the HPI.     GENERAL: mild fatigue, wt stable; denies fevers, chills, night sweats.   HEENT: no dysphagia, odonophagia, diplopia, neck pain  THYROID:  no apparent hyper or hypothyroid symptoms  CV: no chest pain, pressure, palpitations  LUNGS: no SOB, AGARWAL, cough, wheezing   ABDOMEN: no diarrhea, constipation, abdominal pain  EXTREMITIES: no rashes, ulcers, edema  NEUROLOGY: right leg tingling; no headaches, denies changes in vision, extremitiy numbness   MSK: no muscle aches or pains, weakness  SKIN: no rashes or lesions  : decreased erection function; occasional nocturia  PSYCH:  stable mood, no significant anxiety or depression  ENDOCRINE: no heat or cold intolerance    Physical Exam (visual exam)  VS:  no vital signs taken for video visit  CONSTITUTIONAL: healthy, alert and NAD, well dressed, answering questions appropriately  ENT: no nose swelling or nasal discharge, mouth redness or gum changes.  EYES: eyes grossly normal to inspection, conjunctivae and sclerae normal, no exophthalmos or proptosis  THYROID:  no apparent nodules or goiter  LUNGS: no audible wheeze, cough or visible cyanosis, no visible retractions or increased work of breathing  ABDOMEN: abdomen not evaluated  EXTREMITIES: no hand tremors, limited exam  NEUROLOGY: CN grossly intact, mentation intact and speech normal   SKIN:  no apparent skin lesions, rash, or edema with visualized skin appearance  PSYCH: mentation appears normal, affect normal/bright, judgement and insight intact,   normal speech and appearance well groomed       LABS:     All pertinent notes, labs, and images personally reviewed by me.     A/P:  Encounter Diagnoses   Name Primary?     Type 1 diabetes mellitus without complication (H) Yes     Insulin pump status      Insulin resistance      Morbid obesity (H)        Comments:  Reviewed health history and  diabetes issues.  Health history and lab testing indicates T1DM.  I suspect the Victoza med use enhanced his glucose control, contributed to the hypoglycemia events prior to pump setting changes  Reviewed and interpreted tests that I previously ordered.   Ordered appropriate tests for the endocrinology disease management.    Management options discussed and implemented after shared medical decision making with the patient.  T1DM problem is chronic-stable    Plan:  Reviewed the overall T1DM management and insulin pump use.  Discussed optimal BG testing to assess glucose trends.  We reviewed insulin pump settings, basal rate and bolus dosing  Use of automated pump bolus dosing for meal/snack carb & correction dosing  Reviewed recent Tandem pump and DexcomG6 CGM glucose trend data, in detail     Recommend:  Continue the Tandem insulin pump, DexcomG6 sensor, and diabetes management plan.  Pump setting changes:   Basals 3a and 5a 1.5 to 1.4, 12p 1.9 to 1.8 unit(s)/hr    Continue the current Victoza 1.8 mg subcutaneous daily dose plan  No labs ordered at this time  Resume walking exercise routine... helps his insulin sensitivity  Monitor for symptom changes, including GI symptoms  Keep focus on diet, exercise, and weight management  Would benefit from dietician evaluation, also commercial weight loss program or bariatric surgery  Consider use of antihypertensive medication (ACEI or ARB) and lipid (statin) medications  Arrange annual dilated eye exam, fasting lipid panel testing.     Addressed patient's questions today.     There are no Patient Instructions on file for this visit.    Future labs ordered today:   Orders Placed This Encounter   Procedures     GLUCOSE MONITOR, 72 HOUR, PHYS INTERP     Radiology/Consults ordered today: None    Total time spent in with the patient evaluation:  20 min  Additional time spent reviewing pertinent lab tests and chart notes, and documentation:  5 min    Follow-up:  7/2022, office  appt if possible    DAVIN Heaton MD, MS  Endocrinology  [facility]    CC:  ERIKA Milligan                        Again, thank you for allowing me to participate in the care of your patient.        Sincerely,        Ryley Heaton MD

## 2022-03-24 ENCOUNTER — MYC MEDICAL ADVICE (OUTPATIENT)
Dept: FAMILY MEDICINE | Facility: CLINIC | Age: 47
End: 2022-03-24
Payer: COMMERCIAL

## 2022-03-24 DIAGNOSIS — E10.9 TYPE 1 DIABETES MELLITUS WITHOUT COMPLICATION (H): Primary | ICD-10-CM

## 2022-03-24 DIAGNOSIS — E10.9 TYPE 1 DIABETES MELLITUS WITHOUT COMPLICATION (H): ICD-10-CM

## 2022-03-24 RX ORDER — LANCETS
EACH MISCELLANEOUS
Qty: 400 EACH | Refills: 11 | Status: SHIPPED | OUTPATIENT
Start: 2022-03-24

## 2022-03-25 NOTE — TELEPHONE ENCOUNTER
Routing refill request to provider for review/approval because:  Drug not on the [facility] refill protocol .     Aisha Cordero RN  [facility] Triage

## 2022-04-20 DIAGNOSIS — J30.2 SEASONAL ALLERGIC RHINITIS, UNSPECIFIED TRIGGER: ICD-10-CM

## 2022-04-22 RX ORDER — FLUTICASONE PROPIONATE 50 MCG
SPRAY, SUSPENSION (ML) NASAL
Qty: 16 G | Refills: 3 | Status: SHIPPED | OUTPATIENT
Start: 2022-04-22 | End: 2022-08-03

## 2022-04-22 NOTE — TELEPHONE ENCOUNTER
Prescription approved per [facility], [facility] or [facility] refill protocol.  Ghazal LOU - Registered Nurse  [facility]  Acute and Diagnostic Services

## 2022-05-09 ENCOUNTER — VIRTUAL VISIT (OUTPATIENT)
Dept: UROLOGY | Facility: CLINIC | Age: 47
End: 2022-05-09
Attending: INTERNAL MEDICINE
Payer: COMMERCIAL

## 2022-05-09 VITALS — HEIGHT: 67 IN | BODY MASS INDEX: 44.73 KG/M2 | WEIGHT: 285 LBS

## 2022-05-09 DIAGNOSIS — Z30.2 ENCOUNTER FOR VASECTOMY: ICD-10-CM

## 2022-05-09 PROCEDURE — 99203 OFFICE O/P NEW LOW 30 MIN: CPT | Mod: 95 | Performed by: UROLOGY

## 2022-05-09 ASSESSMENT — PAIN SCALES - GENERAL: PAINLEVEL: NO PAIN (0)

## 2022-05-09 NOTE — PROGRESS NOTES
CARMEN Ochoa is a 46 year old who is being evaluated via a billable video visit.      How would you like to obtain your AVS? Carmen  If the video visit is dropped, the invitation should be resent by: Text to cell phone: .  Will anyone else be joining your video visit? No         VASECTOMY CONSULTATION NOTE  [facility]  [phone]  DATE OF VISIT: 5/9/2022    PATIENT NAME: Jose Apodaca    YOB: 1975      REASON FOR CONSULTATION: Mr. Jose Apodaca is a 46 year old year old gentleman who is being seen as a virtual visit in the urology clinic today requesting a vasectomy. He has 5 children and he wishes to have a vasectomy for birth control. He has no prior urologic history and has had no prior surgery on the testicles. He has no symptoms in the testicles.    PAST MEDICAL HISTORY:   Past Medical History:   Diagnosis Date     Cataracts, bilateral      DKA (diabetic ketoacidoses)      Morbid obesity (H)      PHUONG (obstructive sleep apnea)      T1DM (type 1 diabetes mellitus) (H)        PAST SURGICAL HISTORY:   Past Surgical History:   Procedure Laterality Date     COLONOSCOPY       ENT SURGERY  10 years ago    cyst on neck     EXTRACTION(S) DENTAL       NECK SURGERY  2010    to remove a cyst     PHACOEMULSIFICATION CLEAR CORNEA WITH STANDARD INTRAOCULAR LENS IMPLANT Left 09/21/2020    Procedure: COMPLEX PHACOEMULSIFICATION, CATARACT, WITH INTRAOCULAR LENS IMPLANT;  Surgeon: Andre Jackson MD;  Location:  OR     PHACOEMULSIFICATION CLEAR CORNEA WITH STANDARD INTRAOCULAR LENS IMPLANT Right 09/28/2020    Procedure: PHACOEMULSIFICATION, CATARACT, WITH INTRAOCULAR LENS IMPLANT;  Surgeon: Andre Jackson MD;  Location:  OR       MEDICATIONS:   Current Outpatient Medications:      blood glucose (NO BRAND SPECIFIED) test strip, Use to test blood sugar 4 times daily or as directed., Disp: 150 strip, Rfl: 6     blood glucose monitoring (SOFTCLIX) lancets, TAKE AS DIRECTED  FOUR TIMES DAILY, Disp: 400 each, Rfl: 11     clobetasol (TEMOVATE) 0.05 % external cream, Apply topically 2 times daily, Disp: 15 g, Rfl: 1     Continuous Blood Gluc  (DEXCOM G6 ) TESHA, Use to read blood sugars as per 's instructions., Disp: 1 each, Rfl: 0     Continuous Blood Gluc Sensor (DEXCOM G6 SENSOR) MISC, CHANGE EVERY 10 DAYS., Disp: 3 each, Rfl: 11     Continuous Blood Gluc Transmit (DEXCOM G6 TRANSMITTER) MISC, CHANGE EVERY 3 MONTHS., Disp: 1 each, Rfl: 0     fexofenadine (ALLEGRA) 180 MG tablet, Take 1 tablet (180 mg) by mouth daily, Disp: 90 tablet, Rfl: 3     fluticasone (FLONASE) 50 MCG/ACT nasal spray, SHAKE LIQUID AND USE 1 SPRAY IN EACH NOSTRIL DAILY, Disp: 16 g, Rfl: 3     insulin aspart (NOVOLOG VIAL) 100 UNITS/ML vial, USE WITH INSULIN PUMP, TOTAL DAILY DOSE OF APPROXIMATELY 110 UNITS, Disp: 100 mL, Rfl: 3     insulin pen needle (B-D U/F) 31G X 5 MM miscellaneous, Use 1 pen needles daily or as directed., Disp: 100 each, Rfl: 3     INSULIN PUMP - OUTPATIENT, Date Last Updated: 02/16/22 Tandem X2 with control IQ and Dexcom BASAL RATES and times: 12am: (midnight): 1.10 units/hour   3am:1.5 5 am: 1.5 units/hour  8am: 2.05 12pm (noon): 1.9 units/hour  4pm: 1.45 units/hour  8 pm: 1.75 units/hour  CARB RATIO and times: 12   AM (midnight): 2.8 5am: 2.5 12  PM (noon):  2.2 8    PM:  2.7 Corection Factor (Sensitivity) and times: 12   AM (midnight): 15 mg/dL BG target 125 mg/dL Active Insulin Time: 4 hours, Disp: , Rfl:      liraglutide (VICTOZA PEN) 18 MG/3ML solution, ADMINISTER 1.8 MG UNDER THE SKIN DAILY AS DIRECTED, Disp: 27 mL, Rfl: 3     predniSONE (DELTASONE) 20 MG tablet, Take 3 tabs by mouth daily x 3 days, then 2 tabs daily x 3 days, then 1 tab daily x 3 days, then 1/2 tab daily x 3 days., Disp: 20 tablet, Rfl: 0     sildenafil (VIAGRA) 100 MG tablet, TAKE 1/2 TABLET BY MOUTH 30 MINUTES PRIOR TO INTERCOURSE AS DIRECTED, Disp: 15 tablet, Rfl: 0     triamcinolone  (KENALOG) 0.1 % external cream, Apply topically 2 times daily, Disp: 15 g, Rfl: 3    ALLERGIES:   Allergies   Allergen Reactions     Cats      Seasonal Allergies      grass       FAMILY HISTORY:   Family History   Problem Relation Age of Onset     Diabetes Maternal Grandmother      Other Cancer Maternal Grandmother         Lung Cancer     Diabetes Paternal Grandmother      Glaucoma Brother      Macular Degeneration No family hx of        SOCIAL HISTORY:   Social History     Socioeconomic History     Marital status:      Spouse name: Not on file     Number of children: Not on file     Years of education: Not on file     Highest education level: Not on file   Occupational History     Not on file   Tobacco Use     Smoking status: Never Smoker     Smokeless tobacco: Never Used   Substance and Sexual Activity     Alcohol use: Never     Drug use: Never     Sexual activity: Not on file   Other Topics Concern     Parent/sibling w/ CABG, MI or angioplasty before 65F 55M? Not Asked   Social History Narrative     Not on file     Social Determinants of Health     Financial Resource Strain: Not on file   Food Insecurity: Not on file   Transportation Needs: Not on file   Physical Activity: Not on file   Stress: Not on file   Social Connections: Not on file   Intimate Partner Violence: Not on file   Housing Stability: Not on file       REVIEW OF SYSTEMS:  Skin: No rash, pruritis, or skin pigmentation  Eyes: No changes in vision  Ears/Nose/Throat: No changes in hearing, no nosebleeds  Respiratory: No shortness of breath, dyspnea on exertion, cough, or hemoptysis  Cardiovascular: No chest pain or palpitations  Gastrointestinal: No diarrhea or constipation. No abdominal pain. No hematochezia  Genitourinary: see HPI  Musculoskeletal: No pain or swelling of joints, normal range of motion  Neurologic: No weakness or tremors  Psychiatric: No recent changes in memory or mood  Hematologic/Lymphatic/Immunologic: No easy bruising or  "enlarged lymph nodes  Endocrine: No weight gain or loss      HEIGHT: 5' 6.5\"     WEIGHT: 285 lbs 0 oz   BP: Data Unavailable    PULSE: Data Unavailable    EXAM:   General: Alert and oriented to time, place, and self. In NAD   HEENT: Head AT/NC, EOMI, CN Grossly intact   Lungs: no respiratory distress, or pursed lip breathing   Heart: No obvious jugular venous distension present   Musculoskeltal: Normal movements. Normal appearing musculature  Skin: no suspicious lesions or rashes   Neuro: Alert, oriented, speech and mentation normal; moving all 4 extremities equally.   Psych: affect and mood normal      DIAGNOSIS: Request for sterilization    PLAN: The risks of the procedure as well as expectations for recovery and outcomes were splint in detail to him.  He was counseled on the risks for bleeding infection and pain after the procedure.  He was instructed to continue to use contraception until he had proven azoospermia on a semen specimen.  This would normally be collected at least 3 months after the procedure.  He was instructed to hold all anticoagulants medications for one week prior to the procedure.  He was also instructed to shave the scrotum prior to procedure.  It was recommended that he have someone else drive him home after his vasectomy.  In light of these risks and expectations he would like to proceed.  We are scheduling a vasectomy in the office in the near future.  This visit today was performed via video.  He understands that because of this I was not able to examine the testicles in person today.  I will perform an examination at the beginning of the vasectomy and he understands that there is a small chance the procedure may need to be moved to the operating room.    Alcon Hathaway M.D.      Video Start Time: 2:38 PM  Video-Visit Details    Type of service:  Video Visit    Video End Time:2:45 PM    Originating Location (pt. Location): Home    Distant Location (provider location):  [facility] [place]     Platform used for Video Visit: Tasneem

## 2022-05-09 NOTE — LETTER
5/9/2022       RE: Jose Apodaca  [address]  [address]  [place] [address]     Dear Colleague,    Thank you for referring your patient, Jose Apodaca, to the [facility] [place] at [facility]. Please see a copy of my visit note below.    CARMEN Ochoa is a 46 year old who is being evaluated via a billable video visit.      How would you like to obtain your AVS? Maryellaabeba  If the video visit is dropped, the invitation should be resent by: Text to cell phone: .  Will anyone else be joining your video visit? No         VASECTOMY CONSULTATION NOTE  [facility]  [phone]  DATE OF VISIT: 5/9/2022    PATIENT NAME: Jose Apodaca    YOB: 1975      REASON FOR CONSULTATION: Mr. Jose Apodaca is a 46 year old year old gentleman who is being seen as a virtual visit in the urology clinic today requesting a vasectomy. He has 5 children and he wishes to have a vasectomy for birth control. He has no prior urologic history and has had no prior surgery on the testicles. He has no symptoms in the testicles.    PAST MEDICAL HISTORY:   Past Medical History:   Diagnosis Date     Cataracts, bilateral      DKA (diabetic ketoacidoses)      Morbid obesity (H)      PHUONG (obstructive sleep apnea)      T1DM (type 1 diabetes mellitus) (H)        PAST SURGICAL HISTORY:   Past Surgical History:   Procedure Laterality Date     COLONOSCOPY       ENT SURGERY  10 years ago    cyst on neck     EXTRACTION(S) DENTAL       NECK SURGERY  2010    to remove a cyst     PHACOEMULSIFICATION CLEAR CORNEA WITH STANDARD INTRAOCULAR LENS IMPLANT Left 09/21/2020    Procedure: COMPLEX PHACOEMULSIFICATION, CATARACT, WITH INTRAOCULAR LENS IMPLANT;  Surgeon: Andre Jackson MD;  Location:  OR     PHACOEMULSIFICATION CLEAR CORNEA WITH STANDARD INTRAOCULAR LENS IMPLANT Right 09/28/2020    Procedure: PHACOEMULSIFICATION, CATARACT, WITH  INTRAOCULAR LENS IMPLANT;  Surgeon: Andre Jackson MD;  Location:  OR       MEDICATIONS:   Current Outpatient Medications:      blood glucose (NO BRAND SPECIFIED) test strip, Use to test blood sugar 4 times daily or as directed., Disp: 150 strip, Rfl: 6     blood glucose monitoring (SOFTCLIX) lancets, TAKE AS DIRECTED FOUR TIMES DAILY, Disp: 400 each, Rfl: 11     clobetasol (TEMOVATE) 0.05 % external cream, Apply topically 2 times daily, Disp: 15 g, Rfl: 1     Continuous Blood Gluc  (DEXCOM G6 ) TESHA, Use to read blood sugars as per 's instructions., Disp: 1 each, Rfl: 0     Continuous Blood Gluc Sensor (DEXCOM G6 SENSOR) MISC, CHANGE EVERY 10 DAYS., Disp: 3 each, Rfl: 11     Continuous Blood Gluc Transmit (DEXCOM G6 TRANSMITTER) MISC, CHANGE EVERY 3 MONTHS., Disp: 1 each, Rfl: 0     fexofenadine (ALLEGRA) 180 MG tablet, Take 1 tablet (180 mg) by mouth daily, Disp: 90 tablet, Rfl: 3     fluticasone (FLONASE) 50 MCG/ACT nasal spray, SHAKE LIQUID AND USE 1 SPRAY IN EACH NOSTRIL DAILY, Disp: 16 g, Rfl: 3     insulin aspart (NOVOLOG VIAL) 100 UNITS/ML vial, USE WITH INSULIN PUMP, TOTAL DAILY DOSE OF APPROXIMATELY 110 UNITS, Disp: 100 mL, Rfl: 3     insulin pen needle (B-D U/F) 31G X 5 MM miscellaneous, Use 1 pen needles daily or as directed., Disp: 100 each, Rfl: 3     INSULIN PUMP - OUTPATIENT, Date Last Updated: 02/16/22 Tandem X2 with control IQ and Dexcom BASAL RATES and times: 12am: (midnight): 1.10 units/hour   3am:1.5 5 am: 1.5 units/hour  8am: 2.05 12pm (noon): 1.9 units/hour  4pm: 1.45 units/hour  8 pm: 1.75 units/hour  CARB RATIO and times: 12   AM (midnight): 2.8 5am: 2.5 12  PM (noon):  2.2 8    PM:  2.7 Corection Factor (Sensitivity) and times: 12   AM (midnight): 15 mg/dL BG target 125 mg/dL Active Insulin Time: 4 hours, Disp: , Rfl:      liraglutide (VICTOZA PEN) 18 MG/3ML solution, ADMINISTER 1.8 MG UNDER THE SKIN DAILY AS DIRECTED, Disp: 27 mL, Rfl: 3     predniSONE  (DELTASONE) 20 MG tablet, Take 3 tabs by mouth daily x 3 days, then 2 tabs daily x 3 days, then 1 tab daily x 3 days, then 1/2 tab daily x 3 days., Disp: 20 tablet, Rfl: 0     sildenafil (VIAGRA) 100 MG tablet, TAKE 1/2 TABLET BY MOUTH 30 MINUTES PRIOR TO INTERCOURSE AS DIRECTED, Disp: 15 tablet, Rfl: 0     triamcinolone (KENALOG) 0.1 % external cream, Apply topically 2 times daily, Disp: 15 g, Rfl: 3    ALLERGIES:   Allergies   Allergen Reactions     Cats      Seasonal Allergies      grass       FAMILY HISTORY:   Family History   Problem Relation Age of Onset     Diabetes Maternal Grandmother      Other Cancer Maternal Grandmother         Lung Cancer     Diabetes Paternal Grandmother      Glaucoma Brother      Macular Degeneration No family hx of        SOCIAL HISTORY:   Social History     Socioeconomic History     Marital status:      Spouse name: Not on file     Number of children: Not on file     Years of education: Not on file     Highest education level: Not on file   Occupational History     Not on file   Tobacco Use     Smoking status: Never Smoker     Smokeless tobacco: Never Used   Substance and Sexual Activity     Alcohol use: Never     Drug use: Never     Sexual activity: Not on file   Other Topics Concern     Parent/sibling w/ CABG, MI or angioplasty before 65F 55M? Not Asked   Social History Narrative     Not on file     Social Determinants of Health     Financial Resource Strain: Not on file   Food Insecurity: Not on file   Transportation Needs: Not on file   Physical Activity: Not on file   Stress: Not on file   Social Connections: Not on file   Intimate Partner Violence: Not on file   Housing Stability: Not on file       REVIEW OF SYSTEMS:  Skin: No rash, pruritis, or skin pigmentation  Eyes: No changes in vision  Ears/Nose/Throat: No changes in hearing, no nosebleeds  Respiratory: No shortness of breath, dyspnea on exertion, cough, or hemoptysis  Cardiovascular: No chest pain or  "palpitations  Gastrointestinal: No diarrhea or constipation. No abdominal pain. No hematochezia  Genitourinary: see HPI  Musculoskeletal: No pain or swelling of joints, normal range of motion  Neurologic: No weakness or tremors  Psychiatric: No recent changes in memory or mood  Hematologic/Lymphatic/Immunologic: No easy bruising or enlarged lymph nodes  Endocrine: No weight gain or loss      HEIGHT: 5' 6.5\"     WEIGHT: 285 lbs 0 oz   BP: Data Unavailable    PULSE: Data Unavailable    EXAM:   General: Alert and oriented to time, place, and self. In NAD   HEENT: Head AT/NC, EOMI, CN Grossly intact   Lungs: no respiratory distress, or pursed lip breathing   Heart: No obvious jugular venous distension present   Musculoskeltal: Normal movements. Normal appearing musculature  Skin: no suspicious lesions or rashes   Neuro: Alert, oriented, speech and mentation normal; moving all 4 extremities equally.   Psych: affect and mood normal      DIAGNOSIS: Request for sterilization    PLAN: The risks of the procedure as well as expectations for recovery and outcomes were splint in detail to him.  He was counseled on the risks for bleeding infection and pain after the procedure.  He was instructed to continue to use contraception until he had proven azoospermia on a semen specimen.  This would normally be collected at least 3 months after the procedure.  He was instructed to hold all anticoagulants medications for one week prior to the procedure.  He was also instructed to shave the scrotum prior to procedure.  It was recommended that he have someone else drive him home after his vasectomy.  In light of these risks and expectations he would like to proceed.  We are scheduling a vasectomy in the office in the near future.  This visit today was performed via video.  He understands that because of this I was not able to examine the testicles in person today.  I will perform an examination at the beginning of the vasectomy and he " understands that there is a small chance the procedure may need to be moved to the operating room.    Alcon Hathaway M.D.      Video Start Time: 2:38 PM  Video-Visit Details    Type of service:  Video Visit    Video End Time:2:45 PM    Originating Location (pt. Location): Home    Distant Location (provider location):  [facility]     Platform used for Video Visit: [employer]

## 2022-05-11 ENCOUNTER — TELEPHONE (OUTPATIENT)
Dept: UROLOGY | Facility: CLINIC | Age: 47
End: 2022-05-11
Payer: COMMERCIAL

## 2022-05-11 NOTE — TELEPHONE ENCOUNTER
----- Message from Maricruz Ha sent at 5/10/2022  9:34 AM CDT -----  We are scheduling a vasectomy in the office in the near future.     MANNY

## 2022-06-26 ENCOUNTER — HOSPITAL ENCOUNTER (EMERGENCY)
Facility: CLINIC | Age: 47
Discharge: LEFT BEFORE DISCHARGE | End: 2022-06-27
Admitting: EMERGENCY MEDICINE
Payer: COMMERCIAL

## 2022-06-26 VITALS
WEIGHT: 300 LBS | OXYGEN SATURATION: 99 % | SYSTOLIC BLOOD PRESSURE: 146 MMHG | BODY MASS INDEX: 48.21 KG/M2 | DIASTOLIC BLOOD PRESSURE: 83 MMHG | RESPIRATION RATE: 20 BRPM | HEIGHT: 66 IN | TEMPERATURE: 97.2 F | HEART RATE: 94 BPM

## 2022-06-26 LAB
FLUAV RNA SPEC QL NAA+PROBE: NEGATIVE
FLUBV RNA RESP QL NAA+PROBE: NEGATIVE
RSV RNA SPEC NAA+PROBE: NEGATIVE
SARS-COV-2 RNA RESP QL NAA+PROBE: POSITIVE

## 2022-06-26 PROCEDURE — 999N000104 HC STATISTIC NO CHARGE

## 2022-06-26 PROCEDURE — C9803 HOPD COVID-19 SPEC COLLECT: HCPCS

## 2022-06-26 PROCEDURE — 87637 SARSCOV2&INF A&B&RSV AMP PRB: CPT | Performed by: EMERGENCY MEDICINE

## 2022-06-27 ENCOUNTER — MYC MEDICAL ADVICE (OUTPATIENT)
Dept: FAMILY MEDICINE | Facility: CLINIC | Age: 47
End: 2022-06-27

## 2022-06-27 NOTE — ED TRIAGE NOTES
Cough, fever, sob since Friday. Concerned he has covid, took home test and not sure if it was accurate, stated positive     Triage Assessment     Row Name 06/26/22 [id number]       Triage Assessment (Adult)    Airway WDL WDL       Respiratory WDL    Respiratory WDL cough       Cardiac WDL    Cardiac WDL WDL       Cognitive/Neuro/Behavioral WDL    Cognitive/Neuro/Behavioral WDL WDL

## 2022-06-29 DIAGNOSIS — E10.9 TYPE 1 DIABETES MELLITUS WITHOUT COMPLICATION (H): ICD-10-CM

## 2022-06-30 ENCOUNTER — VIRTUAL VISIT (OUTPATIENT)
Dept: URGENT CARE | Facility: CLINIC | Age: 47
End: 2022-06-30
Payer: COMMERCIAL

## 2022-06-30 DIAGNOSIS — J30.2 SEASONAL ALLERGIC RHINITIS, UNSPECIFIED TRIGGER: ICD-10-CM

## 2022-06-30 DIAGNOSIS — U07.1 INFECTION DUE TO 2019 NOVEL CORONAVIRUS: Primary | ICD-10-CM

## 2022-06-30 PROCEDURE — 99443 PR PHYSICIAN TELEPHONE EVALUATION 21-30 MIN: CPT | Mod: CS

## 2022-06-30 RX ORDER — PROCHLORPERAZINE 25 MG/1
SUPPOSITORY RECTAL
Qty: 1 EACH | Refills: 0 | Status: SHIPPED | OUTPATIENT
Start: 2022-06-30 | End: 2022-10-24

## 2022-06-30 NOTE — PROGRESS NOTES
SUBJECTIVE:   Jose Apodaca is a 46 year old male presenting with a chief complaint of COVID.  6 days ago symptoms  4 days ago positive test  Improving daily.  Exhausted.      Some shortness of breath, cough, chest and head cold symptoms.  Osmin rodriguesn'abeba Ochoa is a 46 year old who is being evaluated via a billable video visit.        ROS  10 point ROS negative except as listed above        Objective           Vitals:  No vitals were obtained today due to virtual visit.    Physical Exam   GENERAL: Healthy, alert and no distress  RESP: No audible wheeze, cough, or visible cyanosis.  No visible retractions or increased work of breathing.    NEURO: Cranial nerves grossly intact.  Mentation and speech appropriate for age.  PSYCH: Mentation appears normal, affect normal/bright, judgement and insight intact, normal speech and appearance well-groomed.      (U07.1) Infection due to 2019 novel coronavirus  (primary encounter diagnosis)  Plan: Covid Monoclonal Antibody Referral, Covid         Monoclonal Antibody Referral              25 minute telephone conversation  .  ..

## 2022-06-30 NOTE — TELEPHONE ENCOUNTER
Prescription approved per [facility] Refill Protocol.  Aisha Cordero RN  [facility]

## 2022-06-30 NOTE — PATIENT INSTRUCTIONS
Instructions for Patients      What are the symptoms of COVID-19?  Symptoms can include fever, cough, shortness of breath, chills, headache, muscle pain sore throat, fatigue, runny or stuffy nose, and loss of taste and smell. Other less common symptoms include nausea, vomiting, or diarrhea (watery stools).    Know when to call 911. Emergency warning signs include:    Trouble breathing or shortness of breath    Pain or pressure in the chest that doesn't go away    Feeling confused like you haven't felt before, or not being able to wake up    Bluish-colored lips or face    How can I take care of myself?  1. Get lots of rest. Drink extra fluids (unless a doctor has told you not to).  2. Take Tylenol (acetaminophen) for fever or pain. If you have liver or kidney problems, ask your family doctor if it's okay to take Tylenol   Adults:   650 mg (two 325 mg pills or tablets) every 4 to 6 hours, or...   1,000 mg (two 500 mg pills or tablets) every 8 hours as needed.  Note: Don't take more than 3,000 mg in one day. Acetaminophen is found in many medicines (both prescribed and over the counter). Read all labels to be sure you don't take too much.  For children, check the Tylenol bottle for the right dose. The dose is based on the child's age or weight.  3. Take over the counter medicines for your symptoms as needed. Talk to your pharmacist.  4. If you have other health problems (like cancer, heart failure, an organ transplant, or severe kidney disease): Call your specialty clinic if you don't feel better in the next 2 days.    These guidelines are for isolating and quarantining before returning to work, school or .     For employers, schools and day cares: This is an official notice for this person s medical guidelines for returning in-person.     For health care sites: The CDC gives different isolation and quarantine guidelines for healthcare sites, please check with these sites before arriving.     How do I  self-isolate?  You isolate when you have symptoms of COVID or a test shows you have COVID, even if you don t have symptoms.     If you DO have symptoms:  o Stay home and away from others  - For at least 5 days after your symptoms started, AND   - You are fever free for 24 hours (with no medicine that reduces fever), AND  - Your other symptoms are better.  o Wear a mask for 10 full days any time you are around others.    If you DON T have symptoms:  o Stay at home and away from others for at least 5 days after your positive test.  o Wear a mask for 10 full days any time you are around others.    How and when do I quarantine?  You quarantine when you may have been exposed to the virus and DON T have symptoms.     Stay home and away from others.     You must quarantine for 5 days after your last contact with a person who has COVID.  o Note: If you are fully vaccinated, you don t need to quarantine. You should still follow the steps below.     Wear a mask for 10 full days any time you re around others.    Get tested at least 5 days after you were exposed, even if you don t have symptoms.     If you start to have symptoms, isolate right away and get tested.    Where can I get more information?    [facility] COVID-19 Resource Hub: [email or web address]     CDC Quarantine & Isolation: https://www.cdc.gov/coronavirus/2019-ncov/your-health/quarantine-isolation.html     CDC - What to Do If You're Sick: https://www.cdc.gov/coronavirus/2019-ncov/if-you-are-sick/index.html    [facility] clinical trials (COVID-19 research studies): [email or web address].[email or web address]/umn-clinical-trials    [place] Department of Health COVID-19 Public Hotline: 1-503.782.2611  Coping with Life After COVID-19  Being in the hospital because of COVID-19 is scary. Going home can be scary, too. You may face changes to your life, the way you work or what you can eat. It s hard to adjust to change, and it s normal to feel afraid,  frustrated or even angry. These feelings usually go away over time. If your feelings don t start to get better, it s called  adjustment disorder.      What signs should I look out for?  Adjustment disorder can happen to anyone in a time of stress. It makes it hard to cope with daily life. You may feel lonely or fight with loved ones, even if you re glad to be home. Watch for these signs:  Fear or worry  Hard time focusing  Sadness or anger  Trouble talking to family or friends  Feeling like you don t fit in or isolating yourself  Problems with sleep   Drinking alcohol or taking drugs to cope    What can I do?  You can help yourself get better. Feeling you have control helps you move forward. You may wonder if you ll be able to do things you did before. Be patient. Do your best to make the most of every day. Try to build relationships, be as active as you can, eat right and keep a sense of humor. Avoid smoking and drinking too much alcohol. Call your family doctor or clinic if you re not sure what to do. They can guide you to care or other services.    When should I get help?  Think about getting counseling if your sadness or frustration gets worse. Together with a trained counselor, you can talk about your problems adjusting to life after your hospital stay. You can come up with new ways to handle changes that give you more control. Your family doctor or care team can help you find a counselor.     Get help if you re thinking about hurting yourself. If you need help right away, call 911 or go to the nearest emergency room. You can also try the Crisis Text Line.    Crisis Text Line: 695-730 (http://www.crisistextline.org)  The Crisis Text Line serves anyone, in any crisis. It gives free, 24/7 support. Here's how it works:  Text HOME to 150495 from anywhere in the [place], anytime, about any type of crisis.  A live, trained Crisis Counselor will text you back quickly.  The volunteer Crisis Counselor can help you move from  a  hot moment  to a  cool moment.  They can also help you work out a safety plan.

## 2022-07-01 RX ORDER — FEXOFENADINE HYDROCHLORIDE 180 MG/1
TABLET, FILM COATED ORAL
Qty: 90 TABLET | Refills: 0 | Status: SHIPPED | OUTPATIENT
Start: 2022-07-01 | End: 2022-10-20

## 2022-07-01 NOTE — TELEPHONE ENCOUNTER
Medication filled 1 time as pt is due for a follow-up in clinic. Pharmacy has been notified to inform patient to call clinic and schedule appointment.     Oriana Ng RN

## 2022-07-09 ENCOUNTER — HEALTH MAINTENANCE LETTER (OUTPATIENT)
Age: 47
End: 2022-07-09

## 2022-07-12 ENCOUNTER — VIRTUAL VISIT (OUTPATIENT)
Dept: ENDOCRINOLOGY | Facility: CLINIC | Age: 47
End: 2022-07-12
Payer: COMMERCIAL

## 2022-07-12 DIAGNOSIS — E10.9 TYPE 1 DIABETES MELLITUS WITHOUT COMPLICATION (H): Primary | ICD-10-CM

## 2022-07-12 DIAGNOSIS — E66.01 MORBID OBESITY (H): ICD-10-CM

## 2022-07-12 DIAGNOSIS — Z96.41 INSULIN PUMP STATUS: ICD-10-CM

## 2022-07-12 DIAGNOSIS — E88.819 INSULIN RESISTANCE: ICD-10-CM

## 2022-07-12 PROCEDURE — 99214 OFFICE O/P EST MOD 30 MIN: CPT | Mod: 95 | Performed by: INTERNAL MEDICINE

## 2022-07-12 PROCEDURE — 95251 CONT GLUC MNTR ANALYSIS I&R: CPT | Performed by: INTERNAL MEDICINE

## 2022-07-12 NOTE — LETTER
7/12/2022         RE: Jose Apodaca  [address]  [address]  [place] [address]        Dear Colleague,    Thank you for referring your patient, Jose Apodaca, to the [facility]. Please see a copy of my visit note below.    Patient is being evaluated via a billable video visit.      How would you like to obtain your AVS? Reviewed verbally  If the video visit is dropped, the invitation should be resent by cell phone  Will anyone else be joining your video visit? no      Video Start Time:  4:10 pm    Video-Visit Details    Type of service:  Video Visit    Video End Time:  4:35 pm    Originating Location (pt. Location): home    Distant Location (provider location):  [facility]/home    Platform used for Video Visit:  ZakiAvisena        Recent issues:  Diabetes follow-up evaluation  Continues with Tandem pump and DexcomG6 sensor use, also the Victoza daily  Had COVID-19 illness about 3 weeks ago, symptoms of extreme fatigue, diarrhea, cough   Now feeling much better but some lingering fatigue          2011. Diagnosis of diabetes mellitus while living in [place]  Acute symptoms increased thirst, frequent urination, fatigue, weight loss (265 to 203#/30 days)  Hospitalized and diagnosis of DKA  Initial treatment with Apidra and Lantus insulin  Met with an endocrinologist Dr. Lina Fish  ~6 mo later, switched to Griffin Ping pump     2012. Continued using this pump for 1 year, then moved to [facility]  Saw Dr. Beena Johnson/Endocrinologist in [place]  Switched to Tandem insulin pump    Previous [facility] labs include:       Subsequent move back to [place]  Recalls taking Victoza medication along with pump management, success with significant weight loss  Moved to [place]  9/2019. Moved to [place], [place]     Medical evaluations with Dr. 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  Started Tandem insulin pump     Previous [facility] labs include:          2/19/20. Med evaluation with Dr. ERIKA Milligan/[facility]  No lab tests ordered  Started DexcomG6 CGMS sensor  ~4/2020. Upgrade to the Control IQ pump       5/26/20. Initial diabetes evaluation with me, [facility]  Continued use of the Tandem TSlim pump with the DexcomG6  ~6/2020. Restarted (off label) Victoza  10/2020. Insurance coverage for Victoza, used 1.8 mg daily  11/2021. Ran out of Victoza  ~3/2022. Restarted Victoza medication    Using Tandem TSlim pump with DexcomG6 sensor              Novolog in pump   Victoza (off label) 1.8 mg daily    Current Tandem pump settings:         Recent pump and sensor data:          Recent DexcomG6 data:        Blood glucose (BG) meter:  Accu-check               Tests infrequently  Fam Hx DM:    MA, Mcousin, MGM, PGM     Previous  labs include:          Lab Results   Component Value Date    A1C 7.2 (H) 03/08/2022     03/08/2022    POTASSIUM 4.4 03/08/2022    CHLORIDE 106 03/08/2022    CO2 27 03/08/2022    ANIONGAP 6 03/08/2022     (H) 03/08/2022    BUN 12 03/08/2022    CR 0.88 03/08/2022    GFRESTIMATED >90 03/08/2022    GFRESTBLACK >90 05/05/2021    MICK 9.4 03/08/2022    CPEPT <0.1 (L) 05/28/2020    CHOL 138 05/05/2021    TRIG 37 05/05/2021    HDL 56 05/05/2021    LDL 75 05/05/2021    NHDL 82 05/05/2021    UCRR 164 05/05/2021    MICROL 10 05/05/2021    UMALCR 6.34 05/05/2021     Lab Results   Component Value Date    TSH 1.09 05/05/2021     Last eye exam 10/2020, no DR noted  DM Complications: none        , lives in [facility], works with computer networking  Sees Dr. ERIKA Milligan/[facility] for general medicine evaluations.       PMH/PSH:  Past Medical History:   Diagnosis Date     Cataracts, bilateral      DKA (diabetic ketoacidoses)      Morbid obesity (H)      PHUONG (obstructive sleep apnea)      T1DM (type 1 diabetes mellitus) (H)      Past Surgical  History:   Procedure Laterality Date     COLONOSCOPY       ENT SURGERY  10 years ago    cyst on neck     EXTRACTION(S) DENTAL       NECK SURGERY  2010    to remove a cyst     PHACOEMULSIFICATION CLEAR CORNEA WITH STANDARD INTRAOCULAR LENS IMPLANT Left 09/21/2020    Procedure: COMPLEX PHACOEMULSIFICATION, CATARACT, WITH INTRAOCULAR LENS IMPLANT;  Surgeon: Andre Jackson MD;  Location: [facility] OR     PHACOEMULSIFICATION CLEAR CORNEA WITH STANDARD INTRAOCULAR LENS IMPLANT Right 09/28/2020    Procedure: PHACOEMULSIFICATION, CATARACT, WITH INTRAOCULAR LENS IMPLANT;  Surgeon: Andre Jackson MD;  Location: [facility] OR       Family Hx:  Family History   Problem Relation Age of Onset     Diabetes Maternal Grandmother      Other Cancer Maternal Grandmother         Lung Cancer     Diabetes Paternal Grandmother      Glaucoma Brother      Macular Degeneration No family hx of          Social Hx:  Social History     Socioeconomic History     Marital status:      Spouse name: Not on file     Number of children: Not on file     Years of education: Not on file     Highest education level: Not on file   Occupational History     Not on file   Tobacco Use     Smoking status: Never Smoker     Smokeless tobacco: Never Used   Substance and Sexual Activity     Alcohol use: Never     Drug use: Never     Sexual activity: Not on file   Other Topics Concern     Parent/sibling w/ CABG, MI or angioplasty before 65F 55M? Not Asked   Social History Narrative     Not on file     Social Determinants of Health     Financial Resource Strain: Not on file   Food Insecurity: Not on file   Transportation Needs: Not on file   Physical Activity: Not on file   Stress: Not on file   Social Connections: Not on file   Intimate Partner Violence: Not on file   Housing Stability: Not on file          MEDICATIONS:  has a current medication list which includes the following prescription(s): allergy relief, dexcom g6 , dexcom g6 sensor, dexcom g6  transmitter, insulin aspart, insulin pump, victoza pen, sildenafil, blood glucose, blood glucose monitoring, clobetasol, fluticasone, b-d u/f, prednisone, and triamcinolone.       ROS: 10 point ROS neg other than the symptoms noted above in the HPI.     GENERAL: mild fatigue, wt stable; denies fevers, chills, night sweats.   HEENT: no dysphagia, odonophagia, diplopia, neck pain  THYROID:  no apparent hyper or hypothyroid symptoms  CV: no chest pain, pressure, palpitations  LUNGS: no SOB, AGARWAL, cough, wheezing   ABDOMEN: no diarrhea, constipation, abdominal pain  EXTREMITIES: no rashes, ulcers, edema  NEUROLOGY: right leg tingling; no headaches, denies changes in vision, extremitiy numbness   MSK: no muscle aches or pains, weakness  SKIN: no rashes or lesions  : decreased erection function; occasional nocturia  PSYCH:  stable mood, no significant anxiety or depression  ENDOCRINE: no heat or cold intolerance    Physical Exam (visual exam)  VS:  no vital signs taken for video visit  CONSTITUTIONAL: healthy, alert and NAD, well dressed, answering questions appropriately  ENT: no nose swelling or nasal discharge, mouth redness or gum changes.  EYES: eyes grossly normal to inspection, conjunctivae and sclerae normal, no exophthalmos or proptosis  THYROID:  no apparent nodules or goiter  LUNGS: no audible wheeze, cough or visible cyanosis, no visible retractions or increased work of breathing  ABDOMEN: abdomen not evaluated  EXTREMITIES: no hand tremors, limited exam  NEUROLOGY: CN grossly intact, mentation intact and speech normal   SKIN:  no apparent skin lesions, rash, or edema with visualized skin appearance  PSYCH: mentation appears normal, affect normal/bright, judgement and insight intact,   normal speech and appearance well groomed       LABS:     All pertinent notes, labs, and images personally reviewed by me.     A/P:  Encounter Diagnoses   Name Primary?     Type 1 diabetes mellitus without complication (H) Yes      Insulin pump status      Insulin resistance      Morbid obesity (H)        Comments:  Reviewed health history and diabetes issues.  Health history and lab testing indicates T1DM.  I suspect the Victoza med use enhanced his glucose control, contributed to the hypoglycemia events prior to pump setting changes  Reviewed and interpreted tests that I previously ordered.   Ordered appropriate tests for the endocrinology disease management.    Management options discussed and implemented after shared medical decision making with the patient.  T1DM problem is chronic-stable    Plan:  Reviewed the overall T1DM management and insulin pump use.  Discussed optimal BG testing to assess glucose trends.  We reviewed insulin pump settings, basal rate and bolus dosing  Use of automated pump bolus dosing for meal/snack carb & correction dosing  Reviewed recent Tandem pump and DexcomG6 CGM glucose trend data, in detail     Recommend:  Continue the Tandem insulin pump, DexcomG6 sensor, and diabetes management plan.  Pump setting changes:   Bolus ICR 8pm 2.7 to 2.5    Continue the current (off label) Victoza 1.8 mg subcutaneous daily dose plan  Lab testing:   No labs ordered at this time   Plan preappt labs in 9/2022, orders placed  Resume walking exercise routine... helps his insulin sensitivity  Monitor for symptom changes, including GI symptoms   Try body lotion (or Calamine lotion) to feet itching areas   If kinking or clicking or inability to extend (right index) finger, plan to see ortho hand physician   Keep focus on diet, exercise, and weight management  Would benefit from dietician evaluation, also commercial weight loss program or bariatric surgery  Consider use of antihypertensive medication (ACEI or ARB) and lipid (statin) medications  Arrange annual dilated eye exam, fasting lipid panel testing.     Addressed patient's questions today.     There are no Patient Instructions on file for this visit.    Future labs ordered  today:   Orders Placed This Encounter   Procedures     GLUCOSE MONITOR, 72 HOUR, PHYS INTERP     TSH     Hemoglobin A1c     Basic metabolic panel     Albumin Random Urine Quantitative with Creat Ratio     Radiology/Consults ordered today: None    Total time spent in with the patient evaluation:  25 min  Additional time spent reviewing pertinent lab tests and chart notes, and documentation:  8 min    Follow-up:  9/20/22 at 3pm, Gilma Heaton MD, MS  Endocrinology  [facility]    CC:  ERIKA Milligan                          Again, thank you for allowing me to participate in the care of your patient.        Sincerely,        Ryley Heaton MD

## 2022-07-12 NOTE — PROGRESS NOTES
Patient is being evaluated via a billable video visit.      How would you like to obtain your AVS? Reviewed verbally  If the video visit is dropped, the invitation should be resent by cell phone  Will anyone else be joining your video visit? no      Video Start Time:  4:10 pm    Video-Visit Details    Type of service:  Video Visit    Video End Time:  4:35 pm    Originating Location (pt. Location): home    Distant Location (provider location):  [facility]/[place]    Platform used for Video Visit:  Tasneem        Recent issues:  Diabetes follow-up evaluation  Continues with Tandem pump and DexcomG6 sensor use, also the Victoza daily  Had COVID-19 illness about 3 weeks ago, symptoms of extreme fatigue, diarrhea, cough   Now feeling much better but some lingering fatigue          2011. Diagnosis of diabetes mellitus while living in [place]  Acute symptoms increased thirst, frequent urination, fatigue, weight loss (265 to 203#/30 days)  Hospitalized and diagnosis of DKA  Initial treatment with Apidra and Lantus insulin  Met with an endocrinologist Dr. Lina Fish  ~6 mo later, switched to [place] Ping pump     2012. Continued using this pump for 1 year, then moved to [facility]  Saw Dr. Beena Johnson/Endocrinologist in [place]  Switched to Tandem insulin pump    Previous [facility] labs include:       Subsequent move back to [place]  Recalls taking Victoza medication along with pump management, success with significant weight loss  Moved to [place]  9/2019. Moved to [place], [place]     Medical evaluations with Dr. 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 Started Tandem insulin pump     Previous [facility] labs include:          2/19/20. Med evaluation with Dr. ERIKA Milligan/[facility] clinic  No lab tests ordered  Started DexcomG6 CGMS sensor  ~4/2020. Upgrade to the Control IQ pump       5/26/20. Initial diabetes  evaluation with me,  [facility]  Continued use of the Tandem TSlim pump with the DexcomG6  ~6/2020. Restarted (off label) Victoza  10/2020. Insurance coverage for Victoza, used 1.8 mg daily  11/2021. Ran out of Victoza  ~3/2022. Restarted Victoza medication    Using Tandem TSlim pump with DexcomG6 sensor              Novolog in pump   Victoza (off label) 1.8 mg daily    Current Tandem pump settings:         Recent pump and sensor data:          Recent DexcomG6 data:        Blood glucose (BG) meter:  Accu-check               Tests infrequently  Fam Hx DM:    MA, Mcousin, MGM, PGM     Previous  labs include:          Lab Results   Component Value Date    A1C 7.2 (H) 03/08/2022     03/08/2022    POTASSIUM 4.4 03/08/2022    CHLORIDE 106 03/08/2022    CO2 27 03/08/2022    ANIONGAP 6 03/08/2022     (H) 03/08/2022    BUN 12 03/08/2022    CR 0.88 03/08/2022    GFRESTIMATED >90 03/08/2022    GFRESTBLACK >90 05/05/2021    MICK 9.4 03/08/2022    CPEPT <0.1 (L) 05/28/2020    CHOL 138 05/05/2021    TRIG 37 05/05/2021    HDL 56 05/05/2021    LDL 75 05/05/2021    NHDL 82 05/05/2021    UCRR 164 05/05/2021    MICROL 10 05/05/2021    UMALCR 6.34 05/05/2021     Lab Results   Component Value Date    TSH 1.09 05/05/2021     Last eye exam 10/2020, no DR noted  DM Complications: none        , lives in [facility], works with computer networking  Sees Dr. ERIKA Milligan/[facility] clinic for general medicine evaluations.       PMH/PSH:  Past Medical History:   Diagnosis Date     Cataracts, bilateral      DKA (diabetic ketoacidoses)      Morbid obesity (H)      PHUONG (obstructive sleep apnea)      T1DM (type 1 diabetes mellitus) (H)      Past Surgical History:   Procedure Laterality Date     COLONOSCOPY       ENT SURGERY  10 years ago    cyst on neck     EXTRACTION(S) DENTAL       NECK SURGERY  2010    to remove a cyst     PHACOEMULSIFICATION CLEAR CORNEA WITH STANDARD INTRAOCULAR LENS IMPLANT Left 09/21/2020     Procedure: COMPLEX PHACOEMULSIFICATION, CATARACT, WITH INTRAOCULAR LENS IMPLANT;  Surgeon: Andre Jackson MD;  [facility]     PHACOEMULSIFICATION CLEAR CORNEA WITH STANDARD INTRAOCULAR LENS IMPLANT Right 09/28/2020    Procedure: PHACOEMULSIFICATION, CATARACT, WITH INTRAOCULAR LENS IMPLANT;  Surgeon: Andre Jackson MD;  [facility] OR       Family Hx:  Family History   Problem Relation Age of Onset     Diabetes Maternal Grandmother      Other Cancer Maternal Grandmother         Lung Cancer     Diabetes Paternal Grandmother      Glaucoma Brother      Macular Degeneration No family hx of          Social Hx:  Social History     Socioeconomic History     Marital status:      Spouse name: Not on file     Number of children: Not on file     Years of education: Not on file     Highest education level: Not on file   Occupational History     Not on file   Tobacco Use     Smoking status: Never Smoker     Smokeless tobacco: Never Used   Substance and Sexual Activity     Alcohol use: Never     Drug use: Never     Sexual activity: Not on file   Other Topics Concern     Parent/sibling w/ CABG, MI or angioplasty before 65F 55M? Not Asked   Social History Narrative     Not on file     Social Determinants of Health     Financial Resource Strain: Not on file   Food Insecurity: Not on file   Transportation Needs: Not on file   Physical Activity: Not on file   Stress: Not on file   Social Connections: Not on file   Intimate Partner Violence: Not on file   Housing Stability: Not on file          MEDICATIONS:  has a current medication list which includes the following prescription(s): allergy relief, dexcom g6 , dexcom g6 sensor, dexcom g6 transmitter, insulin aspart, insulin pump, victoza pen, sildenafil, blood glucose, blood glucose monitoring, clobetasol, fluticasone, b-d u/f, prednisone, and triamcinolone.       ROS: 10 point ROS neg other than the symptoms noted above in the  HPI.     GENERAL: mild fatigue, wt stable; denies fevers, chills, night sweats.   HEENT: no dysphagia, odonophagia, diplopia, neck pain  THYROID:  no apparent hyper or hypothyroid symptoms  CV: no chest pain, pressure, palpitations  LUNGS: no SOB, AGARWAL, cough, wheezing   ABDOMEN: no diarrhea, constipation, abdominal pain  EXTREMITIES: no rashes, ulcers, edema  NEUROLOGY: right leg tingling; no headaches, denies changes in vision, extremitiy numbness   MSK: no muscle aches or pains, weakness  SKIN: no rashes or lesions  : decreased erection function; occasional nocturia  PSYCH:  stable mood, no significant anxiety or depression  ENDOCRINE: no heat or cold intolerance    Physical Exam (visual exam)  VS:  no vital signs taken for video visit  CONSTITUTIONAL: healthy, alert and NAD, well dressed, answering questions appropriately  ENT: no nose swelling or nasal discharge, mouth redness or gum changes.  EYES: eyes grossly normal to inspection, conjunctivae and sclerae normal, no exophthalmos or proptosis  THYROID:  no apparent nodules or goiter  LUNGS: no audible wheeze, cough or visible cyanosis, no visible retractions or increased work of breathing  ABDOMEN: abdomen not evaluated  EXTREMITIES: no hand tremors, limited exam  NEUROLOGY: CN grossly intact, mentation intact and speech normal   SKIN:  no apparent skin lesions, rash, or edema with visualized skin appearance  PSYCH: mentation appears normal, affect normal/bright, judgement and insight intact,   normal speech and appearance well groomed       LABS:     All pertinent notes, labs, and images personally reviewed by me.     A/P:  Encounter Diagnoses   Name Primary?     Type 1 diabetes mellitus without complication (H) Yes     Insulin pump status      Insulin resistance      Morbid obesity (H)        Comments:  Reviewed health history and diabetes issues.  Health history and lab testing indicates T1DM.  I suspect the Victoza med use enhanced his glucose control,  contributed to the hypoglycemia events prior to pump setting changes  Reviewed and interpreted tests that I previously ordered.   Ordered appropriate tests for the endocrinology disease management.    Management options discussed and implemented after shared medical decision making with the patient.  T1DM problem is chronic-stable    Plan:  Reviewed the overall T1DM management and insulin pump use.  Discussed optimal BG testing to assess glucose trends.  We reviewed insulin pump settings, basal rate and bolus dosing  Use of automated pump bolus dosing for meal/snack carb & correction dosing  Reviewed recent Tandem pump and DexcomG6 CGM glucose trend data, in detail     Recommend:  Continue the Tandem insulin pump, DexcomG6 sensor, and diabetes management plan.  Pump setting changes:   Bolus ICR 8pm 2.7 to 2.5    Continue the current (off label) Victoza 1.8 mg subcutaneous daily dose plan  Lab testing:   No labs ordered at this time   Plan preappt labs in 9/2022, orders placed  Resume walking exercise routine... helps his insulin sensitivity  Monitor for symptom changes, including GI symptoms   Try body lotion (or Calamine lotion) to feet itching areas   If kinking or clicking or inability to extend (right index) finger, plan to see ortho hand physician   Keep focus on diet, exercise, and weight management  Would benefit from dietician evaluation, also commercial weight loss program or bariatric surgery  Consider use of antihypertensive medication (ACEI or ARB) and lipid (statin) medications  Arrange annual dilated eye exam, fasting lipid panel testing.     Addressed patient's questions today.     There are no Patient Instructions on file for this visit.    Future labs ordered today:   Orders Placed This Encounter   Procedures     GLUCOSE MONITOR, 72 HOUR, PHYS INTERP     TSH     Hemoglobin A1c     Basic metabolic panel     Albumin Random Urine Quantitative with Creat Ratio     Radiology/Consults ordered today:  None    Total time spent in with the patient evaluation:  25 min  Additional time spent reviewing pertinent lab tests and chart notes, and documentation:  8 min    Follow-up:  9/20/22 at 3pm, Gilma Heaton MD, MS  Endocrinology  [facility]    CC:  ERIKA Milligan

## 2022-07-13 ENCOUNTER — TELEPHONE (OUTPATIENT)
Dept: ENDOCRINOLOGY | Facility: CLINIC | Age: 47
End: 2022-07-13

## 2022-07-13 NOTE — TELEPHONE ENCOUNTER
NICOLLEM for PT to call [phone] to schedule f/u appt with Dr. Heaton for end of Sept video, and to schedule a lab appt.

## 2022-09-03 ENCOUNTER — HEALTH MAINTENANCE LETTER (OUTPATIENT)
Age: 47
End: 2022-09-03

## 2022-09-20 ENCOUNTER — VIRTUAL VISIT (OUTPATIENT)
Dept: ENDOCRINOLOGY | Facility: CLINIC | Age: 47
End: 2022-09-20
Payer: COMMERCIAL

## 2022-09-20 DIAGNOSIS — E88.819 INSULIN RESISTANCE: ICD-10-CM

## 2022-09-20 DIAGNOSIS — Z96.41 INSULIN PUMP STATUS: ICD-10-CM

## 2022-09-20 DIAGNOSIS — E13.9 DIABETES MELLITUS TYPE 1.5, MANAGED AS TYPE 2 (H): Primary | ICD-10-CM

## 2022-09-20 PROCEDURE — 99214 OFFICE O/P EST MOD 30 MIN: CPT | Mod: 95 | Performed by: INTERNAL MEDICINE

## 2022-09-20 PROCEDURE — 95251 CONT GLUC MNTR ANALYSIS I&R: CPT | Performed by: INTERNAL MEDICINE

## 2022-09-20 NOTE — PROGRESS NOTES
Patient is being evaluated via a billable video visit.      How would you like to obtain your AVS? Reviewed verbally  If the video visit is dropped, the invitation should be resent by cell phone  Will anyone else be joining your video visit? no      Video Start Time:  3:10 pm    Video-Visit Details    Type of service:  Video Visit    Video End Time:  3:25 pm    Originating Location (pt. Location): home    Distant Location (provider location):  [facility] [place]    Platform used for Video Visit:  Tasneem          Recent issues:  Diabetes follow-up evaluation  Continues with Tandem pump and DexcomG6 sensor use, also the Victoza daily  Recovering from COVID-19 illness earlier this Summer '22          2011. Diagnosis of diabetes mellitus while living in [place]  Acute symptoms increased thirst, frequent urination, fatigue, weight loss (265 to 203#/30 days)  Hospitalized and diagnosis of DKA  Initial treatment with Apidra and Lantus insulin  Met with an endocrinologist Dr. Lina Fish  ~6 mo later, switched to [place] Ping pump     2012. Continued using this pump for 1 year, then moved to [facility]  Saw Dr. Beena Johnson/Endocrinologist in [place]  Switched to Tandem insulin pump    Previous  labs include:       Subsequent move back to [place]  Recalls taking Victoza medication along with pump management, success with significant weight loss  Moved to [place]  9/2019. Moved to [place], [place]     Medical evaluations with Dr. 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 Started Tandem insulin pump     Previous [facility] labs include:          2/19/20. Med evaluation with Dr. ERIKA Milligan/[facility]  No lab tests ordered  Started DexcomG6 CGMS sensor  ~4/2020. Upgrade to the Control IQ pump       5/26/20. Initial diabetes evaluation with me,  [facility]  Continued use of the Tandem TSlim pump with  the DexcomG6  ~6/2020. Restarted (off label) Victoza  10/2020. Insurance coverage for Victoza, used 1.8 mg daily  11/2021. Ran out of Victoza  ~3/2022. Restarted Victoza medication    Using Tandem TSlim pump with DexcomG6 sensor              Novolog in pump   Victoza (off label) 1.8 mg daily    Current Tandem pump settings:         Recent pump and sensor data:        Recent DexcomG6 data:        Blood glucose (BG) meter:  Accu-check               Tests infrequently  Fam Hx DM:    MA, Mcbharatiin, MGM, PGM     Previous [facility] labs include:          Lab Results   Component Value Date    A1C 7.2 (H) 03/08/2022     03/08/2022    POTASSIUM 4.4 03/08/2022    CHLORIDE 106 03/08/2022    CO2 27 03/08/2022    ANIONGAP 6 03/08/2022     (H) 03/08/2022    BUN 12 03/08/2022    CR 0.88 03/08/2022    GFRESTIMATED >90 03/08/2022    GFRESTBLACK >90 05/05/2021    MICK 9.4 03/08/2022    CPEPT <0.1 (L) 05/28/2020    CHOL 138 05/05/2021    TRIG 37 05/05/2021    HDL 56 05/05/2021    LDL 75 05/05/2021    NHDL 82 05/05/2021    UCRR 164 05/05/2021    MICROL 10 05/05/2021    UMALCR 6.34 05/05/2021     Lab Results   Component Value Date    TSH 1.09 05/05/2021     Last eye exam 10/2020, no DR noted  DM Complications: none        , lives in [facility], works with computer networking  Sees Dr. ERIKA Milligan/[facility] clinic for general medicine evaluations.       PMH/PSH:  Past Medical History:   Diagnosis Date     Cataracts, bilateral      DKA (diabetic ketoacidoses)      Morbid obesity (H)      PHUONG (obstructive sleep apnea)      T1DM (type 1 diabetes mellitus) (H)      Past Surgical History:   Procedure Laterality Date     COLONOSCOPY       ENT SURGERY  10 years ago    cyst on neck     EXTRACTION(S) DENTAL       NECK SURGERY  2010    to remove a cyst     PHACOEMULSIFICATION CLEAR CORNEA WITH STANDARD INTRAOCULAR LENS IMPLANT Left 09/21/2020    Procedure: COMPLEX PHACOEMULSIFICATION, CATARACT, WITH INTRAOCULAR LENS IMPLANT;   Surgeon: Andre Jackson MD;  [facility]     PHACOEMULSIFICATION CLEAR CORNEA WITH STANDARD INTRAOCULAR LENS IMPLANT Right 09/28/2020    Procedure: PHACOEMULSIFICATION, CATARACT, WITH INTRAOCULAR LENS IMPLANT;  Surgeon: Andre Jackson MD;  [facility] OR       Family Hx:  Family History   Problem Relation Age of Onset     Diabetes Maternal Grandmother      Other Cancer Maternal Grandmother         Lung Cancer     Diabetes Paternal Grandmother      Glaucoma Brother      Macular Degeneration No family hx of          Social Hx:  Social History     Socioeconomic History     Marital status:      Spouse name: Not on file     Number of children: Not on file     Years of education: Not on file     Highest education level: Not on file   Occupational History     Not on file   Tobacco Use     Smoking status: Never Smoker     Smokeless tobacco: Never Used   Substance and Sexual Activity     Alcohol use: Never     Drug use: Never     Sexual activity: Not on file   Other Topics Concern     Parent/sibling w/ CABG, MI or angioplasty before 65F 55M? Not Asked   Social History Narrative     Not on file     Social Determinants of Health     Financial Resource Strain: Not on file   Food Insecurity: Not on file   Transportation Needs: Not on file   Physical Activity: Not on file   Stress: Not on file   Social Connections: Not on file   Intimate Partner Violence: Not on file   Housing Stability: Not on file          MEDICATIONS:  has a current medication list which includes the following prescription(s): allergy relief, clobetasol, dexcom g6 , dexcom g6 sensor, fluticasone, insulin aspart, insulin pump, victoza pen, sildenafil, triamcinolone, blood glucose, blood glucose monitoring, dexcom g6 transmitter, b-d u/f, and prednisone.       ROS: 10 point ROS neg other than the symptoms noted above in the HPI.     GENERAL: mild fatigue, wt stable; denies fevers, chills, night sweats.   HEENT: no dysphagia,  odonophagia, diplopia, neck pain  THYROID:  no apparent hyper or hypothyroid symptoms  CV: no chest pain, pressure, palpitations  LUNGS: no SOB, AGARWAL, cough, wheezing   ABDOMEN: no diarrhea, constipation, abdominal pain  EXTREMITIES: no rashes, ulcers, edema  NEUROLOGY: right leg tingling; no headaches, denies changes in vision, extremitiy numbness   MSK: no muscle aches or pains, weakness  SKIN: no rashes or lesions  : decreased erection function; occasional nocturia  PSYCH:  stable mood, no significant anxiety or depression  ENDOCRINE: no heat or cold intolerance    Physical Exam (visual exam)  VS:  no vital signs taken for video visit  CONSTITUTIONAL: healthy, alert and NAD, well dressed, answering questions appropriately  ENT: no nose swelling or nasal discharge, mouth redness or gum changes.  EYES: eyes grossly normal to inspection, conjunctivae and sclerae normal, no exophthalmos or proptosis  THYROID:  no apparent nodules or goiter  LUNGS: no audible wheeze, cough or visible cyanosis, no visible retractions or increased work of breathing  ABDOMEN: abdomen not evaluated  EXTREMITIES: no hand tremors, limited exam  NEUROLOGY: CN grossly intact, mentation intact and speech normal   SKIN:  no apparent skin lesions, rash, or edema with visualized skin appearance  PSYCH: mentation appears normal, affect normal/bright, judgement and insight intact,   normal speech and appearance well groomed       LABS:     All pertinent notes, labs, and images personally reviewed by me.     A/P:  Encounter Diagnoses   Name Primary?     Diabetes mellitus type 1.5, managed as type 2 (H) Yes     Insulin pump status      Insulin resistance        Comments:  Reviewed health history and diabetes issues.  Health history and lab testing indicates T1.5DM.  I suspect the Victoza med use enhanced his glucose control, contributed to the hypoglycemia events prior to pump setting changes  Reviewed and interpreted tests that I previously  ordered.   Ordered appropriate tests for the endocrinology disease management.    Management options discussed and implemented after shared medical decision making with the patient.  T1DM problem is chronic-stable    Plan:  Reviewed the overall T1DM management and insulin pump use.  Discussed optimal BG testing to assess glucose trends.  We reviewed insulin pump settings, basal rate and bolus dosing  Use of automated pump bolus dosing for meal/snack carb & correction dosing  Reviewed recent Tandem pump and DexcomG6 CGM glucose trend data, in detail     Recommend:  Continue the Tandem insulin pump, DexcomG6 sensor, and diabetes management plan.  Advised skipping the late evening snack or starting to bolus before the snack, then lowering the nocturnal basal rate(s)  Pump setting changes:   Basal:  3a 1.4 to 1.2 unit(s)/hr    Important to enter meal and snack carb amount for automated bolusing  Avoid eating carb snack to raise late evening SG trends  Discussed use of the Tandem pump Exercise Mode for aerobic exercise  Continue the current (off label) Victoza 1.8 mg subcutaneous daily dose plan  Plan repeat fasting labs soon   Discussed [facility] clinic   Lab orders placed  Continue the walking exercise routine... helps his insulin sensitivity  Monitor for symptom changes, including GI symptoms   Try body lotion (or Calamine lotion) to feet itching areas   If kinking or clicking or inability to extend (right index) finger, plan to see ortho hand physician   Keep focus on diet, exercise, and weight management  Would benefit from dietician evaluation, also commercial weight loss program or bariatric surgery  Consider use of antihypertensive medication (ACEI or ARB) and lipid (statin) medications  Arrange annual dilated eye exam, fasting lipid panel testing.     Addressed patient's questions today.     There are no Patient Instructions on file for this visit.    Future labs ordered today:   Orders Placed This Encounter    Procedures     GLUCOSE MONITOR, 72 HOUR, PHYS INTERP     Hemoglobin A1c     Basic metabolic panel     Lipid panel reflex to direct LDL Fasting     Albumin Random Urine Quantitative with Creat Ratio     TSH     Radiology/Consults ordered today: None    Total time spent in with the patient evaluation:  23 min  Additional time spent reviewing pertinent lab tests and chart notes, and documentation:  8 min    Follow-up:  1/2023, Gilma Heaton MD, MS  Endocrinology  [facility]    CC:  ERIKA Milligan

## 2022-09-20 NOTE — LETTER
9/20/2022         RE: Jose Apodaca  [address]  [address]  [facility] [address]        Dear Colleague,    Thank you for referring your patient, Jose Apodaca, to the [facility]. Please see a copy of my visit note below.    Patient is being evaluated via a billable video visit.      How would you like to obtain your AVS? Reviewed verbally  If the video visit is dropped, the invitation should be resent by cell phone  Will anyone else be joining your video visit? no      Video Start Time:  3:10 pm    Video-Visit Details    Type of service:  Video Visit    Video End Time:  3:25 pm    Originating Location (pt. Location): home    Distant Location (provider location):  [facility]/home    Platform used for Video Visit:  [employer]          Recent issues:  Diabetes follow-up evaluation  Continues with Tandem pump and DexcomG6 sensor use, also the Victoza daily  Recovering from COVID-19 illness earlier this Summer '22          2011. Diagnosis of diabetes mellitus while living in [place]  Acute symptoms increased thirst, frequent urination, fatigue, weight loss (265 to 203#/30 days)  Hospitalized and diagnosis of DKA  Initial treatment with Apidra and Lantus insulin  Met with an endocrinologist Dr. Lina Fish  ~6 mo later, switched to [place] Ping pump     2012. Continued using this pump for 1 year, then moved to [facility]  Saw Dr. Beena Johnson/Endocrinologist in [place]  Switched to Tandem insulin pump    Previous [facility] labs include:       Subsequent move back to [place]  Recalls taking Victoza medication along with pump management, success with significant weight loss  Moved to [place]  9/2019. Moved to [place], [place]     Medical evaluations with Dr. 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 Started Tandem insulin pump     Previous [facility] labs include:          2/19/20.  Med evaluation with Dr. ERIKA Milligan/[facility]  No lab tests ordered  Started DexcomG6 CGMS sensor  ~4/2020. Upgrade to the Control IQ pump       5/26/20. Initial diabetes evaluation with me, [facility] clinic [facility]  Continued use of the Tandem TSlim pump with the DexcomG6  ~6/2020. Restarted (off label) Victoza  10/2020. Insurance coverage for Victoza, used 1.8 mg daily  11/2021. Ran out of Victoza  ~3/2022. Restarted Victoza medication    Using Tandem TSlim pump with DexcomG6 sensor              Novolog in pump   Victoza (off label) 1.8 mg daily    Current Tandem pump settings:         Recent pump and sensor data:        Recent DexcomG6 data:        Blood glucose (BG) meter:  Accu-check               Tests infrequently  Fam Hx DM:    Clement GALVEZ, MGM, PGM     Previous  labs include:          Lab Results   Component Value Date    A1C 7.2 (H) 03/08/2022     03/08/2022    POTASSIUM 4.4 03/08/2022    CHLORIDE 106 03/08/2022    CO2 27 03/08/2022    ANIONGAP 6 03/08/2022     (H) 03/08/2022    BUN 12 03/08/2022    CR 0.88 03/08/2022    GFRESTIMATED >90 03/08/2022    GFRESTBLACK >90 05/05/2021    MICK 9.4 03/08/2022    CPEPT <0.1 (L) 05/28/2020    CHOL 138 05/05/2021    TRIG 37 05/05/2021    HDL 56 05/05/2021    LDL 75 05/05/2021    NHDL 82 05/05/2021    UCRR 164 05/05/2021    MICROL 10 05/05/2021    UMALCR 6.34 05/05/2021     Lab Results   Component Value Date    TSH 1.09 05/05/2021     Last eye exam 10/2020, no DR noted  DM Complications: none        , lives in [facility], works with computer networking  Sees Dr. ERIKA Milligan/[facility] for general medicine evaluations.       PMH/PSH:  Past Medical History:   Diagnosis Date     Cataracts, bilateral      DKA (diabetic ketoacidoses)      Morbid obesity (H)      PHUONG (obstructive sleep apnea)      T1DM (type 1 diabetes mellitus) (H)      Past Surgical History:   Procedure Laterality Date     COLONOSCOPY       ENT SURGERY  10 years  ago    cyst on neck     EXTRACTION(S) DENTAL       NECK SURGERY  2010    to remove a cyst     PHACOEMULSIFICATION CLEAR CORNEA WITH STANDARD INTRAOCULAR LENS IMPLANT Left 09/21/2020    Procedure: COMPLEX PHACOEMULSIFICATION, CATARACT, WITH INTRAOCULAR LENS IMPLANT;  Surgeon: Andre Jackson MD;  [facility]     PHACOEMULSIFICATION CLEAR CORNEA WITH STANDARD INTRAOCULAR LENS IMPLANT Right 09/28/2020    Procedure: PHACOEMULSIFICATION, CATARACT, WITH INTRAOCULAR LENS IMPLANT;  Surgeon: Andre Jackson MD;  [facility] OR       Family Hx:  Family History   Problem Relation Age of Onset     Diabetes Maternal Grandmother      Other Cancer Maternal Grandmother         Lung Cancer     Diabetes Paternal Grandmother      Glaucoma Brother      Macular Degeneration No family hx of          Social Hx:  Social History     Socioeconomic History     Marital status:      Spouse name: Not on file     Number of children: Not on file     Years of education: Not on file     Highest education level: Not on file   Occupational History     Not on file   Tobacco Use     Smoking status: Never Smoker     Smokeless tobacco: Never Used   Substance and Sexual Activity     Alcohol use: Never     Drug use: Never     Sexual activity: Not on file   Other Topics Concern     Parent/sibling w/ CABG, MI or angioplasty before 65F 55M? Not Asked   Social History Narrative     Not on file     Social Determinants of Health     Financial Resource Strain: Not on file   Food Insecurity: Not on file   Transportation Needs: Not on file   Physical Activity: Not on file   Stress: Not on file   Social Connections: Not on file   Intimate Partner Violence: Not on file   Housing Stability: Not on file          MEDICATIONS:  has a current medication list which includes the following prescription(s): allergy relief, clobetasol, dexcom g6 , dexcom g6 sensor, fluticasone, insulin aspart, insulin pump, victoza pen, sildenafil, triamcinolone,  blood glucose, blood glucose monitoring, dexcom g6 transmitter, b-d u/f, and prednisone.       ROS: 10 point ROS neg other than the symptoms noted above in the HPI.     GENERAL: mild fatigue, wt stable; denies fevers, chills, night sweats.   HEENT: no dysphagia, odonophagia, diplopia, neck pain  THYROID:  no apparent hyper or hypothyroid symptoms  CV: no chest pain, pressure, palpitations  LUNGS: no SOB, AGARWAL, cough, wheezing   ABDOMEN: no diarrhea, constipation, abdominal pain  EXTREMITIES: no rashes, ulcers, edema  NEUROLOGY: right leg tingling; no headaches, denies changes in vision, extremitiy numbness   MSK: no muscle aches or pains, weakness  SKIN: no rashes or lesions  : decreased erection function; occasional nocturia  PSYCH:  stable mood, no significant anxiety or depression  ENDOCRINE: no heat or cold intolerance    Physical Exam (visual exam)  VS:  no vital signs taken for video visit  CONSTITUTIONAL: healthy, alert and NAD, well dressed, answering questions appropriately  ENT: no nose swelling or nasal discharge, mouth redness or gum changes.  EYES: eyes grossly normal to inspection, conjunctivae and sclerae normal, no exophthalmos or proptosis  THYROID:  no apparent nodules or goiter  LUNGS: no audible wheeze, cough or visible cyanosis, no visible retractions or increased work of breathing  ABDOMEN: abdomen not evaluated  EXTREMITIES: no hand tremors, limited exam  NEUROLOGY: CN grossly intact, mentation intact and speech normal   SKIN:  no apparent skin lesions, rash, or edema with visualized skin appearance  PSYCH: mentation appears normal, affect normal/bright, judgement and insight intact,   normal speech and appearance well groomed       LABS:     All pertinent notes, labs, and images personally reviewed by me.     A/P:  Encounter Diagnoses   Name Primary?     Diabetes mellitus type 1.5, managed as type 2 (H) Yes     Insulin pump status      Insulin resistance        Comments:  Reviewed health  history and diabetes issues.  Health history and lab testing indicates T1.5DM.  I suspect the Victoza med use enhanced his glucose control, contributed to the hypoglycemia events prior to pump setting changes  Reviewed and interpreted tests that I previously ordered.   Ordered appropriate tests for the endocrinology disease management.    Management options discussed and implemented after shared medical decision making with the patient.  T1DM problem is chronic-stable    Plan:  Reviewed the overall T1DM management and insulin pump use.  Discussed optimal BG testing to assess glucose trends.  We reviewed insulin pump settings, basal rate and bolus dosing  Use of automated pump bolus dosing for meal/snack carb & correction dosing  Reviewed recent Tandem pump and DexcomG6 CGM glucose trend data, in detail     Recommend:  Continue the Tandem insulin pump, DexcomG6 sensor, and diabetes management plan.  Advised skipping the late evening snack or starting to bolus before the snack, then lowering the nocturnal basal rate(s)  Pump setting changes:   Basal:  3a 1.4 to 1.2 unit(s)/hr    Important to enter meal and snack carb amount for automated bolusing  Avoid eating carb snack to raise late evening SG trends  Discussed use of the Tandem pump Exercise Mode for aerobic exercise  Continue the current (off label) Victoza 1.8 mg subcutaneous daily dose plan  Plan repeat fasting labs soon   Discussed [facility] clinic   Lab orders placed  Continue the walking exercise routine... helps his insulin sensitivity  Monitor for symptom changes, including GI symptoms   Try body lotion (or Calamine lotion) to feet itching areas   If kinking or clicking or inability to extend (right index) finger, plan to see ortho hand physician   Keep focus on diet, exercise, and weight management  Would benefit from dietician evaluation, also commercial weight loss program or bariatric surgery  Consider use of antihypertensive medication (ACEI or ARB)  and lipid (statin) medications  Arrange annual dilated eye exam, fasting lipid panel testing.     Addressed patient's questions today.     There are no Patient Instructions on file for this visit.    Future labs ordered today:   Orders Placed This Encounter   Procedures     GLUCOSE MONITOR, 72 HOUR, PHYS INTERP     Hemoglobin A1c     Basic metabolic panel     Lipid panel reflex to direct LDL Fasting     Albumin Random Urine Quantitative with Creat Ratio     TSH     Radiology/Consults ordered today: None    Total time spent in with the patient evaluation:  23 min  Additional time spent reviewing pertinent lab tests and chart notes, and documentation:  8 min    Follow-up:  1/2023, Gilma Heaton MD, MS  Endocrinology  [facility]    CC:  ERIKA Milligan                          Again, thank you for allowing me to participate in the care of your patient.        Sincerely,        Ryley Heaton MD

## 2022-09-21 ENCOUNTER — TELEPHONE (OUTPATIENT)
Dept: ENDOCRINOLOGY | Facility: CLINIC | Age: 47
End: 2022-09-21

## 2022-09-21 ENCOUNTER — MYC MEDICAL ADVICE (OUTPATIENT)
Dept: FAMILY MEDICINE | Facility: CLINIC | Age: 47
End: 2022-09-21

## 2022-09-21 DIAGNOSIS — Z96.41 INSULIN PUMP STATUS: ICD-10-CM

## 2022-09-21 DIAGNOSIS — E10.9 TYPE 1 DIABETES MELLITUS WITHOUT COMPLICATION (H): ICD-10-CM

## 2022-09-21 DIAGNOSIS — E88.819 INSULIN RESISTANCE: Primary | ICD-10-CM

## 2022-09-21 NOTE — TELEPHONE ENCOUNTER
NICOLLEM for PT to call [phone] to schedule f/u appt with Dr. Heaton for mid Jan. olga and to schedule lab appt.

## 2022-09-22 ENCOUNTER — E-VISIT (OUTPATIENT)
Dept: FAMILY MEDICINE | Facility: CLINIC | Age: 47
End: 2022-09-22
Payer: COMMERCIAL

## 2022-09-22 DIAGNOSIS — E10.9 TYPE 1 DIABETES MELLITUS WITHOUT COMPLICATION (H): ICD-10-CM

## 2022-09-22 DIAGNOSIS — Z12.5 SCREENING FOR PROSTATE CANCER: Primary | ICD-10-CM

## 2022-09-22 PROCEDURE — 99423 OL DIG E/M SVC 21+ MIN: CPT | Performed by: INTERNAL MEDICINE

## 2022-09-22 RX ORDER — INSULIN ASPART 100 [IU]/ML
INJECTION, SOLUTION INTRAVENOUS; SUBCUTANEOUS
Qty: 100 ML | Refills: 1 | Status: SHIPPED | OUTPATIENT
Start: 2022-09-22 | End: 2022-12-26

## 2022-09-22 NOTE — TELEPHONE ENCOUNTER
Disp Refills Start End MELODY   insulin aspart (NOVOLOG VIAL) 100 UNITS/ML vial 100 mL 3 9/10/2021  No   Sig: USE WITH INSULIN PUMP, TOTAL DAILY DOSE OF APPROXIMATELY 110 UNITS     Refill request received for above med  Unable to select for reorder, asks to select an alternative

## 2022-09-22 NOTE — TELEPHONE ENCOUNTER
Provider E-Visit time total (minutes): 22 minutes    Chief Complaint:   E-visit for follow up of Type 1 Diabetes      HPI:   Patient Jose Apodaca is a very pleasant 46 year old male with history of Type 1 Diabetes today for E-visit for follow up of Type 1 Diabetes.          Current Medications:     Current Outpatient Medications   Medication Sig Dispense Refill     ALLERGY RELIEF 180 MG tablet TAKE 1 TABLET(180 MG) BY MOUTH DAILY 90 tablet 0     blood glucose (NO BRAND SPECIFIED) test strip Use to test blood sugar 4 times daily or as directed. 150 strip 6     blood glucose monitoring (SOFTCLIX) lancets TAKE AS DIRECTED FOUR TIMES DAILY 400 each 11     clobetasol (TEMOVATE) 0.05 % external cream Apply topically 2 times daily 15 g 1     Continuous Blood Gluc  (DEXCOM G6 ) TESHA Use to read blood sugars as per 's instructions. 1 each 0     Continuous Blood Gluc Sensor (DEXCOM G6 SENSOR) MISC CHANGE EVERY 10 DAYS. 3 each 11     Continuous Blood Gluc Transmit (DEXCOM G6 TRANSMITTER) MISC CHANGE EVERY 3 MONTHS. 1 each 0     fluticasone (FLONASE) 50 MCG/ACT nasal spray SHAKE LIQUID AND USE 1 SPRAY IN EACH NOSTRIL DAILY 16 g 0     insulin aspart (NOVOLOG VIAL) 100 UNITS/ML vial USE WITH INSULIN PUMP, TOTAL DAILY DOSE OF APPROXIMATELY 110 UNITS. 100 mL 1     insulin aspart (NOVOLOG VIAL) 100 UNITS/ML vial USE WITH INSULIN PUMP, TOTAL DAILY DOSE OF APPROXIMATELY 110 UNITS 100 mL 3     insulin pen needle (B-D U/F) 31G X 5 MM miscellaneous Use 1 pen needles daily or as directed. 100 each 3     INSULIN PUMP - OUTPATIENT Date Last Updated: 02/16/22  Tandem X2 with control IQ and Dexcom  BASAL RATES and times:  12am: (midnight): 1.10 units/hour    3am:1.5  5 am: 1.5 units/hour   8am: 2.05  12pm (noon): 1.9 units/hour   4pm: 1.45 units/hour   8 pm: 1.75 units/hour   CARB RATIO and times:  12   AM (midnight): 2.8  5am: 2.5  12  PM (noon):  2.2  8    PM:  2.7  Corection Factor (Sensitivity) and  times:  12   AM (midnight): 15 mg/dL  BG target 125 mg/dL  Active Insulin Time: 4 hours       liraglutide (VICTOZA PEN) 18 MG/3ML solution ADMINISTER 1.8 MG UNDER THE SKIN DAILY AS DIRECTED 27 mL 3     predniSONE (DELTASONE) 20 MG tablet Take 3 tabs by mouth daily x 3 days, then 2 tabs daily x 3 days, then 1 tab daily x 3 days, then 1/2 tab daily x 3 days. (Patient not taking: Reported on 7/12/2022) 20 tablet 0     sildenafil (VIAGRA) 100 MG tablet TAKE 1/2 TABLET BY MOUTH 30 MINUTES PRIOR TO INTERCOURSE AS DIRECTED 15 tablet 0     triamcinolone (KENALOG) 0.1 % external cream Apply topically 2 times daily 15 g 3         Allergies:      Allergies   Allergen Reactions     Cats      Seasonal Allergies      grass            Past Medical History:     Past Medical History:   Diagnosis Date     Cataracts, bilateral      DKA (diabetic ketoacidoses)      Morbid obesity (H)      PHUONG (obstructive sleep apnea)      T1DM (type 1 diabetes mellitus) (H)          Past Surgical History:     Past Surgical History:   Procedure Laterality Date     COLONOSCOPY       ENT SURGERY  10 years ago    cyst on neck     EXTRACTION(S) DENTAL       NECK SURGERY  2010    to remove a cyst     PHACOEMULSIFICATION CLEAR CORNEA WITH STANDARD INTRAOCULAR LENS IMPLANT Left 09/21/2020    Procedure: COMPLEX PHACOEMULSIFICATION, CATARACT, WITH INTRAOCULAR LENS IMPLANT;  Surgeon: Andre Jackson MD;  [facility]     PHACOEMULSIFICATION CLEAR CORNEA WITH STANDARD INTRAOCULAR LENS IMPLANT Right 09/28/2020    Procedure: PHACOEMULSIFICATION, CATARACT, WITH INTRAOCULAR LENS IMPLANT;  Surgeon: Andre Jackson MD;  [facility] OR         Family Medical History:     Family History   Problem Relation Age of Onset     Diabetes Maternal Grandmother      Other Cancer Maternal Grandmother         Lung Cancer     Diabetes Paternal Grandmother      Glaucoma Brother      Macular Degeneration No family hx of          Social History:     Social History      Socioeconomic History     Marital status:      Spouse name: Not on file     Number of children: Not on file     Years of education: Not on file     Highest education level: Not on file   Occupational History     Not on file   Tobacco Use     Smoking status: Never Smoker     Smokeless tobacco: Never Used   Substance and Sexual Activity     Alcohol use: Never     Drug use: Never     Sexual activity: Not on file   Other Topics Concern     Parent/sibling w/ CABG, MI or angioplasty before 65F 55M? Not Asked   Social History Narrative     Not on file     Social Determinants of Health     Financial Resource Strain: Not on file   Food Insecurity: Not on file   Transportation Needs: Not on file   Physical Activity: Not on file   Stress: Not on file   Social Connections: Not on file   Intimate Partner Violence: Not on file   Housing Stability: Not on file           Review of System:     Constitutional: Negative for fever or chills  Skin: Negative for rashes  Ears/Nose/Throat: Negative for nasal congestion, sore throat  Respiratory: No shortness of breath, dyspnea on exertion, cough, or hemoptysis  Cardiovascular: Negative for chest pain  Gastrointestinal: Negative for nausea, vomiting  Genitourinary: Negative for dysuria, hematuria  Musculoskeletal: Negative for myalgias  Neurologic: Negative for headaches  Psychiatric: Negative for depression, anxiety  Hematologic/Lymphatic/Immunologic: Negative  Endocrine: Negative  Behavioral: Negative for tobacco use       Physical Exam:   There were no vitals taken for this visit.      Diagnostic Test Results:     Diagnostic Test Results:  Labs reviewed in Epic    ASSESSMENT/PLAN:       Jose was seen today for other.    Diagnoses and all orders for this visit:    Screening for prostate cancer  -     PSA, screen; Future    Type 1 diabetes mellitus without complication (H)  -     CBC with platelets and differential; Future  -     Hemoglobin A1c; Future  -     Lipid panel  reflex to direct LDL Fasting; Future            Follow Up Plan:     Patient is instructed to return to Internal Medicine clinic for follow-up visit in 1 month.        Radha Milligan MD  Internal Medicine  [facility]

## 2022-09-22 NOTE — TELEPHONE ENCOUNTER
Last Written Prescription Date:  9/10/21  Last Fill Quantity: 100,  # refills: 3   Last office visit: 9/20/22 with prescribing provider:  Dr. Heaton   Future Office Visit:        Requested Prescriptions   Pending Prescriptions Disp Refills     insulin aspart (NOVOLOG VIAL) 100 UNITS/ML vial 100 mL 0     Sig: USE WITH INSULIN PUMP, TOTAL DAILY DOSE OF APPROXIMATELY 110 UNITS.       There is no refill protocol information for this order          Refill sent per protocol  Karla Buckner RN

## 2022-09-23 ENCOUNTER — LAB (OUTPATIENT)
Dept: LAB | Facility: CLINIC | Age: 47
End: 2022-09-23
Payer: COMMERCIAL

## 2022-09-23 DIAGNOSIS — E66.01 MORBID OBESITY (H): ICD-10-CM

## 2022-09-23 DIAGNOSIS — E10.9 TYPE 1 DIABETES MELLITUS WITHOUT COMPLICATION (H): ICD-10-CM

## 2022-09-23 DIAGNOSIS — Z12.5 SCREENING FOR PROSTATE CANCER: ICD-10-CM

## 2022-09-23 DIAGNOSIS — E13.9 DIABETES MELLITUS TYPE 1.5, MANAGED AS TYPE 2 (H): ICD-10-CM

## 2022-09-23 LAB
BASOPHILS # BLD AUTO: 0 10E3/UL (ref 0–0.2)
BASOPHILS NFR BLD AUTO: 1 %
EOSINOPHIL # BLD AUTO: 0.1 10E3/UL (ref 0–0.7)
EOSINOPHIL NFR BLD AUTO: 1 %
ERYTHROCYTE [DISTWIDTH] IN BLOOD BY AUTOMATED COUNT: 12.5 % (ref 10–15)
HBA1C MFR BLD: 7.3 % (ref 0–5.6)
HCT VFR BLD AUTO: 47 % (ref 40–53)
HGB BLD-MCNC: 15.5 G/DL (ref 13.3–17.7)
IMM GRANULOCYTES # BLD: 0 10E3/UL
IMM GRANULOCYTES NFR BLD: 0 %
LYMPHOCYTES # BLD AUTO: 1.4 10E3/UL (ref 0.8–5.3)
LYMPHOCYTES NFR BLD AUTO: 35 %
MCH RBC QN AUTO: 27.7 PG (ref 26.5–33)
MCHC RBC AUTO-ENTMCNC: 33 G/DL (ref 31.5–36.5)
MCV RBC AUTO: 84 FL (ref 78–100)
MONOCYTES # BLD AUTO: 0.4 10E3/UL (ref 0–1.3)
MONOCYTES NFR BLD AUTO: 10 %
NEUTROPHILS # BLD AUTO: 2.2 10E3/UL (ref 1.6–8.3)
NEUTROPHILS NFR BLD AUTO: 53 %
PLATELET # BLD AUTO: 299 10E3/UL (ref 150–450)
RBC # BLD AUTO: 5.59 10E6/UL (ref 4.4–5.9)
WBC # BLD AUTO: 4.1 10E3/UL (ref 4–11)

## 2022-09-23 PROCEDURE — 83036 HEMOGLOBIN GLYCOSYLATED A1C: CPT

## 2022-09-23 PROCEDURE — 80061 LIPID PANEL: CPT

## 2022-09-23 PROCEDURE — G0103 PSA SCREENING: HCPCS

## 2022-09-23 PROCEDURE — 84443 ASSAY THYROID STIM HORMONE: CPT

## 2022-09-23 PROCEDURE — 80048 BASIC METABOLIC PNL TOTAL CA: CPT

## 2022-09-23 PROCEDURE — 85025 COMPLETE CBC W/AUTO DIFF WBC: CPT

## 2022-09-23 PROCEDURE — 36415 COLL VENOUS BLD VENIPUNCTURE: CPT

## 2022-09-23 PROCEDURE — 82043 UR ALBUMIN QUANTITATIVE: CPT

## 2022-09-23 NOTE — TELEPHONE ENCOUNTER
Radha Milligan MD  Cs Triage Im 7 minutes ago (10:00 AM)     X    Please advise pt to schedule lab visit appointment to draw labs that are ordered by both me and endocrinology.    Message text

## 2022-09-23 NOTE — TELEPHONE ENCOUNTER
See evisit response in evisit encounter on 9/22/22    Taylor Ashley RN  [facility]

## 2022-09-23 NOTE — TELEPHONE ENCOUNTER
Please see patient's mychart:    Please advise if patient should continue to monitor symptoms? Or schedule VV?     Per evisit instructions no patient instructions on file.     Please reply back to patient, route to triage follow up, or route to team coordinators to have patient schedule an appointment.     Taylor Ashley RN  [facility]

## 2022-09-24 ENCOUNTER — MYC MEDICAL ADVICE (OUTPATIENT)
Dept: FAMILY MEDICINE | Facility: CLINIC | Age: 47
End: 2022-09-24

## 2022-09-24 LAB
ANION GAP SERPL CALCULATED.3IONS-SCNC: 7 MMOL/L (ref 3–14)
BUN SERPL-MCNC: 11 MG/DL (ref 7–30)
CALCIUM SERPL-MCNC: 8.9 MG/DL (ref 8.5–10.1)
CHLORIDE BLD-SCNC: 107 MMOL/L (ref 94–109)
CHOLEST SERPL-MCNC: 131 MG/DL
CO2 SERPL-SCNC: 25 MMOL/L (ref 20–32)
CREAT SERPL-MCNC: 0.82 MG/DL (ref 0.66–1.25)
CREAT UR-MCNC: 53 MG/DL
FASTING STATUS PATIENT QL REPORTED: YES
GFR SERPL CREATININE-BSD FRML MDRD: >90 ML/MIN/1.73M2
GLUCOSE BLD-MCNC: 105 MG/DL (ref 70–99)
HDLC SERPL-MCNC: 56 MG/DL
LDLC SERPL CALC-MCNC: 63 MG/DL
MICROALBUMIN UR-MCNC: <5 MG/L
MICROALBUMIN/CREAT UR: NORMAL MG/G{CREAT}
NONHDLC SERPL-MCNC: 75 MG/DL
POTASSIUM BLD-SCNC: 3.9 MMOL/L (ref 3.4–5.3)
PSA SERPL-MCNC: 1.56 UG/L (ref 0–4)
SODIUM SERPL-SCNC: 139 MMOL/L (ref 133–144)
TRIGL SERPL-MCNC: 60 MG/DL
TSH SERPL DL<=0.005 MIU/L-ACNC: 0.9 MU/L (ref 0.4–4)

## 2022-09-27 ENCOUNTER — OFFICE VISIT (OUTPATIENT)
Dept: URGENT CARE | Facility: URGENT CARE | Age: 47
End: 2022-09-27
Payer: COMMERCIAL

## 2022-09-27 VITALS — TEMPERATURE: 97.7 F | SYSTOLIC BLOOD PRESSURE: 134 MMHG | HEART RATE: 80 BPM | DIASTOLIC BLOOD PRESSURE: 89 MMHG

## 2022-09-27 DIAGNOSIS — Z11.3 SCREEN FOR STD (SEXUALLY TRANSMITTED DISEASE): Primary | ICD-10-CM

## 2022-09-27 DIAGNOSIS — Z20.2 EXPOSURE TO CHLAMYDIA: ICD-10-CM

## 2022-09-27 PROCEDURE — 96372 THER/PROPH/DIAG INJ SC/IM: CPT | Performed by: FAMILY MEDICINE

## 2022-09-27 PROCEDURE — 87491 CHLMYD TRACH DNA AMP PROBE: CPT | Performed by: FAMILY MEDICINE

## 2022-09-27 PROCEDURE — 99213 OFFICE O/P EST LOW 20 MIN: CPT | Mod: 25 | Performed by: FAMILY MEDICINE

## 2022-09-27 PROCEDURE — 87591 N.GONORRHOEAE DNA AMP PROB: CPT | Performed by: FAMILY MEDICINE

## 2022-09-27 RX ORDER — DOXYCYCLINE 100 MG/1
100 TABLET ORAL 2 TIMES DAILY
Qty: 14 TABLET | Refills: 0 | Status: SHIPPED | OUTPATIENT
Start: 2022-09-27 | End: 2022-10-04

## 2022-09-27 NOTE — PROGRESS NOTES
SUBJECTIVE: Jose Apodaca is a 46 year old male presenting with a chief complaint of chlamydia exposure.  Onset of symptoms was day(s) ago.    Past Medical History:   Diagnosis Date     Cataracts, bilateral      DKA (diabetic ketoacidoses)      Morbid obesity (H)      PHUONG (obstructive sleep apnea)      T1DM (type 1 diabetes mellitus) (H)      Allergies   Allergen Reactions     Cats      Seasonal Allergies      grass     Social History     Tobacco Use     Smoking status: Never Smoker     Smokeless tobacco: Never Used   Substance Use Topics     Alcohol use: Never       ROS:  SKIN: no rash  GI: no vomiting    OBJECTIVE:  /89 (BP Location: Left arm, Patient Position: Sitting, Cuff Size: Adult Large)   Pulse 80   Temp 97.7  F (36.5  C) (Tympanic) GENERAL APPEARANCE: healthy, alert and no distress  SKIN: no suspicious lesions or rashes      ICD-10-CM    1. Screen for STD (sexually transmitted disease)  Z11.3 NEISSERIA GONORRHOEA PCR     CHLAMYDIA TRACHOMATIS PCR     cefTRIAXone (ROCEPHIN) injection 500 mg     doxycycline monohydrate (ADOXA) 100 MG tablet   2. Exposure to chlamydia  Z20.2 cefTRIAXone (ROCEPHIN) injection 500 mg     doxycycline monohydrate (ADOXA) 100 MG tablet       Fluids/Rest, f/u if worse/not any better

## 2022-09-27 NOTE — TELEPHONE ENCOUNTER
Multiple encounter regarding this.   Per chart review patient triaged today 9/27/22 advised to be seen in person for appt or go to .     Taylor Ashley RN  [facility]

## 2022-09-28 LAB
C TRACH DNA SPEC QL NAA+PROBE: NEGATIVE
N GONORRHOEA DNA SPEC QL NAA+PROBE: NEGATIVE

## 2022-10-05 NOTE — TELEPHONE ENCOUNTER
[employer] and [email or web address] message for PT to call [phone] to schedule the f/u appt with Dr. Heaton for mid Jan.

## 2022-10-09 DIAGNOSIS — L30.9 ECZEMA, UNSPECIFIED TYPE: ICD-10-CM

## 2022-10-10 NOTE — TELEPHONE ENCOUNTER
Routing refill request to provider for review/approval because:  Drug not on the [facility] refill protocol   Rocio MARCELINO RN  [facility]

## 2022-10-12 RX ORDER — CLOBETASOL PROPIONATE 0.5 MG/G
CREAM TOPICAL
Qty: 15 G | Refills: 1 | Status: SHIPPED | OUTPATIENT
Start: 2022-10-12 | End: 2024-03-06

## 2022-10-19 DIAGNOSIS — E10.9 TYPE 1 DIABETES MELLITUS WITHOUT COMPLICATION (H): ICD-10-CM

## 2022-10-20 ENCOUNTER — MYC REFILL (OUTPATIENT)
Dept: FAMILY MEDICINE | Facility: CLINIC | Age: 47
End: 2022-10-20

## 2022-10-20 DIAGNOSIS — J30.2 SEASONAL ALLERGIC RHINITIS, UNSPECIFIED TRIGGER: ICD-10-CM

## 2022-10-23 ENCOUNTER — MYC REFILL (OUTPATIENT)
Dept: FAMILY MEDICINE | Facility: CLINIC | Age: 47
End: 2022-10-23

## 2022-10-23 DIAGNOSIS — J30.2 SEASONAL ALLERGIC RHINITIS, UNSPECIFIED TRIGGER: ICD-10-CM

## 2022-10-23 DIAGNOSIS — E10.9 TYPE 1 DIABETES MELLITUS WITHOUT COMPLICATION (H): ICD-10-CM

## 2022-10-23 RX ORDER — PROCHLORPERAZINE 25 MG/1
SUPPOSITORY RECTAL
Qty: 1 EACH | Refills: 0 | Status: CANCELLED | OUTPATIENT
Start: 2022-10-23

## 2022-10-23 RX ORDER — FLUTICASONE PROPIONATE 50 MCG
SPRAY, SUSPENSION (ML) NASAL
Qty: 16 G | Refills: 0 | Status: CANCELLED | OUTPATIENT
Start: 2022-10-23

## 2022-10-23 RX ORDER — FEXOFENADINE HCL 180 MG/1
TABLET ORAL
Qty: 90 TABLET | Refills: 0 | Status: CANCELLED | OUTPATIENT
Start: 2022-10-23

## 2022-10-24 RX ORDER — PROCHLORPERAZINE 25 MG/1
SUPPOSITORY RECTAL
Qty: 1 EACH | Refills: 0 | Status: SHIPPED | OUTPATIENT
Start: 2022-10-24 | End: 2023-01-26

## 2022-10-24 NOTE — TELEPHONE ENCOUNTER
Routing refill request to provider for review/approval because:  Patient needs to be seen because it has been more than 1 year since last office visit. Only e-visits with PCP over past year.   Rocio MARCELINO RN  [facility]

## 2022-10-25 RX ORDER — FLUTICASONE PROPIONATE 50 MCG
1 SPRAY, SUSPENSION (ML) NASAL DAILY
Qty: 16 G | Refills: 3 | Status: SHIPPED | OUTPATIENT
Start: 2022-10-25 | End: 2023-01-31

## 2022-10-25 RX ORDER — FEXOFENADINE HCL 180 MG/1
180 TABLET ORAL DAILY
Qty: 30 TABLET | Refills: 0 | Status: SHIPPED | OUTPATIENT
Start: 2022-10-25 | End: 2024-03-06

## 2022-10-25 NOTE — TELEPHONE ENCOUNTER
Medication filled 1 time as pt is due for a follow-up in clinic. Pharmacy has been notified to inform patient to call clinic and schedule appointment.     Prescription approved per [facility] Refill Protocol.  Griselda Porter RN

## 2022-10-25 NOTE — TELEPHONE ENCOUNTER
Requesting Fexofenadine, Flonase, and Dexcom G6 refills.      Duplicates all signed previously and today. Removed.    Deanna Beaver RN  [facility]

## 2022-10-25 NOTE — TELEPHONE ENCOUNTER
Routing refill request to provider for review/approval because:  Patient needs to be seen because:  LOV 14 months ago  Please see patient comment and review medication directions: Patient comment: I use 2 sprays oer nostril once a day  Griselda Porter RN

## 2022-11-01 NOTE — TELEPHONE ENCOUNTER
Refill sent.   Prior Authorization Retail Medication Request    Medication/Dose: liraglutide (VICTOZA PEN) 18 MG/3ML solution     ICD code (if different than what is on RX):     Previously Tried and Failed:     Rationale:       Insurance Name:     Insurance ID:         Pharmacy Information (if different than what is on RX)  Name:     Phone:        [facility]: [id number]

## 2022-11-14 ENCOUNTER — MYC MEDICAL ADVICE (OUTPATIENT)
Dept: FAMILY MEDICINE | Facility: CLINIC | Age: 47
End: 2022-11-14

## 2022-11-14 DIAGNOSIS — E10.9 TYPE 1 DIABETES MELLITUS WITHOUT COMPLICATION (H): ICD-10-CM

## 2022-11-14 DIAGNOSIS — E66.01 MORBID OBESITY (H): ICD-10-CM

## 2022-11-14 NOTE — TELEPHONE ENCOUNTER
LOV 9- Sudarshan ronquillo, follow up January 2023  No future OV scheduled    Team: please contact patient to schedule follow up with Dr Sudarshan Hanson, RT (R)

## 2022-11-14 NOTE — TELEPHONE ENCOUNTER
[employer] and [employer] message for PT to call [phone] to schedule f/u appt with Dr. Heaton.

## 2022-11-15 RX ORDER — FLURBIPROFEN SODIUM 0.3 MG/ML
SOLUTION/ DROPS OPHTHALMIC
Qty: 100 EACH | Refills: 3 | Status: SHIPPED | OUTPATIENT
Start: 2022-11-15 | End: 2023-07-27

## 2022-11-15 NOTE — TELEPHONE ENCOUNTER
"Last Written Prescription Date:  12/15/21  Last Fill Quantity: 100,  # refills: 3   Last office visit: 9/20/22 with prescribing provider:  Dr. Heaton   Future Office Visit:  Due back 1/23        Requested Prescriptions   Pending Prescriptions Disp Refills     insulin pen needle (B-D U/F) 31G X 5 MM miscellaneous 100 each 3     Sig: Use 1 pen needles daily or as directed.       Diabetic Supplies Protocol Passed - 11/14/2022 11:30 AM        Passed - Medication is active on med list        Passed - Patient is 18 years of age or older        Passed - Recent (6 mo) or future (30 days) visit within the authorizing provider's specialty     Patient had office visit in the last 6 months or has a visit in the next 30 days with authorizing provider.  See \"Patient Info\" tab in inbasket, or \"Choose Columns\" in Meds & Orders section of the refill encounter.               Refill sent per protocol  Karla Buckner RN    "

## 2022-11-15 NOTE — TELEPHONE ENCOUNTER
"\"Denials\" were because of duplicate requests  Rx's approved Oct 24 or Oct 25    Last E-Visit 9- with Dr Milligan, complete lab work (done) and follow up 1 month for in-person visit (not done)    Reply sent back to patient, Rx's were not denied.  Just were duplicate requests.    OV still due, call to schedule.    Jyoti Hanson, RT (R)    "

## 2022-11-28 ENCOUNTER — OFFICE VISIT (OUTPATIENT)
Dept: URGENT CARE | Facility: URGENT CARE | Age: 47
End: 2022-11-28
Payer: COMMERCIAL

## 2022-11-28 VITALS
RESPIRATION RATE: 20 BRPM | OXYGEN SATURATION: 99 % | TEMPERATURE: 98.6 F | HEART RATE: 77 BPM | BODY MASS INDEX: 48.42 KG/M2 | DIASTOLIC BLOOD PRESSURE: 90 MMHG | SYSTOLIC BLOOD PRESSURE: 148 MMHG | WEIGHT: 300 LBS

## 2022-11-28 DIAGNOSIS — R05.9 COUGH, UNSPECIFIED TYPE: ICD-10-CM

## 2022-11-28 DIAGNOSIS — R68.89 FLU-LIKE SYMPTOMS: Primary | ICD-10-CM

## 2022-11-28 LAB
FLUAV AG SPEC QL IA: NEGATIVE
FLUBV AG SPEC QL IA: NEGATIVE

## 2022-11-28 PROCEDURE — 87804 INFLUENZA ASSAY W/OPTIC: CPT | Performed by: FAMILY MEDICINE

## 2022-11-28 PROCEDURE — 99213 OFFICE O/P EST LOW 20 MIN: CPT | Performed by: FAMILY MEDICINE

## 2022-11-28 RX ORDER — BENZONATATE 100 MG/1
100 CAPSULE ORAL 3 TIMES DAILY PRN
Qty: 231 CAPSULE | Refills: 0 | Status: SHIPPED | OUTPATIENT
Start: 2022-11-28 | End: 2022-12-05

## 2022-11-28 NOTE — PROGRESS NOTES
SUBJECTIVE: Jose Apodaca is a 47 year old male presenting with a chief complaint of nasal congestion, cough  and myalgias.  Onset of symptoms was 5 day(s) ago.  Predisposing factors include None.    Past Medical History:   Diagnosis Date     Cataracts, bilateral      DKA (diabetic ketoacidoses)      Morbid obesity (H)      PHUONG (obstructive sleep apnea)      T1DM (type 1 diabetes mellitus) (H)      Allergies   Allergen Reactions     Cats      Seasonal Allergies      grass     Social History     Tobacco Use     Smoking status: Never     Smokeless tobacco: Never   Substance Use Topics     Alcohol use: Never       ROS:  SKIN: no rash  GI: no vomiting    OBJECTIVE:  BP (!) 148/90   Pulse 77   Temp 98.6  F (37  C)   Resp 20   Wt 136.1 kg (300 lb)   SpO2 99%   BMI 48.42 kg/m  GENERAL APPEARANCE: healthy, alert and no distress  EYES: EOMI,  PERRL, conjunctiva clear  HENT: ear canals and TM's normal.  Nose and mouth without ulcers, erythema or lesions  RESP: lungs clear to auscultation - no rales, rhonchi or wheezes  SKIN: no suspicious lesions or rashes      ICD-10-CM    1. Flu-like symptoms  R68.89 Influenza A/B antigen     benzonatate (TESSALON) 100 MG capsule      2. Cough, unspecified type  R05.9 benzonatate (TESSALON) 100 MG capsule          Fluids/Rest, f/u if worse/not any better

## 2022-12-02 ENCOUNTER — VIRTUAL VISIT (OUTPATIENT)
Dept: FAMILY MEDICINE | Facility: CLINIC | Age: 47
End: 2022-12-02
Payer: COMMERCIAL

## 2022-12-02 DIAGNOSIS — Z76.0 ENCOUNTER FOR MEDICATION REFILL: Primary | ICD-10-CM

## 2022-12-02 DIAGNOSIS — I10 PRIMARY HYPERTENSION: ICD-10-CM

## 2022-12-02 DIAGNOSIS — J30.2 SEASONAL ALLERGIC RHINITIS, UNSPECIFIED TRIGGER: ICD-10-CM

## 2022-12-02 DIAGNOSIS — E66.01 MORBID OBESITY (H): ICD-10-CM

## 2022-12-02 DIAGNOSIS — E10.9 TYPE 1 DIABETES MELLITUS WITHOUT COMPLICATION (H): ICD-10-CM

## 2022-12-02 DIAGNOSIS — Z12.11 SCREEN FOR COLON CANCER: ICD-10-CM

## 2022-12-02 DIAGNOSIS — G47.33 OSA (OBSTRUCTIVE SLEEP APNEA): ICD-10-CM

## 2022-12-02 PROCEDURE — 99214 OFFICE O/P EST MOD 30 MIN: CPT | Mod: 95 | Performed by: INTERNAL MEDICINE

## 2022-12-02 RX ORDER — FEXOFENADINE HCL AND PSEUDOEPHEDRINE HCL 180; 240 MG/1; MG/1
1 TABLET, EXTENDED RELEASE ORAL DAILY
Qty: 90 TABLET | Refills: 1 | Status: SHIPPED | OUTPATIENT
Start: 2022-12-02 | End: 2024-03-06

## 2022-12-02 RX ORDER — LOSARTAN POTASSIUM 25 MG/1
25 TABLET ORAL DAILY
Qty: 90 TABLET | Refills: 3 | Status: SHIPPED | OUTPATIENT
Start: 2022-12-02 | End: 2023-08-28

## 2022-12-02 NOTE — PROGRESS NOTES
Don is a 47 year old who is being evaluated via a billable telephone visit.      What phone number would you like to be contacted at?   How would you like to obtain your AVS? Gisella Prieto   Don is a 47 year old presenting for the following health issues:    HPI       Chief Complaint:     Medication refills  Type 1 Diabetes   Hypertension  Allergic rhinitis      HPI:   Patient Jose Apodaca is a very pleasant 47 year old male with history of Type 1 Diabetes on insulin pump therapy today for telephone visit for for evaluation of multiple concerns including medication refills, Type 1 Diabetes, PHUONG and morbid obeseity. Regarding the patient's Type 1 Diabetes, the patient reports that he was diagnosed with Type 1 Diabetes at age of 36. He last was diagnosed with DKA and required hospitalization in 1/2016. No DKA episodes since then. He reports that he does not require a refill of his insulin medication at this time.       Current Medications:     Current Outpatient Medications   Medication Sig Dispense Refill     benzonatate (TESSALON) 100 MG capsule Take 1 capsule (100 mg) by mouth 3 times daily as needed for cough 231 capsule 0     blood glucose (NO BRAND SPECIFIED) test strip Use to test blood sugar 4 times daily or as directed. 150 strip 6     blood glucose monitoring (SOFTCLIX) lancets TAKE AS DIRECTED FOUR TIMES DAILY 400 each 11     clobetasol (TEMOVATE) 0.05 % external cream APPLY TOPICALLY TWICE DAILY 15 g 1     Continuous Blood Gluc  (DEXCOM G6 ) TESHA Use to read blood sugars as per 's instructions. 1 each 0     Continuous Blood Gluc Sensor (DEXCOM G6 SENSOR) MISC CHANGE EVERY 10 DAYS. 3 each 11     Continuous Blood Gluc Transmit (DEXCOM G6 TRANSMITTER) MISC CHANGE EVERY 3 MONTHS. 1 each 0     fexofenadine (ALLERGY RELIEF) 180 MG tablet Take 1 tablet (180 mg) by mouth daily TAKE 1 TABLET(180 MG) BY MOUTH DAILY Strength: 180 mg 30 tablet 0      fexofenadine-pseudoePHEDrine (ALLEGRA-D 24) 180-240 MG 24 hr tablet Take 1 tablet by mouth daily 90 tablet 1     fluticasone (FLONASE) 50 MCG/ACT nasal spray Spray 1 spray into both nostrils daily SHAKE LIQUID AND USE 1 SPRAY IN EACH NOSTRIL DAILY Strength: 50 MCG/ACTSHAKE LIQUID AND USE 1 SPRAY IN EACH NOSTRIL DAILY Strength: 50 MCG/ACT 16 g 3     insulin aspart (NOVOLOG VIAL) 100 UNITS/ML vial USE WITH INSULIN PUMP, TOTAL DAILY DOSE OF APPROXIMATELY 110 UNITS. 100 mL 1     insulin aspart (NOVOLOG VIAL) 100 UNITS/ML vial USE WITH INSULIN PUMP, TOTAL DAILY DOSE OF APPROXIMATELY 110 UNITS 100 mL 3     insulin pen needle (B-D U/F) 31G X 5 MM miscellaneous Use 1 pen needles daily or as directed. 100 each 3     INSULIN PUMP - OUTPATIENT Date Last Updated: 02/16/22  Tandem X2 with control IQ and Dexcom  BASAL RATES and times:  12am: (midnight): 1.10 units/hour    3am:1.5  5 am: 1.5 units/hour   8am: 2.05  12pm (noon): 1.9 units/hour   4pm: 1.45 units/hour   8 pm: 1.75 units/hour   CARB RATIO and times:  12   AM (midnight): 2.8  5am: 2.5  12  PM (noon):  2.2  8    PM:  2.7  Corection Factor (Sensitivity) and times:  12   AM (midnight): 15 mg/dL  BG target 125 mg/dL  Active Insulin Time: 4 hours       liraglutide (VICTOZA PEN) 18 MG/3ML solution ADMINISTER 1.8 MG UNDER THE SKIN DAILY AS DIRECTED 27 mL 3     losartan (COZAAR) 25 MG tablet Take 1 tablet (25 mg) by mouth daily 90 tablet 3     predniSONE (DELTASONE) 20 MG tablet Take 3 tabs by mouth daily x 3 days, then 2 tabs daily x 3 days, then 1 tab daily x 3 days, then 1/2 tab daily x 3 days. 20 tablet 0     sildenafil (VIAGRA) 100 MG tablet TAKE 1/2 TABLET BY MOUTH 30 MINUTES PRIOR TO INTERCOURSE AS DIRECTED 15 tablet 0     triamcinolone (KENALOG) 0.1 % external cream Apply topically 2 times daily 15 g 3         Allergies:      Allergies   Allergen Reactions     Cats      Seasonal Allergies      grass            Past Medical History:     Past Medical History:   Diagnosis  Date     Cataracts, bilateral      DKA (diabetic ketoacidoses)      Morbid obesity (H)      PHUONG (obstructive sleep apnea)      T1DM (type 1 diabetes mellitus) (H)          Past Surgical History:     Past Surgical History:   Procedure Laterality Date     COLONOSCOPY       ENT SURGERY  10 years ago    cyst on neck     EXTRACTION(S) DENTAL       NECK SURGERY  2010    to remove a cyst     PHACOEMULSIFICATION CLEAR CORNEA WITH STANDARD INTRAOCULAR LENS IMPLANT Left 09/21/2020    Procedure: COMPLEX PHACOEMULSIFICATION, CATARACT, WITH INTRAOCULAR LENS IMPLANT;  Surgeon: Andre Jackson MD;  Location: [facility] OR     PHACOEMULSIFICATION CLEAR CORNEA WITH STANDARD INTRAOCULAR LENS IMPLANT Right 09/28/2020    Procedure: PHACOEMULSIFICATION, CATARACT, WITH INTRAOCULAR LENS IMPLANT;  Surgeon: Andre Jackson MD;  Location: [facility] OR         Family Medical History:     Family History   Problem Relation Age of Onset     Diabetes Maternal Grandmother      Other Cancer Maternal Grandmother         Lung Cancer     Diabetes Paternal Grandmother      Glaucoma Brother      Macular Degeneration No family hx of          Social History:     Social History     Socioeconomic History     Marital status:      Spouse name: Not on file     Number of children: Not on file     Years of education: Not on file     Highest education level: Not on file   Occupational History     Not on file   Tobacco Use     Smoking status: Never     Smokeless tobacco: Never   Substance and Sexual Activity     Alcohol use: Never     Drug use: Never     Sexual activity: Not on file   Other Topics Concern     Parent/sibling w/ CABG, MI or angioplasty before 65F 55M? Not Asked   Social History Narrative     Not on file     Social Determinants of Health     Financial Resource Strain: Not on file   Food Insecurity: Not on file   Transportation Needs: Not on file   Physical Activity: Not on file   Stress: Not on file   Social Connections: Not on file   Intimate  Partner Violence: Not on file   Housing Stability: Not on file           Review of System:     Constitutional: Negative for fever or chills, positive for chronic morbid obesity  Skin: Negative for rashes  Ears/Nose/Throat: positive for allergic rhinitis  Respiratory: No shortness of breath, dyspnea on exertion, cough, or hemoptysis  Cardiovascular: Negative for chest pain  Gastrointestinal: Negative for nausea, vomiting  Genitourinary: Negative for dysuria, hematuria  Musculoskeletal: Negative for myalgias  Neurologic: Negative for headaches  Psychiatric: Negative for depression, anxiety  Hematologic/Lymphatic/Immunologic: Negative  Endocrine: Negative for recent hypoglycemia events. Positive for chronic Type 1 Diabetes on insulin pump therapy.  Behavioral: Negative for tobacco use       Physical Exam:   There were no vitals taken for this visit.    RESP: no cough present   NEURO: Alert & Oriented x 3.   PSYCH: mentation appears normal, affect normal        Diagnostic Test Results:     Diagnostic Test Results:  Lab Results   Component Value Date    A1C 7.3 09/23/2022    A1C 7.2 03/08/2022    A1C 6.8 09/08/2021    A1C 7.0 05/05/2021    A1C 7.3 10/02/2020    A1C 7.5 08/20/2020    A1C 8.5 05/28/2020         ASSESSMENT/PLAN:     (I10) Primary hypertension  (primary encounter diagnosis)  Comment: BP is not at goal  Plan: losartan (COZAAR) 25 MG tablet, Home Blood         Pressure Monitor Order for DME - ONLY FOR DME    (J30.2) Seasonal allergic rhinitis, unspecified trigger  Comment: stable  Plan: fexofenadine-pseudoePHEDrine (ALLEGRA-D 24)         180-240 MG 24 hr tablet    (E10.9) Type 1 diabetes mellitus without complication (H)  Comment: Type 1 Diabetes on insulin pump therapy, no recent hypoglycemia events. No recent DKA episodes since last DKA hospitalization in 1/2016.   Plan: continue current therapy      (G47.33) PHUONG (obstructive sleep apnea)  (E66.01) Morbid obesity (H)  Comment: chronic morbid obesity with  PHUONG  Plan: diet, exercise      Follow Up Plan:     Patient is instructed to return to Internal Medicine clinic for follow-up visit in 3 to 6 months.        Radha Milligan MD  Internal Medicine  [facility]      telephone visit duration including chart review, medication management: 30 minutes

## 2022-12-06 ENCOUNTER — OFFICE VISIT (OUTPATIENT)
Dept: OPTOMETRY | Facility: CLINIC | Age: 47
End: 2022-12-06
Payer: COMMERCIAL

## 2022-12-06 DIAGNOSIS — E10.9 TYPE 1 DIABETES MELLITUS WITHOUT COMPLICATION (H): Primary | ICD-10-CM

## 2022-12-06 DIAGNOSIS — H18.613 STABLE KERATOCONUS OF BOTH EYES: ICD-10-CM

## 2022-12-06 DIAGNOSIS — Z96.1 PSEUDOPHAKIA: ICD-10-CM

## 2022-12-06 ASSESSMENT — REFRACTION_CURRENTRX
OD_SPHERE: +3.50
OD_SPHERE: -2.00
OD_BASECURVE: 4.8/7.1
OD_BASECURVE: 8.0
OD_ADDL_SPECS: OPT EXTRA CLEAR
OD_DIAMETER: 16.0
OD_BRAND: ZENLENS TRIAL
OS_SPHERE: +3.75
OD_DIAMETER: 15.5
OD_BRAND: ONEFIT
OS_BRAND: ONEFIT
OS_ADDL_SPECS: OPT EXTRA BLUE
OS_BASECURVE: 7.8
OS_DIAMETER: 15.2

## 2022-12-06 ASSESSMENT — VISUAL ACUITY
OD_CC: 20/20
CORRECTION_TYPE: CONTACTS
VA_OR_OS_CURRENT_RX: 20/25+1
METHOD: SNELLEN - LINEAR
OS_CC: 20/25
OS_CC+: -2

## 2022-12-06 ASSESSMENT — TONOMETRY
OS_IOP_MMHG: 09
OD_IOP_MMHG: 10
IOP_METHOD: ICARE

## 2022-12-06 ASSESSMENT — CONF VISUAL FIELD
OS_INFERIOR_TEMPORAL_RESTRICTION: 0
OS_NORMAL: 1
OS_INFERIOR_NASAL_RESTRICTION: 0
OD_NORMAL: 1
OD_INFERIOR_NASAL_RESTRICTION: 0
OD_SUPERIOR_TEMPORAL_RESTRICTION: 0
OS_SUPERIOR_NASAL_RESTRICTION: 0
OD_INFERIOR_TEMPORAL_RESTRICTION: 0
OS_SUPERIOR_TEMPORAL_RESTRICTION: 0
METHOD: COUNTING FINGERS
OD_SUPERIOR_NASAL_RESTRICTION: 0

## 2022-12-06 ASSESSMENT — SLIT LAMP EXAM - LIDS
COMMENTS: MGD
COMMENTS: MGD

## 2022-12-06 ASSESSMENT — CUP TO DISC RATIO
OS_RATIO: 0.3
OD_RATIO: 0.3

## 2022-12-06 ASSESSMENT — EXTERNAL EXAM - LEFT EYE: OS_EXAM: NORMAL

## 2022-12-06 ASSESSMENT — EXTERNAL EXAM - RIGHT EYE: OD_EXAM: NORMAL

## 2022-12-06 NOTE — PROGRESS NOTES
A/P  1.) Keratoconus each eye, s/p CE/IOL each eye  -Overall doing well in scleral lens. BCVA 20/20 right eye, 20/25 left eye. Good comfort while in. Wearing plano PAL's over  -Tight fit inferiorly right eye>left eye. He does endorse some difficulty removing right lens at times, moreso than left  -Maxed out in current design. Refit to Zenlens right eye. Will flatten inferior significantly. Reviewed dot at bottom placement. He is comfortable trying lens at home.     2.) Type 1 DM without ophthalmic manifestation   -Last A1c 7.3, no h/o diabetic retinopathy  -Reviewed effects of DM on the eyes and importance of good blood sugar control  -Monitor annually with dilation    Order new right lens and mail to pt. F/u prn with lens concerns, otherwise 1 year diabetic eye exam    I have confirmed the patient's CC, HPI and reviewed Past Medical History, Past Surgical History, Social History, Family History, Problem List, Medication List and agree with Tech note.     Adalgisa Simon, OD FAAO FSLS    Contact Lens Billing  V-Code:  - GP scleral  Final Contact Lens Rx       Brand Base Curve Diameter Sphere Lens Addl. Specs    Right Zenlens Prolate 7.5bc, 4.7 sag 16.0 +0.75 quad PC: 2 flat @ 0, std @ 90, 2 flat @ 180, 5 flat @ 270 [place] XO clear    Left Onefit 7.8 15.2 +3.75 3 flat sym Opt Extra blue         # of units: 1 right lens  Price per Unit: $225    This patient requires contact lenses that are medically necessary for either improvement in vision over spectacles, support of the ocular surface, or other therapeutic benefit. These are not cosmetic contact lenses.     Encounter Diagnoses   Name Primary?     Type 1 diabetes mellitus without complication (H) Yes     Stable keratoconus of both eyes      Pseudophakia

## 2022-12-09 DIAGNOSIS — G47.33 OBSTRUCTIVE SLEEP APNEA: Primary | ICD-10-CM

## 2022-12-26 ENCOUNTER — TELEPHONE (OUTPATIENT)
Dept: GASTROENTEROLOGY | Facility: CLINIC | Age: 47
End: 2022-12-26

## 2022-12-26 ENCOUNTER — HOSPITAL ENCOUNTER (OUTPATIENT)
Facility: CLINIC | Age: 47
End: 2022-12-26
Attending: COLON & RECTAL SURGERY | Admitting: COLON & RECTAL SURGERY
Payer: COMMERCIAL

## 2022-12-26 DIAGNOSIS — E10.9 TYPE 1 DIABETES MELLITUS WITHOUT COMPLICATION (H): ICD-10-CM

## 2022-12-26 DIAGNOSIS — E88.819 INSULIN RESISTANCE: ICD-10-CM

## 2022-12-26 DIAGNOSIS — Z12.11 ENCOUNTER FOR SCREENING COLONOSCOPY: Primary | ICD-10-CM

## 2022-12-26 DIAGNOSIS — Z96.41 INSULIN PUMP STATUS: ICD-10-CM

## 2022-12-26 RX ORDER — INSULIN ASPART 100 [IU]/ML
INJECTION, SOLUTION INTRAVENOUS; SUBCUTANEOUS
Qty: 100 ML | Refills: 1 | Status: SHIPPED | OUTPATIENT
Start: 2022-12-26 | End: 2023-02-27

## 2022-12-26 NOTE — TELEPHONE ENCOUNTER
"Last Written Prescription Date:  9/22/22  Last Fill Quantity: 100,  # refills: 1   Last office visit: 9/20/22 with prescribing provider:  Dr. Heaton   Future Office Visit:  2/15/23        Requested Prescriptions   Pending Prescriptions Disp Refills     insulin aspart (NOVOLOG VIAL) 100 UNITS/ML vial 100 mL 1     Sig: USE WITH INSULIN PUMP, TOTAL DAILY DOSE OF APPROXIMATELY 110 UNITS.       Short Acting Insulin Protocol Passed - 12/26/2022 11:41 AM        Passed - Serum creatinine on file in past 12 months     Recent Labs   Lab Test 09/23/22  1206   CR 0.82       Ok to refill medication if creatinine is low          Passed - HgbA1C in past 3 or 6 months     If HgbA1C is 8 or greater, it needs to be on file within the past 3 months.  If less than 8, must be on file within the past 6 months.     Recent Labs   Lab Test 09/23/22  1206   A1C 7.3*             Passed - Medication is active on med list        Passed - Patient is age 18 or older        Passed - Recent (6 mo) or future (30 days) visit within the authorizing provider's specialty     Patient had office visit in the last 6 months or has a visit in the next 30 days with authorizing provider or within the authorizing provider's specialty.  See \"Patient Info\" tab in inbasket, or \"Choose Columns\" in Meds & Orders section of the refill encounter.               Refills sent  Karla Buckner RN    " No

## 2022-12-26 NOTE — TELEPHONE ENCOUNTER
Screening Questions  BLUE  KIND OF PREP RED  LOCATION [review exclusion criteria] GREEN  SEDATION TYPE        Y Are you active on mychart?     Radha Milligan MD Ordering/Referring Provider?        [employer] What type of coverage do you have?      N Have you had a positive covid test in the last 14 days?     47.8 1. BMI  [BMI 40+ - review exclusion criteria]    Y  2. Are you able to give consent for your medical care? [IF NO,RN REVIEW]        N  3. Are you taking any prescription pain medications on a routine schedule?        3a. EXTENDED PREP What kind of prescription?     N 4. Do you have any chemical dependencies such as alcohol, street drugs, or methadone?    N 5. Do you have any history of post-traumatic stress syndrome, severe anxiety or history of psychosis?      **If yes 3- 5 , please schedule with MAC sedation.**          IF YES TO ANY 6 - 10 - HOSPITAL SETTING ONLY.     N 6.   Do you need assistance transferring?     N 7.   Have you had a heart or lung transplant?    N 8.   Are you currently on dialysis?   N 9.   Do you use daily home oxygen?   N 10. Do you take nitroglycerin?   10a.  If yes, how often?     11. [FEMALES]   Are you currently pregnant?    11a.  If yes, how many weeks? [ Greater than 12 weeks, OR NEEDED]    N 12. Do you have Pulmonary Hypertension? *NEED PAC APPT AT [facility]*     N 13. [review exclusion criteria]  Do you have any implantable devices in your body (pacemaker, defib, LVAD)?    N 14. In the past 6 months, have you had any heart related issues including cardiomyopathy or heart attack?     14a.  If yes, did it require cardiac stenting if so when?     N 15. Have you had a stroke or Transient ischemic attack (TIA - aka  mini stroke ) within 6 months?      Y 16. Do you have mod to severe Obstructive Sleep Apnea?  [Hospital only - Ok at [facility]]    N 17. Do you have SEVERE AND UNCONTROLLED asthma? *NEED PAC APPT AT [facility]*     N 18. Are you currently taking any blood  "thinners?     18a. If yes, inform patient to \"follow up w/ ordering provider for bridging instructions.\"    N 19. Do you take the medication Phentermine?    19a. If yes, \"Hold for 7 days before procedure.  Please consult your prescribing provider if you have questions about holding this medication.\"     N  20. Do you have chronic kidney disease?      Y  21. Do you have a diagnosis of diabetes?     N  22. On a regular basis do you go 3-5 days between bowel movements?      23. Preferred LOCAL Pharmacy for Pre Prescription    [ LIST ONLY ONE PHARMACY]        [id number] [address]          - CLOSING REMINDERS -    Informed patient they will need an adult    Cannot take any type of public or medical transportation alone    Conscious Sedation- Needs  for 6 hours after the procedure       MAC/General-Needs  for 24 hours after procedure    Pre-Procedure Covid test to be completed [ESSC PCR Testing Required]    Confirmed Nurse will call to complete assessment       - SCHEDULING DETAILS -  Y Hospital Setting Required? If yes, what is the exclusion?: PHUONG   RONALDO  Surgeon    02/16/2023  Date of Procedure  Lower Endoscopy [Colonoscopy]  Type of Procedure Scheduled  [facility] EXTENDED - If you answer yes to questions #1, #3, #22 ([place] and CF pts)Which Colonoscopy Prep was Sent?     MODERATE Sedation Type     NO PAC / Pre-op Required               "

## 2023-01-14 ENCOUNTER — HEALTH MAINTENANCE LETTER (OUTPATIENT)
Age: 48
End: 2023-01-14

## 2023-01-24 ENCOUNTER — TELEPHONE (OUTPATIENT)
Dept: ENDOCRINOLOGY | Facility: CLINIC | Age: 48
End: 2023-01-24
Payer: COMMERCIAL

## 2023-01-24 DIAGNOSIS — E10.9 TYPE 1 DIABETES MELLITUS WITHOUT COMPLICATION (H): ICD-10-CM

## 2023-01-24 DIAGNOSIS — E66.01 MORBID OBESITY (H): ICD-10-CM

## 2023-01-24 RX ORDER — LIRAGLUTIDE 6 MG/ML
INJECTION SUBCUTANEOUS
Qty: 27 ML | Refills: 1 | Status: SHIPPED | OUTPATIENT
Start: 2023-01-24 | End: 2023-04-06 | Stop reason: ALTCHOICE

## 2023-01-24 NOTE — TELEPHONE ENCOUNTER
Last Written Prescription Date:  12/15/2021  Last Fill Quantity: 27,  # refills: 3   Last office visit: 9/20/2022 with prescribing provider:     Future Office Visit:      Requested Prescriptions   Pending Prescriptions Disp Refills     liraglutide (VICTOZA PEN) 18 MG/3ML solution 27 mL 3     Sig: ADMINISTER 1.8 MG UNDER THE SKIN DAILY AS DIRECTED       There is no refill protocol information for this order

## 2023-01-24 NOTE — TELEPHONE ENCOUNTER
Prior Authorization Approval    Authorization Effective Date: 1/24/2023  Authorization Expiration Date: 1/23/2024  Medication: Victoza - PA Approved   Approved Dose/Quantity: 1 month  Reference #: [id number]   Insurance Company: [employer] - Phone [phone] Fax [phone]  Expected CoPay:       CoPay Card Available:      Foundation Assistance Needed:    Which Pharmacy is filling the prescription (Not needed for infusion/clinic administered):    Pharmacy Notified:    Patient Notified:      TANGELA CALLED FROM INSURANCE AND APPROVED VICTOZA. PA APPROVAL WILL BE MAILED TO CLINIC    CLOSING ENCOUNTER

## 2023-01-24 NOTE — TELEPHONE ENCOUNTER
"Requested Prescriptions   Pending Prescriptions Disp Refills     liraglutide (VICTOZA PEN) 18 MG/3ML solution 27 mL 3     Sig: ADMINISTER 1.8 MG UNDER THE SKIN DAILY AS DIRECTED       GLP-1 Agonists Protocol Passed - 1/24/2023  9:19 AM        Passed - HgbA1C in past 3 or 6 months     If HgbA1C is 8 or greater, it needs to be on file within the past 3 months.  If less than 8, must be on file within the past 6 months.     Recent Labs   Lab Test 09/23/22  1206   A1C 7.3*             Passed - Medication is active on med list        Passed - Patient is age 18 or older        Passed - Normal serum creatinine on file in past 12 months     Recent Labs   Lab Test 09/23/22  1206   CR 0.82       Ok to refill medication if creatinine is low          Passed - Recent (6 mo) or future (30 days) visit within the authorizing provider's specialty     Patient had office visit in the last 6 months or has a visit in the next 30 days with authorizing provider.  See \"Patient Info\" tab in inbasket, or \"Choose Columns\" in Meds & Orders section of the refill encounter.               Next appt 2/15/23  Refills sent  Karla Buckner RN    "

## 2023-01-24 NOTE — TELEPHONE ENCOUNTER
PA Initiation    Medication: Victoza - PA Pending   Insurance Company: [employer] - Phone [phone] Fax [phone]  Pharmacy Filling the Rx:    Filling Pharmacy Phone:    Filling Pharmacy Fax:    Start Date: 1/24/2023

## 2023-01-30 DIAGNOSIS — J30.2 SEASONAL ALLERGIC RHINITIS, UNSPECIFIED TRIGGER: ICD-10-CM

## 2023-01-31 RX ORDER — FLUTICASONE PROPIONATE 50 MCG
SPRAY, SUSPENSION (ML) NASAL
Qty: 16 G | Refills: 3 | Status: SHIPPED | OUTPATIENT
Start: 2023-01-31 | End: 2023-07-11

## 2023-02-10 RX ORDER — BISACODYL 5 MG
TABLET, DELAYED RELEASE (ENTERIC COATED) ORAL
Qty: 4 TABLET | Refills: 0 | Status: SHIPPED | OUTPATIENT
Start: 2023-02-10 | End: 2023-02-16 | Stop reason: HOSPADM

## 2023-02-15 ENCOUNTER — VIRTUAL VISIT (OUTPATIENT)
Dept: ENDOCRINOLOGY | Facility: CLINIC | Age: 48
End: 2023-02-15
Payer: COMMERCIAL

## 2023-02-15 DIAGNOSIS — E66.01 MORBID OBESITY (H): ICD-10-CM

## 2023-02-15 DIAGNOSIS — E10.9 TYPE 1 DIABETES MELLITUS WITHOUT COMPLICATION (H): Primary | ICD-10-CM

## 2023-02-15 DIAGNOSIS — Z96.41 INSULIN PUMP STATUS: ICD-10-CM

## 2023-02-15 PROCEDURE — 99214 OFFICE O/P EST MOD 30 MIN: CPT | Mod: VID | Performed by: INTERNAL MEDICINE

## 2023-02-15 PROCEDURE — 95251 CONT GLUC MNTR ANALYSIS I&R: CPT | Performed by: INTERNAL MEDICINE

## 2023-02-15 NOTE — PROGRESS NOTES
Video-Visit Details    Type of service:  Video Visit    Video Start Time (time video started): 3:38 pm    Video End Time (time video stopped): 4:00 pm    Originating Location (pt. Location):  Home        Distant Location (provider location):  Off-site    Mode of Communication:  Video Conference via [employer]        Recent issues:  Diabetes follow-up evaluation  Continues with Tandem pump and DexcomG6 sensor use, also the Victoza daily  Recalls having elevated BP at medical appt(s), then PCP started losartan 25 mg daily          2011. Diagnosis of diabetes mellitus while living in [place]  Acute symptoms increased thirst, frequent urination, fatigue, weight loss (265 to 203#/30 days)  Hospitalized and diagnosis of DKA  Initial treatment with Apidra and Lantus insulin  Met with an endocrinologist Dr. Lina Fish  ~6 mo later, switched to [place] Ping pump     2012. Continued using this pump for 1 year, then moved to [facility]  Saw Dr. Beena Johnson/Endocrinologist in [place]  Switched to Tandem insulin pump    Previous [facility] labs include:       Subsequent move back to [place]  Recalls taking Victoza medication along with pump management, success with significant weight loss  Moved to [place]  9/2019. Moved to [place], [place]     Medical evaluations with Dr. 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 Started Tandem insulin pump     Previous [facility] labs include:          2/19/20. Med evaluation with Dr. ERIKA Milligan/[facility]  No lab tests ordered  Started DexcomG6 CGMS sensor  ~4/2020. Upgrade to the Control IQ pump         5/26/20. Initial diabetes evaluation with me, [facility]  Continued use of the Tandem TSlim pump with the DexcomG6  ~6/2020. Restarted (off label) Victoza  10/2020. Insurance coverage for Victoza, used 1.8 mg daily  11/2021. Ran out of Victoza  ~3/2022. Restarted Victoza medication    Using Tandem  TSlim pump with DexcomG6 sensor              Novolog in pump   Victoza (off label) 1.8 mg daily    Current Tandem pump settings:      Recent pump and sensor data:          Recent DexcomG6 data:        Blood glucose (BG) meter:  Accu-check               Tests infrequently  Fam Hx DM:    MA, Mcousin, MGM, PGM     Previous [facility] labs include:          Lab Results   Component Value Date    A1C 7.3 (H) 09/23/2022     09/23/2022    POTASSIUM 3.9 09/23/2022    CHLORIDE 107 09/23/2022    CO2 25 09/23/2022    ANIONGAP 7 09/23/2022     (H) 09/23/2022    BUN 11 09/23/2022    CR 0.82 09/23/2022    GFRESTIMATED >90 09/23/2022    GFRESTBLACK >90 05/05/2021    MICK 8.9 09/23/2022    CPEPT <0.1 (L) 05/28/2020    CHOL 131 09/23/2022    TRIG 60 09/23/2022    HDL 56 09/23/2022    LDL 63 09/23/2022    NHDL 75 09/23/2022    UCRR 53 09/23/2022    MICROL <5 09/23/2022    UMALCR  09/23/2022      Comment:      Unable to calculate, urine albumin or creatinine is outside the detectable limits.     Lab Results   Component Value Date    TSH 0.90 09/23/2022     Last eye exam 10/2020, no DR noted  DM Complications: none        , lives in [place], [place]; works with computer networking  Sees Dr. ERIKA Milligan/[facility] for general medicine evaluations.       PMH/PSH:  Past Medical History:   Diagnosis Date     Cataracts, bilateral      DKA (diabetic ketoacidoses)      Morbid obesity (H)      PHUONG (obstructive sleep apnea)      T1DM (type 1 diabetes mellitus) (H)      Past Surgical History:   Procedure Laterality Date     COLONOSCOPY       ENT SURGERY  10 years ago    cyst on neck     EXTRACTION(S) DENTAL       NECK SURGERY  2010    to remove a cyst     PHACOEMULSIFICATION CLEAR CORNEA WITH STANDARD INTRAOCULAR LENS IMPLANT Left 09/21/2020    Procedure: COMPLEX PHACOEMULSIFICATION, CATARACT, WITH INTRAOCULAR LENS IMPLANT;  Surgeon: Andre Jackson MD;  [facility]     PHACOEMULSIFICATION CLEAR CORNEA WITH STANDARD  INTRAOCULAR LENS IMPLANT Right 09/28/2020    Procedure: PHACOEMULSIFICATION, CATARACT, WITH INTRAOCULAR LENS IMPLANT;  Surgeon: Andre Jackson MD;  [facility] OR       Family Hx:  Family History   Problem Relation Age of Onset     Diabetes Maternal Grandmother      Other Cancer Maternal Grandmother         Lung Cancer     Diabetes Paternal Grandmother      Glaucoma Brother      Macular Degeneration No family hx of          Social Hx:  Social History     Socioeconomic History     Marital status: Single     Spouse name: Not on file     Number of children: Not on file     Years of education: Not on file     Highest education level: Not on file   Occupational History     Not on file   Tobacco Use     Smoking status: Never     Smokeless tobacco: Never   Substance and Sexual Activity     Alcohol use: Never     Drug use: Never     Sexual activity: Not on file   Other Topics Concern     Parent/sibling w/ CABG, MI or angioplasty before 65F 55M? Not Asked   Social History Narrative     Not on file     Social Determinants of Health     Financial Resource Strain: Not on file   Food Insecurity: Not on file   Transportation Needs: Not on file   Physical Activity: Not on file   Stress: Not on file   Social Connections: Not on file   Intimate Partner Violence: Not on file   Housing Stability: Not on file          MEDICATIONS:  has a current medication list which includes the following prescription(s): dulcolax, clobetasol, dexcom g6 sensor, dexcom g6 transmitter, fexofenadine, fexofenadine-pseudoephedrine, fluticasone, insulin aspart, insulin pump, losartan, polyethylene glycol, prednisone, sildenafil, triamcinolone, blood glucose, blood glucose monitoring, dexcom g6 , insulin aspart, b-d u/f, and victoza pen.       ROS: 10 point ROS neg other than the symptoms noted above in the HPI.     GENERAL: mild fatigue, wt stable; denies fevers, chills, night sweats.   HEENT: no dysphagia, odonophagia, diplopia, neck  pain  THYROID:  no apparent hyper or hypothyroid symptoms  CV: no chest pain, pressure, palpitations  LUNGS: no SOB, AGARWAL, cough, wheezing   ABDOMEN: no diarrhea, constipation, abdominal pain  EXTREMITIES: no rashes, ulcers, edema  NEUROLOGY: right leg tingling; no headaches, denies changes in vision, extremitiy numbness   MSK: no muscle aches or pains, weakness  SKIN: no rashes or lesions  : decreased erection function; occasional nocturia  PSYCH:  stable mood, no significant anxiety or depression  ENDOCRINE: no heat or cold intolerance    Physical Exam (visual exam)  VS:  no vital signs taken for video visit  CONSTITUTIONAL: healthy, alert and NAD, well dressed, answering questions appropriately  ENT: no nose swelling or nasal discharge, mouth redness or gum changes.  EYES: eyes grossly normal to inspection, conjunctivae and sclerae normal, no exophthalmos or proptosis  THYROID:  no apparent nodules or goiter  LUNGS: no audible wheeze, cough or visible cyanosis, no visible retractions or increased work of breathing  ABDOMEN: abdomen not evaluated  EXTREMITIES: no hand tremors, limited exam  NEUROLOGY: CN grossly intact, mentation intact and speech normal   SKIN:  no apparent skin lesions, rash, or edema with visualized skin appearance  PSYCH: mentation appears normal, affect normal/bright, judgement and insight intact,   normal speech and appearance well groomed       LABS:     All pertinent notes, labs, and images personally reviewed by me.     A/P:  Encounter Diagnoses   Name Primary?     Type 1 diabetes mellitus without complication (H) Yes     Insulin pump status      Morbid obesity (H)        Comments:  Reviewed health history and diabetes issues.  Health history and lab testing indicates T1.5DM managed as Type 1  The off-label Victoza medication has been helpful for management  Reviewed and interpreted tests that I previously ordered.   Ordered appropriate tests for the endocrinology disease management.     Management options discussed and implemented after shared medical decision making with the patient.  T1DM problem is chronic-stable    Plan:  Reviewed the overall T1DM management and insulin pump use.  Discussed optimal BG testing to assess glucose trends.  We reviewed insulin pump settings, basal rate and bolus dosing  Use of automated pump bolus dosing for meal/snack carb & correction dosing  Reviewed recent Tandem pump and DexcomG6 CGM glucose trend data, in detail     Recommend:  Continue the Tandem insulin pump, DexcomG6 sensor, and diabetes management plan.  Pump setting changes:   Bolus ICR 4p 2.2 to 2.0, 8p 2.5 to 2.3      Important to enter meal and snack carb amount for automated bolusing  Avoid eating carb snack to raise late evening SG trends  We have discussed use of the Tandem pump Exercise Mode for aerobic exercise  Discussed use of the off-label GLP1RA medications for management   Continue Victoza 1.8 mg daily or consider switch to Ozempic 0.5 mg weekly alternative   He will consider this option and let me know preference  Continue the walking exercise routine... helps his insulin sensitivity  Monitor for symptom changes, including GI symptoms   Try body lotion (or Calamine lotion) to feet itching areas   If kinking or clicking or inability to extend (right index) finger, plan to see ortho hand physician   Keep focus on diet, exercise, and weight management  Needs follow-up BP check with use of the low dose losartan medication, discussed work-in visit at our office soon  Would benefit from dietician evaluation, also commercial weight loss program or bariatric surgery  Plan repeat fasting labs soon   Discussed [facility] clinic   Lab orders placed  Arrange annual dilated eye exam, fasting lipid panel testing.     Addressed patient's questions today.     There are no Patient Instructions on file for this visit.    Future labs ordered today:   Orders Placed This Encounter   Procedures     GLUCOSE  MONITOR, 72 HOUR, PHYS INTERP     Hemoglobin A1c     Basic metabolic panel     ALT     Radiology/Consults ordered today: None    Total time spent on day of encounter:  35 min    Follow-up:  5/2023, Gilma Heaton MD, MS  Endocrinology  [facility]    CC:  ERIKA Milligan

## 2023-02-15 NOTE — LETTER
2/15/2023         RE: Jose Apodaca  [address]  [address]  [place] [address]        Dear Colleague,    Thank you for referring your patient, Jose Apodaca, to the [facility] [place]. Please see a copy of my visit note below.    Video-Visit Details    Type of service:  Video Visit    Video Start Time (time video started): 3:38 pm    Video End Time (time video stopped): 4:00 pm    Originating Location (pt. Location):  Home        Distant Location (provider location):  Off-site    Mode of Communication:  Video Conference via [employer]        Recent issues:  Diabetes follow-up evaluation  Continues with Tandem pump and DexcomG6 sensor use, also the Victoza daily  Recalls having elevated BP at medical appt(s), then PCP started losartan 25 mg daily          2011. Diagnosis of diabetes mellitus while living in [place]  Acute symptoms increased thirst, frequent urination, fatigue, weight loss (265 to 203#/30 days)  Hospitalized and diagnosis of DKA  Initial treatment with Apidra and Lantus insulin  Met with an endocrinologist Dr. Lina Fish  ~6 mo later, switched to [place] Ping pump     2012. Continued using this pump for 1 year, then moved to [facility]  Saw Dr. Beena Johnson/Endocrinologist in [place]  Switched to Tandem insulin pump    Previous [facility] labs include:       Subsequent move back to [place]  Recalls taking Victoza medication along with pump management, success with significant weight loss  Moved to [place]  9/2019. Moved to [place], [place]     Medical evaluations with Dr. 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 Started Tandem insulin pump     Previous [facility] labs include:          2/19/20. Med evaluation with Dr. ERIKA Milligan/[facility]  No lab tests ordered  Started DexcomG6 CGMS sensor  ~4/2020. Upgrade to the Control IQ pump         5/26/20. Initial diabetes evaluation  with me, [facility]  Continued use of the Tandem TSlim pump with the DexcomG6  ~6/2020. Restarted (off label) Victoza  10/2020. Insurance coverage for Victoza, used 1.8 mg daily  11/2021. Ran out of Victoza  ~3/2022. Restarted Victoza medication    Using Tandem TSlim pump with DexcomG6 sensor              Novolog in pump   Victoza (off label) 1.8 mg daily    Current Tandem pump settings:      Recent pump and sensor data:          Recent DexcomG6 data:        Blood glucose (BG) meter:  Accu-check               Tests infrequently  Fam Hx DM:    MA, Mcousin, MGM, PGM     Previous  labs include:          Lab Results   Component Value Date    A1C 7.3 (H) 09/23/2022     09/23/2022    POTASSIUM 3.9 09/23/2022    CHLORIDE 107 09/23/2022    CO2 25 09/23/2022    ANIONGAP 7 09/23/2022     (H) 09/23/2022    BUN 11 09/23/2022    CR 0.82 09/23/2022    GFRESTIMATED >90 09/23/2022    GFRESTBLACK >90 05/05/2021    MICK 8.9 09/23/2022    CPEPT <0.1 (L) 05/28/2020    CHOL 131 09/23/2022    TRIG 60 09/23/2022    HDL 56 09/23/2022    LDL 63 09/23/2022    NHDL 75 09/23/2022    UCRR 53 09/23/2022    MICROL <5 09/23/2022    UMALCR  09/23/2022      Comment:      Unable to calculate, urine albumin or creatinine is outside the detectable limits.     Lab Results   Component Value Date    TSH 0.90 09/23/2022     Last eye exam 10/2020, no DR noted  DM Complications: none        , lives in [place], [place]; works with computer networking  Sees Dr. ERIKA Milligan/[facility] for general medicine evaluations.       PMH/PSH:  Past Medical History:   Diagnosis Date     Cataracts, bilateral      DKA (diabetic ketoacidoses)      Morbid obesity (H)      PHUONG (obstructive sleep apnea)      T1DM (type 1 diabetes mellitus) (H)      Past Surgical History:   Procedure Laterality Date     COLONOSCOPY       ENT SURGERY  10 years ago    cyst on neck     EXTRACTION(S) DENTAL       NECK SURGERY  2010    to remove a cyst      PHACOEMULSIFICATION CLEAR CORNEA WITH STANDARD INTRAOCULAR LENS IMPLANT Left 09/21/2020    Procedure: COMPLEX PHACOEMULSIFICATION, CATARACT, WITH INTRAOCULAR LENS IMPLANT;  Surgeon: Andre Jackson MD;  [facility]     PHACOEMULSIFICATION CLEAR CORNEA WITH STANDARD INTRAOCULAR LENS IMPLANT Right 09/28/2020    Procedure: PHACOEMULSIFICATION, CATARACT, WITH INTRAOCULAR LENS IMPLANT;  Surgeon: Andre Jackson MD;  [facility]       Family Hx:  Family History   Problem Relation Age of Onset     Diabetes Maternal Grandmother      Other Cancer Maternal Grandmother         Lung Cancer     Diabetes Paternal Grandmother      Glaucoma Brother      Macular Degeneration No family hx of          Social Hx:  Social History     Socioeconomic History     Marital status: Single     Spouse name: Not on file     Number of children: Not on file     Years of education: Not on file     Highest education level: Not on file   Occupational History     Not on file   Tobacco Use     Smoking status: Never     Smokeless tobacco: Never   Substance and Sexual Activity     Alcohol use: Never     Drug use: Never     Sexual activity: Not on file   Other Topics Concern     Parent/sibling w/ CABG, MI or angioplasty before 65F 55M? Not Asked   Social History Narrative     Not on file     Social Determinants of Health     Financial Resource Strain: Not on file   Food Insecurity: Not on file   Transportation Needs: Not on file   Physical Activity: Not on file   Stress: Not on file   Social Connections: Not on file   Intimate Partner Violence: Not on file   Housing Stability: Not on file          MEDICATIONS:  has a current medication list which includes the following prescription(s): dulcolax, clobetasol, dexcom g6 sensor, dexcom g6 transmitter, fexofenadine, fexofenadine-pseudoephedrine, fluticasone, insulin aspart, insulin pump, losartan, polyethylene glycol, prednisone, sildenafil, triamcinolone, blood glucose, blood glucose  monitoring, dexcom g6 , insulin aspart, b-d u/f, and victoza pen.       ROS: 10 point ROS neg other than the symptoms noted above in the HPI.     GENERAL: mild fatigue, wt stable; denies fevers, chills, night sweats.   HEENT: no dysphagia, odonophagia, diplopia, neck pain  THYROID:  no apparent hyper or hypothyroid symptoms  CV: no chest pain, pressure, palpitations  LUNGS: no SOB, AGARWAL, cough, wheezing   ABDOMEN: no diarrhea, constipation, abdominal pain  EXTREMITIES: no rashes, ulcers, edema  NEUROLOGY: right leg tingling; no headaches, denies changes in vision, extremitiy numbness   MSK: no muscle aches or pains, weakness  SKIN: no rashes or lesions  : decreased erection function; occasional nocturia  PSYCH:  stable mood, no significant anxiety or depression  ENDOCRINE: no heat or cold intolerance    Physical Exam (visual exam)  VS:  no vital signs taken for video visit  CONSTITUTIONAL: healthy, alert and NAD, well dressed, answering questions appropriately  ENT: no nose swelling or nasal discharge, mouth redness or gum changes.  EYES: eyes grossly normal to inspection, conjunctivae and sclerae normal, no exophthalmos or proptosis  THYROID:  no apparent nodules or goiter  LUNGS: no audible wheeze, cough or visible cyanosis, no visible retractions or increased work of breathing  ABDOMEN: abdomen not evaluated  EXTREMITIES: no hand tremors, limited exam  NEUROLOGY: CN grossly intact, mentation intact and speech normal   SKIN:  no apparent skin lesions, rash, or edema with visualized skin appearance  PSYCH: mentation appears normal, affect normal/bright, judgement and insight intact,   normal speech and appearance well groomed       LABS:     All pertinent notes, labs, and images personally reviewed by me.     A/P:  Encounter Diagnoses   Name Primary?     Type 1 diabetes mellitus without complication (H) Yes     Insulin pump status      Morbid obesity (H)        Comments:  Reviewed health history and  diabetes issues.  Health history and lab testing indicates T1.5DM managed as Type 1  The off-label Victoza medication has been helpful for management  Reviewed and interpreted tests that I previously ordered.   Ordered appropriate tests for the endocrinology disease management.    Management options discussed and implemented after shared medical decision making with the patient.  T1DM problem is chronic-stable    Plan:  Reviewed the overall T1DM management and insulin pump use.  Discussed optimal BG testing to assess glucose trends.  We reviewed insulin pump settings, basal rate and bolus dosing  Use of automated pump bolus dosing for meal/snack carb & correction dosing  Reviewed recent Tandem pump and DexcomG6 CGM glucose trend data, in detail     Recommend:  Continue the Tandem insulin pump, DexcomG6 sensor, and diabetes management plan.  Pump setting changes:   Bolus ICR 4p 2.2 to 2.0, 8p 2.5 to 2.3      Important to enter meal and snack carb amount for automated bolusing  Avoid eating carb snack to raise late evening SG trends  We have discussed use of the Tandem pump Exercise Mode for aerobic exercise  Discussed use of the off-label GLP1RA medications for management   Continue Victoza 1.8 mg daily or consider switch to Ozempic 0.5 mg weekly alternative   He will consider this option and let me know preference  Continue the walking exercise routine... helps his insulin sensitivity  Monitor for symptom changes, including GI symptoms   Try body lotion (or Calamine lotion) to feet itching areas   If kinking or clicking or inability to extend (right index) finger, plan to see ortho hand physician   Keep focus on diet, exercise, and weight management  Needs follow-up BP check with use of the low dose losartan medication, discussed work-in visit at our office soon  Would benefit from dietician evaluation, also commercial weight loss program or bariatric surgery  Plan repeat fasting labs soon   Discussed [facility]   Lab orders placed  Arrange annual dilated eye exam, fasting lipid panel testing.     Addressed patient's questions today.     There are no Patient Instructions on file for this visit.    Future labs ordered today:   Orders Placed This Encounter   Procedures     GLUCOSE MONITOR, 72 HOUR, PHYS INTERP     Hemoglobin A1c     Basic metabolic panel     ALT     Radiology/Consults ordered today: None    Total time spent on day of encounter:  35 min    Follow-up:  5/2023, Gilma Heaton MD, MS  Endocrinology  [facility]    CC:  ERIKA Milligan                            Again, thank you for allowing me to participate in the care of your patient.        Sincerely,        Ryley Heaton MD

## 2023-02-16 ENCOUNTER — TELEPHONE (OUTPATIENT)
Dept: ENDOCRINOLOGY | Facility: CLINIC | Age: 48
End: 2023-02-16
Payer: COMMERCIAL

## 2023-02-16 RX ORDER — ONDANSETRON 2 MG/ML
4 INJECTION INTRAMUSCULAR; INTRAVENOUS
Status: CANCELLED | OUTPATIENT
Start: 2023-02-16

## 2023-02-16 RX ORDER — LIDOCAINE 40 MG/G
CREAM TOPICAL
Status: CANCELLED | OUTPATIENT
Start: 2023-02-16

## 2023-02-26 DIAGNOSIS — E10.9 TYPE 1 DIABETES MELLITUS WITHOUT COMPLICATION (H): ICD-10-CM

## 2023-02-26 DIAGNOSIS — Z96.41 INSULIN PUMP STATUS: ICD-10-CM

## 2023-02-26 DIAGNOSIS — E88.819 INSULIN RESISTANCE: ICD-10-CM

## 2023-02-27 RX ORDER — INSULIN ASPART 100 [IU]/ML
INJECTION, SOLUTION INTRAVENOUS; SUBCUTANEOUS
Qty: 90 ML | Refills: 2 | Status: SHIPPED | OUTPATIENT
Start: 2023-02-27 | End: 2024-04-02

## 2023-02-27 NOTE — TELEPHONE ENCOUNTER
"Last Written Prescription Date:  12/26/22  Last Fill Quantity: 100,  # refills: 1   Last office visit: 2/15/23 with prescribing provider:  Dr. Heaton   Future Office Visit: 6/14/23         Requested Prescriptions   Pending Prescriptions Disp Refills     NOVOLOG VIAL 100 UNIT/ML soln [Pharmacy Med Name: NOVOLOG U-100 INSULIN VL10ML(ORANG)] 90 mL      Sig: USE WITH INSULIN PUMP, TOTAL DAILY DOSE OF APPROXIMATELY 100 UNITS       Short Acting Insulin Protocol Passed - 2/26/2023  3:53 AM        Passed - Serum creatinine on file in past 12 months     Recent Labs   Lab Test 09/23/22  1206   CR 0.82       Ok to refill medication if creatinine is low          Passed - HgbA1C in past 3 or 6 months     If HgbA1C is 8 or greater, it needs to be on file within the past 3 months.  If less than 8, must be on file within the past 6 months.     Recent Labs   Lab Test 09/23/22  1206   A1C 7.3*             Passed - Medication is active on med list        Passed - Patient is age 18 or older        Passed - Recent (6 mo) or future (30 days) visit within the authorizing provider's specialty     Patient had office visit in the last 6 months or has a visit in the next 30 days with authorizing provider or within the authorizing provider's specialty.  See \"Patient Info\" tab in inbasket, or \"Choose Columns\" in Meds & Orders section of the refill encounter.               Refills sent  Karla Buckner RN    "

## 2023-04-06 DIAGNOSIS — E13.9 DIABETES MELLITUS TYPE 1.5, MANAGED AS TYPE 2 (H): Primary | ICD-10-CM

## 2023-04-11 ENCOUNTER — TELEPHONE (OUTPATIENT)
Dept: ENDOCRINOLOGY | Facility: CLINIC | Age: 48
End: 2023-04-11
Payer: COMMERCIAL

## 2023-04-11 NOTE — TELEPHONE ENCOUNTER
Prior Authorization Retail Medication Request    Medication/Dose: semaglutide (OZEMPIC) 2 MG/1.5ML SOPN pen  ICD code (if different than what is on RX):  E13.9      Insurance Name:  [facility]  Insurance ID:  [id number]       Pharmacy Information (if different than what is on RX)  Name:  [facility]  Phone:  [phone]

## 2023-04-15 NOTE — TELEPHONE ENCOUNTER
Central Prior Authorization Team   Phone: [phone]      PA Initiation    Medication: semaglutide (OZEMPIC) 2 MG/1.5ML SOPN pen  Insurance Company:    Pharmacy Filling the Rx: [id number] - [place], [place] - [address] [facility] [address]  Filling Pharmacy Phone:    Filling Pharmacy Fax: [phone]  Start Date: 4/15/2023

## 2023-04-18 NOTE — TELEPHONE ENCOUNTER
Central Prior Authorization Team   Phone: [phone]      PRIOR AUTHORIZATION DENIED    Medication: semaglutide (OZEMPIC) 2 MG/1.5ML SOPN pen - PA DENIED     Denial Date: 4/15/2023    Denial Rational:         Appeal Information:

## 2023-04-21 NOTE — TELEPHONE ENCOUNTER
Message regarding insurance PA denial for the Ozempic pens noted.  I called patient today and explained this issue.  He thanked me for the call.    DAVIN Heaton MD, MS  [facility]

## 2023-04-23 ENCOUNTER — HEALTH MAINTENANCE LETTER (OUTPATIENT)
Age: 48
End: 2023-04-23

## 2023-04-28 ENCOUNTER — LAB (OUTPATIENT)
Dept: LAB | Facility: CLINIC | Age: 48
End: 2023-04-28
Payer: COMMERCIAL

## 2023-04-28 DIAGNOSIS — Z96.41 INSULIN PUMP STATUS: ICD-10-CM

## 2023-04-28 DIAGNOSIS — E66.01 MORBID OBESITY (H): ICD-10-CM

## 2023-04-28 DIAGNOSIS — Z11.3 SCREEN FOR STD (SEXUALLY TRANSMITTED DISEASE): ICD-10-CM

## 2023-04-28 DIAGNOSIS — Z11.59 NEED FOR HEPATITIS C SCREENING TEST: ICD-10-CM

## 2023-04-28 DIAGNOSIS — E10.9 TYPE 1 DIABETES MELLITUS WITHOUT COMPLICATION (H): ICD-10-CM

## 2023-04-28 DIAGNOSIS — Z11.4 SCREENING FOR HIV (HUMAN IMMUNODEFICIENCY VIRUS): ICD-10-CM

## 2023-04-28 LAB — HBA1C MFR BLD: 7.6 % (ref 0–5.6)

## 2023-04-28 PROCEDURE — 87389 HIV-1 AG W/HIV-1&-2 AB AG IA: CPT

## 2023-04-28 PROCEDURE — 80048 BASIC METABOLIC PNL TOTAL CA: CPT

## 2023-04-28 PROCEDURE — 83036 HEMOGLOBIN GLYCOSYLATED A1C: CPT

## 2023-04-28 PROCEDURE — 84460 ALANINE AMINO (ALT) (SGPT): CPT

## 2023-04-28 PROCEDURE — 86803 HEPATITIS C AB TEST: CPT

## 2023-04-28 PROCEDURE — 36415 COLL VENOUS BLD VENIPUNCTURE: CPT

## 2023-04-29 LAB
ALT SERPL W P-5'-P-CCNC: 30 U/L (ref 10–50)
ANION GAP SERPL CALCULATED.3IONS-SCNC: 11 MMOL/L (ref 7–15)
BUN SERPL-MCNC: 9.6 MG/DL (ref 6–20)
CALCIUM SERPL-MCNC: 9.3 MG/DL (ref 8.6–10)
CHLORIDE SERPL-SCNC: 103 MMOL/L (ref 98–107)
CREAT SERPL-MCNC: 0.86 MG/DL (ref 0.67–1.17)
DEPRECATED HCO3 PLAS-SCNC: 24 MMOL/L (ref 22–29)
GFR SERPL CREATININE-BSD FRML MDRD: >90 ML/MIN/1.73M2
GLUCOSE SERPL-MCNC: 197 MG/DL (ref 70–99)
POTASSIUM SERPL-SCNC: 4.6 MMOL/L (ref 3.4–5.3)
SODIUM SERPL-SCNC: 138 MMOL/L (ref 136–145)

## 2023-04-30 DIAGNOSIS — E10.9 TYPE 1 DIABETES MELLITUS WITHOUT COMPLICATION (H): ICD-10-CM

## 2023-05-01 LAB
HCV AB SERPL QL IA: NONREACTIVE
HIV 1+2 AB+HIV1 P24 AG SERPL QL IA: NONREACTIVE

## 2023-05-01 RX ORDER — BLOOD SUGAR DIAGNOSTIC
STRIP MISCELLANEOUS
Qty: 150 STRIP | Refills: 0 | Status: SHIPPED | OUTPATIENT
Start: 2023-05-01

## 2023-05-04 DIAGNOSIS — E10.9 TYPE 1 DIABETES MELLITUS WITHOUT COMPLICATION (H): ICD-10-CM

## 2023-05-04 RX ORDER — BLOOD SUGAR DIAGNOSTIC
STRIP MISCELLANEOUS
Qty: 150 STRIP | Refills: 0 | OUTPATIENT
Start: 2023-05-04

## 2023-05-22 DIAGNOSIS — Z96.41 INSULIN PUMP STATUS: ICD-10-CM

## 2023-05-22 DIAGNOSIS — E10.9 TYPE 1 DIABETES MELLITUS WITHOUT COMPLICATION (H): Primary | ICD-10-CM

## 2023-05-22 RX ORDER — INSULIN ASPART 100 [IU]/ML
INJECTION, SOLUTION INTRAVENOUS; SUBCUTANEOUS
Qty: 110 ML | Refills: 3 | Status: SHIPPED | OUTPATIENT
Start: 2023-05-22 | End: 2024-04-26

## 2023-06-14 ENCOUNTER — VIRTUAL VISIT (OUTPATIENT)
Dept: ENDOCRINOLOGY | Facility: CLINIC | Age: 48
End: 2023-06-14
Payer: COMMERCIAL

## 2023-06-14 DIAGNOSIS — E88.819 INSULIN RESISTANCE: ICD-10-CM

## 2023-06-14 DIAGNOSIS — Z96.41 INSULIN PUMP STATUS: ICD-10-CM

## 2023-06-14 DIAGNOSIS — E13.9 DIABETES MELLITUS TYPE 1.5, MANAGED AS TYPE 2 (H): Primary | ICD-10-CM

## 2023-06-14 PROCEDURE — 99214 OFFICE O/P EST MOD 30 MIN: CPT | Mod: VID | Performed by: INTERNAL MEDICINE

## 2023-06-14 PROCEDURE — 95251 CONT GLUC MNTR ANALYSIS I&R: CPT | Performed by: INTERNAL MEDICINE

## 2023-06-14 NOTE — PROGRESS NOTES
Virtual Visit Details    Type of service:  Video Visit   Video Start Time: 3:40 PM  Video End Time: 3:51 PM    Originating Location (pt. Location): Home  Distant Location (provider location):  Off-site  Platform used for Video Visit: Tasneem          Recent issues:  Diabetes follow-up evaluation  Continues with Tandem pump and DexcomG6 sensor use  Switched from Victoza to Ozempic, tolerating Ozempic well overall  Recent travel to [place] for work trip          2011. Diagnosis of diabetes mellitus while living in [place]  Acute symptoms increased thirst, frequent urination, fatigue, weight loss (265 to 203#/30 days)  Hospitalized and diagnosis of DKA  Initial treatment with Apidra and Lantus insulin  Met with an endocrinologist Dr. Lina Fish  ~6 mo later, switched to [place] Ping pump     2012. Continued using this pump for 1 year, then moved to [facility]  Saw Dr. Beena Johnson/Endocrinologist in [place]  Switched to Tandem insulin pump    Previous [facility] labs include:       Subsequent move back to [place]  Recalls taking Victoza medication along with pump management, success with significant weight loss  Moved to [place]  9/2019. Moved to [place], [place]     Medical evaluations with Dr. 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 Started Tandem insulin pump     Previous [facility] labs include:          2/19/20. Med [facility]  No lab tests ordered  Started DexcomG6 CGMS sensor  ~4/2020. Upgrade to the Control IQ pump         5/26/20. Initial diabetes evaluation with me, [facility]  Continued use of the Tandem TSlim pump with the DexcomG6  ~6/2020. Restarted (off label) Victoza  10/2020. Insurance coverage for Victoza, used 1.8 mg daily  11/2021. Ran out of Victoza  ~3/2022. Restarted Victoza medication  Switched from Victoza to Ozempic    Using Tandem TSlim pump with DexcomG6  sensor              Novolog in pump   Ozempic 0.5 mg subcutaneous weekly    Previous Tandem pump settings:      Recent pump and sensor data:  None available today  Recent DexcomG6 data:        Blood glucose (BG) meter:  Accu-check               Tests infrequently  Fam Hx DM:    MA, Mcousin, MGM, PGM     Previous [facility] labs include:          Lab Results   Component Value Date    A1C 7.6 (H) 04/28/2023     04/28/2023    POTASSIUM 4.6 04/28/2023    CHLORIDE 103 04/28/2023    CO2 24 04/28/2023    ANIONGAP 11 04/28/2023     (H) 04/28/2023    BUN 9.6 04/28/2023    CR 0.86 04/28/2023    GFRESTIMATED >90 04/28/2023    GFRESTBLACK >90 05/05/2021    MICK 9.3 04/28/2023    CPEPT <0.1 (L) 05/28/2020    CHOL 131 09/23/2022    TRIG 60 09/23/2022    HDL 56 09/23/2022    LDL 63 09/23/2022    NHDL 75 09/23/2022    UCRR 53 09/23/2022    MICROL <5 09/23/2022    UMALCR  09/23/2022      Comment:      Unable to calculate, urine albumin or creatinine is outside the detectable limits.     Lab Results   Component Value Date    TSH 0.90 09/23/2022     Last eye exam 10/2020, no DR noted  DM Complications: none        , lives in [place], [place]; works with computer networking  Sees Dr. ERIKA Milligan[facility] for general medicine evaluations.       PMH/PSH:  Past Medical History:   Diagnosis Date     Cataracts, bilateral      DKA (diabetic ketoacidoses)      Morbid obesity (H)      PHUONG (obstructive sleep apnea)      T1DM (type 1 diabetes mellitus) (H)      Past Surgical History:   Procedure Laterality Date     COLONOSCOPY       ENT SURGERY  10 years ago    cyst on neck     EXTRACTION(S) DENTAL       NECK SURGERY  2010    to remove a cyst     PHACOEMULSIFICATION CLEAR CORNEA WITH STANDARD INTRAOCULAR LENS IMPLANT Left 09/21/2020    Procedure: COMPLEX PHACOEMULSIFICATION, CATARACT, WITH INTRAOCULAR LENS IMPLANT;  Surgeon: Andre Jackson MD;  [facility]     PHACOEMULSIFICATION CLEAR CORNEA WITH STANDARD INTRAOCULAR  LENS IMPLANT Right 09/28/2020    Procedure: PHACOEMULSIFICATION, CATARACT, WITH INTRAOCULAR LENS IMPLANT;  Surgeon: Andre Jackson MD;  [facility] OR       Family Hx:  Family History   Problem Relation Age of Onset     Diabetes Maternal Grandmother      Other Cancer Maternal Grandmother         Lung Cancer     Diabetes Paternal Grandmother      Glaucoma Brother      Macular Degeneration No family hx of          Social Hx:  Social History     Socioeconomic History     Marital status: Single     Spouse name: Not on file     Number of children: Not on file     Years of education: Not on file     Highest education level: Not on file   Occupational History     Not on file   Tobacco Use     Smoking status: Never     Smokeless tobacco: Never   Vaping Use     Vaping status: Not on file   Substance and Sexual Activity     Alcohol use: Never     Drug use: Never     Sexual activity: Not on file   Other Topics Concern     Parent/sibling w/ CABG, MI or angioplasty before 65F 55M? Not Asked   Social History Narrative     Not on file     Social Determinants of Health     Financial Resource Strain: Not on file   Food Insecurity: Not on file   Transportation Needs: Not on file   Physical Activity: Not on file   Stress: Not on file   Social Connections: Not on file   Intimate Partner Violence: Not on file   Housing Stability: Not on file          MEDICATIONS:  has a current medication list which includes the following prescription(s): clobetasol, dexcom g6 sensor, dexcom g6 transmitter, fexofenadine, fexofenadine-pseudoephedrine, fluticasone, insulin aspart, insulin pump, losartan, novolog vial, semaglutide, sildenafil, triamcinolone, accu-chek justin plus, blood glucose monitoring, dexcom g6 , b-d u/f, and prednisone.       ROS: 10 point ROS neg other than the symptoms noted above in the HPI.     GENERAL: mild fatigue, wt stable; denies fevers, chills, night sweats.   HEENT: no dysphagia, odonophagia, diplopia, neck  pain  THYROID:  no apparent hyper or hypothyroid symptoms  CV: no chest pain, pressure, palpitations  LUNGS: no SOB, AGARWAL, cough, wheezing   ABDOMEN: no diarrhea, constipation, abdominal pain  EXTREMITIES: no rashes, ulcers, edema  NEUROLOGY: right leg tingling; no headaches, denies changes in vision, extremitiy numbness   MSK: no muscle aches or pains, weakness  SKIN: no rashes or lesions  : decreased erection function; occasional nocturia  PSYCH:  stable mood, no significant anxiety or depression  ENDOCRINE: no heat or cold intolerance    Physical Exam (visual exam)  VS:  no vital signs taken for video visit  CONSTITUTIONAL: healthy, alert and NAD, well dressed, answering questions appropriately  ENT: no nose swelling or nasal discharge, mouth redness or gum changes.  EYES: eyes grossly normal to inspection, conjunctivae and sclerae normal, no exophthalmos or proptosis  THYROID:  no apparent nodules or goiter  LUNGS: no audible wheeze, cough or visible cyanosis, no visible retractions or increased work of breathing  ABDOMEN: abdomen not evaluated  EXTREMITIES: no hand tremors, limited exam  NEUROLOGY: CN grossly intact, mentation intact and speech normal   SKIN:  no apparent skin lesions, rash, or edema with visualized skin appearance  PSYCH: mentation appears normal, affect normal/bright, judgement and insight intact,   normal speech and appearance well groomed       LABS:     All pertinent notes, labs, and images personally reviewed by me.     A/P:  Encounter Diagnoses   Name Primary?     Diabetes mellitus type 1.5, managed as type 2 (H) Yes     Insulin pump status      Insulin resistance        Comments:  Reviewed health history and diabetes issues.  Health history and lab testing indicates T1.5DM   The off-label Victoza, then Ozempic medication... helpful for management  Reviewed and interpreted tests that I previously ordered.   Ordered appropriate tests for the endocrinology disease management.     Management options discussed and implemented after shared medical decision making with the patient.  T1.5DM problem is chronic-stable    Plan:  Reviewed the overall T1.5DM management and insulin pump use.  Discussed optimal BG testing to assess glucose trends.  We reviewed insulin pump settings, basal rate and bolus dosing  Use of automated pump bolus dosing for meal/snack carb & correction dosing  Reviewed recent Tandem pump and DexcomG6 CGM glucose trend data, in detail     Recommend:  Continue the Tandem insulin pump, DexcomG6 sensor, and diabetes management plan.  No pump setting changes at this time    Important to enter meal and snack carb amount for automated (premeal) bolusing  Avoid eating carb snack to raise late evening SG trends  We have discussed use of the Tandem pump Exercise Mode for aerobic exercise  Continue use of the Ozempic medication, since tolerated well and helpful  Monitor for symptom changes, including GI symptoms  Keep focus on diet, exercise, and weight management   Continue the walking exercise routine... helps his insulin sensitivity  Would benefit from dietician evaluation, also commercial weight loss program or bariatric surgery  Plan repeat fasting labs in 8/2023   Discussed [facility] clinic   Lab orders placed  Arrange annual dilated eye exam, fasting lipid panel testing.     Addressed patient's questions today.     There are no Patient Instructions on file for this visit.    Future labs ordered today:   Orders Placed This Encounter   Procedures     GLUCOSE MONITOR, 72 HOUR, PHYS INTERP     Hemoglobin A1c     Basic metabolic panel     Albumin Random Urine Quantitative with Creat Ratio     Lipid panel reflex to direct LDL Fasting     TSH     Radiology/Consults ordered today: None    Total time spent on day of encounter:  23 min    Follow-up:  9/2023, Gilma Heaton MD, MS  Endocrinology  [facility]    CC:  ERIKA Milligan

## 2023-06-14 NOTE — LETTER
6/14/2023         RE: Jose Apodaca  [address]  [address]  [place] [place] [address]        Dear Colleague,    Thank you for referring your patient, Jose Apodaca, to the [facility] [place]. Please see a copy of my visit note below.    Virtual Visit Details    Type of service:  Video Visit   Video Start Time: 3:40 PM  Video End Time: 3:51 PM    Originating Location (pt. Location): Home  Distant Location (provider location):  Off-site  Platform used for Video Visit: Tasneem          Recent issues:  Diabetes follow-up evaluation  Continues with Tandem pump and DexcomG6 sensor use  Switched from Victoza to Ozempic, tolerating Ozempic well overall  Recent travel to [place] for work trip          2011. Diagnosis of diabetes mellitus while living in [place]  Acute symptoms increased thirst, frequent urination, fatigue, weight loss (265 to 203#/30 days)  Hospitalized and diagnosis of DKA  Initial treatment with Apidra and Lantus insulin  Met with an endocrinologist Dr. Lina Fish  ~6 mo later, switched to [place] Ping pump     2012. Continued using this pump for 1 year, then moved to [facility]  Saw Dr. Beena Johnson/Endocrinologist in [place]  Switched to Tandem insulin pump    Previous [facility] labs include:       Subsequent move back to [place]  Recalls taking Victoza medication along with pump management, success with significant weight loss  Moved to [place]  9/2019. Moved to [place], [place]     Medical evaluations with Dr. 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 Started Tandem insulin pump     Previous [facility] labs include:          2/19/20. Med evaluation with Dr. ERIKA Milligan/[facility]  No lab tests ordered  Started DexcomG6 CGMS sensor  ~4/2020. Upgrade to the Control IQ pump         5/26/20. Initial diabetes evaluation with me, [facility] clinic [facility]  Continued use of the Tandem TSlim pump  with the DexcomG6  ~6/2020. Restarted (off label) Victoza  10/2020. Insurance coverage for Victoza, used 1.8 mg daily  11/2021. Ran out of Victoza  ~3/2022. Restarted Victoza medication  Switched from Victoza to Ozempic    Using Tandem TSlim pump with DexcomG6 sensor              Novolog in pump   Ozempic 0.5 mg subcutaneous weekly    Previous Tandem pump settings:      Recent pump and sensor data:  None available today  Recent DexcomG6 data:        Blood glucose (BG) meter:  Accu-check               Tests infrequently  Fam Hx DM:    MA, Mcousin, MGM, PGM     Previous [facility] labs include:          Lab Results   Component Value Date    A1C 7.6 (H) 04/28/2023     04/28/2023    POTASSIUM 4.6 04/28/2023    CHLORIDE 103 04/28/2023    CO2 24 04/28/2023    ANIONGAP 11 04/28/2023     (H) 04/28/2023    BUN 9.6 04/28/2023    CR 0.86 04/28/2023    GFRESTIMATED >90 04/28/2023    GFRESTBLACK >90 05/05/2021    MICK 9.3 04/28/2023    CPEPT <0.1 (L) 05/28/2020    CHOL 131 09/23/2022    TRIG 60 09/23/2022    HDL 56 09/23/2022    LDL 63 09/23/2022    NHDL 75 09/23/2022    UCRR 53 09/23/2022    MICROL <5 09/23/2022    UMALCR  09/23/2022      Comment:      Unable to calculate, urine albumin or creatinine is outside the detectable limits.     Lab Results   Component Value Date    TSH 0.90 09/23/2022     Last eye exam 10/2020, no DR noted  DM Complications: none        , lives in [place], [place]; works with computer networking  Se[facility] Dr. ERIKA [facility]igan[facility] for general medicine evaluations.       PMH/PSH:  Past Medical History:   Diagnosis Date     Cataracts, bilateral      DKA (diabetic ketoacidoses)      Morbid obesity (H)      PHUONG (obstructive sleep apnea)      T1DM (type 1 diabetes mellitus) (H)      Past Surgical History:   Procedure Laterality Date     COLONOSCOPY       ENT SURGERY  10 years ago    cyst on neck     EXTRACTION(S) DENTAL       NECK SURGERY  2010    to remove a cyst     PHACOEMULSIFICATION CLEAR  CORNEA WITH STANDARD INTRAOCULAR LENS IMPLANT Left 09/21/2020    Procedure: COMPLEX PHACOEMULSIFICATION, CATARACT, WITH INTRAOCULAR LENS IMPLANT;  Surgeon: Adnre Jackson MD;  [facility]     PHACOEMULSIFICATION CLEAR CORNEA WITH STANDARD INTRAOCULAR LENS IMPLANT Right 09/28/2020    Procedure: PHACOEMULSIFICATION, CATARACT, WITH INTRAOCULAR LENS IMPLANT;  Surgeon: Andre Jackson MD;  [facility]       Family Hx:  Family History   Problem Relation Age of Onset     Diabetes Maternal Grandmother      Other Cancer Maternal Grandmother         Lung Cancer     Diabetes Paternal Grandmother      Glaucoma Brother      Macular Degeneration No family hx of          Social Hx:  Social History     Socioeconomic History     Marital status: Single     Spouse name: Not on file     Number of children: Not on file     Years of education: Not on file     Highest education level: Not on file   Occupational History     Not on file   Tobacco Use     Smoking status: Never     Smokeless tobacco: Never   Vaping Use     Vaping status: Not on file   Substance and Sexual Activity     Alcohol use: Never     Drug use: Never     Sexual activity: Not on file   Other Topics Concern     Parent/sibling w/ CABG, MI or angioplasty before 65F 55M? Not Asked   Social History Narrative     Not on file     Social Determinants of Health     Financial Resource Strain: Not on file   Food Insecurity: Not on file   Transportation Needs: Not on file   Physical Activity: Not on file   Stress: Not on file   Social Connections: Not on file   Intimate Partner Violence: Not on file   Housing Stability: Not on file          MEDICATIONS:  has a current medication list which includes the following prescription(s): clobetasol, dexcom g6 sensor, dexcom g6 transmitter, fexofenadine, fexofenadine-pseudoephedrine, fluticasone, insulin aspart, insulin pump, losartan, novolog vial, semaglutide, sildenafil, triamcinolone, accu-chek justin plus, blood  glucose monitoring, dexcom g6 , b-d u/f, and prednisone.       ROS: 10 point ROS neg other than the symptoms noted above in the HPI.     GENERAL: mild fatigue, wt stable; denies fevers, chills, night sweats.   HEENT: no dysphagia, odonophagia, diplopia, neck pain  THYROID:  no apparent hyper or hypothyroid symptoms  CV: no chest pain, pressure, palpitations  LUNGS: no SOB, AGARWAL, cough, wheezing   ABDOMEN: no diarrhea, constipation, abdominal pain  EXTREMITIES: no rashes, ulcers, edema  NEUROLOGY: right leg tingling; no headaches, denies changes in vision, extremitiy numbness   MSK: no muscle aches or pains, weakness  SKIN: no rashes or lesions  : decreased erection function; occasional nocturia  PSYCH:  stable mood, no significant anxiety or depression  ENDOCRINE: no heat or cold intolerance    Physical Exam (visual exam)  VS:  no vital signs taken for video visit  CONSTITUTIONAL: healthy, alert and NAD, well dressed, answering questions appropriately  ENT: no nose swelling or nasal discharge, mouth redness or gum changes.  EYES: eyes grossly normal to inspection, conjunctivae and sclerae normal, no exophthalmos or proptosis  THYROID:  no apparent nodules or goiter  LUNGS: no audible wheeze, cough or visible cyanosis, no visible retractions or increased work of breathing  ABDOMEN: abdomen not evaluated  EXTREMITIES: no hand tremors, limited exam  NEUROLOGY: CN grossly intact, mentation intact and speech normal   SKIN:  no apparent skin lesions, rash, or edema with visualized skin appearance  PSYCH: mentation appears normal, affect normal/bright, judgement and insight intact,   normal speech and appearance well groomed       LABS:     All pertinent notes, labs, and images personally reviewed by me.     A/P:  Encounter Diagnoses   Name Primary?     Diabetes mellitus type 1.5, managed as type 2 (H) Yes     Insulin pump status      Insulin resistance        Comments:  Reviewed health history and diabetes  issues.  Health history and lab testing indicates T1.5DM   The off-label Victoza, then Ozempic medication... helpful for management  Reviewed and interpreted tests that I previously ordered.   Ordered appropriate tests for the endocrinology disease management.    Management options discussed and implemented after shared medical decision making with the patient.  T1.5DM problem is chronic-stable    Plan:  Reviewed the overall T1.5DM management and insulin pump use.  Discussed optimal BG testing to assess glucose trends.  We reviewed insulin pump settings, basal rate and bolus dosing  Use of automated pump bolus dosing for meal/snack carb & correction dosing  Reviewed recent Tandem pump and DexcomG6 CGM glucose trend data, in detail     Recommend:  Continue the Tandem insulin pump, DexcomG6 sensor, and diabetes management plan.  No pump setting changes at this time    Important to enter meal and snack carb amount for automated (premeal) bolusing  Avoid eating carb snack to raise late evening SG trends  We have discussed use of the Tandem pump Exercise Mode for aerobic exercise  Continue use of the Ozempic medication, since tolerated well and helpful  Monitor for symptom changes, including GI symptoms  Keep focus on diet, exercise, and weight management   Continue the walking exercise routine... helps his insulin sensitivity  Would benefit from dietician evaluation, also commercial weight loss program or bariatric surgery  Plan repeat fasting labs in 8/2023   Discussed [facility] clinic   Lab orders placed  Arrange annual dilated eye exam, fasting lipid panel testing.     Addressed patient's questions today.     There are no Patient Instructions on file for this visit.    Future labs ordered today:   Orders Placed This Encounter   Procedures     GLUCOSE MONITOR, 72 HOUR, PHYS INTERP     Hemoglobin A1c     Basic metabolic panel     Albumin Random Urine Quantitative with Creat Ratio     Lipid panel reflex to direct LDL  Fasting     TSH     Radiology/Consults ordered today: None    Total time spent on day of encounter:  23 min    Follow-up:  9/2023, Gilma Heaton MD, MS  Endocrinology  [facility]    CC:  ERIKA Milligan                            Again, thank you for allowing me to participate in the care of your patient.        Sincerely,        Ryley Heaton MD

## 2023-06-15 ENCOUNTER — TELEPHONE (OUTPATIENT)
Dept: ENDOCRINOLOGY | Facility: CLINIC | Age: 48
End: 2023-06-15
Payer: COMMERCIAL

## 2023-07-17 ENCOUNTER — TELEPHONE (OUTPATIENT)
Dept: ENDOCRINOLOGY | Facility: CLINIC | Age: 48
End: 2023-07-17
Payer: COMMERCIAL

## 2023-07-17 DIAGNOSIS — E10.9 TYPE 1 DIABETES MELLITUS WITHOUT COMPLICATION (H): ICD-10-CM

## 2023-07-17 NOTE — TELEPHONE ENCOUNTER
SUNSHINE and [employer] message for PT to call [phone] to schedule f/u appt with Dr. Heaton for 10/5 at 1:00 ok to book per Mandie.  Schedule lab appt mid Sept.

## 2023-07-18 RX ORDER — PROCHLORPERAZINE 25 MG/1
SUPPOSITORY RECTAL
Qty: 1 EACH | Refills: 1 | Status: SHIPPED | OUTPATIENT
Start: 2023-07-18 | End: 2024-01-24

## 2023-07-25 ENCOUNTER — OFFICE VISIT (OUTPATIENT)
Dept: FAMILY MEDICINE | Facility: CLINIC | Age: 48
End: 2023-07-25
Payer: COMMERCIAL

## 2023-07-25 VITALS
RESPIRATION RATE: 16 BRPM | HEART RATE: 75 BPM | DIASTOLIC BLOOD PRESSURE: 84 MMHG | OXYGEN SATURATION: 98 % | TEMPERATURE: 97.9 F | BODY MASS INDEX: 47.49 KG/M2 | WEIGHT: 302.6 LBS | HEIGHT: 67 IN | SYSTOLIC BLOOD PRESSURE: 134 MMHG

## 2023-07-25 DIAGNOSIS — Z12.11 SCREEN FOR COLON CANCER: Primary | ICD-10-CM

## 2023-07-25 DIAGNOSIS — R21 RASH AND NONSPECIFIC SKIN ERUPTION: ICD-10-CM

## 2023-07-25 DIAGNOSIS — E10.9 TYPE 1 DIABETES MELLITUS WITHOUT COMPLICATION (H): ICD-10-CM

## 2023-07-25 PROCEDURE — 99213 OFFICE O/P EST LOW 20 MIN: CPT | Performed by: FAMILY MEDICINE

## 2023-07-25 RX ORDER — CLOTRIMAZOLE AND BETAMETHASONE DIPROPIONATE 10; .64 MG/G; MG/G
CREAM TOPICAL 2 TIMES DAILY
Qty: 45 G | Refills: 1 | Status: SHIPPED | OUTPATIENT
Start: 2023-07-25 | End: 2024-03-06

## 2023-07-25 ASSESSMENT — PAIN SCALES - GENERAL: PAINLEVEL: NO PAIN (0)

## 2023-07-25 NOTE — PROGRESS NOTES
"  Assessment & Plan     (Z12.11) Screen for colon cancer  (primary encounter diagnosis)  Comment:   Plan: Colonoscopy Screening  Referral      (E10.9) Type 1 diabetes mellitus without complication (H)  Comment:   Plan:     (R21) Rash and nonspecific skin eruption  Comment:   Plan: clotrimazole-betamethasone (LOTRISONE) 1-0.05 %        external cream        Patient has itching and dryness in the middle of his fingers and underneath the feet.  There is no erythema noted.  Itching is intense when it is moist.  I suggested he use the clotrimazole cream for at least 4 to 6 weeks to see if that helps.  Avoid too much moisture in that area dry that area after taking shower.  Make sure to change her shoes on a frequent basis.  Follow-up if is not improved.         BMI:   Estimated body mass index is 46.94 kg/m  as calculated from the following:    Height as of this encounter: 1.71 m (5' 7.32\").    Weight as of this encounter: 137.3 kg (302 lb 9.6 oz).           Domingo Malone MD  [facility] PRAIRIC[facility]OLYN Ochoa is a 47 year old, presenting for the following health issues:  Foot Problems        7/25/2023     7:12 AM   Additional Questions   Roomed by Jasvir   Accompanied by N/A     History of Present Illness       Reason for visit:  Dry skin itchy foot    He eats 0-1 servings of fruits and vegetables daily.He consumes 0 sweetened beverage(s) daily.He exercises with enough effort to increase his heart rate 9 or less minutes per day.  He exercises with enough effort to increase his heart rate 4 days per week.   He is taking medications regularly.         Review of Systems   Constitutional, HEENT, cardiovascular, pulmonary, gi and gu systems are negative, except as otherwise noted.      Objective    /84   Pulse 75   Temp 97.9  F (36.6  C) (Tympanic)   Resp 16   Ht 1.71 m (5' 7.32\")   Wt 137.3 kg (302 lb 9.6 oz)   SpO2 98%   BMI 46.94 kg/m    Body mass index is 46.94 kg/m .  Physical " Exam   Foot has some dryness in between the toes and dryness in the bottom of the feet.

## 2023-07-27 ENCOUNTER — MYC MEDICAL ADVICE (OUTPATIENT)
Dept: ENDOCRINOLOGY | Facility: CLINIC | Age: 48
End: 2023-07-27
Payer: COMMERCIAL

## 2023-07-27 DIAGNOSIS — E66.01 MORBID OBESITY (H): ICD-10-CM

## 2023-07-27 DIAGNOSIS — E10.9 TYPE 1 DIABETES MELLITUS WITHOUT COMPLICATION (H): ICD-10-CM

## 2023-07-27 RX ORDER — FLURBIPROFEN SODIUM 0.3 MG/ML
SOLUTION/ DROPS OPHTHALMIC
Qty: 100 EACH | Refills: 3 | Status: SHIPPED | OUTPATIENT
Start: 2023-07-27

## 2023-07-27 NOTE — TELEPHONE ENCOUNTER
Last Written Prescription Date:  11/15/22  Last Fill Quantity: 100,  # refills: 3   Last office visit: 6/14/2023 with prescribing provider:  Dr. Heaton   Future Office Visit:  10/5/23        Requested Prescriptions   Pending Prescriptions Disp Refills    insulin pen needle (B-D U/F) 31G X 5 MM miscellaneous 100 each 3     Sig: Use 1 pen needles daily or as directed.       There is no refill protocol information for this order        Refills sent  Karla Buckner RN

## 2023-08-11 ENCOUNTER — HOSPITAL ENCOUNTER (OUTPATIENT)
Facility: CLINIC | Age: 48
End: 2023-08-11
Attending: COLON & RECTAL SURGERY | Admitting: COLON & RECTAL SURGERY
Payer: COMMERCIAL

## 2023-08-11 ENCOUNTER — TELEPHONE (OUTPATIENT)
Dept: GASTROENTEROLOGY | Facility: CLINIC | Age: 48
End: 2023-08-11
Payer: COMMERCIAL

## 2023-08-11 NOTE — TELEPHONE ENCOUNTER
"Endoscopy Scheduling Screen    Have you had a positive Covid test in the last 14 days?  No    Are you active on MyChart?   Yes    What insurance is in the chart?  Other:  [facility]     Ordering/Referring Provider: BESSIE MORALES    (If ordering provider performs procedure, schedule with ordering provider unless otherwise instructed. )    BMI: Estimated body mass index is 46.94 kg/m  as calculated from the following:    Height as of 7/25/23: 1.71 m (5' 7.32\").    Weight as of 7/25/23: 137.3 kg (302 lb 9.6 oz).     Sedation Ordered  moderate sedation.   If patient BMI > 50 do not schedule in ASC.    Are you taking any prescription medications for pain?   No    Are you taking methadone or Suboxone?  No    Do you have a history of malignant hyperthermia or adverse reaction to anesthesia?  No    (Females) Are you currently pregnant?   No     Have you been diagnosed or told you have pulmonary hypertension?   No    Do you have an LVAD?  No    Have you been told you have moderate to severe sleep apnea?  No    Have you been told you have COPD, asthma, or any other lung disease?  No    Do you have any heart conditions?  No     Have you ever had or are you awaiting a heart or lung transplant?   No    Have you had a stroke or transient ischemic attack (TIA aka \"mini stroke\" in the last 6 months?   No    Have you been diagnosed with or been told you have cirrhosis of the liver?   No    Are you currently on dialysis?   No    Do you need assistance transferring?   No    BMI: Estimated body mass index is 46.94 kg/m  as calculated from the following:    Height as of 7/25/23: 1.71 m (5' 7.32\").    Weight as of 7/25/23: 137.3 kg (302 lb 9.6 oz).     Is patients BMI > 40 and scheduling location [facility]?  No    Do you take the medication Phentermine, Ozempic or Wegovy?  No    Do you take the medication Naltrexone?  No    Do you take blood thinners?  Yes     Are you taking Effient/Prasugrel?  No, you must contact your prescribing provider " for direction on holding or bridging with a different medication. - LOSARTAN       Prep   Are you currently on dialysis or do you have chronic kidney disease?  No    Do you have a diagnosis of diabetes?  Yes (Golytely Prep)    Do you have a diagnosis of cystic fibrosis (CF)?  No    On a regular basis do you go 3 -5 days between bowel movements?  No    BMI > 40?  No    Preferred Pharmacy:      [id number] - [place], [place] - [address] AT [address]  [address] [address]  Phone: [phone] Fax: [phone]      Final Scheduling Details   Colonoscopy prep sent?  Standard Golytely    Procedure scheduled  Colonoscopy    Surgeon:  JORGE      Date of procedure:  08/22/2023     Schedule PAC:   No    Location  [facility]    Sedation   Moderate Sedation    Patient Reminders:   You will receive a call from a Nurse to review instructions and health history.  This assessment must be completed prior to your procedure.  Failure to complete the Nurse assessment may result in the procedure being cancelled.      On the day of your procedure, please designate an adult(s) who can drive you home stay with you for the next 24 hours. The medicines used in the exam will make you sleepy. You will not be able to drive.      You cannot take public transportation, ride share services, or non-medical taxi service without a responsible caregiver.  Medical transport services are allowed with the requirement that a responsible caregiver will receive you at your destination.  We require that drivers and caregivers are confirmed prior to your procedure.

## 2023-08-21 DIAGNOSIS — J30.2 SEASONAL ALLERGIC RHINITIS, UNSPECIFIED TRIGGER: ICD-10-CM

## 2023-08-21 RX ORDER — FLUTICASONE PROPIONATE 50 MCG
SPRAY, SUSPENSION (ML) NASAL
Qty: 16 G | Refills: 0 | Status: SHIPPED | OUTPATIENT
Start: 2023-08-21 | End: 2024-02-29

## 2023-08-21 NOTE — TELEPHONE ENCOUNTER
Prescription approved per [facility] [place] Refill Protocol.  Lexi Evans RN  [facility]

## 2023-08-22 ENCOUNTER — HOSPITAL ENCOUNTER (OUTPATIENT)
Facility: AMBULATORY SURGERY CENTER | Age: 48
End: 2023-08-22
Attending: INTERNAL MEDICINE
Payer: COMMERCIAL

## 2023-08-22 ENCOUNTER — TELEPHONE (OUTPATIENT)
Dept: GASTROENTEROLOGY | Facility: CLINIC | Age: 48
End: 2023-08-22
Payer: COMMERCIAL

## 2023-08-22 RX ORDER — ONDANSETRON 2 MG/ML
4 INJECTION INTRAMUSCULAR; INTRAVENOUS
Status: DISCONTINUED | OUTPATIENT
Start: 2023-08-22 | End: 2023-08-22 | Stop reason: HOSPADM

## 2023-08-22 RX ORDER — LIDOCAINE 40 MG/G
CREAM TOPICAL
Status: DISCONTINUED | OUTPATIENT
Start: 2023-08-22 | End: 2023-08-22 | Stop reason: HOSPADM

## 2023-08-22 NOTE — TELEPHONE ENCOUNTER
"Upon review severe PHUONG noted in sleep study in 2017.     Colonoscopy scheduled on 9/6/23 at [facility]. Will send to anesthesia review.     ------------------------------------  Addendum 08/24/23 8:58 AM   Response form [facility] anesthesia:    Meet Gilbert MD Roman, Jennifer, RN                \"Only approved as MAC\"    Case will need to be rescheduled with [facility] if wanting to keep at [facility]. Will send to endoscopy scheduling to reschedule.     Lina Gilliam RN  Endoscopy Procedure Pre Assessment RN  [phone] option 4   "

## 2023-08-22 NOTE — TELEPHONE ENCOUNTER
Bowel prep recommendation would be Golytely extended prep due to BMI >40.     Will send/review updated prep instructions during pre assessment call.     Lina Gilliam RN  Endoscopy Procedure Pre Assessment RN  [phone] option 4

## 2023-08-22 NOTE — TELEPHONE ENCOUNTER
Patient was not able to make original appointment, did not receive the prescription and was unable to complete prep for procedure. Rescheduling, states not changes, screening questions completed 8/11.    Final Scheduling Details   Colonoscopy prep sent?  Standard Golytely    Procedure scheduled  Colonoscopy    Surgeon:  ELVIRA     Date of procedure:  9/6     Schedule PAC:   No    Location  [facility] - [facility]    Sedation   Moderate Sedation    Patient Reminders:   You will receive a call from a Nurse to review instructions and health history.  This assessment must be completed prior to your procedure.  Failure to complete the Nurse assessment may result in the procedure being cancelled.      On the day of your procedure, please designate an adult(s) who can drive you home stay with you for the next 24 hours. The medicines used in the exam will make you sleepy. You will not be able to drive.      You cannot take public transportation, ride share services, or non-medical taxi service without a responsible caregiver.  Medical transport services are allowed with the requirement that a responsible caregiver will receive you at your destination.  We require that drivers and caregivers are confirmed prior to your procedure.

## 2023-08-24 ENCOUNTER — TELEPHONE (OUTPATIENT)
Dept: GASTROENTEROLOGY | Facility: CLINIC | Age: 48
End: 2023-08-24

## 2023-08-24 ENCOUNTER — TELEPHONE (OUTPATIENT)
Dept: GASTROENTEROLOGY | Facility: CLINIC | Age: 48
End: 2023-08-24
Payer: COMMERCIAL

## 2023-08-24 ENCOUNTER — TELEPHONE (OUTPATIENT)
Dept: FAMILY MEDICINE | Facility: CLINIC | Age: 48
End: 2023-08-24
Payer: COMMERCIAL

## 2023-08-24 DIAGNOSIS — J30.2 SEASONAL ALLERGIC RHINITIS, UNSPECIFIED TRIGGER: Primary | ICD-10-CM

## 2023-08-24 DIAGNOSIS — Z12.11 ENCOUNTER FOR SCREENING COLONOSCOPY: Primary | ICD-10-CM

## 2023-08-24 RX ORDER — BISACODYL 5 MG/1
TABLET, DELAYED RELEASE ORAL
Qty: 4 TABLET | Refills: 0 | Status: SHIPPED | OUTPATIENT
Start: 2023-08-24 | End: 2024-02-29

## 2023-08-24 RX ORDER — FLUTICASONE PROPIONATE 50 MCG
1 SPRAY, SUSPENSION (ML) NASAL DAILY
Qty: 16 G | Refills: 3 | Status: SHIPPED | OUTPATIENT
Start: 2023-08-24 | End: 2024-02-16

## 2023-08-24 NOTE — TELEPHONE ENCOUNTER
"TO PCP:     Sumit called regarding the Flonase nasal spray     They cannot fill with the sig they received     \"USE 1 SPRAY IN EACH NOSTRIL AS DIRECTED\"     Needing a frequency 1 spray - daily? Twice a day?     They need a call back or a new Rx     Thank you     Scarlett TOSCANO, Triage RN  [facility]     "

## 2023-08-24 NOTE — TELEPHONE ENCOUNTER
Staff message resent to [facility] anesthesia review to clarify if ok to proceed. Patient with severe PHUONG.       Procedure details:    Patient scheduled for Colonoscopy  on 9/6/23.     Arrival time: 0905. Procedure time 0950    Pre op exam needed? N/A    Facility location: [facility]; [address], [place] [address]    Sedation type: Conscious sedation     Indication for procedure: screening       Chart review:     Electronic implanted devices? No    Diabetic? Yes. See medication holding recommendations     Diabetic medication HOLDING recommendations: (if applicable)  Oral diabetic medications: No  Diabetic injectables: No  Insulin: Yes. Consult with managing provider       Medication review:    Anticoagulants? No    NSAIDS? No    Other medication HOLDING recommendations:  N/A      Prep for procedure:     Bowel prep recommendation: Extended prep Golytely    Due to:  BMI > 40.     Prep instructions sent via [employer] Bowel prep script sent to    "[facility] #[id number] - [place], [place] - [address] AT [facility] [address]        Lina Gilliam RN  Endoscopy Procedure Pre Assessment RN  [phone] option 4

## 2023-08-24 NOTE — TELEPHONE ENCOUNTER
"Response from [facility] anesthesia:    Meet Gilbert MD Roman, Jennifer, RN   \"Only approved as MAC\"    Currently scheduled with CS at [facility]. Will send to endoscopy scheduling to reschedule under MAC if wanting to be at [facility].    Lina Gilliam RN  Endoscopy Procedure Pre Assessment RN  [phone] option 4   "

## 2023-08-24 NOTE — TELEPHONE ENCOUNTER
Caller: Don  Reason for Reschedule/Cancellation (please be detailed, any staff messages or encounters to note?): Pt needs to be either at hospital setting or under MAC at [facility]       Prior to reschedule please review:  Ordering Provider:     Domingo Malone MD in [facility] FP/IM/PEDS     Sedation per order: Moderate- but pt needs MAC if at [facility]   Does patient have any ASC Exclusions, please identify?: Y PHUONG      Notes on Cancelled Procedure:  Procedure: Lower Endoscopy [Colonoscopy]   Date: 09/06/2023  Location: [facility]; [address], [place] [address]  Surgeon: Adalberto       Rescheduled: Yes  Procedure: Lower Endoscopy [Colonoscopy]   Date: 11/03/2023  Location: [facility]; [address], [place] [address]  Surgeon: Rosy  Sedation Level Scheduled  MAC,  Reason for Sedation Level per in bakset message  Prep/Instructions updated and sent: y     Send In - basket message to Panc - Quinn Pool if EUS  procedure is canceled or rescheduled: [ N/A, YES or NO] na

## 2023-08-27 DIAGNOSIS — I10 PRIMARY HYPERTENSION: ICD-10-CM

## 2023-08-28 DIAGNOSIS — E10.9 TYPE 1 DIABETES MELLITUS WITHOUT COMPLICATION (H): ICD-10-CM

## 2023-08-28 RX ORDER — LOSARTAN POTASSIUM 25 MG/1
25 TABLET ORAL DAILY
Qty: 90 TABLET | Refills: 3 | Status: SHIPPED | OUTPATIENT
Start: 2023-08-28

## 2023-08-30 DIAGNOSIS — E10.9 TYPE 1 DIABETES MELLITUS WITHOUT COMPLICATION (H): ICD-10-CM

## 2023-08-30 RX ORDER — PROCHLORPERAZINE 25 MG/1
SUPPOSITORY RECTAL
Qty: 9 EACH | Refills: 3 | Status: SHIPPED | OUTPATIENT
Start: 2023-08-30

## 2023-08-30 NOTE — TELEPHONE ENCOUNTER
Requested Prescriptions   Pending Prescriptions Disp Refills    Continuous Blood Gluc Sensor (DEXCOM G6 SENSOR) MISC 3 each 4     Sig: Change every 10 days.       There is no refill protocol information for this order        Last Written Prescription Date:  1/26/23  Last Fill Quantity: 3 each,  # refills: 4   Last office visit: 12/15/2021 ; last virtual visit: 6/14/2023 Dr Heaton   Future Office Visit:  10/5/23    Continue Dexcom G6 per Dr Heaton's LOV on 6/14/23. Routing to Dr Heaton to confirm refill as medications was previously ordered by PCP.    Bang Jackson RN

## 2023-09-03 RX ORDER — PROCHLORPERAZINE 25 MG/1
SUPPOSITORY RECTAL
Qty: 9 EACH | Refills: 3 | Status: SHIPPED | OUTPATIENT
Start: 2023-09-03

## 2023-09-30 ENCOUNTER — HEALTH MAINTENANCE LETTER (OUTPATIENT)
Age: 48
End: 2023-09-30

## 2023-10-02 ENCOUNTER — IMMUNIZATION (OUTPATIENT)
Dept: FAMILY MEDICINE | Facility: CLINIC | Age: 48
End: 2023-10-02
Payer: COMMERCIAL

## 2023-10-02 ENCOUNTER — LAB (OUTPATIENT)
Dept: LAB | Facility: CLINIC | Age: 48
End: 2023-10-02
Payer: COMMERCIAL

## 2023-10-02 DIAGNOSIS — E88.819 INSULIN RESISTANCE: ICD-10-CM

## 2023-10-02 DIAGNOSIS — Z96.41 INSULIN PUMP STATUS: ICD-10-CM

## 2023-10-02 DIAGNOSIS — E13.9 DIABETES MELLITUS TYPE 1.5, MANAGED AS TYPE 2 (H): ICD-10-CM

## 2023-10-02 LAB — HBA1C MFR BLD: 7.3 % (ref 0–5.6)

## 2023-10-02 PROCEDURE — 83036 HEMOGLOBIN GLYCOSYLATED A1C: CPT

## 2023-10-02 PROCEDURE — 82043 UR ALBUMIN QUANTITATIVE: CPT

## 2023-10-02 PROCEDURE — 80061 LIPID PANEL: CPT

## 2023-10-02 PROCEDURE — 82570 ASSAY OF URINE CREATININE: CPT

## 2023-10-02 PROCEDURE — 90686 IIV4 VACC NO PRSV 0.5 ML IM: CPT

## 2023-10-02 PROCEDURE — 84443 ASSAY THYROID STIM HORMONE: CPT

## 2023-10-02 PROCEDURE — 80048 BASIC METABOLIC PNL TOTAL CA: CPT

## 2023-10-02 PROCEDURE — 90471 IMMUNIZATION ADMIN: CPT

## 2023-10-02 PROCEDURE — 36415 COLL VENOUS BLD VENIPUNCTURE: CPT

## 2023-10-03 LAB
ANION GAP SERPL CALCULATED.3IONS-SCNC: 12 MMOL/L (ref 7–15)
BUN SERPL-MCNC: 11.6 MG/DL (ref 6–20)
CALCIUM SERPL-MCNC: 9 MG/DL (ref 8.6–10)
CHLORIDE SERPL-SCNC: 104 MMOL/L (ref 98–107)
CHOLEST SERPL-MCNC: 155 MG/DL
CREAT SERPL-MCNC: 0.79 MG/DL (ref 0.67–1.17)
CREAT UR-MCNC: 134 MG/DL
DEPRECATED HCO3 PLAS-SCNC: 23 MMOL/L (ref 22–29)
EGFRCR SERPLBLD CKD-EPI 2021: >90 ML/MIN/1.73M2
GLUCOSE SERPL-MCNC: 147 MG/DL (ref 70–99)
HDLC SERPL-MCNC: 64 MG/DL
LDLC SERPL CALC-MCNC: 83 MG/DL
MICROALBUMIN UR-MCNC: <12 MG/L
MICROALBUMIN/CREAT UR: NORMAL MG/G{CREAT}
NONHDLC SERPL-MCNC: 91 MG/DL
POTASSIUM SERPL-SCNC: 4.2 MMOL/L (ref 3.4–5.3)
SODIUM SERPL-SCNC: 139 MMOL/L (ref 135–145)
TRIGL SERPL-MCNC: 40 MG/DL
TSH SERPL DL<=0.005 MIU/L-ACNC: 1.63 UIU/ML (ref 0.3–4.2)

## 2023-10-05 ENCOUNTER — VIRTUAL VISIT (OUTPATIENT)
Dept: ENDOCRINOLOGY | Facility: CLINIC | Age: 48
End: 2023-10-05
Payer: COMMERCIAL

## 2023-10-05 DIAGNOSIS — E66.01 MORBID OBESITY (H): ICD-10-CM

## 2023-10-05 DIAGNOSIS — Z96.41 INSULIN PUMP STATUS: ICD-10-CM

## 2023-10-05 DIAGNOSIS — E10.9 TYPE 1 DIABETES MELLITUS WITHOUT COMPLICATION (H): Primary | ICD-10-CM

## 2023-10-05 PROCEDURE — 99213 OFFICE O/P EST LOW 20 MIN: CPT | Mod: VID | Performed by: INTERNAL MEDICINE

## 2023-10-05 PROCEDURE — 95251 CONT GLUC MNTR ANALYSIS I&R: CPT | Performed by: INTERNAL MEDICINE

## 2023-10-05 NOTE — PROGRESS NOTES
Virtual Visit Details    Type of service:  Video Visit   Video Start Time: 1:05 PM  Video End Time: 1:25 PM    Originating Location (pt. Location): Home  Distant Location (provider location):  Off-site  Platform used for Video Visit: Tasneem        Recent issues:  Diabetes follow-up evaluation  Continues with Tandem pump and DexcomG6 sensor use  Switched from Victoza to Ozempic, but ran out 1-2 weeks ago  Feeling well overall, yet had tooth filling, then filling came out and told root dead... planning root canal          2011. Diagnosis of diabetes mellitus while living in [place]  Acute symptoms increased thirst, frequent urination, fatigue, weight loss (265 to 203#/30 days)  Hospitalized and diagnosis of DKA  Initial treatment with Apidra and Lantus insulin  Met with an endocrinologist Dr. Lina Fish  ~6 mo later, switched to [place] Ping pump     2012. Continued using this pump for 1 year, then moved to [facility]  Saw Dr. Beena Johnson/Endocrinologist in [place]  Switched to Tandem insulin pump    Previous [facility] labs include:       Subsequent move back to [place]  Recalls taking Victoza medication along with pump management, success with significant weight loss  Moved to [place]  9/2019. Moved to [place], [place]     Medical evaluations with Dr. 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 Started Tandem insulin pump     Previous [facility] labs include:          2/19/20. Med [facility]  No lab tests ordered  Started DexcomG6 CGMS sensor  ~4/2020. Upgrade to the Control IQ pump         5/26/20. Initial diabetes evaluation with me, [facility]  Continued use of the Tandem TSlim pump with the DexcomG6  ~6/2020. Restarted (off label) Victoza  10/2020. Insurance coverage for Victoza, used 1.8 mg daily  11/2021. Ran out of Victoza  ~3/2022. Restarted Victoza medication  Switched from Victoza to  Ozempic 0.5 mg weekly  10/2023. Ran out of Ozempic medication    Using Tandem TSlim pump with DexcomG6 sensor              Novolog in pump    Current Tandem pump settings:      Recent pump and sensor data:          Recent DexcomG6 data:        Blood glucose (BG) meter:  Accu-check               Tests infrequently  Fam Hx DM:    MA, Mcousin, MGM, PGM     Previous [facility] labs include:          Lab Results   Component Value Date    A1C 7.3 (H) 10/02/2023     10/02/2023    POTASSIUM 4.2 10/02/2023    CHLORIDE 104 10/02/2023    CO2 23 10/02/2023    ANIONGAP 12 10/02/2023     (H) 10/02/2023    BUN 11.6 10/02/2023    CR 0.79 10/02/2023    GFRESTIMATED >90 10/02/2023    GFRESTBLACK >90 05/05/2021    MICK 9.0 10/02/2023    CPEPT <0.1 (L) 05/28/2020    CHOL 155 10/02/2023    TRIG 40 10/02/2023    HDL 64 10/02/2023    LDL 83 10/02/2023    NHDL 91 10/02/2023    UCRR 134.0 10/02/2023    MICROL <12.0 10/02/2023    UMALCR  10/02/2023      Comment:      Unable to calculate, urine albumin and/or urine creatinine is outside detectable limits.  Microalbuminuria is defined as an albumin:creatinine ratio of 17 to 299 for males and 25 to 299 for females. A ratio of albumin:creatinine of 300 or higher is indicative of overt proteinuria.  Due to biologic variability, positive results should be confirmed by a second, first-morning random or 24-hour timed urine specimen. If there is discrepancy, a third specimen is recommended. When 2 out of 3 results are in the microalbuminuria range, this is evidence for incipient nephropathy and warrants increased efforts at glucose control, blood pressure control, and institution of therapy with an angiotensin-converting-enzyme (ACE) inhibitor (if the patient can tolerate it).       Lab Results   Component Value Date    TSH 1.63 10/02/2023     Last eye exam 12/2022, no DR noted  DM Complications: none        , lives in [place], [place]; previously worked with [employer]  Sees Dr. JAMES  [facility] for general medicine evaluations.       PMH/PSH:  Past Medical History:   Diagnosis Date    Cataracts, bilateral     DKA (diabetic ketoacidoses)     Morbid obesity (H)     PHUONG (obstructive sleep apnea)     T1DM (type 1 diabetes mellitus) (H)      Past Surgical History:   Procedure Laterality Date    COLONOSCOPY      ENT SURGERY  10 years ago    cyst on neck    EXTRACTION(S) DENTAL      NECK SURGERY  2010    to remove a cyst    PHACOEMULSIFICATION CLEAR CORNEA WITH STANDARD INTRAOCULAR LENS IMPLANT Left 09/21/2020    Procedure: COMPLEX PHACOEMULSIFICATION, CATARACT, WITH INTRAOCULAR LENS IMPLANT;  Surgeon: Andre Jackson MD;  Location: [facility]    PHACOEMULSIFICATION CLEAR CORNEA WITH STANDARD INTRAOCULAR LENS IMPLANT Right 09/28/2020    Procedure: PHACOEMULSIFICATION, CATARACT, WITH INTRAOCULAR LENS IMPLANT;  Surgeon: Andre Jackson MD;  Location: [facility] OR       Family Hx:  Family History   Problem Relation Age of Onset    Diabetes Maternal Grandmother     Other Cancer Maternal Grandmother         Lung Cancer    Diabetes Paternal Grandmother     Glaucoma Brother     Macular Degeneration No family hx of          Social Hx:  Social History     Socioeconomic History    Marital status: Single     Spouse name: Not on file    Number of children: Not on file    Years of education: Not on file    Highest education level: Not on file   Occupational History    Not on file   Tobacco Use    Smoking status: Never    Smokeless tobacco: Never   Substance and Sexual Activity    Alcohol use: Never    Drug use: Never    Sexual activity: Not on file   Other Topics Concern    Parent/sibling w/ CABG, MI or angioplasty before 65F 55M? Not Asked   Social History Narrative    Not on file     Social Determinants of Health     Financial Resource Strain: Not on file   Food Insecurity: Not on file   Transportation Needs: Not on file   Physical Activity: Not on file   Stress: Not on file   Social  Connections: Not on file   Interpersonal Safety: Not on file   Housing Stability: Not on file          MEDICATIONS:  has a current medication list which includes the following prescription(s): accu-chek justin plus, blood glucose monitoring, dexcom g6 , dexcom g6 sensor, dexcom g6 sensor, dexcom g6 transmitter, fluticasone, insulin aspart, b-d u/f, insulin pump, losartan, novolog vial, bisacodyl, clobetasol, clotrimazole-betamethasone, fexofenadine, fexofenadine-pseudoephedrine, fluticasone, polyethylene glycol, and sildenafil.       ROS: 10 point ROS neg other than the symptoms noted above in the HPI.     GENERAL: mild fatigue, wt gain 10-12#; denies fevers, chills, night sweats.   HEENT: no dysphagia, odonophagia, diplopia, neck pain  THYROID:  no apparent hyper or hypothyroid symptoms  CV: no chest pain, pressure, palpitations  LUNGS: no SOB, AGARWAL, cough, wheezing   ABDOMEN: no diarrhea, constipation, abdominal pain  EXTREMITIES: no rashes, ulcers, edema  NEUROLOGY: right leg tingling; no headaches, denies changes in vision, extremitiy numbness   MSK: no muscle aches or pains, weakness  SKIN: no rashes or lesions  : decreased erection function; occasional nocturia  PSYCH:  stable mood, no significant anxiety or depression  ENDOCRINE: no heat or cold intolerance    Physical Exam (visual exam)  VS:  no vital signs taken for video visit  CONSTITUTIONAL: healthy, alert and NAD, well dressed, answering questions appropriately  ENT: no nose swelling or nasal discharge, mouth redness or gum changes.  EYES: eyes grossly normal to inspection, conjunctivae and sclerae normal, no exophthalmos or proptosis  THYROID:  no apparent nodules or goiter  LUNGS: no audible wheeze, cough or visible cyanosis, no visible retractions or increased work of breathing  ABDOMEN: abdomen not evaluated  EXTREMITIES: no hand tremors, limited exam  NEUROLOGY: CN grossly intact, mentation intact and speech normal   SKIN:  no apparent skin  lesions, rash, or edema with visualized skin appearance  PSYCH: mentation appears normal, affect normal/bright, judgement and insight intact,   normal speech and appearance well groomed       LABS:     All pertinent notes, labs, and images personally reviewed by me.       A/P:  Encounter Diagnoses   Name Primary?    Type 1 diabetes mellitus without complication (H) Yes    Insulin pump status     Morbid obesity (H)      Comments:  Reviewed health history and diabetes issues.  Health history and lab testing indicates T1.5DM   The off-label Victoza, then Ozempic medication... helpful for management  Reviewed and interpreted tests that I previously ordered.   Ordered appropriate tests for the endocrinology disease management.    Management options discussed and implemented after shared medical decision making with the patient.  T1.5DM problem is chronic-stable    Plan:  Reviewed the overall T1.5DM management and insulin pump use.  Discussed optimal BG testing to assess glucose trends.  We reviewed insulin pump settings, basal rate and bolus dosing  Use of automated pump bolus dosing for meal/snack carb & correction dosing  Reviewed recent Tandem pump and DexcomG6 CGM glucose trend data, in detail     Recommend:  Continue the Tandem insulin pump, DexcomG6 sensor, and diabetes management plan.  Pump setting changes:   Bolus ICR 4p 2.0 to 1.8    Important to enter meal and snack carb amount for automated (premeal) bolusing  Avoid eating carb snack to raise late evening SG trends  We have discussed use of the Tandem pump Exercise Mode for aerobic exercise  Resume use of the off-label Ozempic medication, since tolerated well and helpful   Reviewed pen dosing options, including modified dosing using pen-click technique  Monitor for symptom changes, including GI symptoms  Keep focus on diet, exercise, and weight management   Continue the walking exercise routine... helps his insulin sensitivity  Would benefit from dietician  evaluation, also commercial weight loss program or bariatric surgery  Plan repeat fasting labs in 2/2024   Discussed [facility] clinic   Lab orders placed  Arrange annual dilated eye exam, fasting lipid panel testing.     Addressed patient's questions today.    There are no Patient Instructions on file for this visit.    Future labs ordered today:   Orders Placed This Encounter   Procedures    GLUCOSE MONITOR, 72 HOUR, PHYS INTERP    Hemoglobin A1c     Radiology/Consults ordered today: None    Total time spent on day of encounter:  26 min    Follow-up:  2/2024, Gilma Heaton MD, MS  Endocrinology  [facility]    CC:  ERIKA Milligan

## 2023-10-05 NOTE — Clinical Note
10/5/2023         RE: Jose Apodaca  [address]  [place] [address]        Dear Colleague,    Thank you for referring your patient, Jose Apodaca, to the [facility] [place]. Please see a copy of my visit note below.    Virtual Visit Details    Type of service:  Video Visit   Video Start Time: 1:05 PM  Video End Time: 1:25 PM    Originating Location (pt. Location): Home  Distant Location (provider location):  Off-site  Platform used for Video Visit: Tasneem        Recent issues:  Diabetes follow-up evaluation  Continues with Tandem pump and DexcomG6 sensor use  Switched from Victoza to Ozempic, but ran out 1-2 weeks ago  Feeling well overall, yet had tooth filling, then filling came out and told root dead... planning root canal          2011. Diagnosis of diabetes mellitus while living in [place]  Acute symptoms increased thirst, frequent urination, fatigue, weight loss (265 to 203#/30 days)  Hospitalized and diagnosis of DKA  Initial treatment with Apidra and Lantus insulin  Met with an endocrinologist Dr. Lina Fish  ~6 mo later, switched to [place] Ping pump     2012. Continued using this pump for 1 year, then moved to [facility]  Saw Dr. Beena Johnson/Endocrinologist in [place]  Switched to Tandem insulin pump    Previous [facility] labs include:       Subsequent move back to [place]  Recalls taking Victoza medication along with pump management, success with significant weight loss  Moved to [place]  9/2019. Moved to [place], [place]     Medical evaluations with Dr. 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 Started Tandem insulin pump     Previous [facility] labs include:          2/19/20. Med evaluation with Dr. ERIKA Milligan/[facility]  No lab tests ordered  Started DexcomG6 CGMS sensor  ~4/2020. Upgrade to the Control IQ pump         5/26/20. Initial diabetes evaluation  with me, [facility]  Continued use of the Tandem TSlim pump with the DexcomG6  ~6/2020. Restarted (off label) Victoza  10/2020. Insurance coverage for Victoza, used 1.8 mg daily  11/2021. Ran out of Victoza  ~3/2022. Restarted Victoza medication  Switched from Victoza to Ozempic 0.5 mg weekly  10/2023. Ran out of Ozempic medication    Using Tandem TSlim pump with DexcomG6 sensor              Novolog in pump    Current Tandem pump settings:      Recent pump and sensor data:          Recent DexcomG6 data:        Blood glucose (BG) meter:  Accu-check               Tests infrequently  Fam Hx DM:    MA, Mcousin, MGM, PGM     Previous [facility] labs include:          Lab Results   Component Value Date    A1C 7.3 (H) 10/02/2023     10/02/2023    POTASSIUM 4.2 10/02/2023    CHLORIDE 104 10/02/2023    CO2 23 10/02/2023    ANIONGAP 12 10/02/2023     (H) 10/02/2023    BUN 11.6 10/02/2023    CR 0.79 10/02/2023    GFRESTIMATED >90 10/02/2023    GFRESTBLACK >90 05/05/2021    MICK 9.0 10/02/2023    CPEPT <0.1 (L) 05/28/2020    CHOL 155 10/02/2023    TRIG 40 10/02/2023    HDL 64 10/02/2023    LDL 83 10/02/2023    NHDL 91 10/02/2023    UCRR 134.0 10/02/2023    MICROL <12.0 10/02/2023    UMALCR  10/02/2023      Comment:      Unable to calculate, urine albumin and/or urine creatinine is outside detectable limits.  Microalbuminuria is defined as an albumin:creatinine ratio of 17 to 299 for males and 25 to 299 for females. A ratio of albumin:creatinine of 300 or higher is indicative of overt proteinuria.  Due to biologic variability, positive results should be confirmed by a second, first-morning random or 24-hour timed urine specimen. If there is discrepancy, a third specimen is recommended. When 2 out of 3 results are in the microalbuminuria range, this is evidence for incipient nephropathy and warrants increased efforts at glucose control, blood pressure control, and institution of therapy with an  angiotensin-converting-enzyme (ACE) inhibitor (if the patient can tolerate it).       Lab Results   Component Value Date    TSH 1.63 10/02/2023     Last eye exam 12/2022, no DR noted  DM Complications: none        , lives in [place], [place]; previously worked with [employer]  Sees Dr. ERIKA Milligan/[facility] for general medicine evaluations.       PMH/PSH:  Past Medical History:   Diagnosis Date    Cataracts, bilateral     DKA (diabetic ketoacidoses)     Morbid obesity (H)     PHUONG (obstructive sleep apnea)     T1DM (type 1 diabetes mellitus) (H)      Past Surgical History:   Procedure Laterality Date    COLONOSCOPY      ENT SURGERY  10 years ago    cyst on neck    EXTRACTION(S) DENTAL      NECK SURGERY  2010    to remove a cyst    PHACOEMULSIFICATION CLEAR CORNEA WITH STANDARD INTRAOCULAR LENS IMPLANT Left 09/21/2020    Procedure: COMPLEX PHACOEMULSIFICATION, CATARACT, WITH INTRAOCULAR LENS IMPLANT;  Surgeon: Andre Jackson MD;  Location: [facility] OR    PHACOEMULSIFICATION CLEAR CORNEA WITH STANDARD INTRAOCULAR LENS IMPLANT Right 09/28/2020    Procedure: PHACOEMULSIFICATION, CATARACT, WITH INTRAOCULAR LENS IMPLANT;  Surgeon: Andre Jackson MD;  Location: [facility] OR       Family Hx:  Family History   Problem Relation Age of Onset    Diabetes Maternal Grandmother     Other Cancer Maternal Grandmother         Lung Cancer    Diabetes Paternal Grandmother     Glaucoma Brother     Macular Degeneration No family hx of          Social Hx:  Social History     Socioeconomic History    Marital status: Single     Spouse name: Not on file    Number of children: Not on file    Years of education: Not on file    Highest education level: Not on file   Occupational History    Not on file   Tobacco Use    Smoking status: Never    Smokeless tobacco: Never   Substance and Sexual Activity    Alcohol use: Never    Drug use: Never    Sexual activity: Not on file   Other Topics Concern    Parent/sibling w/ CABG, MI  or angioplasty before 65F 55M? Not Asked   Social History Narrative    Not on file     Social Determinants of Health     Financial Resource Strain: Not on file   Food Insecurity: Not on file   Transportation Needs: Not on file   Physical Activity: Not on file   Stress: Not on file   Social Connections: Not on file   Interpersonal Safety: Not on file   Housing Stability: Not on file          MEDICATIONS:  has a current medication list which includes the following prescription(s): accu-chek justin plus, blood glucose monitoring, dexcom g6 , dexcom g6 sensor, dexcom g6 sensor, dexcom g6 transmitter, fluticasone, insulin aspart, b-d u/f, insulin pump, losartan, novolog vial, bisacodyl, clobetasol, clotrimazole-betamethasone, fexofenadine, fexofenadine-pseudoephedrine, fluticasone, polyethylene glycol, and sildenafil.       ROS: 10 point ROS neg other than the symptoms noted above in the HPI.     GENERAL: mild fatigue, wt gain 10-12#; denies fevers, chills, night sweats.   HEENT: no dysphagia, odonophagia, diplopia, neck pain  THYROID:  no apparent hyper or hypothyroid symptoms  CV: no chest pain, pressure, palpitations  LUNGS: no SOB, AGARWAL, cough, wheezing   ABDOMEN: no diarrhea, constipation, abdominal pain  EXTREMITIES: no rashes, ulcers, edema  NEUROLOGY: right leg tingling; no headaches, denies changes in vision, extremitiy numbness   MSK: no muscle aches or pains, weakness  SKIN: no rashes or lesions  : decreased erection function; occasional nocturia  PSYCH:  stable mood, no significant anxiety or depression  ENDOCRINE: no heat or cold intolerance    Physical Exam (visual exam)  VS:  no vital signs taken for video visit  CONSTITUTIONAL: healthy, alert and NAD, well dressed, answering questions appropriately  ENT: no nose swelling or nasal discharge, mouth redness or gum changes.  EYES: eyes grossly normal to inspection, conjunctivae and sclerae normal, no exophthalmos or proptosis  THYROID:  no apparent  nodules or goiter  LUNGS: no audible wheeze, cough or visible cyanosis, no visible retractions or increased work of breathing  ABDOMEN: abdomen not evaluated  EXTREMITIES: no hand tremors, limited exam  NEUROLOGY: CN grossly intact, mentation intact and speech normal   SKIN:  no apparent skin lesions, rash, or edema with visualized skin appearance  PSYCH: mentation appears normal, affect normal/bright, judgement and insight intact,   normal speech and appearance well groomed       LABS:     All pertinent notes, labs, and images personally reviewed by me.       A/P:  Encounter Diagnoses   Name Primary?    Type 1 diabetes mellitus without complication (H) Yes    Insulin pump status     Morbid obesity (H)      Comments:  Reviewed health history and diabetes issues.  Health history and lab testing indicates T1.5DM   The off-label Victoza, then Ozempic medication... helpful for management  Reviewed and interpreted tests that I previously ordered.   Ordered appropriate tests for the endocrinology disease management.    Management options discussed and implemented after shared medical decision making with the patient.  T1.5DM problem is chronic-stable    Plan:  Reviewed the overall T1.5DM management and insulin pump use.  Discussed optimal BG testing to assess glucose trends.  We reviewed insulin pump settings, basal rate and bolus dosing  Use of automated pump bolus dosing for meal/snack carb & correction dosing  Reviewed recent Tandem pump and DexcomG6 CGM glucose trend data, in detail     Recommend:  Continue the Tandem insulin pump, DexcomG6 sensor, and diabetes management plan.  Pump setting changes:   Bolus ICR 4p 2.0 to 1.8    Important to enter meal and snack carb amount for automated (premeal) bolusing  Avoid eating carb snack to raise late evening SG trends  We have discussed use of the Tandem pump Exercise Mode for aerobic exercise  Resume use of the off-label Ozempic medication, since tolerated well and  helpful   Reviewed pen dosing options, including modified dosing using pen-click technique  Monitor for symptom changes, including GI symptoms  Keep focus on diet, exercise, and weight management   Continue the walking exercise routine... helps his insulin sensitivity  Would benefit from dietician evaluation, also commercial weight loss program or bariatric surgery  Plan repeat fasting labs in 2/2024   Discussed [facility]   Lab orders placed  Arrange annual dilated eye exam, fasting lipid panel testing.     Addressed patient's questions today.    There are no Patient Instructions on file for this visit.    Future labs ordered today: No orders of the defined types were placed in this encounter.    Radiology/Consults ordered today: None    Total time spent on day of encounter:  26 min    Follow-up:  2/2024, Gilma Heaton MD, MS  Endocrinology  [facility]    CC:  ERIKA Milligan                          Virtual Visit Details    Type of service:  Video Visit   Video Start Time: 1:05 PM  Video End Time: 1:25 PM    Originating Location (pt. Location): Home  Distant Location (provider location):  Off-site  Platform used for Video Visit: Tasneem        Recent issues:  Diabetes follow-up evaluation  Continues with Tandem pump and DexcomG6 sensor use  Switched from Victoza to Ozempic, but ran out 1-2 weeks ago  Feeling well overall, yet had tooth filling, then filling came out and told root dead... planning root canal          2011. Diagnosis of diabetes mellitus while living in [place]  Acute symptoms increased thirst, frequent urination, fatigue, weight loss (265 to 203#/30 days)  Hospitalized and diagnosis of DKA  Initial treatment with Apidra and Lantus insulin  Met with an endocrinologist Dr. Lina Fish  ~6 mo later, switched to [place] Ping pump     2012. Continued using this pump for 1 year, then moved to [facility]  Saw Dr. Beena Johnson/Endocrinologist in [place]  Switched to  Tandem insulin pump    Previous [facility] labs include:       Subsequent move back to [place]  Recalls taking Victoza medication along with pump management, success with significant weight loss  Moved to [place]  9/2019. Moved to [place], [place]     Medical evaluations with Dr. 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 Started Tandem insulin pump     Previous [facility] labs include:          2/19/20. Med evaluation with Dr. ERIKA Milligan/[facility]  No lab tests ordered  Started DexcomG6 CGMS sensor  ~4/2020. Upgrade to the Control IQ pump         5/26/20. Initial diabetes evaluation with me, [facility]  Continued use of the Tandem TSlim pump with the DexcomG6  ~6/2020. Restarted (off label) Victoza  10/2020. Insurance coverage for Victoza, used 1.8 mg daily  11/2021. Ran out of Victoza  ~3/2022. Restarted Victoza medication  Switched from Victoza to Ozempic 0.5 mg weekly  10/2023. Ran out of Ozempic medication    Using Tandem TSlim pump with DexcomG6 sensor              Novolog in pump    Current Tandem pump settings:      Recent pump and sensor data:          Recent DexcomG6 data:        Blood glucose (BG) meter:  Accu-check               Tests infrequently  Fam Hx DM:    Clement GALVEZ, MGM, PGM     Previous  labs include:          Lab Results   Component Value Date    A1C 7.3 (H) 10/02/2023     10/02/2023    POTASSIUM 4.2 10/02/2023    CHLORIDE 104 10/02/2023    CO2 23 10/02/2023    ANIONGAP 12 10/02/2023     (H) 10/02/2023    BUN 11.6 10/02/2023    CR 0.79 10/02/2023    GFRESTIMATED >90 10/02/2023    GFRESTBLACK >90 05/05/2021    MICK 9.0 10/02/2023    CPEPT <0.1 (L) 05/28/2020    CHOL 155 10/02/2023    TRIG 40 10/02/2023    HDL 64 10/02/2023    LDL 83 10/02/2023    NHDL 91 10/02/2023    UCRR 134.0 10/02/2023    MICROL <12.0 10/02/2023    UMALCR  10/02/2023      Comment:      Unable to calculate, urine albumin and/or urine  creatinine is outside detectable limits.  Microalbuminuria is defined as an albumin:creatinine ratio of 17 to 299 for males and 25 to 299 for females. A ratio of albumin:creatinine of 300 or higher is indicative of overt proteinuria.  Due to biologic variability, positive results should be confirmed by a second, first-morning random or 24-hour timed urine specimen. If there is discrepancy, a third specimen is recommended. When 2 out of 3 results are in the microalbuminuria range, this is evidence for incipient nephropathy and warrants increased efforts at glucose control, blood pressure control, and institution of therapy with an angiotensin-converting-enzyme (ACE) inhibitor (if the patient can tolerate it).       Lab Results   Component Value Date    TSH 1.63 10/02/2023     Last eye exam 12/2022, no DR noted  DM Complications: none        , lives in [place], [place]; previously worked with [employer]  Sees Dr. ERIKA Milligan/[facility] for general medicine evaluations.       PMH/PSH:  Past Medical History:   Diagnosis Date     Cataracts, bilateral      DKA (diabetic ketoacidoses)      Morbid obesity (H)      PHUONG (obstructive sleep apnea)      T1DM (type 1 diabetes mellitus) (H)      Past Surgical History:   Procedure Laterality Date     COLONOSCOPY       ENT SURGERY  10 years ago    cyst on neck     EXTRACTION(S) DENTAL       NECK SURGERY  2010    to remove a cyst     PHACOEMULSIFICATION CLEAR CORNEA WITH STANDARD INTRAOCULAR LENS IMPLANT Left 09/21/2020    Procedure: COMPLEX PHACOEMULSIFICATION, CATARACT, WITH INTRAOCULAR LENS IMPLANT;  Surgeon: Andre Jackson MD;  Location:  OR     PHACOEMULSIFICATION CLEAR CORNEA WITH STANDARD INTRAOCULAR LENS IMPLANT Right 09/28/2020    Procedure: PHACOEMULSIFICATION, CATARACT, WITH INTRAOCULAR LENS IMPLANT;  Surgeon: Andre Jackson MD;  Location:  OR       Family Hx:  Family History   Problem Relation Age of Onset     Diabetes Maternal  Grandmother      Other Cancer Maternal Grandmother         Lung Cancer     Diabetes Paternal Grandmother      Glaucoma Brother      Macular Degeneration No family hx of          Social Hx:  Social History     Socioeconomic History     Marital status: Single     Spouse name: Not on file     Number of children: Not on file     Years of education: Not on file     Highest education level: Not on file   Occupational History     Not on file   Tobacco Use     Smoking status: Never     Smokeless tobacco: Never   Substance and Sexual Activity     Alcohol use: Never     Drug use: Never     Sexual activity: Not on file   Other Topics Concern     Parent/sibling w/ CABG, MI or angioplasty before 65F 55M? Not Asked   Social History Narrative     Not on file     Social Determinants of Health     Financial Resource Strain: Not on file   Food Insecurity: Not on file   Transportation Needs: Not on file   Physical Activity: Not on file   Stress: Not on file   Social Connections: Not on file   Interpersonal Safety: Not on file   Housing Stability: Not on file          MEDICATIONS:  has a current medication list which includes the following prescription(s): accu-chek justin plus, blood glucose monitoring, dexcom g6 , dexcom g6 sensor, dexcom g6 sensor, dexcom g6 transmitter, fluticasone, insulin aspart, b-d u/f, insulin pump, losartan, novolog vial, bisacodyl, clobetasol, clotrimazole-betamethasone, fexofenadine, fexofenadine-pseudoephedrine, fluticasone, polyethylene glycol, and sildenafil.       ROS: 10 point ROS neg other than the symptoms noted above in the HPI.     GENERAL: mild fatigue, wt gain 10-12#; denies fevers, chills, night sweats.   HEENT: no dysphagia, odonophagia, diplopia, neck pain  THYROID:  no apparent hyper or hypothyroid symptoms  CV: no chest pain, pressure, palpitations  LUNGS: no SOB, AGARWAL, cough, wheezing   ABDOMEN: no diarrhea, constipation, abdominal pain  EXTREMITIES: no rashes, ulcers,  edema  NEUROLOGY: right leg tingling; no headaches, denies changes in vision, extremitiy numbness   MSK: no muscle aches or pains, weakness  SKIN: no rashes or lesions  : decreased erection function; occasional nocturia  PSYCH:  stable mood, no significant anxiety or depression  ENDOCRINE: no heat or cold intolerance    Physical Exam (visual exam)  VS:  no vital signs taken for video visit  CONSTITUTIONAL: healthy, alert and NAD, well dressed, answering questions appropriately  ENT: no nose swelling or nasal discharge, mouth redness or gum changes.  EYES: eyes grossly normal to inspection, conjunctivae and sclerae normal, no exophthalmos or proptosis  THYROID:  no apparent nodules or goiter  LUNGS: no audible wheeze, cough or visible cyanosis, no visible retractions or increased work of breathing  ABDOMEN: abdomen not evaluated  EXTREMITIES: no hand tremors, limited exam  NEUROLOGY: CN grossly intact, mentation intact and speech normal   SKIN:  no apparent skin lesions, rash, or edema with visualized skin appearance  PSYCH: mentation appears normal, affect normal/bright, judgement and insight intact,   normal speech and appearance well groomed       LABS:     All pertinent notes, labs, and images personally reviewed by me.       A/P:  Encounter Diagnoses   Name Primary?     Type 1 diabetes mellitus without complication (H) Yes     Insulin pump status      Morbid obesity (H)      Comments:  Reviewed health history and diabetes issues.  Health history and lab testing indicates T1.5DM   The off-label Victoza, then Ozempic medication... helpful for management  Reviewed and interpreted tests that I previously ordered.   Ordered appropriate tests for the endocrinology disease management.    Management options discussed and implemented after shared medical decision making with the patient.  T1.5DM problem is chronic-stable    Plan:  Reviewed the overall T1.5DM management and insulin pump use.  Discussed optimal BG  testing to assess glucose trends.  We reviewed insulin pump settings, basal rate and bolus dosing  Use of automated pump bolus dosing for meal/snack carb & correction dosing  Reviewed recent Tandem pump and DexcomG6 CGM glucose trend data, in detail     Recommend:  Continue the Tandem insulin pump, DexcomG6 sensor, and diabetes management plan.  Pump setting changes:   Bolus ICR 4p 2.0 to 1.8    Important to enter meal and snack carb amount for automated (premeal) bolusing  Avoid eating carb snack to raise late evening SG trends  We have discussed use of the Tandem pump Exercise Mode for aerobic exercise  Resume use of the off-label Ozempic medication, since tolerated well and helpful   Reviewed pen dosing options, including modified dosing using pen-click technique  Monitor for symptom changes, including GI symptoms  Keep focus on diet, exercise, and weight management   Continue the walking exercise routine... helps his insulin sensitivity  Would benefit from dietician evaluation, also commercial weight loss program or bariatric surgery  Plan repeat fasting labs in 2/2024   Discussed [facility]   Lab orders placed  Arrange annual dilated eye exam, fasting lipid panel testing.     Addressed patient's questions today.    There are no Patient Instructions on file for this visit.    Future labs ordered today:   Orders Placed This Encounter   Procedures     GLUCOSE MONITOR, 72 HOUR, PHYS INTERP     Hemoglobin A1c     Radiology/Consults ordered today: None    Total time spent on day of encounter:  26 min    Follow-up:  2/2024, Gilma Heaton MD, MS  Endocrinology  [facility]    CC:  ERIKA Milligan                            Again, thank you for allowing me to participate in the care of your patient.        Sincerely,        Ryley Heaton MD

## 2023-10-06 ENCOUNTER — TELEPHONE (OUTPATIENT)
Dept: ENDOCRINOLOGY | Facility: CLINIC | Age: 48
End: 2023-10-06
Payer: COMMERCIAL

## 2023-10-06 NOTE — TELEPHONE ENCOUNTER
I was not available to [facility], I sent PT a [employer] message to schedule f/u appt with Dr. Heaton for Feb.

## 2023-10-23 NOTE — TELEPHONE ENCOUNTER
I think I LVM for PT, phone rang and then beeped. No message heard on my end. Schedule f/u appt with Dr. Heaton for Feb.

## 2023-10-23 NOTE — TELEPHONE ENCOUNTER
Rescheduled Procedure     Case is now under MAC at     Attempted to contact patient in order to complete pre assessment questions.     No answer. Left message to return call to [phone] option 4      Procedure details:    Patient scheduled for Colonoscopy  on 11/3/23.     Arrival time: 1130. Procedure time 1215    Pre op exam needed? N/A    Facility location: [facility]; [address], [place] [address]    Sedation type: MAC      Chart review:       Diabetic? Yes. See medication holding recommendations     Diabetic medication HOLDING recommendations: (if applicable)  Oral diabetic medications: No  Diabetic injectables: No  Insulin: Yes. Consult with managing provider       Medication review:    Other medication HOLDING recommendations:  N/A      Prep for procedure:     Bowel prep recommendation: Extended Golytely - did patient  prep?  Due to: BMI > 40.     Prep instructions sent via [employer].       Lina Gilliam RN  Endoscopy Procedure Pre Assessment RN

## 2023-10-25 NOTE — TELEPHONE ENCOUNTER
Second call attempt to complete pre assessment.     No answer.  Left message to return call to [phone] #4 by next business day prior to 4PM or procedure will be sent to cancel.     Additional information needed?  N/A      Lina Botello RN  Endoscopy Procedure Pre Assessment RN

## 2023-10-27 ENCOUNTER — TELEPHONE (OUTPATIENT)
Dept: GASTROENTEROLOGY | Facility: CLINIC | Age: 48
End: 2023-10-27
Payer: COMMERCIAL

## 2023-10-27 NOTE — TELEPHONE ENCOUNTER
Caller: Don  Reason for Reschedule/Cancellation (please be detailed, any staff messages or encounters to note?): pre assessment call not completed       Prior to reschedule please review:  Ordering Provider:     Domingo Malone MD in [facility] FP/IM/PEDS     Sedation per order: moderate  Does patient have any ASC Exclusions, please identify?: Y PHUONG      Notes on Cancelled Procedure:  Procedure: Lower Endoscopy [Colonoscopy]   Date: 11/03/2023  Location: [facility]; [address] [place] [address] [address], [place] [address]  Surgeon: Rosy      Rescheduled: No

## 2023-10-27 NOTE — TELEPHONE ENCOUNTER
No return call received.   Pre assessment was not completed for upcoming scheduled procedure.     Staff message sent to endoscopy scheduling to cancel procedure per policy.       Lina Botello RN   Endoscopy Procedure Pre Assessment RN   Trans-septal procedure in progress

## 2023-11-01 NOTE — TELEPHONE ENCOUNTER
I was not available to [facility], I have tried to call a couple times to schedule f/u appt with Dr. Heaton for Feb.

## 2023-11-30 ENCOUNTER — TELEPHONE (OUTPATIENT)
Dept: SLEEP MEDICINE | Facility: CLINIC | Age: 48
End: 2023-11-30
Payer: COMMERCIAL

## 2023-11-30 DIAGNOSIS — G47.33 OSA (OBSTRUCTIVE SLEEP APNEA): Primary | ICD-10-CM

## 2023-11-30 NOTE — TELEPHONE ENCOUNTER
General Call    Contacts         Type Contact Phone/Fax    11/30/2023 11:09 AM CST Phone (Incoming) Don Apodaca (Self) [phone] (M)     Requesting renewal of CPAP Supplies - scheduled next available appt 5/13/24          Reason for Call:  CPAP Supplies    What are your questions or concerns:  Requesting an order for supplies sooner than scheduled appointment 5/13/23    Date of last appointment with provider: 2020    Could we send this information to you in [facility] or would you prefer to receive a phone call?:   Patient would prefer a phone call   Okay to leave a detailed message?: Yes at Cell number on file:    Telephone Information:   Mobile [phone]

## 2023-11-30 NOTE — TELEPHONE ENCOUNTER
Last ov 5/3/21  Next ov 5/13/24    Patient requesting updated order for CPAP supplies prior to appointment. Order pended and routed to provider for consideration.    Ghazal BABCOCK RN  [facility]

## 2023-12-06 ENCOUNTER — MEDICAL CORRESPONDENCE (OUTPATIENT)
Dept: HEALTH INFORMATION MANAGEMENT | Facility: CLINIC | Age: 48
End: 2023-12-06
Payer: COMMERCIAL

## 2023-12-31 ENCOUNTER — MEDICAL CORRESPONDENCE (OUTPATIENT)
Dept: HEALTH INFORMATION MANAGEMENT | Facility: CLINIC | Age: 48
End: 2023-12-31

## 2024-01-24 DIAGNOSIS — E10.9 TYPE 1 DIABETES MELLITUS WITHOUT COMPLICATION (H): ICD-10-CM

## 2024-01-24 RX ORDER — PROCHLORPERAZINE 25 MG/1
SUPPOSITORY RECTAL
Qty: 1 EACH | Refills: 1 | Status: SHIPPED | OUTPATIENT
Start: 2024-01-24 | End: 2024-07-31

## 2024-02-16 ENCOUNTER — MYC REFILL (OUTPATIENT)
Dept: FAMILY MEDICINE | Facility: CLINIC | Age: 49
End: 2024-02-16
Payer: COMMERCIAL

## 2024-02-16 DIAGNOSIS — J30.2 SEASONAL ALLERGIC RHINITIS, UNSPECIFIED TRIGGER: ICD-10-CM

## 2024-02-16 RX ORDER — FLUTICASONE PROPIONATE 50 MCG
1 SPRAY, SUSPENSION (ML) NASAL DAILY
Qty: 48 G | OUTPATIENT
Start: 2024-02-16

## 2024-02-17 ENCOUNTER — HEALTH MAINTENANCE LETTER (OUTPATIENT)
Age: 49
End: 2024-02-17

## 2024-02-26 RX ORDER — FLUTICASONE PROPIONATE 50 MCG
1 SPRAY, SUSPENSION (ML) NASAL DAILY
Qty: 16 G | Refills: 3 | Status: SHIPPED | OUTPATIENT
Start: 2024-02-26 | End: 2024-02-29

## 2024-02-28 ENCOUNTER — VIRTUAL VISIT (OUTPATIENT)
Dept: FAMILY MEDICINE | Facility: CLINIC | Age: 49
End: 2024-02-28
Payer: COMMERCIAL

## 2024-02-28 DIAGNOSIS — J30.2 SEASONAL ALLERGIC RHINITIS, UNSPECIFIED TRIGGER: Primary | ICD-10-CM

## 2024-02-28 DIAGNOSIS — I10 PRIMARY HYPERTENSION: ICD-10-CM

## 2024-02-28 DIAGNOSIS — G47.33 OSA (OBSTRUCTIVE SLEEP APNEA): ICD-10-CM

## 2024-02-28 DIAGNOSIS — Z12.11 SCREEN FOR COLON CANCER: ICD-10-CM

## 2024-02-28 DIAGNOSIS — L30.9 ECZEMA, UNSPECIFIED TYPE: ICD-10-CM

## 2024-02-28 DIAGNOSIS — E13.9 DIABETES 1.5, MANAGED AS TYPE 2 (H): ICD-10-CM

## 2024-02-28 PROCEDURE — 99214 OFFICE O/P EST MOD 30 MIN: CPT | Mod: 95 | Performed by: INTERNAL MEDICINE

## 2024-02-28 NOTE — PROGRESS NOTES
Don is a 48 year old who is being evaluated via a billable video visit.      How would you like to obtain your AVS? MyChart  If the video visit is dropped, the invitation should be resent by: Text to cell phone: [phone]  Will anyone else be joining your video visit? No      Subjective   Don is a 48 year old, presenting for the following health issues:  Recheck Medication and Refill Request  History of Present Illness       Reason for visit:  Med refresh    He eats 0-1 servings of fruits and vegetables daily.He consumes 0 sweetened beverage(s) daily.He exercises with enough effort to increase his heart rate 30 to 60 minutes per day.  He exercises with enough effort to increase his heart rate 5 days per week.   He is taking medications regularly.     Current Medications:     Current Outpatient Medications   Medication Sig Dispense Refill    benzonatate (TESSALON) 100 MG capsule Take 1 capsule (100 mg) by mouth 3 times daily as needed for cough 231 capsule 0    blood glucose (NO BRAND SPECIFIED) test strip Use to test blood sugar 4 times daily or as directed. 150 strip 6    blood glucose monitoring (SOFTCLIX) lancets TAKE AS DIRECTED FOUR TIMES DAILY 400 each 11    clobetasol (TEMOVATE) 0.05 % external cream APPLY TOPICALLY TWICE DAILY 15 g 1    Continuous Blood Gluc  (DEXCOM G6 ) TESHA Use to read blood sugars as per 's instructions. 1 each 0    Continuous Blood Gluc Sensor (DEXCOM G6 SENSOR) MISC CHANGE EVERY 10 DAYS. 3 each 11    Continuous Blood Gluc Transmit (DEXCOM G6 TRANSMITTER) MISC CHANGE EVERY 3 MONTHS. 1 each 0    fexofenadine (ALLERGY RELIEF) 180 MG tablet Take 1 tablet (180 mg) by mouth daily TAKE 1 TABLET(180 MG) BY MOUTH DAILY Strength: 180 mg 30 tablet 0    fexofenadine-pseudoePHEDrine (ALLEGRA-D 24) 180-240 MG 24 hr tablet Take 1 tablet by mouth daily 90 tablet 1    fluticasone (FLONASE) 50 MCG/ACT nasal spray Spray 1 spray into both nostrils daily SHAKE LIQUID AND USE  1 SPRAY IN EACH NOSTRIL DAILY Strength: 50 MCG/ACTSHAKE LIQUID AND USE 1 SPRAY IN EACH NOSTRIL DAILY Strength: 50 MCG/ACT 16 g 3    insulin aspart (NOVOLOG VIAL) 100 UNITS/ML vial USE WITH INSULIN PUMP, TOTAL DAILY DOSE OF APPROXIMATELY 110 UNITS. 100 mL 1    insulin aspart (NOVOLOG VIAL) 100 UNITS/ML vial USE WITH INSULIN PUMP, TOTAL DAILY DOSE OF APPROXIMATELY 110 UNITS 100 mL 3    insulin pen needle (B-D U/F) 31G X 5 MM miscellaneous Use 1 pen needles daily or as directed. 100 each 3    INSULIN PUMP - OUTPATIENT Date Last Updated: 02/16/22  Tandem X2 with control IQ and Dexcom  BASAL RATES and times:  12am: (midnight): 1.10 units/hour    3am:1.5  5 am: 1.5 units/hour   8am: 2.05  12pm (noon): 1.9 units/hour   4pm: 1.45 units/hour   8 pm: 1.75 units/hour   CARB RATIO and times:  12   AM (midnight): 2.8  5am: 2.5  12  PM (noon):  2.2  8    PM:  2.7  Corection Factor (Sensitivity) and times:  12   AM (midnight): 15 mg/dL  BG target 125 mg/dL  Active Insulin Time: 4 hours      liraglutide (VICTOZA PEN) 18 MG/3ML solution ADMINISTER 1.8 MG UNDER THE SKIN DAILY AS DIRECTED 27 mL 3    losartan (COZAAR) 25 MG tablet Take 1 tablet (25 mg) by mouth daily 90 tablet 3    predniSONE (DELTASONE) 20 MG tablet Take 3 tabs by mouth daily x 3 days, then 2 tabs daily x 3 days, then 1 tab daily x 3 days, then 1/2 tab daily x 3 days. 20 tablet 0    sildenafil (VIAGRA) 100 MG tablet TAKE 1/2 TABLET BY MOUTH 30 MINUTES PRIOR TO INTERCOURSE AS DIRECTED 15 tablet 0    triamcinolone (KENALOG) 0.1 % external cream Apply topically 2 times daily 15 g 3         Allergies:      Allergies   Allergen Reactions    Cats     Seasonal Allergies      grass            Past Medical History:     Past Medical History:   Diagnosis Date    Cataracts, bilateral     DKA (diabetic ketoacidoses)     Morbid obesity (H)     PHUONG (obstructive sleep apnea)     T1DM (type 1 diabetes mellitus) (H)          Past Surgical History:     Past Surgical History:   Procedure  Laterality Date    COLONOSCOPY      ENT SURGERY  10 years ago    cyst on neck    EXTRACTION(S) DENTAL      NECK SURGERY  2010    to remove a cyst    PHACOEMULSIFICATION CLEAR CORNEA WITH STANDARD INTRAOCULAR LENS IMPLANT Left 09/21/2020    Procedure: COMPLEX PHACOEMULSIFICATION, CATARACT, WITH INTRAOCULAR LENS IMPLANT;  Surgeon: Andre Jackson MD;  Location:  OR    PHACOEMULSIFICATION CLEAR CORNEA WITH STANDARD INTRAOCULAR LENS IMPLANT Right 09/28/2020    Procedure: PHACOEMULSIFICATION, CATARACT, WITH INTRAOCULAR LENS IMPLANT;  Surgeon: Andre Jackson MD;  Location: [facility] OR         Family Medical History:     Family History   Problem Relation Age of Onset    Diabetes Maternal Grandmother     Other Cancer Maternal Grandmother         Lung Cancer    Diabetes Paternal Grandmother     Glaucoma Brother     Macular Degeneration No family hx of          Social History:     Social History     Socioeconomic History    Marital status:      Spouse name: Not on file    Number of children: Not on file    Years of education: Not on file    Highest education level: Not on file   Occupational History    Not on file   Tobacco Use    Smoking status: Never    Smokeless tobacco: Never   Substance and Sexual Activity    Alcohol use: Never    Drug use: Never    Sexual activity: Not on file   Other Topics Concern    Parent/sibling w/ CABG, MI or angioplasty before 65F 55M? Not Asked   Social History Narrative    Not on file     Social Determinants of Health     Financial Resource Strain: Not on file   Food Insecurity: Not on file   Transportation Needs: Not on file   Physical Activity: Not on file   Stress: Not on file   Social Connections: Not on file   Intimate Partner Violence: Not on file   Housing Stability: Not on file           Review of System:     Constitutional: Negative for fever or chills, positive for chronic morbid obesity  Skin: Negative for rashes  Ears/Nose/Throat: positive for allergic  rhinitis  Respiratory: No shortness of breath, dyspnea on exertion, cough, or hemoptysis  Cardiovascular: Negative for chest pain  Gastrointestinal: Negative for nausea, vomiting  Genitourinary: Negative for dysuria, hematuria  Musculoskeletal: Negative for myalgias  Neurologic: Negative for headaches  Psychiatric: Negative for depression, anxiety  Hematologic/Lymphatic/Immunologic: Negative  Endocrine: Negative for recent hypoglycemia events. Positive for chronic Type 1 Diabetes on insulin pump therapy.  Behavioral: Negative for tobacco use       Physical Exam:   There were no vitals taken for this visit.    RESP: no cough present   NEURO: Alert & Oriented x 3.   PSYCH: mentation appears normal, affect normal        Diagnostic Test Results:     Diagnostic Test Results:  Lab Results   Component Value Date    A1C 7.3 09/23/2022    A1C 7.2 03/08/2022    A1C 6.8 09/08/2021    A1C 7.0 05/05/2021    A1C 7.3 10/02/2020    A1C 7.5 08/20/2020    A1C 8.5 05/28/2020         ASSESSMENT/PLAN:     (J30.2) Seasonal allergic rhinitis, unspecified trigger  (primary encounter diagnosis)  Comment: stable  Plan: medication refilled today for fluticasone (FLONASE) 50 MCG/ACT nasal spray      (I10) Primary hypertension    Comment: stable  Plan: continue losartan (COZAAR) 25 MG tablet, Home Blood         Pressure Monitor Order for DME - ONLY FOR DME    (J30.2) Seasonal allergic rhinitis, unspecified trigger  Comment: stable  Plan: fexofenadine-pseudoePHEDrine (ALLEGRA-D 24)         180-240 MG 24 hr tablet    (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 No recent DKA episodes since last DKA hospitalization in 1/2016.   Plan: continue current insulin therapy   Continue outpatient endocrinology clinic follow up going forward  Adult Eye  Referral    (G47.33) PHUONG (obstructive sleep apnea)  (E66.01) Morbid obesity (H)  Comment: chronic morbid obesity  with PHUONG  Plan: diet, exercise      (Z12.11) Screen for colon cancer  Comment: patient is due for colonoscopy   Plan: Colonoscopy Screening  Referral    (L30.9) Eczema, unspecified type  Comment: stable  Plan: continue current therapy        Follow Up Plan:     Patient is instructed to return to Internal Medicine clinic for follow-up visit in 3 to 6 months.        Radha Milligan MD  Internal Medicine  [facility]      Video-Visit Details    Type of service:  Video Visit   Video visit start Time: 5:25 PM  Video visit end Time: 5:41 PM    Originating Location (pt. Location): Home    Distant Location (provider location):  Off-site  Platform used for Video Visit: [facility]  Signed Electronically by: Radha Milligan MD

## 2024-02-29 RX ORDER — FLUTICASONE PROPIONATE 50 MCG
2 SPRAY, SUSPENSION (ML) NASAL DAILY
Qty: 32 G | Refills: 11 | Status: SHIPPED | OUTPATIENT
Start: 2024-02-29

## 2024-03-13 ENCOUNTER — OFFICE VISIT (OUTPATIENT)
Dept: OPTOMETRY | Facility: CLINIC | Age: 49
End: 2024-03-13
Payer: COMMERCIAL

## 2024-03-13 DIAGNOSIS — E13.9 DIABETES 1.5, MANAGED AS TYPE 2 (H): Primary | ICD-10-CM

## 2024-03-13 DIAGNOSIS — Z96.1 PSEUDOPHAKIA: ICD-10-CM

## 2024-03-13 DIAGNOSIS — H18.613 KERATOCONUS, STABLE, BILATERAL: ICD-10-CM

## 2024-03-13 ASSESSMENT — CONF VISUAL FIELD
METHOD: COUNTING FINGERS
OD_INFERIOR_NASAL_RESTRICTION: 0
OS_SUPERIOR_TEMPORAL_RESTRICTION: 0
OS_INFERIOR_TEMPORAL_RESTRICTION: 0
OD_INFERIOR_TEMPORAL_RESTRICTION: 0
OS_INFERIOR_NASAL_RESTRICTION: 0
OD_NORMAL: 1
OD_SUPERIOR_TEMPORAL_RESTRICTION: 0
OD_SUPERIOR_NASAL_RESTRICTION: 0
OS_SUPERIOR_NASAL_RESTRICTION: 0
OS_NORMAL: 1

## 2024-03-13 ASSESSMENT — REFRACTION_WEARINGRX
OS_ADD: +2.25
OD_ADD: +1.50
OD_AXIS: 160
OD_ADD: +2.25
OS_CYLINDER: +1.50
OS_SPHERE: PLANO
OD_SPHERE: -0.75
OS_AXIS: 025
OS_SPHERE: -3.50
OD_CYLINDER: +2.25
OS_ADD: +1.50
OD_SPHERE: PLANO

## 2024-03-13 ASSESSMENT — VISUAL ACUITY
OD_CC: 20/25
OS_CC+: -1
OD_CC+: -2
CORRECTION_TYPE: GLASSES, CONTACTS
METHOD: SNELLEN - LINEAR
OS_PH_CC+: -1
OS_PH_CC: 20/20
OS_CC: 20/40

## 2024-03-13 ASSESSMENT — REFRACTION_MANIFEST
OD_CYLINDER: SPHERE
OD_CYLINDER: SPHERE
OS_CYLINDER: +0.50
OS_SPHERE: +0.75
OS_AXIS: 130
OD_SPHERE: -0.25
OS_CYLINDER: SPHERE
OS_SPHERE: +0.50
OD_SPHERE: -0.75

## 2024-03-13 ASSESSMENT — CUP TO DISC RATIO
OD_RATIO: 0.35
OS_RATIO: 0.40

## 2024-03-13 ASSESSMENT — REFRACTION_CURRENTRX
OS_ADDL_SPECS: OPT EXTRA BLUE
OD_BRAND: ZENLENS PROLATE
OS_BRAND: ONEFIT
OS_BASECURVE: 7.8
OD_SPHERE: +0.75
OD_DIAMETER: 16.0
OS_SPHERE: +3.75
OS_DIAMETER: 15.2

## 2024-03-13 ASSESSMENT — TONOMETRY
OS_IOP_MMHG: 13
OD_IOP_MMHG: 18
IOP_METHOD: ICARE

## 2024-03-13 ASSESSMENT — EXTERNAL EXAM - LEFT EYE: OS_EXAM: NORMAL

## 2024-03-13 ASSESSMENT — EXTERNAL EXAM - RIGHT EYE: OD_EXAM: NORMAL

## 2024-03-13 ASSESSMENT — SLIT LAMP EXAM - LIDS
COMMENTS: MGD
COMMENTS: MGD

## 2024-03-13 NOTE — PROGRESS NOTES
A/P  1.) Keratoconus each eye, s/p CE/IOL each eye  -Overall doing well in scleral lens. BCVA 20/20 right eye, 20/25 left eye. Good comfort while in. Wearing plano PAL's over  -Right eye largely fitting well, definitely not tight. Still some issues with removal on right eye. Would leave as is for now. Good VA  -Left eye slightly tight inferior but tolerating well without corneal stain or removal issues. Plus OR to improve vision. This lens is 3 years old, would recommend replacing    2.) Type 1 DM without ophthalmic manifestation   -Last A1c 7.3, no h/o diabetic retinopathy  -Reviewed effects of DM on the eyes and importance of good blood sugar control  -Monitor annually with dilation    Order new left lens and mail to pt. F/u prn with lens concerns, otherwise 1 year diabetic eye exam    I have confirmed the patient's CC, HPI and reviewed Past Medical History, Past Surgical History, Social History, Family History, Problem List, Medication List and agree with Tech note.     Adalgisa Simon, OD FAAO FSLS    Contact Lens Billing  V-Code:  - GP scleral  Final Contact Lens Rx         Brand Base Curve Diameter Sphere Lens Addl. Specs    Right Zenlens Prolate 7.5bc, 4.7 sag 16.0 +0.75 quad PC: 2 flat @ 0, std @ 90, 2 flat @ 180, 5 flat @ 270 [employer] XO clear    Left Onefit 7.8 15.2 +4.25 3 flat sym Opt Extra blue           # of units: 1 left lens  Price per Unit: $225    This patient requires contact lenses that are medically necessary for either improvement in vision over spectacles, support of the ocular surface, or other therapeutic benefit. These are not cosmetic contact lenses.     Encounter Diagnoses   Name Primary?    Diabetes 1.5, managed as type 2 (H) Yes    Pseudophakia     Keratoconus, stable, bilateral

## 2024-04-02 DIAGNOSIS — Z96.41 INSULIN PUMP STATUS: ICD-10-CM

## 2024-04-02 DIAGNOSIS — E88.819 INSULIN RESISTANCE: ICD-10-CM

## 2024-04-02 DIAGNOSIS — E10.9 TYPE 1 DIABETES MELLITUS WITHOUT COMPLICATION (H): ICD-10-CM

## 2024-04-02 NOTE — TELEPHONE ENCOUNTER
Requested Prescriptions   Pending Prescriptions Disp Refills    insulin aspart (NOVOLOG VIAL) 100 UNITS/ML vial 90 mL 2       There is no refill protocol information for this order            Last Written Prescription Date:  5/22/2023  Last Fill Quantity: 110 mL,  # refills: 3   Last office visit: Visit date not found ; last virtual visit: 10/5/2023 with prescribing provider:  Ryley Heaton MD    Future Office Visit: None  Next 5 appointments (look out 90 days)      Jun 07, 2024  1:00 PM  (Arrive by 12:40 PM)  Adult Preventative Visit with Radha Milligan MD  [facility] ([facility] ) [address], [address]  [place] [address] [phone]

## 2024-04-03 ENCOUNTER — MYC REFILL (OUTPATIENT)
Dept: ENDOCRINOLOGY | Facility: CLINIC | Age: 49
End: 2024-04-03
Payer: COMMERCIAL

## 2024-04-03 DIAGNOSIS — E88.819 INSULIN RESISTANCE: ICD-10-CM

## 2024-04-03 DIAGNOSIS — E10.9 TYPE 1 DIABETES MELLITUS WITHOUT COMPLICATION (H): ICD-10-CM

## 2024-04-03 DIAGNOSIS — Z96.41 INSULIN PUMP STATUS: ICD-10-CM

## 2024-04-03 RX ORDER — INSULIN ASPART 100 [IU]/ML
INJECTION, SOLUTION INTRAVENOUS; SUBCUTANEOUS
Qty: 110 ML | Refills: 0 | Status: CANCELLED | OUTPATIENT
Start: 2024-04-03

## 2024-04-03 RX ORDER — INSULIN ASPART 100 [IU]/ML
INJECTION, SOLUTION INTRAVENOUS; SUBCUTANEOUS
Qty: 110 ML | Refills: 0 | Status: SHIPPED | OUTPATIENT
Start: 2024-04-03 | End: 2024-04-26

## 2024-04-03 NOTE — TELEPHONE ENCOUNTER
"Requested Prescriptions   Pending Prescriptions Disp Refills    insulin aspart (NOVOLOG VIAL) 100 UNITS/ML vial 90 mL 2     Sig: USE WITH INSULIN PUMP, TOTAL DAILY DOSE OF APPROXIMATELY 120 UNITS       Short Acting Insulin Protocol Failed - 4/2/2024  1:53 PM        Failed - HgbA1C in past 3 or 6 months     If HgbA1C is 8 or greater, it needs to be on file within the past 3 months.  If less than 8, must be on file within the past 6 months.     Recent Labs   Lab Test 10/02/23  1351   A1C 7.3*             Failed - Recent (6 mo) or future (30 days) visit within the authorizing provider's specialty     Patient had office visit in the last 6 months or has a visit in the next 30 days with authorizing provider or within the authorizing provider's specialty.  See \"Patient Info\" tab in inbasket, or \"Choose Columns\" in Meds & Orders section of the refill encounter.            Passed - Serum creatinine on file in past 12 months     Recent Labs   Lab Test 10/02/23  1351   CR 0.79                 Passed - Medication is active on med list        Passed - Patient is age 18 or older       Insulin Protocol Failed - 4/2/2024  1:53 PM        Failed - Recent (6 mo) or future (90 days) visit within the authorizing provider's specialty     The patient must have completed an in-person or virtual visit within the past 6 months or has a future visit scheduled within the next 90 days with the authorizing provider s specialty.  Urgent care and e-visits do not quality as an office visit for this protocol.          Failed - Chart Review Required     Review Chart.    Do not approve if insulin is used in a pump.  Instead, direct refill request to the patient's endocrinologist.  If the patient doesn't have an endocrinologist, then send the refill to the patient's PCP for review            Passed - Medication is active on med list        Passed - Has GFR on file in past 12 months and most recent value is normal        Passed - Medication indicated " for associated diagnosis     Medication is associated with one or more of the following diagnoses:   - Type 1 diabetes mellitus  - Type 2 diabetes mellitus  - Diabetic nephropathy; Prophylaxis  - Neuropathy due to diabetes mellitus; Prophylaxis  - Retinopathy due to diabetes mellitus; Prophylaxis  - Diabetes mellitus associated with cystic fibrosis  - Disorder of cardiovascular system; Prophylaxis - Type 1 diabetes mellitus   - Disorder of cardiovascular system; Prophylaxis - Type 2 diabetes mellitus            Passed - Patient is 18 years of age or older           Pt was due back 2/24  Refill sent with note to pharmacy that pt needs appt for add'l refills. Karla Buckner RN

## 2024-04-19 ENCOUNTER — MYC MEDICAL ADVICE (OUTPATIENT)
Dept: SLEEP MEDICINE | Facility: CLINIC | Age: 49
End: 2024-04-19

## 2024-04-26 ENCOUNTER — MYC MEDICAL ADVICE (OUTPATIENT)
Dept: ENDOCRINOLOGY | Facility: CLINIC | Age: 49
End: 2024-04-26
Payer: COMMERCIAL

## 2024-04-26 DIAGNOSIS — E88.819 INSULIN RESISTANCE: ICD-10-CM

## 2024-04-26 DIAGNOSIS — E10.9 TYPE 1 DIABETES MELLITUS WITHOUT COMPLICATION (H): Primary | ICD-10-CM

## 2024-04-26 DIAGNOSIS — Z96.41 INSULIN PUMP STATUS: ICD-10-CM

## 2024-04-26 RX ORDER — INSULIN ASPART 100 [IU]/ML
INJECTION, SOLUTION INTRAVENOUS; SUBCUTANEOUS
Qty: 40 ML | Refills: 5 | Status: SHIPPED | OUTPATIENT
Start: 2024-04-26 | End: 2024-06-12

## 2024-05-07 ENCOUNTER — LAB (OUTPATIENT)
Dept: LAB | Facility: CLINIC | Age: 49
End: 2024-05-07
Payer: COMMERCIAL

## 2024-05-07 DIAGNOSIS — E88.819 INSULIN RESISTANCE: ICD-10-CM

## 2024-05-07 DIAGNOSIS — Z96.41 INSULIN PUMP STATUS: ICD-10-CM

## 2024-05-07 DIAGNOSIS — E10.9 TYPE 1 DIABETES MELLITUS WITHOUT COMPLICATION (H): ICD-10-CM

## 2024-05-07 LAB — HBA1C MFR BLD: 6.9 % (ref 0–5.6)

## 2024-05-07 PROCEDURE — 83036 HEMOGLOBIN GLYCOSYLATED A1C: CPT

## 2024-05-07 PROCEDURE — 84460 ALANINE AMINO (ALT) (SGPT): CPT

## 2024-05-07 PROCEDURE — 80048 BASIC METABOLIC PNL TOTAL CA: CPT

## 2024-05-07 PROCEDURE — 36415 COLL VENOUS BLD VENIPUNCTURE: CPT

## 2024-05-08 LAB
ALT SERPL W P-5'-P-CCNC: 34 U/L (ref 0–70)
ANION GAP SERPL CALCULATED.3IONS-SCNC: 10 MMOL/L (ref 7–15)
BUN SERPL-MCNC: 9.6 MG/DL (ref 6–20)
CALCIUM SERPL-MCNC: 9.2 MG/DL (ref 8.6–10)
CHLORIDE SERPL-SCNC: 103 MMOL/L (ref 98–107)
CREAT SERPL-MCNC: 1.01 MG/DL (ref 0.67–1.17)
DEPRECATED HCO3 PLAS-SCNC: 24 MMOL/L (ref 22–29)
EGFRCR SERPLBLD CKD-EPI 2021: >90 ML/MIN/1.73M2
GLUCOSE SERPL-MCNC: 193 MG/DL (ref 70–99)
POTASSIUM SERPL-SCNC: 4.3 MMOL/L (ref 3.4–5.3)
SODIUM SERPL-SCNC: 137 MMOL/L (ref 135–145)

## 2024-05-09 ENCOUNTER — VIRTUAL VISIT (OUTPATIENT)
Dept: ENDOCRINOLOGY | Facility: CLINIC | Age: 49
End: 2024-05-09
Payer: COMMERCIAL

## 2024-05-09 DIAGNOSIS — Z96.41 INSULIN PUMP STATUS: ICD-10-CM

## 2024-05-09 DIAGNOSIS — E66.01 MORBID OBESITY (H): ICD-10-CM

## 2024-05-09 DIAGNOSIS — E10.9 TYPE 1 DIABETES MELLITUS WITHOUT COMPLICATION (H): Primary | ICD-10-CM

## 2024-05-09 PROCEDURE — G2211 COMPLEX E/M VISIT ADD ON: HCPCS | Mod: 95 | Performed by: INTERNAL MEDICINE

## 2024-05-09 PROCEDURE — 99214 OFFICE O/P EST MOD 30 MIN: CPT | Mod: 95 | Performed by: INTERNAL MEDICINE

## 2024-05-09 PROCEDURE — 95251 CONT GLUC MNTR ANALYSIS I&R: CPT | Performed by: INTERNAL MEDICINE

## 2024-05-09 NOTE — PROGRESS NOTES
Virtual Visit Details    Type of service:  Video Visit   Video Start Time: 3:31 PM  Video End Time: 3:52 PM    Originating Location (pt. Location): Home  Distant Location (provider location):  Off-site  Platform used for Video Visit: Tasneem      Recent issues:  Diabetes follow-up evaluation  Continues with Tandem pump and DexcomG6 sensor use  Feeling well overall, no new health issues reported          2011. Diagnosis of diabetes mellitus while living in [place]  Acute symptoms increased thirst, frequent urination, fatigue, weight loss (265 to 203#/30 days)  Hospitalized and diagnosis of DKA  Initial treatment with Apidra and Lantus insulin  Met with an endocrinologist Dr. Lina Fish  ~6 mo later, switched to [place] Ping pump     2012. Continued using this pump for 1 year, then moved to [facility]  Saw Dr. Beena Johnson/Endocrinologist in [place]  Switched to Tandem insulin pump    Previous [facility] labs include:       Subsequent move back to [place]  Recalls taking Victoza medication along with pump management, success with significant weight loss  Moved to [place]  9/2019. Moved to [place], [place]     Medical evaluations with Dr. FRANCK Salinas/[facility]  Diagnosis Type 1.5 diabetes managed as T1DM  10/2019. Started Tandem insulin pump     Previous [facility] labs include:       2/19/20. Med evaluation with Dr[facility] ERIKA Milligan[facility]  No lab tests ordered  Started DexcomG6 CGMS sensor  ~4/2020. Upgrade to the Control IQ pump         5/26/20. Initial diabetes evaluation with me, [facility]  Continued use of the Tandem TSlim pump with the DexcomG6  ~6/2020. Restarted (off label) Victoza  10/2020. Insurance coverage for Victoza, used 1.8 mg daily  11/2021. Ran out of Victoza  ~3/2022. Restarted Victoza medication  Switched from Victoza to Ozempic 0.5 mg weekly  10/2023. Ran out of Ozempic medication    Using Tandem TSlim pump with DexcomG6 sensor               Novolog in pump    Previous Tandem pump settings:      Recent pump and sensor data:  no information available  Recent DexcomG6 data:          Blood glucose (BG) meter:  Accu-check               Tests infrequently  Fam Hx DM:    MA, Clement, MGM, PGM     Previous [facility] labs include:     Previous [facility] hgbA1c trends include:  Lab Results   Component Value Date    A1C 6.9 (H) 05/07/2024    A1C 7.3 (H) 10/02/2023    A1C 7.6 (H) 04/28/2023    A1C 7.3 (H) 09/23/2022    A1C 7.2 (H) 03/08/2022           Lab Results   Component Value Date    A1C 6.9 (H) 05/07/2024     05/07/2024    POTASSIUM 4.3 05/07/2024    CHLORIDE 103 05/07/2024    CO2 24 05/07/2024    ANIONGAP 10 05/07/2024     (H) 05/07/2024    BUN 9.6 05/07/2024    CR 1.01 05/07/2024    GFRESTIMATED >90 05/07/2024    GFRESTBLACK >90 05/05/2021    MICK 9.2 05/07/2024    CPEPT <0.1 (L) 05/28/2020    CHOL 155 10/02/2023    TRIG 40 10/02/2023    HDL 64 10/02/2023    LDL 83 10/02/2023    NHDL 91 10/02/2023    UCRR 134.0 10/02/2023    MICROL <12.0 10/02/2023    UMALCR  10/02/2023      Comment:      Unable to calculate, urine albumin and/or urine creatinine is outside detectable limits.  Microalbuminuria is defined as an albumin:creatinine ratio of 17 to 299 for males and 25 to 299 for females. A ratio of albumin:creatinine of 300 or higher is indicative of overt proteinuria.  Due to biologic variability, positive results should be confirmed by a second, first-morning random or 24-hour timed urine specimen. If there is discrepancy, a third specimen is recommended. When 2 out of 3 results are in the microalbuminuria range, this is evidence for incipient nephropathy and warrants increased efforts at glucose control, blood pressure control, and institution of therapy with an angiotensin-converting-enzyme (ACE) inhibitor (if the patient can tolerate it).       Lab Results   Component Value Date    TSH 1.63 10/02/2023     Last eye exam 12/2022, no DR noted  DM  Complications: none        , lives in [place], [place]; previously worked with [employer]  Sees Dr. ERIKA Milligan/[facility] for general medicine evaluations.       PMH/PSH:  Past Medical History:   Diagnosis Date    Cataracts, bilateral     DKA (diabetic ketoacidoses)     Morbid obesity (H)     PHUONG (obstructive sleep apnea)     T1DM (type 1 diabetes mellitus) (H)      Past Surgical History:   Procedure Laterality Date    COLONOSCOPY      ENT SURGERY  10 years ago    cyst on neck    EXTRACTION(S) DENTAL      NECK SURGERY  2010    to remove a cyst    PHACOEMULSIFICATION CLEAR CORNEA WITH STANDARD INTRAOCULAR LENS IMPLANT Left 09/21/2020    Procedure: COMPLEX PHACOEMULSIFICATION, CATARACT, WITH INTRAOCULAR LENS IMPLANT;  Surgeon: Andre Jackson MD;  [facility]    PHACOEMULSIFICATION CLEAR CORNEA WITH STANDARD INTRAOCULAR LENS IMPLANT Right 09/28/2020    Procedure: PHACOEMULSIFICATION, CATARACT, WITH INTRAOCULAR LENS IMPLANT;  Surgeon: Andre Jakcson MD;  [facility]       Family Hx:  Family History   Problem Relation Age of Onset    Diabetes Maternal Grandmother     Other Cancer Maternal Grandmother         Lung Cancer    Diabetes Paternal Grandmother     Glaucoma Brother     Macular Degeneration No family hx of          Social Hx:  Social History     Socioeconomic History    Marital status: Single     Spouse name: Not on file    Number of children: Not on file    Years of education: Not on file    Highest education level: Not on file   Occupational History    Not on file   Tobacco Use    Smoking status: Never    Smokeless tobacco: Never   Substance and Sexual Activity    Alcohol use: Never    Drug use: Never    Sexual activity: Not on file   Other Topics Concern    Parent/sibling w/ CABG, MI or angioplasty before 65F 55M? Not Asked   Social History Narrative    Not on file     Social Determinants of Health     Financial Resource Strain: Not on file   Food Insecurity: Not on  file   Transportation Needs: Not on file   Physical Activity: Not on file   Stress: Not on file   Social Connections: Not on file   Interpersonal Safety: Low Risk  (2/28/2024)    Interpersonal Safety     Do you feel physically and emotionally safe where you currently live?: Yes     Within the past 12 months, have you been hit, slapped, kicked or otherwise physically hurt by someone?: No     Within the past 12 months, have you been humiliated or emotionally abused in other ways by your partner or ex-partner?: No   Housing Stability: Not on file          MEDICATIONS:  has a current medication list which includes the following prescription(s): dexcom g6 sensor, dexcom g6 transmitter, insulin aspart, losartan, sildenafil, accu-chek justin plus, blood glucose monitoring, dexcom g6 , dexcom g6 sensor, fluticasone, b-d u/f, and insulin pump.       ROS: 10 point ROS neg other than the symptoms noted above in the HPI.     GENERAL: mild fatigue, wt gain 10-12#; denies fevers, chills, night sweats.   HEENT: no dysphagia, odonophagia, diplopia, neck pain  THYROID:  no apparent hyper or hypothyroid symptoms  CV: no chest pain, pressure, palpitations  LUNGS: no SOB, AGARWAL, cough, wheezing   ABDOMEN: no diarrhea, constipation, abdominal pain  EXTREMITIES: no rashes, ulcers, edema  NEUROLOGY: right leg tingling; no headaches, denies changes in vision, extremitiy numbness   MSK: no muscle aches or pains, weakness  SKIN: no rashes or lesions  : decreased erection function; occasional nocturia  PSYCH:  stable mood, no significant anxiety or depression  ENDOCRINE: no heat or cold intolerance    Physical Exam (visual exam)  VS:  no vital signs taken for video visit  CONSTITUTIONAL: healthy, alert and NAD, well dressed, answering questions appropriately  ENT: no nose swelling or nasal discharge, mouth redness or gum changes.  EYES: eyes grossly normal to inspection, conjunctivae and sclerae normal, no exophthalmos or  proptosis  THYROID:  no apparent nodules or goiter  LUNGS: no audible wheeze, cough or visible cyanosis, no visible retractions or increased work of breathing  ABDOMEN: abdomen not evaluated  EXTREMITIES: no hand tremors, limited exam  NEUROLOGY: CN grossly intact, mentation intact and speech normal   SKIN:  no apparent skin lesions, rash, or edema with visualized skin appearance  PSYCH: mentation appears normal, affect normal/bright, judgement and insight intact,   normal speech and appearance well groomed       LABS:     All pertinent notes, labs, and images personally reviewed by me.       A/P:  Encounter Diagnoses   Name Primary?    Type 1 diabetes mellitus without complication (H) Yes    Insulin pump status     Morbid obesity (H)      Comments:  Reviewed health history and diabetes issues.  Health history and lab testing indicates T1.5DM   Recent CGM glucose trends good overall  Reviewed and interpreted tests that I previously ordered.   Ordered appropriate tests for the endocrinology disease management.    Management options discussed and implemented after shared medical decision making with the patient.  T1.5DM problem is chronic-stable    Plan:  Reviewed the overall T1.5DM management and insulin pump use.  Discussed optimal BG testing to assess glucose trends.  We reviewed insulin pump settings, basal rate and bolus dosing  Use of automated pump bolus dosing for meal/snack carb & correction dosing  Reviewed recent Tandem pump and DexcomG6 CGM glucose trend data, in detail     Recommend:  Continue the Tandem insulin pump, Dexcom sensor, and diabetes management plan.  No pump setting changes at this time    Reviewed the nighttime fluctuations with CGM trends, early morning low's and Sleep mode option  SM changes sensor glucose target from 112 to 120 mgdl, allows for increased basal rate but no auto correction boluses.   Advised starting SM 11p to 7am each night   Important to enter meal and snack carb amount  for automated (premeal) bolusing  Avoid eating carb snack to raise late evening SG trends  Resume use of the off-label Ozempic medication, since tolerated well and helpful   We have reviewed pen dosing options, including modified dosing using pen-click technique   Will check on Ozempic pen availability  Monitor for symptom changes, including GI symptoms  Keep focus on diet, exercise, and weight management  Arrange appointment with [facility] Diabetes Educator   Diab Ed Referral placed  Plan repeat fasting labs in 8/2024   Discussed [facility] clinic   Lab orders placed  Arrange annual dilated eye exam, fasting lipid panel testing.     Addressed patient's questions today.    The longitudinal plan of care for the endocrine problem(s) were addressed during this visit.  Due to added complexity of care,   we will continue to support the patient and the subsequent management of this condition with ongoing continuity of care.    There are no Patient Instructions on file for this visit.    Future labs ordered today: No orders of the defined types were placed in this encounter.    Radiology/Consults ordered today: None    Total time spent on day of encounter:  36 min    Follow-up:  7/8/24 Gilma Heaton MD, MS  Endocrinology  [facility]    CC:  ERIKA Milligan

## 2024-05-09 NOTE — LETTER
5/9/2024         RE: Jose Apodaca  [address]  [address]  [place] [address]        Dear Colleague,    Thank you for referring your patient, Jose Apodaca, to the [facility] [address]. Please see a copy of my visit note below.    Virtual Visit Details    Type of service:  Video Visit   Video Start Time: 3:31 PM  Video End Time: 3:52 PM    Originating Location (pt. Location): Home  Distant Location (provider location):  Off-site  Platform used for Video Visit: Tasneem      Recent issues:  Diabetes follow-up evaluation  Continues with Tandem pump and DexcomG6 sensor use  Feeling well overall, no new health issues reported          2011. Diagnosis of diabetes mellitus while living in [place]  Acute symptoms increased thirst, frequent urination, fatigue, weight loss (265 to 203#/30 days)  Hospitalized and diagnosis of DKA  Initial treatment with Apidra and Lantus insulin  Met with an endocrinologist Dr. Lina Fish  ~6 mo later, switched to [place] Ping pump     2012. Continued using this pump for 1 year, then moved to [facility]  Saw Dr. Beena Johnson/Endocrinologist in [place]  Switched to Tandem insulin pump    Previous [facility] labs include:       Subsequent move back to [place]  Recalls taking Victoza medication along with pump management, success with significant weight loss  Moved to [place]  9/2019. Moved to [place], [place]     Medical evaluations with Dr. 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 Started Tandem insulin pump     Previous [facility] labs include:       2/19/20. Med evaluation with Dr. ERIKA Milligan/[facility]  No lab tests ordered  Started DexcomG6 CGMS sensor  ~4/2020. Upgrade to the Control IQ pump         5/26/20. Initial diabetes evaluation with me, [facility]  Continued use of the Tandem TSlim pump with the DexcomG6  ~6/2020. Restarted (off label)  Victoza  10/2020. Insurance coverage for Victoza, used 1.8 mg daily  11/2021. Ran out of Victoza  ~3/2022. Restarted Victoza medication  Switched from Victoza to Ozempic 0.5 mg weekly  10/2023. Ran out of Ozempic medication    Using Tandem TSlim pump with DexcomG6 sensor              Novolog in pump    Previous Tandem pump settings:      Recent pump and sensor data:  no information available  Recent DexcomG6 data:          Blood glucose (BG) meter:  Accu-check               Tests infrequently  Fam Hx DM:    MA, Clement, MGM, PGM     Previous [facility] labs include:     Previous [facility] hgbA1c trends include:  Lab Results   Component Value Date    A1C 6.9 (H) 05/07/2024    A1C 7.3 (H) 10/02/2023    A1C 7.6 (H) 04/28/2023    A1C 7.3 (H) 09/23/2022    A1C 7.2 (H) 03/08/2022           Lab Results   Component Value Date    A1C 6.9 (H) 05/07/2024     05/07/2024    POTASSIUM 4.3 05/07/2024    CHLORIDE 103 05/07/2024    CO2 24 05/07/2024    ANIONGAP 10 05/07/2024     (H) 05/07/2024    BUN 9.6 05/07/2024    CR 1.01 05/07/2024    GFRESTIMATED >90 05/07/2024    GFRESTBLACK >90 05/05/2021    MICK 9.2 05/07/2024    CPEPT <0.1 (L) 05/28/2020    CHOL 155 10/02/2023    TRIG 40 10/02/2023    HDL 64 10/02/2023    LDL 83 10/02/2023    NHDL 91 10/02/2023    UCRR 134.0 10/02/2023    MICROL <12.0 10/02/2023    UMALCR  10/02/2023      Comment:      Unable to calculate, urine albumin and/or urine creatinine is outside detectable limits.  Microalbuminuria is defined as an albumin:creatinine ratio of 17 to 299 for males and 25 to 299 for females. A ratio of albumin:creatinine of 300 or higher is indicative of overt proteinuria.  Due to biologic variability, positive results should be confirmed by a second, first-morning random or 24-hour timed urine specimen. If there is discrepancy, a third specimen is recommended. When 2 out of 3 results are in the microalbuminuria range, this is evidence for incipient nephropathy and warrants increased  efforts at glucose control, blood pressure control, and institution of therapy with an angiotensin-converting-enzyme (ACE) inhibitor (if the patient can tolerate it).       Lab Results   Component Value Date    TSH 1.63 10/02/2023     Last eye exam 12/2022, no DR noted  DM Complications: none        , lives in [place], [place]; previously worked with [employer]  Sees Dr. ERIKA Milligan/[facility] for general medicine evaluations.       PMH/PSH:  Past Medical History:   Diagnosis Date     Cataracts, bilateral      DKA (diabetic ketoacidoses)      Morbid obesity (H)      PHUONG (obstructive sleep apnea)      T1DM (type 1 diabetes mellitus) (H)      Past Surgical History:   Procedure Laterality Date     COLONOSCOPY       ENT SURGERY  10 years ago    cyst on neck     EXTRACTION(S) DENTAL       NECK SURGERY  2010    to remove a cyst     PHACOEMULSIFICATION CLEAR CORNEA WITH STANDARD INTRAOCULAR LENS IMPLANT Left 09/21/2020    Procedure: COMPLEX PHACOEMULSIFICATION, CATARACT, WITH INTRAOCULAR LENS IMPLANT;  Surgeon: Andre Jackson MD;  [facility]     PHACOEMULSIFICATION CLEAR CORNEA WITH STANDARD INTRAOCULAR LENS IMPLANT Right 09/28/2020    Procedure: PHACOEMULSIFICATION, CATARACT, WITH INTRAOCULAR LENS IMPLANT;  Surgeon: Andre Jackson MD;  [facility]       Family Hx:  Family History   Problem Relation Age of Onset     Diabetes Maternal Grandmother      Other Cancer Maternal Grandmother         Lung Cancer     Diabetes Paternal Grandmother      Glaucoma Brother      Macular Degeneration No family hx of          Social Hx:  Social History     Socioeconomic History     Marital status: Single     Spouse name: Not on file     Number of children: Not on file     Years of education: Not on file     Highest education level: Not on file   Occupational History     Not on file   Tobacco Use     Smoking status: Never     Smokeless tobacco: Never   Substance and Sexual Activity     Alcohol use:  Never     Drug use: Never     Sexual activity: Not on file   Other Topics Concern     Parent/sibling w/ CABG, MI or angioplasty before 65F 55M? Not Asked   Social History Narrative     Not on file     Social Determinants of Health     Financial Resource Strain: Not on file   Food Insecurity: Not on file   Transportation Needs: Not on file   Physical Activity: Not on file   Stress: Not on file   Social Connections: Not on file   Interpersonal Safety: Low Risk  (2/28/2024)    Interpersonal Safety      Do you feel physically and emotionally safe where you currently live?: Yes      Within the past 12 months, have you been hit, slapped, kicked or otherwise physically hurt by someone?: No      Within the past 12 months, have you been humiliated or emotionally abused in other ways by your partner or ex-partner?: No   Housing Stability: Not on file          MEDICATIONS:  has a current medication list which includes the following prescription(s): dexcom g6 sensor, dexcom g6 transmitter, insulin aspart, losartan, sildenafil, accu-chek justin plus, blood glucose monitoring, dexcom g6 , dexcom g6 sensor, fluticasone, b-d u/f, and insulin pump.       ROS: 10 point ROS neg other than the symptoms noted above in the HPI.     GENERAL: mild fatigue, wt gain 10-12#; denies fevers, chills, night sweats.   HEENT: no dysphagia, odonophagia, diplopia, neck pain  THYROID:  no apparent hyper or hypothyroid symptoms  CV: no chest pain, pressure, palpitations  LUNGS: no SOB, AGARWAL, cough, wheezing   ABDOMEN: no diarrhea, constipation, abdominal pain  EXTREMITIES: no rashes, ulcers, edema  NEUROLOGY: right leg tingling; no headaches, denies changes in vision, extremitiy numbness   MSK: no muscle aches or pains, weakness  SKIN: no rashes or lesions  : decreased erection function; occasional nocturia  PSYCH:  stable mood, no significant anxiety or depression  ENDOCRINE: no heat or cold intolerance    Physical Exam (visual exam)  VS:  no  vital signs taken for video visit  CONSTITUTIONAL: healthy, alert and NAD, well dressed, answering questions appropriately  ENT: no nose swelling or nasal discharge, mouth redness or gum changes.  EYES: eyes grossly normal to inspection, conjunctivae and sclerae normal, no exophthalmos or proptosis  THYROID:  no apparent nodules or goiter  LUNGS: no audible wheeze, cough or visible cyanosis, no visible retractions or increased work of breathing  ABDOMEN: abdomen not evaluated  EXTREMITIES: no hand tremors, limited exam  NEUROLOGY: CN grossly intact, mentation intact and speech normal   SKIN:  no apparent skin lesions, rash, or edema with visualized skin appearance  PSYCH: mentation appears normal, affect normal/bright, judgement and insight intact,   normal speech and appearance well groomed       LABS:     All pertinent notes, labs, and images personally reviewed by me.       A/P:  Encounter Diagnoses   Name Primary?     Type 1 diabetes mellitus without complication (H) Yes     Insulin pump status      Morbid obesity (H)      Comments:  Reviewed health history and diabetes issues.  Health history and lab testing indicates T1.5DM   Recent CGM glucose trends good overall  Reviewed and interpreted tests that I previously ordered.   Ordered appropriate tests for the endocrinology disease management.    Management options discussed and implemented after shared medical decision making with the patient.  T1.5DM problem is chronic-stable    Plan:  Reviewed the overall T1.5DM management and insulin pump use.  Discussed optimal BG testing to assess glucose trends.  We reviewed insulin pump settings, basal rate and bolus dosing  Use of automated pump bolus dosing for meal/snack carb & correction dosing  Reviewed recent Tandem pump and DexcomG6 CGM glucose trend data, in detail     Recommend:  Continue the Tandem insulin pump, Dexcom sensor, and diabetes management plan.  No pump setting changes at this time    Reviewed the  nighttime fluctuations with CGM trends, early morning low's and Sleep mode option  SM changes sensor glucose target from 112 to 120 mgdl, allows for increased basal rate but no auto correction boluses.   Advised starting SM 11p to 7am each night   Important to enter meal and snack carb amount for automated (premeal) bolusing  Avoid eating carb snack to raise late evening SG trends  Resume use of the off-label Ozempic medication, since tolerated well and helpful   We have reviewed pen dosing options, including modified dosing using pen-click technique   Will check on Ozempic pen availability  Monitor for symptom changes, including GI symptoms  Keep focus on diet, exercise, and weight management  Arrange appointment with [facility] Diabetes Educator   Diab Ed Referral placed  Plan repeat fasting labs in 8/2024   Discussed [facility] clinic   Lab orders placed  Arrange annual dilated eye exam, fasting lipid panel testing.     Addressed patient's questions today.    The longitudinal plan of care for the endocrine problem(s) were addressed during this visit.  Due to added complexity of care,   we will continue to support the patient and the subsequent management of this condition with ongoing continuity of care.    There are no Patient Instructions on file for this visit.    Future labs ordered today: No orders of the defined types were placed in this encounter.    Radiology/Consults ordered today: None    Total time spent on day of encounter:  36 min    Follow-up:  7/8/24 Gilma Heaton MD, MS  Endocrinology  [facility]    CC:  ERIKA Milligan                          Again, thank you for allowing me to participate in the care of your patient.        Sincerely,        Ryley Heaton MD

## 2024-05-23 ENCOUNTER — TELEPHONE (OUTPATIENT)
Dept: SLEEP MEDICINE | Facility: CLINIC | Age: 49
End: 2024-05-23

## 2024-05-23 ENCOUNTER — MYC MEDICAL ADVICE (OUTPATIENT)
Dept: ENDOCRINOLOGY | Facility: CLINIC | Age: 49
End: 2024-05-23
Payer: COMMERCIAL

## 2024-05-23 NOTE — TELEPHONE ENCOUNTER
FYI - Status Update    Who is Calling: patient    Update: patient sending a fax to  [facility]  for forms from EDUARDO MADDOX to Aime James at  [facility] [facility]     Thank you.    Does caller want a call/response back: No

## 2024-06-05 SDOH — HEALTH STABILITY: PHYSICAL HEALTH: ON AVERAGE, HOW MANY MINUTES DO YOU ENGAGE IN EXERCISE AT THIS LEVEL?: 30 MIN

## 2024-06-05 SDOH — HEALTH STABILITY: PHYSICAL HEALTH: ON AVERAGE, HOW MANY DAYS PER WEEK DO YOU ENGAGE IN MODERATE TO STRENUOUS EXERCISE (LIKE A BRISK WALK)?: 2 DAYS

## 2024-06-05 ASSESSMENT — SOCIAL DETERMINANTS OF HEALTH (SDOH): HOW OFTEN DO YOU GET TOGETHER WITH FRIENDS OR RELATIVES?: NEVER

## 2024-06-07 ENCOUNTER — OFFICE VISIT (OUTPATIENT)
Dept: FAMILY MEDICINE | Facility: CLINIC | Age: 49
End: 2024-06-07
Payer: COMMERCIAL

## 2024-06-07 VITALS
DIASTOLIC BLOOD PRESSURE: 81 MMHG | RESPIRATION RATE: 16 BRPM | HEIGHT: 67 IN | SYSTOLIC BLOOD PRESSURE: 124 MMHG | TEMPERATURE: 97.3 F | HEART RATE: 92 BPM | OXYGEN SATURATION: 97 % | BODY MASS INDEX: 48.5 KG/M2 | WEIGHT: 309 LBS

## 2024-06-07 DIAGNOSIS — Z12.5 SCREENING FOR PROSTATE CANCER: ICD-10-CM

## 2024-06-07 DIAGNOSIS — Z12.11 SCREEN FOR COLON CANCER: ICD-10-CM

## 2024-06-07 DIAGNOSIS — Z00.00 ROUTINE HISTORY AND PHYSICAL EXAMINATION OF ADULT: Primary | ICD-10-CM

## 2024-06-07 PROCEDURE — G0103 PSA SCREENING: HCPCS | Performed by: INTERNAL MEDICINE

## 2024-06-07 PROCEDURE — 36415 COLL VENOUS BLD VENIPUNCTURE: CPT | Performed by: INTERNAL MEDICINE

## 2024-06-07 PROCEDURE — 99396 PREV VISIT EST AGE 40-64: CPT | Performed by: INTERNAL MEDICINE

## 2024-06-07 ASSESSMENT — PAIN SCALES - GENERAL: PAINLEVEL: NO PAIN (0)

## 2024-06-07 NOTE — PROGRESS NOTES
"Preventive Care Visit  [facility]  Radha Milligan MD, Internal Medicine  Jun 7, 2024      Assessment & Plan     Routine history and physical examination of adult  - diet, exercise    Screening for prostate cancer  - PSA, screen  - PSA, screen    Screen for colon cancer  - Colonoscopy Screening  Referral      Patient has been advised of split billing requirements and indicates understanding: Yes        BMI  Estimated body mass index is 48.4 kg/m  as calculated from the following:    Height as of this encounter: 1.702 m (5' 7\").    Weight as of this encounter: 140.2 kg (309 lb).   Weight management plan: Discussed healthy diet and exercise guidelines    Counseling  Appropriate preventive services were discussed with this patient, including applicable screening as appropriate for fall prevention, nutrition, physical activity, Tobacco-use cessation, weight loss and cognition.  Checklist reviewing preventive services available has been given to the patient.  Reviewed patient's diet, addressing concerns and/or questions.   He is at risk for lack of exercise and has been provided with information to increase physical activity for the benefit of his well-being.   Patient is at risk for social isolation and has been provided with information about the benefit of social connection.       FUTURE APPOINTMENTS:       - Follow-up visit in 1 year    Conner Ochoa is a 48 year old, presenting for the following:  Physical         Health Care Directive  Patient does not have a Health Care Directive or Living Will: Discussed advance care planning with patient; information given to patient to review.    HPI        6/5/2024   General Health   How would you rate your overall physical health? Excellent   Feel stress (tense, anxious, or unable to sleep) Not at all         6/5/2024   Nutrition   Three or more servings of calcium each day? (!) NO   Diet: Diabetic   How many servings of fruit and vegetables per " day? (!) 0-1   How many sweetened beverages each day? 0-1         6/5/2024   Exercise   Days per week of moderate/strenous exercise 2 days   Average minutes spent exercising at this level 30 min   (!) EXERCISE CONCERN      6/5/2024   Social Factors   Frequency of gathering with friends or relatives Never   Worry food won't last until get money to buy more No   Food not last or not have enough money for food? No   Do you have housing?  Yes   Are you worried about losing your housing? No   Lack of transportation? No   Unable to get utilities (heat,electricity)? No   (!) SOCIAL CONNECTIONS CONCERN      6/5/2024   Fall Risk   Fallen 2 or more times in the past year? No   Trouble with walking or balance? No          6/5/2024   Dental   Dentist two times every year? Yes         6/5/2024   TB Screening   Were you born outside of the [place]? No         Today's PHQ-2 Score:       6/7/2024    12:45 PM   PHQ-2 ( 1999 [employer])   Q1: Little interest or pleasure in doing things 0   Q2: Feeling down, depressed or hopeless 0   PHQ-2 Score 0   Q1: Little interest or pleasure in doing things Not at all   Q2: Feeling down, depressed or hopeless Not at all   PHQ-2 Score 0         6/5/2024   Substance Use   Alcohol more than 3/day or more than 7/wk Not Applicable   Do you use any other substances recreationally? No     Social History     Tobacco Use    Smoking status: Never    Smokeless tobacco: Never   Substance Use Topics    Alcohol use: Never    Drug use: Never           6/5/2024   STI Screening   New sexual partner(s) since last STI/HIV test? (!) YES    ASCVD Risk   The 10-year ASCVD risk score (Cathy TEMPLE, et al., 2019) is: 13.2%    Values used to calculate the score:      Age: 48 years      Sex: Male      Is Non- : Yes      Diabetic: Yes      Tobacco smoker: No      Systolic Blood Pressure: 124 mmHg      Is BP treated: Yes      HDL Cholesterol: 64 mg/dL      Total Cholesterol: 155 mg/dL          6/5/2024  "  Contraception/Family Planning   Questions about contraception or family planning No       Reviewed and updated as needed this visit by Provider                    Past Medical History:   Diagnosis Date    Cataracts, bilateral     DKA (diabetic ketoacidoses)     Morbid obesity (H)     PHUONG (obstructive sleep apnea)     T1DM (type 1 diabetes mellitus) (H)          Review of Systems  Constitutional, HEENT, cardiovascular, pulmonary, GI, , musculoskeletal, neuro, skin, endocrine and psych systems are negative, except as otherwise noted.     Objective    Exam  /81 (BP Location: Right arm, Patient Position: Sitting, Cuff Size: Adult Large)   Pulse 92   Temp 97.3  F (36.3  C) (Temporal)   Resp 16   Ht 1.702 m (5' 7\")   Wt 140.2 kg (309 lb)   SpO2 97%   BMI 48.40 kg/m     Estimated body mass index is 48.4 kg/m  as calculated from the following:    Height as of this encounter: 1.702 m (5' 7\").    Weight as of this encounter: 140.2 kg (309 lb).    Physical Exam  GENERAL: alert and no distress  EYES: Eyes grossly normal to inspection, conjunctivae and sclerae normal  HENT: ear canals and TM's normal, nose and mouth without ulcers or lesions  NECK: no asymmetry  RESP: lungs clear to auscultation - no rales, rhonchi or wheezes  CV: regular rate and rhythm, normal S1 S2  ABDOMEN: soft, nontender, bowel sounds normal  MS: no gross musculoskeletal defects noted, no edema  SKIN: no suspicious lesions or rashes  NEURO: Normal strength and tone, mentation intact and speech normal  PSYCH: mentation appears normal, affect normal/bright        Signed Electronically by: Radha Milligan MD    "

## 2024-06-08 LAB — PSA SERPL DL<=0.01 NG/ML-MCNC: 1.78 NG/ML (ref 0–3.5)

## 2024-06-11 ENCOUNTER — MYC MEDICAL ADVICE (OUTPATIENT)
Dept: ENDOCRINOLOGY | Facility: CLINIC | Age: 49
End: 2024-06-11
Payer: COMMERCIAL

## 2024-06-12 DIAGNOSIS — E88.819 INSULIN RESISTANCE: ICD-10-CM

## 2024-06-12 DIAGNOSIS — E10.9 TYPE 1 DIABETES MELLITUS WITHOUT COMPLICATION (H): Primary | ICD-10-CM

## 2024-06-12 DIAGNOSIS — Z96.41 INSULIN PUMP STATUS: ICD-10-CM

## 2024-06-12 RX ORDER — INSULIN LISPRO 100 [IU]/ML
INJECTION, SOLUTION INTRAVENOUS; SUBCUTANEOUS
Qty: 40 ML | Refills: 5 | Status: SHIPPED | OUTPATIENT
Start: 2024-06-12

## 2024-06-14 ENCOUNTER — OFFICE VISIT (OUTPATIENT)
Dept: UROLOGY | Facility: CLINIC | Age: 49
End: 2024-06-14
Payer: COMMERCIAL

## 2024-06-14 VITALS
WEIGHT: 309 LBS | SYSTOLIC BLOOD PRESSURE: 148 MMHG | BODY MASS INDEX: 48.5 KG/M2 | DIASTOLIC BLOOD PRESSURE: 78 MMHG | HEIGHT: 67 IN

## 2024-06-14 DIAGNOSIS — Z30.2 ENCOUNTER FOR STERILIZATION: Primary | ICD-10-CM

## 2024-06-14 PROCEDURE — 88302 TISSUE EXAM BY PATHOLOGIST: CPT | Performed by: PATHOLOGY

## 2024-06-14 PROCEDURE — 55250 REMOVAL OF SPERM DUCT(S): CPT | Performed by: UROLOGY

## 2024-06-14 RX ORDER — LIDOCAINE HYDROCHLORIDE 20 MG/ML
20 INJECTION, SOLUTION INFILTRATION; PERINEURAL ONCE
Status: COMPLETED | OUTPATIENT
Start: 2024-06-14 | End: 2024-06-14

## 2024-06-14 RX ADMIN — LIDOCAINE HYDROCHLORIDE 20 ML: 20 INJECTION, SOLUTION INFILTRATION; PERINEURAL at 11:39

## 2024-06-14 ASSESSMENT — PAIN SCALES - GENERAL: PAINLEVEL: NO PAIN (0)

## 2024-06-14 NOTE — LETTER
6/14/2024       RE: Jose Apodaca  [address]  [address]  [place] [id number]     Dear Colleague,    Thank you for referring your patient, Jose pAodaca, to the [facility] [place] [facility]. Please see a copy of my visit note below.    OFFICE VASECTOMY OPERATIVE NOTE  [facility]  ([phone]    DATE: 06/14/24  PATIENT: Jose Apodaca    YOB: 1975    Jose Apodaca is a 48 year old male.  He has 5 children and he wishes a vasectomy for birth control.  He has read the brochure and he has shaved himself.  I reviewed the vasectomy procedure with him explaining that it would be done with a local anesthetic given just in the location where the vasectomy would be done.  It would be done through scalpel-less incisions with the removal of segments of the vasa, cauterization of the ends, and burying the ends separate with sutures.      Pt. Understands:  1/1000-1/3000 risk of future pregnancy even with perfectly done vasectomy  -vasectomy is a permanent procedure    -he may cryopreserve sperm if he wishes   -1-5% risk of post-vasectomy pain syndrome   -1-5% risk of complication, primarily infection or bleeding  - he needs to have a semen sample that shows no sperm before getting approval for unprotected intercourse.      Complications such as bleeding, infection, and damage to other tissues in the area were discussed.  I recommended that an ice bag be placed on the scrotum off and on tonight to help reduce pain and swelling.      He was reminded that he was not sterile immediately after the vasectomy that it would take at least 20 ejaculations to empty the vas of any remaining sperm.  He was not to provide a semen sample until after the 20th ejaculation and not before 12 weeks after the vas. He was  to fulfill both of those requirements.   He understands it is his responsibility to find out  the results of the vas before proceeding with intercourse without birth control protection.  Other items discussed were activity afterwards, returning to work, voluntary physical activity,  resuming sexual activity, clothing to wear, bathing, and care of the vas site and expected changes in the site as healing progresses.  After signing the permit, bilateral vasectomy was done as described through scalpel-less incisions.       ANESTHESIA: Local    DETAILS OF PROCEDURE: The risks of the procedure were explained in detail to the patient and informed consent was obtained. The patient was placed supine on the procedure table and the penis and scrotum were prepped and draped in the standard sterile fashion. The right vas deferens was isolated and brought up to the median raphe of the scrotum. 1% lidocaine local anesthesia was used to infiltrate the skin and the spermatic cord. A sharp hemostat was used to make a skin puncture. Adventitial tissues were swept away from the vas. A 1 cm segment of the vas was excised and sent for pathology. The proximal and distal lumina of the vas were cauterized and then each segment was tied off in a knuckling-fashion with a 3-0 chromic suture. Hemostasis was ensured and the segments were released back into the scrotum. Next the left vas was brought up to the same incision and a vasectomy was performed in the similar fashion. At the end of the procedure a single 3-0 chromic suture was placed in the skin.     COMPLICATIONS: None    DISMISSAL INSTRUCTIONS:  - Ice pack to scrotum 15 to 20 minutes each hour awake for 36 to 40 hours.  - No strenuous activity or ejaculation for 14 days.  - No unprotected sexual activity until proven azoospermia on semen samples at 3 months.  - Referred to patient handout for normal postop expectations and indications to contact nurse or physician.    VINNYD.: Alcon Hathaway M.D.

## 2024-06-14 NOTE — PROGRESS NOTES
OFFICE VASECTOMY OPERATIVE NOTE  [facility]  ([phone]    DATE: 06/14/24  PATIENT: Jose Apodaca    YOB: 1975    Jose Apodaca is a 48 year old male.  He has 5 children and he wishes a vasectomy for birth control.  He has read the brochure and he has shaved himself.  I reviewed the vasectomy procedure with him explaining that it would be done with a local anesthetic given just in the location where the vasectomy would be done.  It would be done through scalpel-less incisions with the removal of segments of the vasa, cauterization of the ends, and burying the ends separate with sutures.      Pt. Understands:  1/1000-1/3000 risk of future pregnancy even with perfectly done vasectomy  -vasectomy is a permanent procedure    -he may cryopreserve sperm if he wishes   -1-5% risk of post-vasectomy pain syndrome   -1-5% risk of complication, primarily infection or bleeding  - he needs to have a semen sample that shows no sperm before getting approval for unprotected intercourse.      Complications such as bleeding, infection, and damage to other tissues in the area were discussed.  I recommended that an ice bag be placed on the scrotum off and on tonight to help reduce pain and swelling.      He was reminded that he was not sterile immediately after the vasectomy that it would take at least 20 ejaculations to empty the vas of any remaining sperm.  He was not to provide a semen sample until after the 20th ejaculation and not before 12 weeks after the vas. He was  to fulfill both of those requirements.   He understands it is his responsibility to find out the results of the vas before proceeding with intercourse without birth control protection.  Other items discussed were activity afterwards, returning to work, voluntary physical activity,  resuming sexual activity, clothing to wear, bathing, and care of the vas site and expected changes in the site as healing progresses.  After  signing the permit, bilateral vasectomy was done as described through scalpel-less incisions.       ANESTHESIA: Local    DETAILS OF PROCEDURE: The risks of the procedure were explained in detail to the patient and informed consent was obtained. The patient was placed supine on the procedure table and the penis and scrotum were prepped and draped in the standard sterile fashion. The right vas deferens was isolated and brought up to the median raphe of the scrotum. 1% lidocaine local anesthesia was used to infiltrate the skin and the spermatic cord. A sharp hemostat was used to make a skin puncture. Adventitial tissues were swept away from the vas. A 1 cm segment of the vas was excised and sent for pathology. The proximal and distal lumina of the vas were cauterized and then each segment was tied off in a knuckling-fashion with a 3-0 chromic suture. Hemostasis was ensured and the segments were released back into the scrotum. Next the left vas was brought up to the same incision and a vasectomy was performed in the similar fashion. At the end of the procedure a single 3-0 chromic suture was placed in the skin.     COMPLICATIONS: None    DISMISSAL INSTRUCTIONS:  - Ice pack to scrotum 15 to 20 minutes each hour awake for 36 to 40 hours.  - No strenuous activity or ejaculation for 14 days.  - No unprotected sexual activity until proven azoospermia on semen samples at 3 months.  - Referred to patient handout for normal postop expectations and indications to contact nurse or physician.    M.D.: Alcon Hathaway M.D.

## 2024-06-14 NOTE — NURSING NOTE
Chief Complaint   Patient presents with    Sterilization     Vasectomy     Patient has signed the consent form stating that we will be doing a bilateral vasectomy today and that this is the correct procedure.  I verbally confirmed the patient's identity using two indicators, relevant allergies, and that the correct equipment was available. Patient was cleaned and prepped according to the appropriate policy.  Equipment was prepped in a sterile fashion and MD was informed that patient was ready.    Consent read and signed: Yes  Aspirin/blood thinning products stopped 7 days prior to procedure: Yes  Allergies   Allergen Reactions    Cats     Seasonal Allergies      grass       Physician performed procedure.  After the procedure the patient was instructed to wait approximately three months and at least 30 ejaculations prior to returning a sample to confirm sterility.  The patient was instructed to bring in sample within 3-6 hours post sample ejaculation.  Any additional medications after the procedure were sent to the patient's pharmacy and instructions were given according to company protocol and the performing physician.  The physician performing the procedure prescribed as they felt appropriate.  Patient was also instructed to avoid heavy lifting or strenuous activity for 10-14 days and to ice the scrotum.  Samples from the R and L vas deferens were sent to the lab and an order was placed for future semen analysis.        The following medication was given:     MEDICATION:  Lidocaine 2% Soln  ROUTE:  infiltration  SITE: scrotum  DOSE: 18  LOT #: [id number]  : [employer]  EXPIRATION DATE: Feb 1, 2025  NDC#: 0397-9472-89   Was there drug waste? Yes  Amount of drug waste (mL): 2.  Reason for waste:  As per MD  Multi-dose vial: Yes      Martha Holloway, EMT

## 2024-06-14 NOTE — PATIENT INSTRUCTIONS
POST VASECTOMY INSTRUCTIONS    1.) If you have any concerns or questions, please contact our office at [phone] during office hours. If it is after hours, please call [phone].     2.) It is okay to take a shower, however, do not soak in water (bath,swimming, hot tub,etc....) until your incision is healed.    3.) You might notice some swelling, mild bruising, and discomfort for several days after your vasectomy. This is to be expected. For at least the next 24 hours, an ice pack should be applied for 20 minutes every hour that you are awake. Ice will help with discomfort and swelling. Do not place directly on the skin.    4.) No intercourse, strenuous activity or exercise for at least 7-10 days, even if you feel fine.    5.) You need to wear good scrotal support while you are healing. We strongly recommend an athletic supporter or a pair of regular briefs that are one size too small. Boxer briefs do not offer enough support.    6.) Tylenol as directed on the bottle is preferred for discomfort. Please avoid any blood thinning products such as ibuprofen and aspirin (Motrin, Advil, Excedrin, Aleve, ect..) for at least the next week.    7.) It is normal to have mild drainage from the incision area for several days. However, please contact our office if you notice: bright red blood that does not stop after three days, increased pain, heat at the incision, red streaks, foul smelling discharge, or if you start to run a fever.     8.) YOU MUST CONTINUE BIRTH CONTROL UNTIL WE CONFIRM YOUR STERILITY.  This process can take up to a year to complete (rare occurrence).     9.) You have been given a form with specimen cup and instructions for your follow up specimen. You will be cleared once we receive ONE negative specimen. If your specimen comes back positive (sperm seen) you will be asked to repeat the test. This does not mean that your vasectomy has failed.

## 2024-06-18 LAB
PATH REPORT.COMMENTS IMP SPEC: NORMAL
PATH REPORT.COMMENTS IMP SPEC: NORMAL
PATH REPORT.FINAL DX SPEC: NORMAL
PATH REPORT.GROSS SPEC: NORMAL
PATH REPORT.MICROSCOPIC SPEC OTHER STN: NORMAL
PATH REPORT.RELEVANT HX SPEC: NORMAL
PHOTO IMAGE: NORMAL

## 2024-07-08 ENCOUNTER — VIRTUAL VISIT (OUTPATIENT)
Dept: ENDOCRINOLOGY | Facility: CLINIC | Age: 49
End: 2024-07-08
Payer: COMMERCIAL

## 2024-07-08 DIAGNOSIS — Z96.41 INSULIN PUMP STATUS: ICD-10-CM

## 2024-07-08 DIAGNOSIS — E88.819 INSULIN RESISTANCE: ICD-10-CM

## 2024-07-08 DIAGNOSIS — E13.9 DIABETES MELLITUS TYPE 1.5, MANAGED AS TYPE 2 (H): Primary | ICD-10-CM

## 2024-07-08 DIAGNOSIS — E66.01 MORBID OBESITY (H): ICD-10-CM

## 2024-07-08 PROCEDURE — 95251 CONT GLUC MNTR ANALYSIS I&R: CPT | Performed by: INTERNAL MEDICINE

## 2024-07-08 PROCEDURE — G2211 COMPLEX E/M VISIT ADD ON: HCPCS | Mod: 95 | Performed by: INTERNAL MEDICINE

## 2024-07-08 PROCEDURE — 99214 OFFICE O/P EST MOD 30 MIN: CPT | Mod: 95 | Performed by: INTERNAL MEDICINE

## 2024-07-08 NOTE — PROGRESS NOTES
Virtual Visit Details    Type of service:  Video Visit     Originating Location (pt. Location): Home  Distant Location (provider location):  Off-site  Platform used for Video Visit: Tasneem      Recent issues:  Diabetes follow-up evaluation  Continues with Tandem pump, Ozempic med, and DexcomG6 sensor use  Feeling well overall, no new health issues reported          2011. Diagnosis of diabetes mellitus while living in [place]  Acute symptoms increased thirst, frequent urination, fatigue, weight loss (265 to 203#/30 days)  Hospitalized and diagnosis of DKA  Initial treatment with Apidra and Lantus insulin  Met with an endocrinologist Lara Naik and Dr. Lina Fish  ~6 mo later, switched to Morgan Ping pump     2012. Continued using this pump for 1 year, then moved to [facility]  Saw Dr. Beena Johnson/Endocrinologist in [place]  Switched to Tandem insulin pump  Previous [facility] labs include:       Subsequent move back to [place]  Recalls taking Victoza medication along with pump management, success with significant weight loss  Moved to [place]  9/2019. Moved to [place], [place]     Medical evaluations with Dr. 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 Started Tandem insulin pump  Previous [facility] labs include:       2/19/20. Med evaluation with Dr. ERIKA Milligan/[facility]  No lab tests ordered  Started DexcomG6 CGMS sensor  ~4/2020. Upgrade to the Control IQ pump         5/26/20. Initial diabetes evaluation with me, [facility]  Continued use of the Tandem TSlim pump with the DexcomG6  ~6/2020. Restarted (off label) Victoza  10/2020. Insurance coverage for Victoza, used 1.8 mg daily  11/2021. Ran out of Victoza  ~3/2022. Restarted Victoza medication  Switched from Victoza to Ozempic 0.5 mg weekly  10/2023. Ran out of Ozempic medication    Using Tandem TSlim pump with DexcomG6 sensor              Humalog in  pump   Ozempic 0.5 mg weekly    Current Tandem pump settings:      Recent pump and sensor data:                Recent DexcomG6 data:          Blood glucose (BG) meter:  Accu-check               Tests infrequently  Fam Hx DM:    MA, Clement, MGM, PGM     Previous [facility] labs include:     Previous [facility] hgbA1c trends include:  Lab Results   Component Value Date    A1C 6.9 (H) 05/07/2024    A1C 7.3 (H) 10/02/2023    A1C 7.6 (H) 04/28/2023    A1C 7.3 (H) 09/23/2022    A1C 7.2 (H) 03/08/2022           Lab Results   Component Value Date    A1C 6.9 (H) 05/07/2024     05/07/2024    POTASSIUM 4.3 05/07/2024    CHLORIDE 103 05/07/2024    CO2 24 05/07/2024    ANIONGAP 10 05/07/2024     (H) 05/07/2024    BUN 9.6 05/07/2024    CR 1.01 05/07/2024    GFRESTIMATED >90 05/07/2024    GFRESTBLACK >90 05/05/2021    MICK 9.2 05/07/2024    CPEPT <0.1 (L) 05/28/2020    CHOL 155 10/02/2023    TRIG 40 10/02/2023    HDL 64 10/02/2023    LDL 83 10/02/2023    NHDL 91 10/02/2023    UCRR 134.0 10/02/2023    MICROL <12.0 10/02/2023    UMALCR  10/02/2023      Comment:      Unable to calculate, urine albumin and/or urine creatinine is outside detectable limits.  Microalbuminuria is defined as an albumin:creatinine ratio of 17 to 299 for males and 25 to 299 for females. A ratio of albumin:creatinine of 300 or higher is indicative of overt proteinuria.  Due to biologic variability, positive results should be confirmed by a second, first-morning random or 24-hour timed urine specimen. If there is discrepancy, a third specimen is recommended. When 2 out of 3 results are in the microalbuminuria range, this is evidence for incipient nephropathy and warrants increased efforts at glucose control, blood pressure control, and institution of therapy with an angiotensin-converting-enzyme (ACE) inhibitor (if the patient can tolerate it).       Lab Results   Component Value Date    TSH 1.63 10/02/2023     Last eye exam at [facility] 3/2024, keratoconus changes  but no DR noted  DM Complications: none        , lives in [place] but moving back to [place] soon; works for [employer]  Sees Dr. ERIKA Milligan/[facility] for general medicine evaluations.       PMH/PSH:  Past Medical History:   Diagnosis Date    Cataracts, bilateral     Diabetes mellitus type 1.5 (H)     DKA (diabetic ketoacidoses)     Keratoconus of both eyes     Morbid obesity (H)     PHUONG (obstructive sleep apnea)      Past Surgical History:   Procedure Laterality Date    COLONOSCOPY      ENT SURGERY  10 years ago    cyst on neck    EXTRACTION(S) DENTAL      NECK SURGERY  2010    to remove a cyst    PHACOEMULSIFICATION CLEAR CORNEA WITH STANDARD INTRAOCULAR LENS IMPLANT Left 09/21/2020    Procedure: COMPLEX PHACOEMULSIFICATION, CATARACT, WITH INTRAOCULAR LENS IMPLANT;  Surgeon: Andre Jackson MD;  [facility]    PHACOEMULSIFICATION CLEAR CORNEA WITH STANDARD INTRAOCULAR LENS IMPLANT Right 09/28/2020    Procedure: PHACOEMULSIFICATION, CATARACT, WITH INTRAOCULAR LENS IMPLANT;  Surgeon: Andre Jackson MD;  [facility]    VASECTOMY         Family Hx:  Family History   Problem Relation Age of Onset    Diabetes Maternal Grandmother     Other Cancer Maternal Grandmother         Lung Cancer    Diabetes Paternal Grandmother     Glaucoma Brother     Hypertension Mother     Diabetes Other         Maternal aunt mother of vibha t1    Diabetes Cousin     Diabetes Daughter         T1 Dx 7/4/2021    Macular Degeneration No family hx of          Social Hx:  Social History     Socioeconomic History    Marital status: Single     Spouse name: Not on file    Number of children: Not on file    Years of education: Not on file    Highest education level: Not on file   Occupational History    Not on file   Tobacco Use    Smoking status: Never    Smokeless tobacco: Never   Substance and Sexual Activity    Alcohol use: Never    Drug use: Never    Sexual activity: Yes      Partners: Female     Birth control/protection: Female Surgical   Other Topics Concern    Parent/sibling w/ CABG, MI or angioplasty before 65F 55M? No   Social History Narrative    Not on file     Social Determinants of Health     Financial Resource Strain: Low Risk  (6/5/2024)    Financial Resource Strain     Within the past 12 months, have you or your family members you live with been unable to get utilities (heat, electricity) when it was really needed?: No   Food Insecurity: Low Risk  (6/5/2024)    Food Insecurity     Within the past 12 months, did you worry that your food would run out before you got money to buy more?: No     Within the past 12 months, did the food you bought just not last and you didn t have money to get more?: No   Transportation Needs: Low Risk  (6/5/2024)    Transportation Needs     Within the past 12 months, has lack of transportation kept you from medical appointments, getting your medicines, non-medical meetings or appointments, work, or from getting things that you need?: No   Physical Activity: Insufficiently Active (6/5/2024)    Exercise Vital Sign     Days of Exercise per Week: 2 days     Minutes of Exercise per Session: 30 min   Stress: No Stress Concern Present (6/5/2024)    [employer] - Occupational Stress Questionnaire     Feeling of Stress : Not at all   Social Connections: Unknown (6/5/2024)    Social Connection and Isolation Panel [NHANES]     Frequency of Communication with Friends and Family: Not on file     Frequency of Social Gatherings with Friends and Family: Never     Attends Pentec[employer]al Services: Not on file     Active Member of Clubs or Organizations: Not on file     Attends Club or Organization Meetings: Not on file     Marital Status: Not on file   Interpersonal Safety: Low Risk  (2/28/2024)    Interpersonal Safety     Do you feel physically and emotionally safe where you currently live?: Yes     Within the past 12 months, have you been  hit, slapped, kicked or otherwise physically hurt by someone?: No     Within the past 12 months, have you been humiliated or emotionally abused in other ways by your partner or ex-partner?: No   Housing Stability: Low Risk  (6/5/2024)    Housing Stability     Do you have housing? : Yes     Are you worried about losing your housing?: No          MEDICATIONS:  has a current medication list which includes the following prescription(s): dexcom g6 sensor, dexcom g6 transmitter, fluticasone, insulin lispro, losartan, semaglutide, sildenafil, accu-chek justin plus, blood glucose monitoring, dexcom g6 , dexcom g6 sensor, b-d u/f, and insulin pump.       ROS: 10 point ROS neg other than the symptoms noted above in the HPI.     GENERAL: mild fatigue, wt stable?; denies fevers, chills, night sweats.   HEENT: no dysphagia, odonophagia, diplopia, neck pain  THYROID:  no apparent hyper or hypothyroid symptoms  CV: no chest pain, pressure, palpitations  LUNGS: no SOB, AGARWAL, cough, wheezing   ABDOMEN: no diarrhea, constipation, abdominal pain  EXTREMITIES: no rashes, ulcers, edema  NEUROLOGY: right leg tingling; no headaches, denies changes in vision, extremitiy numbness   MSK: no muscle aches or pains, weakness  SKIN: no rashes or lesions  : decreased erection function; occasional nocturia  PSYCH:  stable mood, no significant anxiety or depression  ENDOCRINE: no heat or cold intolerance    Physical Exam (visual exam)  VS:  no vital signs taken for video visit  CONSTITUTIONAL: healthy, alert and NAD, well dressed, answering questions appropriately  ENT: no nose swelling or nasal discharge, mouth redness or gum changes.  EYES: eyes grossly normal to inspection, conjunctivae and sclerae normal, no exophthalmos or proptosis  THYROID:  no apparent nodules or goiter  LUNGS: no audible wheeze, cough or visible cyanosis, no visible retractions or increased work of breathing  ABDOMEN: abdomen not evaluated  EXTREMITIES: no hand  tremors, limited exam  NEUROLOGY: CN grossly intact, mentation intact and speech normal   SKIN:  no apparent skin lesions, rash, or edema with visualized skin appearance  PSYCH: mentation appears normal, affect normal/bright, judgement and insight intact,   normal speech and appearance well groomed       LABS:     All pertinent notes, labs, and images personally reviewed by me.       A/P:  Encounter Diagnoses   Name Primary?    Diabetes mellitus type 1.5, managed as type 2 (H) Yes    Insulin pump status     Insulin resistance     Morbid obesity (H)        Comments:  Reviewed health history and diabetes issues.  Health history and lab testing indicates T1.5DM   Recent CGM glucose trends good overall  Reviewed and interpreted tests that I previously ordered.   Ordered appropriate tests for the endocrinology disease management.    Management options discussed and implemented after shared medical decision making with the patient.  T1.5DM problem is chronic-stable    Plan:  Reviewed the overall T1.5DM management and insulin pump use.  Discussed optimal BG testing to assess glucose trends.  We reviewed insulin pump settings, basal rate and bolus dosing  Use of automated pump bolus dosing for meal/snack carb & correction dosing  Reviewed recent Tandem pump and DexcomG6 CGM glucose trend data, in detail     Recommend:  Continue the Tandem insulin pump, Dexcom sensor, and diabetes management plan.  Pump setting changes:   Bolus ISF 12p 15 to 20   Basals: [place] 1.1 to 1.0, 3a 1.2 to 1.1 unit(s)/hr    We reviewed the T1.5DM diagnosis, also Ozempic medication use  Updated Ozempic pen Rx sent to pharmacy, also discussed sample pen option   Important to enter meal and snack carb amount for automated (premeal) bolusing  Avoid eating carb snack to raise late evening SG trends  Monitor for symptom changes, including GI symptoms  Keep focus on diet, exercise, and weight management  Consider follow-up appointment with [facility] Diabetes  Educator  Plan repeat fasting labs in 8/2024   Discussed [facility]   Lab orders available  Arrange annual dilated eye exam, fasting lipid panel testing.     Addressed patient's questions today.    The longitudinal plan of care for the endocrine problem(s) were addressed during this visit.  Due to added complexity of care,   we will continue to support the patient and the subsequent management of this condition with ongoing continuity of care.    There are no Patient Instructions on file for this visit.    Future labs ordered today:   Orders Placed This Encounter   Procedures    GLUCOSE MONITOR, 72 HOUR, PHYS INTERP     Radiology/Consults ordered today: None    Total time spent on day of encounter:  37 min    Follow-up:  10/8/24 at 2pm, Gilma Heaton MD, MS  Endocrinology  [facility]    CC:  ERIKA Milligan

## 2024-07-09 ENCOUNTER — TELEPHONE (OUTPATIENT)
Dept: ENDOCRINOLOGY | Facility: CLINIC | Age: 49
End: 2024-07-09
Payer: COMMERCIAL

## 2024-07-09 NOTE — TELEPHONE ENCOUNTER
Prior Authorization Approval    Medication: OZEMPIC (0.25 OR 0.5 MG/DOSE) 2 MG/3ML SC SOPN  Authorization Effective Date: 7/9/2024  Authorization Expiration Date: 7/9/2025  Approved Dose/Quantity: 1 per 28 days  Reference #: [id number]   Insurance Company: [employer] ([facility]) - Phone [phone] Fax [phone]  Expected CoPay: $ 0  CoPay Card Available: No    Financial Assistance Needed: N/A  Which Pharmacy is filling the prescription: [id number] - [place], [place] - [address] AT [facility]  Pharmacy Notified: Released Rx  Patient Notified: By pharmacy

## 2024-07-09 NOTE — TELEPHONE ENCOUNTER
PA Initiation    Medication: OZEMPIC (0.25 OR 0.5 MG/DOSE) 2 MG/3ML SC SOPN  Insurance Company: [employer] ([facility]) - Phone [phone] Fax [phone]  Pharmacy Filling the Rx: [id number] - [place], [place] - [address] AT [facility]  Filling Pharmacy Phone:    Filling Pharmacy Fax:    Start Date: 7/9/2024    [id number]

## 2024-07-12 ENCOUNTER — VIRTUAL VISIT (OUTPATIENT)
Dept: SLEEP MEDICINE | Facility: CLINIC | Age: 49
End: 2024-07-12
Payer: COMMERCIAL

## 2024-07-12 VITALS — HEIGHT: 67 IN | BODY MASS INDEX: 47.09 KG/M2 | WEIGHT: 300 LBS

## 2024-07-12 DIAGNOSIS — G47.33 OSA (OBSTRUCTIVE SLEEP APNEA): Primary | ICD-10-CM

## 2024-07-12 PROCEDURE — 99203 OFFICE O/P NEW LOW 30 MIN: CPT | Mod: 95 | Performed by: INTERNAL MEDICINE

## 2024-07-12 PROCEDURE — G2211 COMPLEX E/M VISIT ADD ON: HCPCS | Mod: 95 | Performed by: INTERNAL MEDICINE

## 2024-07-12 ASSESSMENT — SLEEP AND FATIGUE QUESTIONNAIRES
HOW LIKELY ARE YOU TO NOD OFF OR FALL ASLEEP IN A CAR, WHILE STOPPED FOR A FEW MINUTES IN TRAFFIC: WOULD NEVER DOZE
HOW LIKELY ARE YOU TO NOD OFF OR FALL ASLEEP WHILE LYING DOWN TO REST IN THE AFTERNOON WHEN CIRCUMSTANCES PERMIT: SLIGHT CHANCE OF DOZING
HOW LIKELY ARE YOU TO NOD OFF OR FALL ASLEEP WHILE SITTING AND TALKING TO SOMEONE: WOULD NEVER DOZE
HOW LIKELY ARE YOU TO NOD OFF OR FALL ASLEEP WHEN YOU ARE A PASSENGER IN A CAR FOR AN HOUR WITHOUT A BREAK: WOULD NEVER DOZE
HOW LIKELY ARE YOU TO NOD OFF OR FALL ASLEEP WHILE SITTING INACTIVE IN A PUBLIC PLACE: WOULD NEVER DOZE
HOW LIKELY ARE YOU TO NOD OFF OR FALL ASLEEP WHILE WATCHING TV: WOULD NEVER DOZE
HOW LIKELY ARE YOU TO NOD OFF OR FALL ASLEEP WHILE SITTING QUIETLY AFTER LUNCH WITHOUT ALCOHOL: WOULD NEVER DOZE
HOW LIKELY ARE YOU TO NOD OFF OR FALL ASLEEP WHILE SITTING AND READING: WOULD NEVER DOZE

## 2024-07-12 ASSESSMENT — PAIN SCALES - GENERAL: PAINLEVEL: NO PAIN (0)

## 2024-07-12 NOTE — PROGRESS NOTES
Virtual Visit Details    Type of service:  Video Visit     Originating Location (pt. Location): Home  Distant Location (provider location):  Off-site  Platform used for Video Visit: [employer]    Additional 15 minutes on the date of service was spent performing the following:    -Preparing to see the patient  -Obtaining and/or reviewing separately obtained history   -Ordering medications, tests, or procedures   -Documenting clinical information in the electronic or other health record     Thank you for the opportunity to participate in the care of Jose Apodaca.     He is a 48 year old y/o male patient who comes to the sleep medicine clinic for follow up. The patient was diagnosed with PHUONG on 09/27/2017 (AHI=31). The patient wanted to re-establish care with us to get a prescription to get supplies for his CPAP. He states that he is still benefiting from CPAP therapy.      Assessment and Plan:  In summary Jose Apodaca is a 48 year old year old male who is here for follow up.    1. PHUONG (obstructive sleep apnea)  I congratulated the patient on his excellent CPAP usage. I will keep him on the same pressure settings for now. RTC bi-annually.  - COMPREHENSIVE DME     Compliance Download data for 30 Days:  Compliance:100%  Pressure setting:CPAP 14 cwp  Leak:Minimal  Residual AHI:4.1 events per hour  Mask Tolerance:Good  Skin irritation:None  DME:[facility]    Lab reviewed: Discussed with patient.    Sleep-Wake Cycle:    The patient likes to initiate sleep at around Midnight with a sleep latency of 15-20 minutes. The patient has 1-2 nocturnal awakenings. Final wake up time is around 7 AM.    HODAN:  HODAN Total Score: 1  Total score - [place]: 1 (7/12/2024  9:06 AM)    Failed to redirect to the Timeline version of the [facility] SmartLink.   Patient Active Problem List   Diagnosis    Morbid obesity (H)    Diabetes mellitus, type 2 (H)    Keratoconus of both eyes    Combined forms of age-related cataract of both eyes        Past Medical History:   Diagnosis Date    Cataracts, bilateral     Diabetes mellitus type 1.5 (H)     DKA (diabetic ketoacidoses)     Keratoconus of both eyes     Morbid obesity (H)     PHUONG (obstructive sleep apnea)        Past Surgical History:   Procedure Laterality Date    COLONOSCOPY      ENT SURGERY  10 years ago    cyst on neck    EXTRACTION(S) DENTAL      NECK SURGERY  2010    to remove a cyst    PHACOEMULSIFICATION CLEAR CORNEA WITH STANDARD INTRAOCULAR LENS IMPLANT Left 09/21/2020    Procedure: COMPLEX PHACOEMULSIFICATION, CATARACT, WITH INTRAOCULAR LENS IMPLANT;  Surgeon: Andre Jackson MD;  [facility]    PHACOEMULSIFICATION CLEAR CORNEA WITH STANDARD INTRAOCULAR LENS IMPLANT Right 09/28/2020    Procedure: PHACOEMULSIFICATION, CATARACT, WITH INTRAOCULAR LENS IMPLANT;  Surgeon: Andre Jackson MD;  [facility]    VASECTOMY         Current Outpatient Medications   Medication Sig Dispense Refill    blood glucose (ACCU-CHEK URIEL PLUS) test strip USE TO TEST BLOOD SUGAR FOUR TIMES DAILY OR AS DIRECTED 150 strip 0    blood glucose monitoring (SOFTCLIX) lancets TAKE AS DIRECTED FOUR TIMES DAILY 400 each 11    Continuous Blood Gluc  (DEXCOM G6 ) TESHA Use to read blood sugars as per 's instructions. 1 each 0    Continuous Blood Gluc Sensor (DEXCOM G6 SENSOR) MISC CHANGE EVERY 10 DAYS. 9 each 3    Continuous Blood Gluc Sensor (DEXCOM G6 SENSOR) MISC Change every 10 days. 9 each 3    Continuous Blood Gluc Transmit (DEXCOM G6 TRANSMITTER) MISC CHANGE EVERY 3 MONTHS. 1 each 1    fluticasone (FLONASE) 50 MCG/ACT nasal spray Spray 2 sprays into both nostrils daily 32 g 11    insulin lispro (HUMALOG VIAL) 100 UNIT/ML vial Use with insulin pump, total daily dose approx 120 units 40 mL 5    insulin pen needle (B-D U/F) 31G X 5 MM miscellaneous Use 1 pen needles daily or as directed. 100 each 3    INSULIN PUMP - OUTPATIENT Date Last Updated: 02/16/22  Tandem X2 with  control IQ and Dexcom  BASAL RATES and times:  12am: (midnight): 1.10 units/hour    3am:1.5  5 am: 1.5 units/hour   8am: 2.05  12pm (noon): 1.9 units/hour   4pm: 1.45 units/hour   8 pm: 1.75 units/hour   CARB RATIO and times:  12   AM (midnight): 2.8  5am: 2.5  12  PM (noon):  2.2  8    PM:  2.7  Corection Factor (Sensitivity) and times:  12   AM (midnight): 15 mg/dL  BG target 125 mg/dL  Active Insulin Time: 4 hours      losartan (COZAAR) 25 MG tablet TAKE 1 TABLET(25 MG) BY MOUTH DAILY 90 tablet 3    semaglutide (OZEMPIC) 2 MG/3ML pen Inject 0.5 mg Subcutaneous every 7 days 3 mL 11    sildenafil (VIAGRA) 100 MG tablet TAKE 1/2 TABLET BY MOUTH 30 MINUTES PRIOR TO INTERCOURSE AS DIRECTED 15 tablet 0       Allergies   Allergen Reactions    Cats     Seasonal Allergies      grass       Visual  Exam:  GEN: NAD,  Psych: normal mood, normal affect    Labs/Studies:      Lab Results   Component Value Date    TSH 1.63 10/02/2023    TSH 0.90 09/23/2022     Lab Results   Component Value Date     (H) 05/07/2024     (H) 10/02/2023     Lab Results   Component Value Date    HGB 15.5 09/23/2022    HGB 14.5 02/08/2022     Lab Results   Component Value Date    BUN 9.6 05/07/2024    BUN 11.6 10/02/2023    CR 1.01 05/07/2024    CR 0.79 10/02/2023     Lab Results   Component Value Date    AST 25 02/08/2022    AST 24 12/09/2021    ALT 34 05/07/2024    ALT 30 04/28/2023    ALKPHOS 86 02/08/2022    ALKPHOS 96 12/09/2021    BILITOTAL 0.9 02/08/2022    BILITOTAL 0.8 12/09/2021       Recent Labs   Lab Test 05/07/24  1447 10/02/23  1351    139   POTASSIUM 4.3 4.2   CHLORIDE 103 104   CO2 24 23   ANIONGAP 10 12   * 147*   BUN 9.6 11.6   CR 1.01 0.79   MICK 9.2 9.0       I reviewed the efficacy and compliance report from his device. Data summarized on the HPI and the PAP compliance flow sheet.     Patient verbalized understanding of these issues, agrees with the plan and all questions were answered today. Patient was  given an opportuntity to voice any other symptoms or concerns not listed above. Patient did not have any other symptoms or concerns.      Moisés Michaels DO  Board Certified in Internal Medicine and Sleep Medicine    (Note created with Dragon voice recognition and unintended spelling errors and word substitutions may occur)     Audio and visual devices were used for this virtual clinic visit with permission from patient.

## 2024-07-12 NOTE — NURSING NOTE
Current patient location: [address]  [address]  [place] [address]    Is the patient currently in the state of [place]? YES    Visit mode:VIDEO    If the visit is dropped, the patient can be reconnected by: VIDEO VISIT: Text to cell phone:   Telephone Information:   Mobile [phone]       Will anyone else be joining the visit? NO  (If patient encounters technical issues they should call [phone]    How would you like to obtain your AVS? MyChart    Are changes needed to the allergy or medication list? No    Are refills needed on medications prescribed by this physician? NO  Has patient had flu shot for current/most recent flu season? If so, when? Yes: 10/02/2023    Reason for visit: RECHMARICHUY MCKEONF

## 2024-07-16 ENCOUNTER — TELEPHONE (OUTPATIENT)
Dept: FAMILY MEDICINE | Facility: CLINIC | Age: 49
End: 2024-07-16
Payer: COMMERCIAL

## 2024-07-16 ENCOUNTER — TELEPHONE (OUTPATIENT)
Dept: SLEEP MEDICINE | Facility: CLINIC | Age: 49
End: 2024-07-16
Payer: COMMERCIAL

## 2024-07-16 NOTE — TELEPHONE ENCOUNTER
[employer] form for Medically Necessary Equipment & Emergency Certification form completed and signed by Dr. Michaels. Form faxed to [employer] at [phone]. Called and informed patient form was faxed. Sent patient a copy via My Chart. Form also signed into chart and attached to message.  Lisa Jean-Baptiste CMA

## 2024-07-16 NOTE — TELEPHONE ENCOUNTER
Received form via fax. Demographic information completed on form. Form now waiting for D.O. to review and sign, then CMA will fax.  Lisa Jean-Baptiste CMA

## 2024-07-16 NOTE — TELEPHONE ENCOUNTER
Reason for Call:  Other call back    Detailed comments: PT CALLING LOOKING FOR A FORM THAT WAS SENT BY [employer] that [employer] WAS TO BE FILLED OUT BY DR PARDO AND SENT BACK PT HAS NOT ELECTRIC RIGHT NOW PLEASE CALL PT AND ADVISE ASAP     Phone Number Patient can be reached at: Cell number on file:    Telephone Information:   Mobile [phone]       Best Time: ANYTIME    Can we leave a detailed message on this number? YES    Call taken on 7/16/2024 at 12:05 PM by Dav Morales

## 2024-07-16 NOTE — TELEPHONE ENCOUNTER
[employer] form for Medically Necessary Equipment & Emergency Certification form completed and signed by Dr. Michaels. Form faxed to [employer] at [phone]. Called and informed patient form was faxed. Sent patient a copy via My Chart. Form also signed into chart and attached to message.  Lisa Jean-Baptiste CMA

## 2024-07-16 NOTE — TELEPHONE ENCOUNTER
Spoke with [facility] staff. Form was not received.     Spoke with patient. Advised he refax form to [phone]    Ghazal BABCOCK RN  [facility]

## 2024-07-16 NOTE — TELEPHONE ENCOUNTER
FYI - Status Update    Who is Calling: patient    Update: Patient claims a form [employer] was faxed to the sleep department.  It appears to not have been filled out and returned as patients electricity was turned off yesterday.  Can you please look in to this and contact the patient ASAP.    Does caller want a call/response back: Yes     Could we send this information to you in [employer] or would you prefer to receive a phone call?:   Patient would prefer a phone call   Okay to leave a detailed message?: Yes at Cell number on file:    Telephone Information:   Mobile [phone]

## 2024-07-16 NOTE — NURSING NOTE
[employer] form for Medically Necessary Equipment & Emergency Certification form completed and signed by Dr. Michaels. Form faxed to [employer] at [phone]. Called and informed patient form was faxed. Sent patient a copy via My Chart. Form also signed into chart and attached to message.  Lisa Jean-Baptiste CMA

## 2024-07-16 NOTE — TELEPHONE ENCOUNTER
FYI - Status Update    Who is Calling: patient    Update: Pt would also like a copy added to [employer] if possible.     Does caller want a call/response back: Yes     Could we send this information to you in Zify or would you prefer to receive a phone call?:   Patient would prefer a phone call   Okay to leave a detailed message?: Yes at Cell number on file:    Telephone Information:   Mobile [phone]

## 2024-07-31 DIAGNOSIS — E10.9 TYPE 1 DIABETES MELLITUS WITHOUT COMPLICATION (H): ICD-10-CM

## 2024-07-31 RX ORDER — PROCHLORPERAZINE 25 MG/1
SUPPOSITORY RECTAL
Qty: 1 EACH | Refills: 1 | Status: SHIPPED | OUTPATIENT
Start: 2024-07-31

## 2024-08-26 ENCOUNTER — MYC MEDICAL ADVICE (OUTPATIENT)
Dept: ENDOCRINOLOGY | Facility: CLINIC | Age: 49
End: 2024-08-26
Payer: COMMERCIAL

## 2024-09-12 DIAGNOSIS — E13.9 DIABETES MELLITUS TYPE 1.5, MANAGED AS TYPE 2 (H): Primary | ICD-10-CM

## 2024-09-12 RX ORDER — ACYCLOVIR 400 MG/1
TABLET ORAL
Qty: 3 EACH | Refills: 5 | Status: SHIPPED | OUTPATIENT
Start: 2024-09-12

## 2024-09-12 NOTE — TELEPHONE ENCOUNTER
Pt is requesting new rx for    Dexcom g7 sensor    Did not see on active med list please verify and send new rx. Thank you!     [facility]  [phone]

## 2024-09-14 ENCOUNTER — HEALTH MAINTENANCE LETTER (OUTPATIENT)
Age: 49
End: 2024-09-14

## 2024-09-16 ENCOUNTER — OFFICE VISIT (OUTPATIENT)
Dept: URGENT CARE | Facility: URGENT CARE | Age: 49
End: 2024-09-16
Payer: COMMERCIAL

## 2024-09-16 VITALS
RESPIRATION RATE: 16 BRPM | OXYGEN SATURATION: 96 % | SYSTOLIC BLOOD PRESSURE: 119 MMHG | TEMPERATURE: 98.2 F | DIASTOLIC BLOOD PRESSURE: 79 MMHG | HEART RATE: 85 BPM

## 2024-09-16 DIAGNOSIS — M76.812 ANTERIOR TIBIALIS TENDINITIS OF LEFT LEG: ICD-10-CM

## 2024-09-16 DIAGNOSIS — M79.605 PAIN AND SWELLING OF LEFT LOWER EXTREMITY: Primary | ICD-10-CM

## 2024-09-16 DIAGNOSIS — M79.89 PAIN AND SWELLING OF LEFT LOWER EXTREMITY: Primary | ICD-10-CM

## 2024-09-16 PROCEDURE — 99213 OFFICE O/P EST LOW 20 MIN: CPT | Performed by: NURSE PRACTITIONER

## 2024-09-16 NOTE — PROGRESS NOTES
Assessment & Plan      Diagnosis Comments   1. Pain and swelling of left lower extremity        2. Anterior tibialis tendinitis of left leg        Recommend treatment with rest ice Tylenol and or ibuprofen as needed.  No concerning indication of DVT at this time.  Swelling overall has improved with elevation and rest.    Recommend decreasing amount of ambulation from 1 and half miles per day to 1 mile every other day he does have a stationary bike at home he can bike on his off days.  Recommend gentle stretching for warm up we reviewed some possible stretches he can do at home.  Discussed red flag symptoms would warrant emergent or urgent evaluation patient verbalized understanding of these  Will follow-up with his primary care in 2 weeks if symptoms not improving may benefit from physical therapy.    [facility]AN[facility] URGENT CARE JIMI Ochoa is a 48 year old male who presents to clinic today for the following health issues:  Chief Complaint   Patient presents with    Urgent Care     Swelling in left leg since Wednesday- swelling has reduced but still has pain.  Started walking 1.5 miles a day after work which is new for him.  No known injury.         HPI    Pain and swelling left lower extremity anterior shin he has been walking a lot more than normal.       Review of Systems  Constitutional, HEENT, cardiovascular, pulmonary, gi and gu systems are negative, except as otherwise noted.      Objective    /79   Pulse 85   Temp 98.2  F (36.8  C) (Tympanic)   SpO2 96%   Physical Exam   GENERAL: alert and no distress  NECK: no adenopathy, no asymmetry, masses, or scars  RESP: lungs clear to auscultation - no rales, rhonchi or wheezes  CV: regular rate and rhythm, normal S1 S2, no S3 or S4, no murmur, click or rub, no peripheral edema  MS: Left anterior lower extremity shin tenderness from below the knee to medial ankle CMS intact normal pulses mild swelling diffusely  about the ankle.  SKIN: no suspicious lesions or rashes

## 2024-09-16 NOTE — PATIENT INSTRUCTIONS
Ice massage as discussed, tylenol or ibuprofen as needed.   Decrease walking just 1 mile every other day may bike on off days 20 minutes   Stretch warm up   Follow up with primary if not improving.

## 2024-09-23 ENCOUNTER — MYC MEDICAL ADVICE (OUTPATIENT)
Dept: ENDOCRINOLOGY | Facility: CLINIC | Age: 49
End: 2024-09-23

## 2024-09-23 ENCOUNTER — LAB (OUTPATIENT)
Dept: LAB | Facility: CLINIC | Age: 49
End: 2024-09-23
Payer: COMMERCIAL

## 2024-09-23 DIAGNOSIS — E10.9 TYPE 1 DIABETES MELLITUS WITHOUT COMPLICATION (H): ICD-10-CM

## 2024-09-23 DIAGNOSIS — E10.9 DIABETES MELLITUS TYPE 1 (H): Primary | ICD-10-CM

## 2024-09-23 DIAGNOSIS — Z96.41 INSULIN PUMP STATUS: ICD-10-CM

## 2024-09-23 DIAGNOSIS — E66.01 MORBID OBESITY (H): ICD-10-CM

## 2024-09-23 LAB
CHOLEST SERPL-MCNC: 140 MG/DL
CREAT UR-MCNC: 250 MG/DL
FASTING STATUS PATIENT QL REPORTED: YES
HDLC SERPL-MCNC: 56 MG/DL
LDLC SERPL CALC-MCNC: 73 MG/DL
MICROALBUMIN UR-MCNC: <12 MG/L
MICROALBUMIN/CREAT UR: NORMAL MG/G{CREAT}
NONHDLC SERPL-MCNC: 84 MG/DL
TRIGL SERPL-MCNC: 53 MG/DL

## 2024-09-23 PROCEDURE — 82570 ASSAY OF URINE CREATININE: CPT

## 2024-09-23 PROCEDURE — 80061 LIPID PANEL: CPT

## 2024-09-23 PROCEDURE — 82043 UR ALBUMIN QUANTITATIVE: CPT

## 2024-09-23 PROCEDURE — 36415 COLL VENOUS BLD VENIPUNCTURE: CPT

## 2024-10-08 ENCOUNTER — VIRTUAL VISIT (OUTPATIENT)
Dept: ENDOCRINOLOGY | Facility: CLINIC | Age: 49
End: 2024-10-08
Payer: COMMERCIAL

## 2024-10-08 DIAGNOSIS — E88.819 INSULIN RESISTANCE: ICD-10-CM

## 2024-10-08 DIAGNOSIS — E66.01 MORBID OBESITY (H): ICD-10-CM

## 2024-10-08 DIAGNOSIS — E13.9 DIABETES MELLITUS TYPE 1.5, MANAGED AS TYPE 2 (H): Primary | ICD-10-CM

## 2024-10-08 DIAGNOSIS — Z96.41 INSULIN PUMP STATUS: ICD-10-CM

## 2024-10-08 PROCEDURE — 99214 OFFICE O/P EST MOD 30 MIN: CPT | Mod: 95 | Performed by: INTERNAL MEDICINE

## 2024-10-08 PROCEDURE — G2211 COMPLEX E/M VISIT ADD ON: HCPCS | Mod: 95 | Performed by: INTERNAL MEDICINE

## 2024-10-08 PROCEDURE — 95251 CONT GLUC MNTR ANALYSIS I&R: CPT | Performed by: INTERNAL MEDICINE

## 2024-10-08 NOTE — PROGRESS NOTES
Virtual Visit Details    Type of service:  Video Visit     Originating Location (pt. Location): Home  Distant Location (provider location):  Off-site  Platform used for Video Visit: Tasneem        Recent issues:  Diabetes follow-up evaluation  Continues with Tandem pump, Ozempic med, and DexcomG7 sensor use  Feeling well overall, no new health issues reported          2011. Diagnosis of diabetes mellitus while living in [place]  Acute symptoms increased thirst, frequent urination, fatigue, weight loss (265 to 203#/30 days)  Hospitalized and diagnosis of DKA  Initial treatment with Apidra and Lantus insulin  Met with an endocrinologist Lara Naik and Dr. Lina Fish  ~6 mo later, switched to [place] pump     2012. Continued using this pump for 1 year, then moved to [facility]  Saw Dr. Beena Johnson/Endocrinologist in [place]  Switched to Tandem insulin pump  Previous [facility] labs include:       Subsequent move back to [place]  Recalls taking Victoza medication along with pump management, success with significant weight loss  Moved to [place]  9/2019. Moved to [place], [place]  Medical evaluations with Dr. 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 Started Tandem insulin pump  Previous [facility] labs include:       2/19/20. Med evaluation with Dr. ERIKA Milligan/[facility]  No lab tests ordered  Started DexcomG6 CGMS sensor  ~4/2020. Upgrade to the Control IQ pump         5/26/20. Initial diabetes evaluation with me, [facility]  Continued use of the Tandem TSlim pump with the DexcomG6  ~6/2020. Restarted (off label) Victoza  10/2020. Insurance coverage for Victoza, used 1.8 mg daily  11/2021. Ran out of Victoza  ~3/2022. Restarted Victoza medication  Switched from Victoza to Ozempic 0.5 mg weekly  10/2023. Ran out of Ozempic medication  Using Tandem TSlim pump with DexcomG6 sensor              Humalog in  pump   Ozempic 0.5 mg weekly    Current Tandem pump settings:      Recent pump and sensor data:                      Recent DexcomG7 data:          Blood glucose (BG) meter:  Accu-check               Tests infrequently  Fam Hx DM:    MA, Clement, MGM, PGM  Previous [facility] labs include:     Previous [facility] hgbA1c trends include:  Lab Results   Component Value Date    A1C 6.9 (H) 05/07/2024    A1C 7.3 (H) 10/02/2023    A1C 7.6 (H) 04/28/2023    A1C 7.3 (H) 09/23/2022    A1C 7.2 (H) 03/08/2022           Lab Results   Component Value Date    A1C 6.9 (H) 05/07/2024     05/07/2024    POTASSIUM 4.3 05/07/2024    CHLORIDE 103 05/07/2024    CO2 24 05/07/2024    ANIONGAP 10 05/07/2024     (H) 05/07/2024    BUN 9.6 05/07/2024    CR 1.01 05/07/2024    GFRESTIMATED >90 05/07/2024    GFRESTBLACK >90 05/05/2021    MICK 9.2 05/07/2024    CPEPT <0.1 (L) 05/28/2020    CHOL 140 09/23/2024    TRIG 53 09/23/2024    HDL 56 09/23/2024    LDL 73 09/23/2024    NHDL 84 09/23/2024    UCRR 250.0 09/23/2024    MICROL <12.0 09/23/2024    UMALCR  09/23/2024      Comment:      Unable to calculate, urine albumin and/or urine creatinine is outside detectable limits.  Microalbuminuria is defined as an albumin:creatinine ratio of 17 to 299 for males and 25 to 299 for females. A ratio of albumin:creatinine of 300 or higher is indicative of overt proteinuria.  Due to biologic variability, positive results should be confirmed by a second, first-morning random or 24-hour timed urine specimen. If there is discrepancy, a third specimen is recommended. When 2 out of 3 results are in the microalbuminuria range, this is evidence for incipient nephropathy and warrants increased efforts at glucose control, blood pressure control, and institution of therapy with an angiotensin-converting-enzyme (ACE) inhibitor (if the patient can tolerate it).       Lab Results   Component Value Date    TSH 1.63 10/02/2023     Last eye exam at [facility] 3/2024, keratoconus changes  but no DR noted  DM Complications: none        , now lives in [facility] [place]; works for [employer]  Sees Dr. ERIKA Milligan/[facility] for general medicine evaluations.       PMH/PSH:  Past Medical History:   Diagnosis Date    Cataracts, bilateral     Diabetes mellitus type 1.5 (H)     DKA (diabetic ketoacidoses)     Keratoconus of both eyes     Morbid obesity (H)     PHUONG (obstructive sleep apnea)      Past Surgical History:   Procedure Laterality Date    COLONOSCOPY      ENT SURGERY  10 years ago    cyst on neck    EXTRACTION(S) DENTAL      NECK SURGERY  2010    to remove a cyst    PHACOEMULSIFICATION CLEAR CORNEA WITH STANDARD INTRAOCULAR LENS IMPLANT Left 09/21/2020    Procedure: COMPLEX PHACOEMULSIFICATION, CATARACT, WITH INTRAOCULAR LENS IMPLANT;  Surgeon: Andre Jackson MD;  [facility]    PHACOEMULSIFICATION CLEAR CORNEA WITH STANDARD INTRAOCULAR LENS IMPLANT Right 09/28/2020    Procedure: PHACOEMULSIFICATION, CATARACT, WITH INTRAOCULAR LENS IMPLANT;  Surgeon: Andre Jackson MD;  Location: [facility] OR    VASECTOMY         Family Hx:  Family History   Problem Relation Age of Onset    Diabetes Maternal Grandmother     Other Cancer Maternal Grandmother         Lung Cancer    Diabetes Paternal Grandmother     Glaucoma Brother     Hypertension Mother     Diabetes Other         Maternal aunt mother of vibha t1    Diabetes Cousin     Diabetes Daughter         T1 Dx 7/4/2021    Macular Degeneration No family hx of          Social Hx:  Social History     Socioeconomic History    Marital status: Single     Spouse name: Not on file    Number of children: Not on file    Years of education: Not on file    Highest education level: Not on file   Occupational History    Not on file   Tobacco Use    Smoking status: Never    Smokeless tobacco: Never   Substance and Sexual Activity    Alcohol use: Never    Drug use: Never    Sexual activity: Yes     Partners: Female      Birth control/protection: Female Surgical   Other Topics Concern    Parent/sibling w/ CABG, MI or angioplasty before 65F 55M? No   Social History Narrative    Not on file     Social Determinants of Health     Financial Resource Strain: Low Risk  (6/5/2024)    Financial Resource Strain     Within the past 12 months, have you or your family members you live with been unable to get utilities (heat, electricity) when it was really needed?: No   Food Insecurity: Low Risk  (6/5/2024)    Food Insecurity     Within the past 12 months, did you worry that your food would run out before you got money to buy more?: No     Within the past 12 months, did the food you bought just not last and you didn t have money to get more?: No   Transportation Needs: Low Risk  (6/5/2024)    Transportation Needs     Within the past 12 months, has lack of transportation kept you from medical appointments, getting your medicines, non-medical meetings or appointments, work, or from getting things that you need?: No   Physical Activity: Insufficiently Active (6/5/2024)    Exercise Vital Sign     Days of Exercise per Week: 2 days     Minutes of Exercise per Session: 30 min   Stress: No Stress Concern Present (6/5/2024)    [employer] - Occupational Stress Questionnaire     Feeling of Stress : Not at all   Social Connections: Unknown (6/5/2024)    Social Connection and Isolation Panel [NHANES]     Frequency of Communication with Friends and Family: Not on file     Frequency of Social Gatherings with Friends and Family: Never     Attends Latter day Services: Not on file     Active Member of Clubs or Organizations: Not on file     Attends Club or Organization Meetings: Not on file     Marital Status: Not on file   Interpersonal Safety: Low Risk  (2/28/2024)    Interpersonal Safety     Do you feel physically and emotionally safe where you currently live?: Yes     Within the past 12 months, have you been hit, slapped, kicked  or otherwise physically hurt by someone?: No     Within the past 12 months, have you been humiliated or emotionally abused in other ways by your partner or ex-partner?: No   Housing Stability: Low Risk  (6/5/2024)    Housing Stability     Do you have housing? : Yes     Are you worried about losing your housing?: No          MEDICATIONS:  has a current medication list which includes the following prescription(s): dexcom g7 sensor, fluticasone, insulin lispro, losartan, sildenafil, tirzepatide, accu-chek justin plus, blood glucose monitoring, dexcom g6 , dexcom g6 sensor, dexcom g6 sensor, dexcom g6 transmitter, b-d u/f, and insulin pump.       ROS: 10 point ROS neg other than the symptoms noted above in the HPI.     GENERAL: mild fatigue, mild wt loss; denies fevers, chills, night sweats.   HEENT: no dysphagia, odonophagia, diplopia, neck pain  THYROID:  no apparent hyper or hypothyroid symptoms  CV: no chest pain, pressure, palpitations  LUNGS: no SOB, AGARWAL, cough, wheezing   ABDOMEN: constipation; no diarrhea, abdominal pain  EXTREMITIES: no rashes, ulcers, edema  NEUROLOGY: right leg tingling; no headaches, denies changes in vision, extremitiy numbness   MSK: no muscle aches or pains, weakness  SKIN: no rashes or lesions  : decreased erection function; occasional nocturia  PSYCH:  stable mood, no significant anxiety or depression  ENDOCRINE: no heat or cold intolerance    Physical Exam (visual exam)  VS:  no vital signs taken for video visit  CONSTITUTIONAL: healthy, alert and NAD, well dressed, answering questions appropriately  ENT: no nose swelling or nasal discharge, mouth redness or gum changes.  EYES: eyes grossly normal to inspection, conjunctivae and sclerae normal, no exophthalmos or proptosis  THYROID:  no apparent nodules or goiter  LUNGS: no audible wheeze, cough or visible cyanosis, no visible retractions or increased work of breathing  ABDOMEN: abdomen not evaluated  EXTREMITIES: no hand  tremors, limited exam  NEUROLOGY: CN grossly intact, mentation intact and speech normal   SKIN:  no apparent skin lesions, rash, or edema with visualized skin appearance  PSYCH: mentation appears normal, affect normal/bright, judgement and insight intact,   normal speech and appearance well groomed       LABS:     All pertinent notes, labs, and images personally reviewed by me.       A/P:  Encounter Diagnoses   Name Primary?    Diabetes mellitus type 1.5, managed as type 2 (H) Yes    Insulin pump status     Insulin resistance     Morbid obesity (H)        Comments:  Reviewed health history and diabetes issues.  Health history and lab testing indicates T1.5DM   Recent CGM glucose trends good overall  Constipation with Ozempic med use  Reviewed and interpreted tests that I previously ordered.   Ordered appropriate tests for the endocrinology disease management.    Management options discussed and implemented after shared medical decision making with the patient.  T1.5DM problem is chronic-stable    Plan:  Reviewed the overall T1.5DM management and insulin pump use.  Discussed optimal BG testing to assess glucose trends.  We reviewed insulin pump settings, basal rate and bolus dosing  Use of automated pump bolus dosing for meal/snack carb & correction dosing  Reviewed recent Tandem pump and DexcomG7 CGM glucose trend data, in detail     Recommend:  Continue the Tandem insulin pump, Dexcom sensor, and diabetes management plan.  Pump setting changes:   Bolus ICR:  5a and 8a 2.5 to 3, 12p 1.2 to 1.7, 4p 1.8 to 2.3       We reviewed the T1.5DM diagnosis, also GLP1RA medication use  Advised stopping the Ozempic and changing to Mounjaro  Reviewed Mounjaro, expected benefits and possible SE's, pen use and dose titration plan  Start Mounjaro 7.5 mg pen weekly dosing, new Rx sent to pharmacy  Important to enter meal and snack carb amount for automated (premeal) bolusing  Avoid eating carb snack to raise late evening SG  trends  Monitor for symptom changes, including GI symptoms  Keep focus on diet, exercise, and weight management  Consider follow-up appointment with [facility] Diabetes Educator  Plan repeat fasting labs in 1/2025   Discussed [facility] clinic   Lab orders available  Arrange annual dilated eye exam, fasting lipid panel testing.     Addressed patient's questions today.    The longitudinal plan of care for the endocrine problem(s) were addressed during this visit.  Due to added complexity of care,   we will continue to support the patient and the subsequent management of this condition with ongoing continuity of care.    There are no Patient Instructions on file for this visit.    Future labs ordered today:   Orders Placed This Encounter   Procedures    GLUCOSE MONITOR, 72 HOUR, PHYS INTERP    Hemoglobin A1c    Basic metabolic panel     Radiology/Consults ordered today: None    Total time spent on day of encounter:  30 min    Follow-up:  1/2025, Gilma Heaton MD, MS  Endocrinology  [facility]    CC:  ERIKA Milligan

## 2024-10-24 ENCOUNTER — TELEPHONE (OUTPATIENT)
Dept: ENDOCRINOLOGY | Facility: CLINIC | Age: 49
End: 2024-10-24
Payer: COMMERCIAL

## 2024-10-24 NOTE — TELEPHONE ENCOUNTER
Prior Authorization Retail Medication Request    Medication/Dose: tirzepatide (MOUNJARO) 7.5 MG/0.5ML pen  Diagnosis and ICD code (if different than what is on RX):  [E13.9]       Insurance   Primary: [employer]   Insurance ID:  [id number]

## 2024-10-28 NOTE — TELEPHONE ENCOUNTER
Retail Pharmacy Prior Authorization Team   Phone: [phone]    PA Initiation    Medication: MOUNJARO 7.5 MG/0.5ML SC SOAJ  Insurance Company: [employer] ([facility]) - Phone [phone] Fax [phone]  Pharmacy Filling the Rx: [id number] - [place], [place] - [address] AT [facility] [address]  Filling Pharmacy Phone: [phone]  Filling Pharmacy Fax:    Start Date: 10/28/2024

## 2024-10-28 NOTE — TELEPHONE ENCOUNTER
Prior Authorization Approval    Authorization Effective Date: 10/28/2024  Authorization Expiration Date: 10/28/2025  Medication: tirzepatide (MOUNJARO) 7.5 MG/0.5ML pen-APPROVED  Reference #:     Insurance Company: [employer] ([facility]) - Phone [phone] Fax [phone]  Which Pharmacy is filling the prescription (Not needed for infusion/clinic administered): [id number] - [place], [place] - [address] AT [facility] [address]  Pharmacy Notified: Yes  Patient Notified: Instructed pharmacy to notify patient when script is ready to /ship.

## 2024-10-31 ENCOUNTER — OFFICE VISIT (OUTPATIENT)
Dept: FAMILY MEDICINE | Facility: CLINIC | Age: 49
End: 2024-10-31
Payer: COMMERCIAL

## 2024-10-31 ENCOUNTER — LAB (OUTPATIENT)
Dept: LAB | Facility: CLINIC | Age: 49
End: 2024-10-31
Payer: COMMERCIAL

## 2024-10-31 VITALS
DIASTOLIC BLOOD PRESSURE: 85 MMHG | HEART RATE: 72 BPM | SYSTOLIC BLOOD PRESSURE: 118 MMHG | OXYGEN SATURATION: 98 % | WEIGHT: 307 LBS | BODY MASS INDEX: 48.18 KG/M2 | RESPIRATION RATE: 16 BRPM | TEMPERATURE: 98.6 F | HEIGHT: 67 IN

## 2024-10-31 DIAGNOSIS — Z96.41 INSULIN PUMP STATUS: ICD-10-CM

## 2024-10-31 DIAGNOSIS — E78.5 HYPERLIPIDEMIA LDL GOAL <100: ICD-10-CM

## 2024-10-31 DIAGNOSIS — E13.9 DIABETES MELLITUS TYPE 1.5, MANAGED AS TYPE 2 (H): ICD-10-CM

## 2024-10-31 DIAGNOSIS — I10 ESSENTIAL HYPERTENSION: ICD-10-CM

## 2024-10-31 DIAGNOSIS — R07.9 CHEST PAIN, UNSPECIFIED TYPE: Primary | ICD-10-CM

## 2024-10-31 DIAGNOSIS — E13.9 DIABETES 1.5, MANAGED AS TYPE 2 (H): ICD-10-CM

## 2024-10-31 DIAGNOSIS — E88.819 INSULIN RESISTANCE: ICD-10-CM

## 2024-10-31 DIAGNOSIS — E66.01 MORBID OBESITY (H): ICD-10-CM

## 2024-10-31 LAB
EST. AVERAGE GLUCOSE BLD GHB EST-MCNC: 163 MG/DL
HBA1C MFR BLD: 7.3 % (ref 0–5.6)

## 2024-10-31 PROCEDURE — 99214 OFFICE O/P EST MOD 30 MIN: CPT | Performed by: INTERNAL MEDICINE

## 2024-10-31 PROCEDURE — G2211 COMPLEX E/M VISIT ADD ON: HCPCS | Performed by: INTERNAL MEDICINE

## 2024-10-31 PROCEDURE — 80048 BASIC METABOLIC PNL TOTAL CA: CPT

## 2024-10-31 PROCEDURE — 83036 HEMOGLOBIN GLYCOSYLATED A1C: CPT

## 2024-10-31 PROCEDURE — 36415 COLL VENOUS BLD VENIPUNCTURE: CPT

## 2024-10-31 ASSESSMENT — PAIN SCALES - GENERAL: PAINLEVEL_OUTOF10: MILD PAIN (3)

## 2024-11-01 LAB
ANION GAP SERPL CALCULATED.3IONS-SCNC: 12 MMOL/L (ref 7–15)
BUN SERPL-MCNC: 9.8 MG/DL (ref 6–20)
CALCIUM SERPL-MCNC: 9.1 MG/DL (ref 8.8–10.4)
CHLORIDE SERPL-SCNC: 104 MMOL/L (ref 98–107)
CREAT SERPL-MCNC: 0.86 MG/DL (ref 0.67–1.17)
EGFRCR SERPLBLD CKD-EPI 2021: >90 ML/MIN/1.73M2
GLUCOSE SERPL-MCNC: 151 MG/DL (ref 70–99)
HCO3 SERPL-SCNC: 24 MMOL/L (ref 22–29)
POTASSIUM SERPL-SCNC: 4.5 MMOL/L (ref 3.4–5.3)
SODIUM SERPL-SCNC: 140 MMOL/L (ref 135–145)

## 2024-11-05 ENCOUNTER — OFFICE VISIT (OUTPATIENT)
Dept: CARDIOLOGY | Facility: CLINIC | Age: 49
End: 2024-11-05
Payer: COMMERCIAL

## 2024-11-05 VITALS
HEIGHT: 67 IN | SYSTOLIC BLOOD PRESSURE: 130 MMHG | OXYGEN SATURATION: 99 % | DIASTOLIC BLOOD PRESSURE: 82 MMHG | HEART RATE: 88 BPM | BODY MASS INDEX: 47.79 KG/M2 | WEIGHT: 304.5 LBS

## 2024-11-05 DIAGNOSIS — E10.69 TYPE 1 DIABETES MELLITUS WITH OTHER SPECIFIED COMPLICATION (H): ICD-10-CM

## 2024-11-05 DIAGNOSIS — E11.9 TYPE 2 DIABETES MELLITUS WITHOUT COMPLICATION, WITH LONG-TERM CURRENT USE OF INSULIN (H): ICD-10-CM

## 2024-11-05 DIAGNOSIS — E10.9 INSULIN DEPENDENT DIABETES MELLITUS TYPE IA (H): ICD-10-CM

## 2024-11-05 DIAGNOSIS — E66.01 MORBID OBESITY (H): ICD-10-CM

## 2024-11-05 DIAGNOSIS — Z79.4 TYPE 2 DIABETES MELLITUS WITHOUT COMPLICATION, WITH LONG-TERM CURRENT USE OF INSULIN (H): ICD-10-CM

## 2024-11-05 DIAGNOSIS — R07.9 CHEST PAIN, UNSPECIFIED TYPE: ICD-10-CM

## 2024-11-05 DIAGNOSIS — I45.10 RBBB: ICD-10-CM

## 2024-11-05 DIAGNOSIS — I25.119 CORONARY ARTERY DISEASE INVOLVING NATIVE CORONARY ARTERY OF NATIVE HEART WITH ANGINA PECTORIS (H): Primary | ICD-10-CM

## 2024-11-05 DIAGNOSIS — G47.33 OSA ON CPAP: ICD-10-CM

## 2024-11-05 DIAGNOSIS — I44.4 LAFB (LEFT ANTERIOR FASCICULAR BLOCK): ICD-10-CM

## 2024-11-05 PROCEDURE — G2211 COMPLEX E/M VISIT ADD ON: HCPCS | Performed by: INTERNAL MEDICINE

## 2024-11-05 PROCEDURE — 99204 OFFICE O/P NEW MOD 45 MIN: CPT | Performed by: INTERNAL MEDICINE

## 2024-11-05 PROCEDURE — 93000 ELECTROCARDIOGRAM COMPLETE: CPT | Performed by: INTERNAL MEDICINE

## 2024-11-05 NOTE — PROGRESS NOTES
"CARDIOLOGY CLINIC CONSULTATION      REASON FOR CONSULT:   Chest pain    PRIMARY CARE PHYSICIAN:  Radha Milligan        History of Present Illness   Jose Apodaca is an extremely pleasant 48 year old male here as a new patient to establish care.  His medical history is notable for bifascicular block (RBBB, LAFB), type 1 diabetes mellitus with insulin pump, hypertension, morbid obesity, and PHUONG on CPAP.  He reports that multiple family members appear to have had heart issues, but often did not seek treatment.  Accordingly, he is not aware of anyone with a history of bypass, stenting, etc., but does know that multiple family members in his family tree had \"atherosclerosis\" somewhere on their death certificates.  Don is a never smoker and does not drink alcohol.    He presents today for evaluation of chest discomfort.  The trigger is not always obvious, but this is usually nonexertional in nature.  It feels like a pressure-like sensation over his mid-left chest.  He also can get short of breath with exertion as well.  No other major cardiac symptoms.    ECG in clinic today showed sinus rhythm with bifascicular block (RBBB, LAFB).  He has never had an echocardiogram or ischemic evaluation as far as I can tell.  In addition to the above, I also reviewed his most recent lipid panel, chemistry panel, A1c, and TSH.      Assessment & Plan     Atypical (primarily nonexertional) chest pain, in a patient with numerous risk factors: Intermediate risk for obstructive CAD  Bifascicular block (RBBB, LAFB), asymptomatic  Type 1 diabetes mellitus, managed with insulin pump  Hypertension, reasonably well controlled  Morbid obesity  PHUONG on CPAP  FH of heart disease of uncertain severity/onset  Never smoker      It was a pleasure to speak with Don in clinic today.  We discussed the potential causes of his chest pain, as well as his finding of bifascicular block on ECG and his general overall cardiac health including his blood " pressure.  Briefly, I explained that thankfully his chest pain is primarily nonexertional, which typically is a reassuring finding.  That said, he does have numerous risk factors for obstructive coronary disease, including his type 1 diabetes, family history, morbid obesity, hypertension, and even simply his age and sex.  Given these risk factors, I would overall categorize him as intermediate risk for obstructive CAD.  We talked about options for evaluation of this, and I ultimately would recommend a coronary CT angiogram.  He is in favor of this as well.  Based on the results of this, we can decide on next steps, including possible initiation of aspirin/statin if indicated.    In addition, we talked about his bifascicular block, and the appropriate workup of this.  He has no symptoms suggesting high-grade AV block/complete heart block, so I would hold off on Zio patch for now.  That said, we should check a TTE to look for evidence of underlying structural heart disease, of which his bifascicular block may have been an early warning sign.  In particular, his sleep apnea (and perhaps OHS) may be leading to some RV strain, pulmonary hypertension, etc.      -Coronary CTA  -TTE  -Further plans, including possible initiation of aspirin/statin, pending results of above testing  -Continue losartan 25 mg daily for now  -Continue diabetes management per primary/endocrinology  -No cardiac contraindication to Mounjaro  -Heart healthy diet, regular aerobic exercise        Follow-up: 2 months to discuss above test results, or sooner as needed        Alcon Phelps MD  Interventional Cardiology  November 5, 2024      The longitudinal plan of care for the diagnosis(es)/condition(s) as documented were addressed during this visit. Due to the added complexity in care, I will continue to support Don in the subsequent management and with ongoing continuity of care.        Medications   Current Outpatient Medications   Medication  Sig Dispense Refill    blood glucose (ACCU-CHEK URIEL PLUS) test strip USE TO TEST BLOOD SUGAR FOUR TIMES DAILY OR AS DIRECTED 150 strip 0    blood glucose monitoring (SOFTCLIX) lancets TAKE AS DIRECTED FOUR TIMES DAILY 400 each 11    Continuous Blood Gluc  (DEXCOM G6 ) TESHA Use to read blood sugars as per 's instructions. 1 each 0    Continuous Blood Gluc Sensor (DEXCOM G6 SENSOR) MISC CHANGE EVERY 10 DAYS. 9 each 3    Continuous Blood Gluc Sensor (DEXCOM G6 SENSOR) MISC Change every 10 days. 9 each 3    Continuous Glucose Sensor (DEXCOM G7 SENSOR) MISC Change every 10 days. 3 each 5    Continuous Glucose Transmitter (DEXCOM G6 TRANSMITTER) MISC CHANGE EVERY 90 DAYS 1 each 1    fluticasone (FLONASE) 50 MCG/ACT nasal spray Spray 2 sprays into both nostrils daily 32 g 11    insulin lispro (HUMALOG VIAL) 100 UNIT/ML vial Use with insulin pump, total daily dose approx 120 units 40 mL 5    insulin pen needle (B-D U/F) 31G X 5 MM miscellaneous Use 1 pen needles daily or as directed. 100 each 3    INSULIN PUMP - OUTPATIENT Date Last Updated: 02/16/22  Tandem X2 with control IQ and Dexcom  BASAL RATES and times:  12am: (midnight): 1.10 units/hour    3am:1.5  5 am: 1.5 units/hour   8am: 2.05  12pm (noon): 1.9 units/hour   4pm: 1.45 units/hour   8 pm: 1.75 units/hour   CARB RATIO and times:  12   AM (midnight): 2.8  5am: 2.5  12  PM (noon):  2.2  8    PM:  2.7  Corection Factor (Sensitivity) and times:  12   AM (midnight): 15 mg/dL  BG target 125 mg/dL  Active Insulin Time: 4 hours      losartan (COZAAR) 25 MG tablet TAKE 1 TABLET(25 MG) BY MOUTH DAILY 90 tablet 3    sildenafil (VIAGRA) 100 MG tablet TAKE 1/2 TABLET BY MOUTH 30 MINUTES PRIOR TO INTERCOURSE AS DIRECTED 15 tablet 0    tirzepatide (MOUNJARO) 7.5 MG/0.5ML pen Inject 7.5 mg subcutaneously every 7 days. 2 mL 1     No current facility-administered medications for this visit.     Allergies   Allergies   Allergen Reactions    Cats      Seasonal Allergies      grass         Physical Exam       BP: 130/82 Pulse: 88     SpO2: 99 %      Vital Signs with Ranges  Pulse:  [88] 88  BP: (130)/(82) 130/82  SpO2:  [99 %] 99 %  304 lbs 8 oz    Constitutional: Well-appearing, no acute distress  Respiratory: Normal respiratory effort, CTAB  Cardiovascular: RRR, no m/r/g.  JVP difficult to assess due to body habitus.  There is no LE edema.  Normal carotid upstrokes, no carotid bruits.

## 2024-11-05 NOTE — LETTER
"11/5/2024    Radha Milligan MD  [address] [facility] [address]  [facility] [address]    RE: Jose Apodaca       Dear Colleague,     I had the pleasure of seeing Jose Apodaca in the [facility].  CARDIOLOGY CLINIC CONSULTATION      REASON FOR CONSULT:   Chest pain    PRIMARY CARE PHYSICIAN:  Radha Milligan        History of Present Illness  Jose Apodaca is an extremely pleasant 48 year old male here as a new patient to establish care.  His medical history is notable for bifascicular block (RBBB, LAFB), type 1 diabetes mellitus with insulin pump, hypertension, morbid obesity, and PHUONG on CPAP.  He reports that multiple family members appear to have had heart issues, but often did not seek treatment.  Accordingly, he is not aware of anyone with a history of bypass, stenting, etc., but does know that multiple family members in his family tree had \"atherosclerosis\" somewhere on their death certificates.  Don is a never smoker and does not drink alcohol.    He presents today for evaluation of chest discomfort.  The trigger is not always obvious, but this is usually nonexertional in nature.  It feels like a pressure-like sensation over his mid-left chest.  He also can get short of breath with exertion as well.  No other major cardiac symptoms.    ECG in clinic today showed sinus rhythm with bifascicular block (RBBB, LAFB).  He has never had an echocardiogram or ischemic evaluation as far as I can tell.  In addition to the above, I also reviewed his most recent lipid panel, chemistry panel, A1c, and TSH.      Assessment & Plan    Atypical (primarily nonexertional) chest pain, in a patient with numerous risk factors: Intermediate risk for obstructive CAD  Bifascicular block (RBBB, LAFB), asymptomatic  Type 1 diabetes mellitus, managed with insulin pump  Hypertension, reasonably well controlled  Morbid obesity  PHUONG on CPAP  FH of heart disease of uncertain severity/onset  Never smoker      It was a " pleasure to speak with Don in clinic today.  We discussed the potential causes of his chest pain, as well as his finding of bifascicular block on ECG and his general overall cardiac health including his blood pressure.  Briefly, I explained that thankfully his chest pain is primarily nonexertional, which typically is a reassuring finding.  That said, he does have numerous risk factors for obstructive coronary disease, including his type 1 diabetes, family history, morbid obesity, hypertension, and even simply his age and sex.  Given these risk factors, I would overall categorize him as intermediate risk for obstructive CAD.  We talked about options for evaluation of this, and I ultimately would recommend a coronary CT angiogram.  He is in favor of this as well.  Based on the results of this, we can decide on next steps, including possible initiation of aspirin/statin if indicated.    In addition, we talked about his bifascicular block, and the appropriate workup of this.  He has no symptoms suggesting high-grade AV block/complete heart block, so I would hold off on Zio patch for now.  That said, we should check a TTE to look for evidence of underlying structural heart disease, of which his bifascicular block may have been an early warning sign.  In particular, his sleep apnea (and perhaps OHS) may be leading to some RV strain, pulmonary hypertension, etc.      -Coronary CTA  -TTE  -Further plans, including possible initiation of aspirin/statin, pending results of above testing  -Continue losartan 25 mg daily for now  -Continue diabetes management per primary/endocrinology  -No cardiac contraindication to Mounjaro  -Heart healthy diet, regular aerobic exercise        Follow-up: 2 months to discuss above test results, or sooner as needed        Alcon Phelps MD  Interventional Cardiology  November 5, 2024      The longitudinal plan of care for the diagnosis(es)/condition(s) as documented were addressed during  this visit. Due to the added complexity in care, I will continue to support Don in the subsequent management and with ongoing continuity of care.        Medications  Current Outpatient Medications   Medication Sig Dispense Refill     blood glucose (ACCU-CHEK URIEL PLUS) test strip USE TO TEST BLOOD SUGAR FOUR TIMES DAILY OR AS DIRECTED 150 strip 0     blood glucose monitoring (SOFTCLIX) lancets TAKE AS DIRECTED FOUR TIMES DAILY 400 each 11     Continuous Blood Gluc  (DEXCOM G6 ) TESHA Use to read blood sugars as per 's instructions. 1 each 0     Continuous Blood Gluc Sensor (DEXCOM G6 SENSOR) MISC CHANGE EVERY 10 DAYS. 9 each 3     Continuous Blood Gluc Sensor (DEXCOM G6 SENSOR) MISC Change every 10 days. 9 each 3     Continuous Glucose Sensor (DEXCOM G7 SENSOR) MISC Change every 10 days. 3 each 5     Continuous Glucose Transmitter (DEXCOM G6 TRANSMITTER) MISC CHANGE EVERY 90 DAYS 1 each 1     fluticasone (FLONASE) 50 MCG/ACT nasal spray Spray 2 sprays into both nostrils daily 32 g 11     insulin lispro (HUMALOG VIAL) 100 UNIT/ML vial Use with insulin pump, total daily dose approx 120 units 40 mL 5     insulin pen needle (B-D U/F) 31G X 5 MM miscellaneous Use 1 pen needles daily or as directed. 100 each 3     INSULIN PUMP - OUTPATIENT Date Last Updated: 02/16/22  Tandem X2 with control IQ and Dexcom  BASAL RATES and times:  12am: (midnight): 1.10 units/hour    3am:1.5  5 am: 1.5 units/hour   8am: 2.05  12pm (noon): 1.9 units/hour   4pm: 1.45 units/hour   8 pm: 1.75 units/hour   CARB RATIO and times:  12   AM (midnight): 2.8  5am: 2.5  12  PM (noon):  2.2  8    PM:  2.7  Corection Factor (Sensitivity) and times:  12   AM (midnight): 15 mg/dL  BG target 125 mg/dL  Active Insulin Time: 4 hours       losartan (COZAAR) 25 MG tablet TAKE 1 TABLET(25 MG) BY MOUTH DAILY 90 tablet 3     sildenafil (VIAGRA) 100 MG tablet TAKE 1/2 TABLET BY MOUTH 30 MINUTES PRIOR TO INTERCOURSE AS DIRECTED 15 tablet  0     tirzepatide (MOUNJARO) 7.5 MG/0.5ML pen Inject 7.5 mg subcutaneously every 7 days. 2 mL 1     No current facility-administered medications for this visit.     Allergies  Allergies   Allergen Reactions     Cats      Seasonal Allergies      grass         Physical Exam      BP: 130/82 Pulse: 88     SpO2: 99 %      Vital Signs with Ranges  Pulse:  [88] 88  BP: (130)/(82) 130/82  SpO2:  [99 %] 99 %  304 lbs 8 oz    Constitutional: Well-appearing, no acute distress  Respiratory: Normal respiratory effort, CTAB  Cardiovascular: RRR, no m/r/g.  JVP difficult to assess due to body habitus.  There is no LE edema.  Normal carotid upstrokes, no carotid bruits.      Thank you for allowing me to participate in the care of your patient.      Sincerely,     Alcon Phelps MD     [facility]  cc:   Radha Milligan MD  [address] [address]

## 2024-11-06 ENCOUNTER — MYC MEDICAL ADVICE (OUTPATIENT)
Dept: ENDOCRINOLOGY | Facility: CLINIC | Age: 49
End: 2024-11-06
Payer: COMMERCIAL

## 2024-11-06 DIAGNOSIS — E13.9 DIABETES MELLITUS TYPE 1.5, MANAGED AS TYPE 2 (H): Primary | ICD-10-CM

## 2024-11-06 RX ORDER — GLUCAGON 3 MG/1
3 POWDER NASAL PRN
Qty: 1 EACH | Refills: 3 | Status: SHIPPED | OUTPATIENT
Start: 2024-11-06 | End: 2025-11-06

## 2024-11-16 DIAGNOSIS — I10 PRIMARY HYPERTENSION: ICD-10-CM

## 2024-11-18 RX ORDER — LOSARTAN POTASSIUM 25 MG/1
25 TABLET ORAL DAILY
Qty: 90 TABLET | Refills: 2 | Status: SHIPPED | OUTPATIENT
Start: 2024-11-18

## 2024-11-26 ENCOUNTER — TELEPHONE (OUTPATIENT)
Dept: GASTROENTEROLOGY | Facility: CLINIC | Age: 49
End: 2024-11-26
Payer: COMMERCIAL

## 2024-11-26 ENCOUNTER — HOSPITAL ENCOUNTER (OUTPATIENT)
Facility: CLINIC | Age: 49
End: 2024-11-26
Attending: SURGERY | Admitting: SURGERY
Payer: COMMERCIAL

## 2024-11-26 DIAGNOSIS — Z12.11 SPECIAL SCREENING FOR MALIGNANT NEOPLASMS, COLON: Primary | ICD-10-CM

## 2024-11-26 NOTE — TELEPHONE ENCOUNTER
"Endoscopy Scheduling Screen    Have you had any respiratory illness or flu-like symptoms in the last 10 days?  No    What is your communication preference for Instructions and/or Bowel Prep?   MyChart    What insurance is in the chart?  Other:  R    Ordering/Referring Provider: NORMAN HODGES    (If ordering provider performs procedure, schedule with ordering provider unless otherwise instructed. )    BMI: Estimated body mass index is 47.69 kg/m  as calculated from the following:    Height as of 11/5/24: 1.702 m (5' 7\").    Weight as of 11/5/24: 138.1 kg (304 lb 8 oz).     Sedation Ordered  moderate sedation.   If patient BMI > 50 do not schedule in ASC.    If patient BMI > 45 do not schedule at [facility].    Are you taking methadone or Suboxone?  NO, No RN review required.    Have you been diagnosed and are being treated for severe PTSD or severe anxiety?  NO, No RN review required.    Are you taking any prescription medications for pain 3 or more times per week?   NO, No RN review required.    Do you have a history of malignant hyperthermia?  No    (Females) Are you currently pregnant?        Have you been diagnosed or told you have pulmonary hypertension?   No    Do you have an LVAD?  No    Have you been told you have moderate to severe sleep apnea?  Yes. Do you use a CPAP? Yes Where is the patient located?. (RN Review required for scheduling unless scheduling in Hospital.)     Have you been told you have COPD, asthma, or any other lung disease?  No    Do you have any heart conditions?  No     Have you ever had or are you waiting for an organ transplant?  No. Continue scheduling, no site restrictions.    Have you had a stroke or transient ischemic attack (TIA aka \"mini stroke\" in the last 6 months?   No    Have you been diagnosed with or been told you have cirrhosis of the liver?   No.    Are you currently on dialysis?   No    Do you need assistance transferring?   No    BMI: Estimated body mass index is 47.69 " "kg/m  as calculated from the following:    Height as of 11/5/24: 1.702 m (5' 7\").    Weight as of 11/5/24: 138.1 kg (304 lb 8 oz).     Is patients BMI > 40 and scheduling location [facility]?  No    Do you take an injectable or oral medication for weight loss or diabetes (excluding insulin)?  Yes, hold time can be up to 7 days. Please consult with you prescribing provider to discuss endoscopy recommendations. (Please schedule at least 7 days out.)    Do you take the medication Naltrexone?  No    Do you take blood thinners?  No       Prep   Are you currently on dialysis or do you have chronic kidney disease?  No    Do you have a diagnosis of diabetes?  Yes (Golytely Prep)    Do you have a diagnosis of cystic fibrosis (CF)?  No    On a regular basis do you go 3 -5 days between bowel movements?  Yes (Extended Prep)    BMI > 40?  Yes (Extended Prep)    Preferred Pharmacy:      [id number] - [place] - [facility] [address] [facility] [address]  [address]  [place] [address]  Phone: [phone] Fax: [phone]      Final Scheduling Details     Procedure scheduled  Colonoscopy    Surgeon:       Date of procedure:  12/16     Pre-OP / PAC:   No - Not required for this site.    Location  SH - Per exclusion criteria.    Sedation   Moderate Sedation - Per order.      Patient Reminders:   You will receive a call from a Nurse to review instructions and health history.  This assessment must be completed prior to your procedure.  Failure to complete the Nurse assessment may result in the procedure being cancelled.      On the day of your procedure, please designate an adult(s) who can drive you home stay with you for the next 24 hours. The medicines used in the exam will make you sleepy. You will not be able to drive.      You cannot take public transportation, ride share services, or non-medical taxi service without a responsible caregiver.  Medical transport services are allowed with the " requirement that a responsible caregiver will receive you at your destination.  We require that drivers and caregivers are confirmed prior to your procedure.

## 2024-11-27 RX ORDER — BISACODYL 5 MG/1
TABLET, DELAYED RELEASE ORAL
Qty: 4 TABLET | Refills: 0 | Status: SHIPPED | OUTPATIENT
Start: 2024-11-27

## 2024-11-27 NOTE — TELEPHONE ENCOUNTER
Extended Golytely Bowel Prep  recommended due to BMI > 40. , constipation noted or reported. , and GLP-1 agonist medication noted in chart.  Instructions were sent via [employer]. Bowel prep was sent 11/27/2024 to  [facility] #[id number] - [place] - [address] AT [facility] [address].       Lina Doyle LPN  Endoscopy Procedure Pre Assessment

## 2024-11-29 ENCOUNTER — HOSPITAL ENCOUNTER (OUTPATIENT)
Dept: CARDIOLOGY | Facility: CLINIC | Age: 49
Discharge: HOME | End: 2024-11-29
Attending: INTERNAL MEDICINE | Admitting: INTERNAL MEDICINE
Payer: COMMERCIAL

## 2024-11-29 DIAGNOSIS — I44.4 LAFB (LEFT ANTERIOR FASCICULAR BLOCK): ICD-10-CM

## 2024-11-29 DIAGNOSIS — I45.10 RBBB: ICD-10-CM

## 2024-11-29 DIAGNOSIS — R07.9 CHEST PAIN, UNSPECIFIED TYPE: ICD-10-CM

## 2024-11-29 LAB — LVEF ECHO: NORMAL

## 2024-11-29 PROCEDURE — 93306 TTE W/DOPPLER COMPLETE: CPT | Mod: 26 | Performed by: INTERNAL MEDICINE

## 2024-11-29 PROCEDURE — 93306 TTE W/DOPPLER COMPLETE: CPT

## 2024-12-02 ENCOUNTER — TELEPHONE (OUTPATIENT)
Dept: GASTROENTEROLOGY | Facility: CLINIC | Age: 49
End: 2024-12-02
Payer: COMMERCIAL

## 2024-12-02 NOTE — TELEPHONE ENCOUNTER
Pre visit planning completed.      Procedure details:    Patient scheduled for Colonoscopy on 12.16.2024.     Arrival time: 1030. Procedure time 1115    Facility location: [facility] [address] [place] [address]. Check in location: [facility].     Sedation type: Conscious sedation     Pre op exam needed? No.    Indication for procedure: screening colonoscopy      Chart review:     Electronic implanted devices? No    Recent diagnosis of diverticulitis within the last 6 weeks? No      Medication review:    Diabetic? Yes. Diabetic injectables: Mounjaro (Tirzepatide).  Weekly dosing of medication.  HOLD 7 days before procedure.  Follow up with managing provider.   Insulin: Consult with managing provider.     Anticoagulants? No    Weight loss medication/injectable? No. Patient is on GLP-1 medication but for DM (see above).    Other medication HOLDING recommendations:  N/A      Prep for procedure:     Bowel prep recommendation: Extended Golytely. Bowel prep prescription sent to    [facility] #[id number] - [place], [place] - [address] AT [facility] [address]   Due to: BMI > 40. , constipation noted or reported. , and GLP-1 agonist medication noted in chart.     Prep instructions sent via [employer]         Philomena Mendez RN  Endoscopy Procedure Pre Assessment   [phone] option 2

## 2024-12-03 NOTE — TELEPHONE ENCOUNTER
Attempted to contact patient in order to complete pre assessment questions.     No answer. Left message to return call to [phone] option 2    Pre-op needed? No.    Callback communication sent via [employer].    Anna Sanders RN  Endoscopy Procedure Pre Assessment

## 2024-12-05 NOTE — TELEPHONE ENCOUNTER
Second call attempt to complete pre assessment.     No answer.  Left message to return call to [phone] #2 by next business day prior to 4PM or procedure will be sent to cancel.     Callback required communication sent via [employer].      Shannon Ramires RN  Endoscopy Procedure Pre Assessment

## 2024-12-09 NOTE — TELEPHONE ENCOUNTER
Pre assessment completed for upcoming procedure.   (Please see previous telephone encounter notes for complete details)    Patient returned call.       Procedure details:    Arrival time and facility location reviewed.    Pre op exam needed? No.    Designated  policy reviewed. Instructed to have someone stay 6  hours post procedure.       Medication review:    Medications reviewed. Please see supporting documentation below. Holding recommendations discussed (if applicable).       Prep for procedure:     Procedure prep instructions reviewed.        Any additional information needed:  Patient verbalized concern regarding BG numbers during fast.  Writer advised patient can still have juice and hard candies to to bring numbers up and drink plenty of water to bring numbers down.  Writer advised patient speak with PCP regarding fast as to how best manage.      The patient asked if there are alternatives to the colonoscopy.  Writer discussed cologuard or Fit Test - however, again advised patient discuss these with his PCP for the best options for him.    Patient verbalized understanding and had no questions or concerns at this time.      Corinne Kliber, RN  Endoscopy Procedure Pre Assessment   [phone] option 2

## 2024-12-09 NOTE — TELEPHONE ENCOUNTER
"Received [employer] message from patient: \"Not sure why you guys are spamming but sorry, I work two jobs and don t have time to wait for you to call. Don t cancel\"     Advised patient via [employer] that nurse pre assessment call is required and we will allow him one more day to call back - must call back by today, Monday, 12/9/24 before 4 PM or the procedure will be cancelled.     Shannon Ramires RN  Endoscopy Procedure Pre-Assessment RN  [phone], option 2  "

## 2024-12-12 ENCOUNTER — HOSPITAL ENCOUNTER (OUTPATIENT)
Dept: CARDIOLOGY | Facility: CLINIC | Age: 49
End: 2024-12-12
Attending: INTERNAL MEDICINE
Payer: COMMERCIAL

## 2024-12-12 VITALS — DIASTOLIC BLOOD PRESSURE: 102 MMHG | SYSTOLIC BLOOD PRESSURE: 160 MMHG | HEART RATE: 77 BPM

## 2024-12-12 DIAGNOSIS — I25.119 CORONARY ARTERY DISEASE INVOLVING NATIVE CORONARY ARTERY OF NATIVE HEART WITH ANGINA PECTORIS (H): ICD-10-CM

## 2024-12-12 LAB
CREAT BLD-MCNC: 0.8 MG/DL (ref 0.7–1.3)
EGFRCR SERPLBLD CKD-EPI 2021: >60 ML/MIN/1.73M2

## 2024-12-12 PROCEDURE — 250N000013 HC RX MED GY IP 250 OP 250 PS 637: Performed by: INTERNAL MEDICINE

## 2024-12-12 PROCEDURE — 250N000009 HC RX 250: Performed by: INTERNAL MEDICINE

## 2024-12-12 PROCEDURE — 82565 ASSAY OF CREATININE: CPT

## 2024-12-12 PROCEDURE — 250N000011 HC RX IP 250 OP 636: Performed by: INTERNAL MEDICINE

## 2024-12-12 PROCEDURE — 75574 CT ANGIO HRT W/3D IMAGE: CPT

## 2024-12-12 RX ORDER — NITROGLYCERIN 0.4 MG/1
0.4 TABLET SUBLINGUAL
Status: ACTIVE | OUTPATIENT
Start: 2024-12-12

## 2024-12-12 RX ORDER — DILTIAZEM HYDROCHLORIDE 5 MG/ML
10-15 INJECTION INTRAVENOUS
OUTPATIENT
Start: 2024-12-12

## 2024-12-12 RX ORDER — IOPAMIDOL 755 MG/ML
500 INJECTION, SOLUTION INTRAVASCULAR ONCE
Status: COMPLETED | OUTPATIENT
Start: 2024-12-12 | End: 2024-12-12

## 2024-12-12 RX ORDER — METOPROLOL TARTRATE 1 MG/ML
5-20 INJECTION, SOLUTION INTRAVENOUS
Status: ACTIVE | OUTPATIENT
Start: 2024-12-12

## 2024-12-12 RX ORDER — DILTIAZEM HCL 60 MG
120 TABLET ORAL
Status: ACTIVE | OUTPATIENT
Start: 2024-12-12

## 2024-12-12 RX ORDER — METOPROLOL TARTRATE 25 MG/1
25-100 TABLET, FILM COATED ORAL
Status: COMPLETED | OUTPATIENT
Start: 2024-12-12 | End: 2024-12-12

## 2024-12-12 RX ORDER — LIDOCAINE 40 MG/G
CREAM TOPICAL
OUTPATIENT
Start: 2024-12-12

## 2024-12-12 RX ORDER — ONDANSETRON 2 MG/ML
4 INJECTION INTRAMUSCULAR; INTRAVENOUS
Status: ACTIVE | OUTPATIENT
Start: 2024-12-12

## 2024-12-12 RX ORDER — IVABRADINE 5 MG/1
5-15 TABLET, FILM COATED ORAL
Status: COMPLETED | OUTPATIENT
Start: 2024-12-12 | End: 2024-12-12

## 2024-12-12 RX ADMIN — NITROGLYCERIN 0.4 MG: 0.4 TABLET SUBLINGUAL at 15:08

## 2024-12-12 RX ADMIN — METOPROLOL TARTRATE 100 MG: 50 TABLET, FILM COATED ORAL at 13:20

## 2024-12-12 RX ADMIN — METOPROLOL TARTRATE 5 MG: 5 INJECTION INTRAVENOUS at 14:41

## 2024-12-12 RX ADMIN — METOPROLOL TARTRATE 10 MG: 5 INJECTION INTRAVENOUS at 14:45

## 2024-12-12 RX ADMIN — METOPROLOL TARTRATE 10 MG: 5 INJECTION INTRAVENOUS at 14:49

## 2024-12-12 RX ADMIN — METOPROLOL TARTRATE 10 MG: 5 INJECTION INTRAVENOUS at 14:53

## 2024-12-12 RX ADMIN — IVABRADINE 15 MG: 5 TABLET, FILM COATED ORAL at 13:21

## 2024-12-12 RX ADMIN — IOPAMIDOL 150 ML: 755 INJECTION, SOLUTION INTRAVENOUS at 15:36

## 2024-12-15 RX ORDER — ONDANSETRON 2 MG/ML
4 INJECTION INTRAMUSCULAR; INTRAVENOUS
Status: CANCELLED | OUTPATIENT
Start: 2024-12-15

## 2024-12-15 RX ORDER — LIDOCAINE 40 MG/G
CREAM TOPICAL
Status: CANCELLED | OUTPATIENT
Start: 2024-12-15

## 2024-12-15 NOTE — H&P
Pre-Endoscopy History and Physical     Jose Apodaca MRN# [id number]   YOB: 1975 Age: 49 year old     Date of Procedure: 12/16/2924  Primary care provider: Radha Milligan  Type of Endoscopy: colonoscopy  Reason for Procedure: Average risk for screening     Type of Anesthesia Anticipated: Moderate Sedation    HPI:    Jose is a 49 year old male who will be undergoing the above procedure.      A history and physical has been performed. The patient's medications and allergies have been reviewed. The risks and benefits of the procedure including the risk of bleeding, perforation, and missed lesions as well as the sedation options and risks were discussed with the patient.  All questions were answered and informed consent was obtained.        Last Colonoscopy: First Colonoscopy ***  Family History of Colorectal Cancer: No FH CRC ***  Allergies   Allergen Reactions    Cats     Seasonal Allergies      grass        No current facility-administered medications for this encounter.     Current Outpatient Medications   Medication Sig Dispense Refill    bisacodyl (DULCOLAX) 5 MG EC tablet Two days prior to exam take two (2) tablets at 4pm. One day prior to exam take two (2) tablets at 4pm 4 tablet 0    blood glucose (ACCU-CHEK URIEL PLUS) test strip USE TO TEST BLOOD SUGAR FOUR TIMES DAILY OR AS DIRECTED 150 strip 0    blood glucose monitoring (SOFTCLIX) lancets TAKE AS DIRECTED FOUR TIMES DAILY 400 each 11    Continuous Blood Gluc  (DEXCOM G6 ) TESHA Use to read blood sugars as per 's instructions. 1 each 0    Continuous Blood Gluc Sensor (DEXCOM G6 SENSOR) MISC CHANGE EVERY 10 DAYS. 9 each 3    Continuous Blood Gluc Sensor (DEXCOM G6 SENSOR) MISC Change every 10 days. 9 each 3    Continuous Glucose Sensor (DEXCOM G7 SENSOR) MISC Change every 10 days. 3 each 5    Continuous Glucose Transmitter (DEXCOM G6 TRANSMITTER) MISC CHANGE EVERY 90 DAYS 1 each 1    fluticasone  (FLONASE) 50 MCG/ACT nasal spray Spray 2 sprays into both nostrils daily 32 g 11    Glucagon (BAQSIMI TWO PACK) 3 MG/DOSE nasal powder Spray 1 spray (3 mg) in nostril as needed (severe hypoglycemia reaction, dose as directed). 1 each 3    insulin lispro (HUMALOG VIAL) 100 UNIT/ML vial Use with insulin pump, total daily dose approx 120 units 40 mL 5    insulin pen needle (B-D U/F) 31G X 5 MM miscellaneous Use 1 pen needles daily or as directed. 100 each 3    INSULIN PUMP - OUTPATIENT Date Last Updated: 02/16/22  Tandem X2 with control IQ and Dexcom  BASAL RATES and times:  12am: (midnight): 1.10 units/hour    3am:1.5  5 am: 1.5 units/hour   8am: 2.05  12pm (noon): 1.9 units/hour   4pm: 1.45 units/hour   8 pm: 1.75 units/hour   CARB RATIO and times:  12   AM (midnight): 2.8  5am: 2.5  12  PM (noon):  2.2  8    PM:  2.7  Corection Factor (Sensitivity) and times:  12   AM (midnight): 15 mg/dL  BG target 125 mg/dL  Active Insulin Time: 4 hours      losartan (COZAAR) 25 MG tablet TAKE 1 TABLET(25 MG) BY MOUTH DAILY 90 tablet 2    polyethylene glycol (GOLYTELY) 236 g suspension Two days before procedure at 5PM fill first container with water. Mix and drink an 8 oz glass every 15 minutes until HALF of the container is gone. Place the remainder in the refrigerator. One day before procedure at 5PM drink second half of bowel prep. Drink an 8 oz glass every 15 minutes until it is gone. Day of procedure 6 hours before arrival time fill the 2nd container with water. Mix and drink an 8 oz glass every 15 minutes until HALF of the container is gone. Discard the remaining solution. 8000 mL 0    sildenafil (VIAGRA) 100 MG tablet TAKE 1/2 TABLET BY MOUTH 30 MINUTES PRIOR TO INTERCOURSE AS DIRECTED 15 tablet 0    tirzepatide (MOUNJARO) 7.5 MG/0.5ML pen Inject 7.5 mg subcutaneously every 7 days. 2 mL 1       No medications prior to admission.       Patient Active Problem List   Diagnosis    Morbid obesity (H)    Diabetes mellitus, type 2  (H)    Keratoconus of both eyes    Combined forms of age-related cataract of both eyes    Allergic rhinitis    Cataract, cortical, both eyes    Insulin pump status    Keratoconus    Lattice degeneration of both retinas    LTBI (latent tuberculosis infection)    Obesity, Class III, BMI 40-49.9 (morbid obesity) (H)    PHUONG on CPAP    Sleep apnea    Diabetes mellitus type 1 (H)    RBBB    LAFB (left anterior fascicular block)        Past Medical History:   Diagnosis Date    Cataracts, bilateral     Diabetes mellitus type 1.5 (H)     DKA (diabetic ketoacidoses)     Keratoconus of both eyes     Morbid obesity (H)     PHUONG (obstructive sleep apnea)         Past Surgical History:   Procedure Laterality Date    COLONOSCOPY      ENT SURGERY  10 years ago    cyst on neck    EXTRACTION(S) DENTAL      NECK SURGERY  2010    to remove a cyst    PHACOEMULSIFICATION CLEAR CORNEA WITH STANDARD INTRAOCULAR LENS IMPLANT Left 09/21/2020    Procedure: COMPLEX PHACOEMULSIFICATION, CATARACT, WITH INTRAOCULAR LENS IMPLANT;  Surgeon: Andre Jackson MD;  Location: [facility] OR    PHACOEMULSIFICATION CLEAR CORNEA WITH STANDARD INTRAOCULAR LENS IMPLANT Right 09/28/2020    Procedure: PHACOEMULSIFICATION, CATARACT, WITH INTRAOCULAR LENS IMPLANT;  Surgeon: Andre Jackson MD;  Location: [facility] OR    VASECTOMY         Social History     Tobacco Use    Smoking status: Never    Smokeless tobacco: Never   Substance Use Topics    Alcohol use: Never       Family History   Problem Relation Age of Onset    Diabetes Maternal Grandmother     Other Cancer Maternal Grandmother         Lung Cancer    Diabetes Paternal Grandmother     Glaucoma Brother     Hypertension Mother     Diabetes Other         Maternal aunt mother of vibha t1    Diabetes Cousin     Diabetes Daughter         T1 Dx 7/4/2021    Macular Degeneration No family hx of          PHYSICAL EXAM:   There were no vitals taken for this visit. Estimated body mass index is 47.69 kg/m  as calculated  "from the following:    Height as of 11/5/24: 1.702 m (5' 7\").    Weight as of 11/5/24: 138.1 kg (304 lb 8 oz).   Vitals:***  Mental status - alert and oriented  RESP: lungs clear  CV: RRR  AIRWAY EXAM: [place]ampatti {:[id number]}    IMPRESSION   ASA Class {ASA CLASS:[id number]:o}      Signed Electronically by: Ilda Sigala MD  December 15, 2024    Colorectal Surgery  [phone] (office)  [phone] (pager)  [email or web address]          "

## 2024-12-16 ENCOUNTER — TELEPHONE (OUTPATIENT)
Dept: GASTROENTEROLOGY | Facility: CLINIC | Age: 49
End: 2024-12-16
Payer: COMMERCIAL

## 2024-12-16 ENCOUNTER — MYC MEDICAL ADVICE (OUTPATIENT)
Dept: CARDIOLOGY | Facility: CLINIC | Age: 49
End: 2024-12-16
Payer: COMMERCIAL

## 2024-12-16 NOTE — TELEPHONE ENCOUNTER
Caller: Yung Ochoa    Reason for Reschedule/Cancellation   (please be detailed, any staff messages or encounters to note?): Going to do Cologuard      Prior to reschedule please review:  Ordering Provider: Radha Milligan MD in  FAMILY PRAC/IM  Sedation Determined: Moderate  Does patient have any ASC Exclusions, please identify?:       Notes on Cancelled Procedure:  Procedure: Lower Endoscopy [Colonoscopy]   Date: 12.16.24  Location: [facility]; [facility] [address] [place] [address]   Surgeon:       Rescheduled: No, Going to do Cologuard  Call to  Priority line and notified of cancellation     Did you cancel or rescheduled an EUS procedure? No.

## 2024-12-21 DIAGNOSIS — E88.819 INSULIN RESISTANCE: ICD-10-CM

## 2024-12-21 DIAGNOSIS — Z96.41 INSULIN PUMP STATUS: ICD-10-CM

## 2024-12-21 DIAGNOSIS — E10.9 TYPE 1 DIABETES MELLITUS WITHOUT COMPLICATION (H): ICD-10-CM

## 2024-12-23 RX ORDER — INSULIN LISPRO 100 [IU]/ML
INJECTION, SOLUTION INTRAVENOUS; SUBCUTANEOUS
Qty: 40 ML | Refills: 5 | Status: SHIPPED | OUTPATIENT
Start: 2024-12-23

## 2024-12-23 NOTE — TELEPHONE ENCOUNTER
Last Written Prescription Date:  6/12/24  Last Fill Quantity: 40,  # refills: 5   Last office visit: Visit date not found ; last virtual visit: 10/8/2024 with prescribing provider:  Dr. Heaton   Future Office Visit: Due back 1/25      Requested Prescriptions   Pending Prescriptions Disp Refills    insulin lispro (HUMALOG VIAL) 100 UNIT/ML vial [Pharmacy Med Name: INSULIN LISPRO 100U/ML VIAL 10ML] 40 mL 5     Sig: USE WITH INSULIN PUMP, TOTAL DAILY DOSE APPROXIMATELY 120 UNITS.       Insulin Protocol Failed - 12/23/2024  9:42 AM        Failed - Chart review required     Review Chart.    Do not approve if insulin is used in a pump.  Instead, direct refill request to the patient's endocrinologist.  If the patient doesn't have an endocrinologist, then send the refill to the patient's PCP for review            Passed - HgbA1C in past 3 or 6 months     If HgbA1C is 8 or greater, it needs to be on file within the past 3 months.  If less than 8, must be on file within the past 6 months.     Recent Labs   Lab Test 10/31/24  0750   A1C 7.3*             Passed - Medication is active on med list        Passed - Recent (6 mo) or future (90 days) visit within the authorizing provider's specialty     The patient must have completed an in-person or virtual visit within the past 6 months or has a future visit scheduled within the next 90 days with the authorizing provider s specialty.  Urgent care and e-visits do not quality as an office visit for this protocol.          Passed - Medication indicated for associated diagnosis     Medication is associated with one or more of the following diagnoses:   - Type 1 diabetes mellitus  - Type 2 diabetes mellitus  - Diabetic nephropathy; Prophylaxis  - Neuropathy due to diabetes mellitus; Prophylaxis  - Retinopathy due to diabetes mellitus; Prophylaxis  - Diabetes mellitus associated with cystic fibrosis  - Disorder of cardiovascular system; Prophylaxis - Type 1 diabetes mellitus    - Disorder of cardiovascular system; Prophylaxis - Type 2 diabetes mellitus            Passed - Patient is 18 years of age or older           Refills sent  Karla Buckner RN

## 2025-01-15 ENCOUNTER — DOCUMENTATION ONLY (OUTPATIENT)
Dept: SLEEP MEDICINE | Facility: CLINIC | Age: 50
End: 2025-01-15
Payer: COMMERCIAL

## 2025-01-15 NOTE — PROGRESS NOTES
"[employer] faxed over a \"medically necessary equipment & emergency certification form.\"    It was signed and faxed back to [phone]. Form scanned to encounter  "

## 2025-01-21 DIAGNOSIS — E13.9 DIABETES MELLITUS TYPE 1.5, MANAGED AS TYPE 2 (H): ICD-10-CM

## 2025-01-21 RX ORDER — TIRZEPATIDE 7.5 MG/.5ML
INJECTION, SOLUTION SUBCUTANEOUS
Qty: 2 ML | Refills: 1 | Status: SHIPPED | OUTPATIENT
Start: 2025-01-21

## 2025-01-21 NOTE — TELEPHONE ENCOUNTER
Last Written Prescription Date:  10/8/24  Last Fill Quantity: 2,  # refills: 1   Last office visit: Visit date not found ; last virtual visit: 10/8/2024 with prescribing provider:  Dr. Heaton   Future Office Visit:  Due back this month    Requested Prescriptions   Pending Prescriptions Disp Refills    MOUNJARO 7.5 MG/0.5ML SOAJ [Pharmacy Med Name: MOUNJARO 7.5MG/0.5ML INJ (4 PENS)] 2 mL      Sig: ADMINISTER 7.5 MG UNDER THE SKIN EVERY 7 DAYS       GLP-1 Agonists Protocol Failed - 1/21/2025  3:56 PM        Failed - Medication indicated for associated diagnosis     Medication is associated with one or more of the following diagnoses:     Type 2 diabetes mellitus           Passed - HgbA1C in past 3 or 6 months     If HgbA1C is 8 or greater, it needs to be on file within the past 3 months.  If less than 8, must be on file within the past 6 months.     Recent Labs   Lab Test 10/31/24  0750   A1C 7.3*             Passed - Medication is active on med list        Passed - Has GFR on file in past 12 months and most recent value is normal        Passed - Recent (6 mo) or future (90 days) visit within the authorizing provider's specialty     The patient must have completed an in-person or virtual visit within the past 6 months or has a future visit scheduled within the next 90 days with the authorizing provider s specialty.  Urgent care and e-visits do not quality as an office visit for this protocol.          Passed - Patient is age 18 or older           Refills sent  Karla Buckner RN

## 2025-01-22 ENCOUNTER — TELEPHONE (OUTPATIENT)
Dept: ENDOCRINOLOGY | Facility: CLINIC | Age: 50
End: 2025-01-22

## 2025-01-31 DIAGNOSIS — N52.9 ERECTILE DYSFUNCTION, UNSPECIFIED ERECTILE DYSFUNCTION TYPE: ICD-10-CM

## 2025-02-03 RX ORDER — SILDENAFIL 100 MG/1
TABLET, FILM COATED ORAL
Qty: 15 TABLET | Refills: 0 | Status: SHIPPED | OUTPATIENT
Start: 2025-02-03

## 2025-02-03 NOTE — TELEPHONE ENCOUNTER
Per refill request dated 1/24/25      Rx routed to patient's PCP to advise on refill.  Bang Jackson RN

## 2025-02-08 ENCOUNTER — HEALTH MAINTENANCE LETTER (OUTPATIENT)
Age: 50
End: 2025-02-08

## 2025-02-27 ENCOUNTER — OFFICE VISIT (OUTPATIENT)
Dept: CARDIOLOGY | Facility: CLINIC | Age: 50
End: 2025-02-27
Attending: INTERNAL MEDICINE
Payer: COMMERCIAL

## 2025-02-27 VITALS
HEART RATE: 67 BPM | HEIGHT: 67 IN | SYSTOLIC BLOOD PRESSURE: 124 MMHG | OXYGEN SATURATION: 100 % | BODY MASS INDEX: 45.67 KG/M2 | DIASTOLIC BLOOD PRESSURE: 74 MMHG | WEIGHT: 291 LBS

## 2025-02-27 DIAGNOSIS — I44.4 LAFB (LEFT ANTERIOR FASCICULAR BLOCK): ICD-10-CM

## 2025-02-27 DIAGNOSIS — I45.10 RBBB: Primary | ICD-10-CM

## 2025-02-27 DIAGNOSIS — E66.01 MORBID OBESITY (H): ICD-10-CM

## 2025-02-27 DIAGNOSIS — E10.9 INSULIN DEPENDENT DIABETES MELLITUS TYPE IA (H): ICD-10-CM

## 2025-02-27 DIAGNOSIS — G47.33 OSA ON CPAP: ICD-10-CM

## 2025-02-27 NOTE — PROGRESS NOTES
"CARDIOLOGY CLINIC FOLLOW-UP NOTE      REASON FOR VISIT:   Chest pain, follow up recent testing    PRIMARY CARE PHYSICIAN:  Radha Milligan        History of Present Illness   Jose Apodaca is an extremely pleasant 49 year old male here for routine follow-up.  His medical history is notable for bifascicular block (RBBB, LAFB), type 1 diabetes mellitus with insulin pump, hypertension, morbid obesity, and PHUONG on CPAP.  He reports that multiple family members appear to have had heart issues, but often did not seek treatment.  Accordingly, he is not aware of anyone with a history of bypass, stenting, etc., but does know that multiple family members in his family tree had \"atherosclerosis\" somewhere on their death certificates.  Don is a never smoker and does not drink alcohol.    I saw him in November 2024 due to chest discomfort.  The trigger was not always obvious, but this was usually nonexertional in nature.  It felt like a pressure-like sensation over his mid-left chest.  He also could get short of breath with exertion.  No other major cardiac symptoms.  Accordingly, I had him do a TTE on 11/29/2024 which was normal, and a coronary CTA on 12/12/2024 which showed a calcium score of 0, with no detectable plaque or stenosis whatsoever.  After getting the results of those tests, his symptoms have since resolved.    ECG in clinic from 11/5/2024 showed sinus rhythm with bifascicular block (RBBB, LAFB).  In addition to the above, I also reviewed his most recent lipid panel, chemistry panel, A1c, and TSH.      Assessment & Plan     Atypical (primarily nonexertional) chest pain, resolved  Normal TTE in 11/2024  Normal CCTA (no plaque, CAC 0) in 12/2024  Bifascicular block (RBBB, LAFB), asymptomatic  Type 1 diabetes mellitus, managed with insulin pump  Hypertension, well controlled  Morbid obesity  PHUONG on CPAP  FH of heart disease of uncertain severity/onset  Never smoker      It was a pleasure to speak with Don again in " clinic today.  We reviewed the results of his above testing, which thankfully was very reassuring.  He is feeling better as well.  From my standpoint, I think it's OK to hold off on aspirin and statin for now.  I would suggest he repeat a coronary calcium score roughly every 5 years or so, as I suspect he will eventually benefit from a statin, but again I think he can hold off for now.        -OK to hold off on aspirin/statin for now as above  -Recommend coronary calcium scan (can be ordered by PCP, Dr. Milligan) roughly every 5 years  -Recommend repeat ECG every 2-3 years given bi-fascicular block, or sooner if concerning symptoms (syncope/pre-syncope primarily)  -Continue losartan 25 mg daily  -Continue diabetes management per primary/endocrinology  -Heart healthy diet, regular aerobic exercise        Follow-up: No routine cardiology follow-up is required at this time, though I would be more than happy to see Don back at any time with future questions or concerns.        Alcon Phelps MD  Interventional Cardiology  February 27, 2025          Medications   Current Outpatient Medications   Medication Sig Dispense Refill    blood glucose (ACCU-CHEK URIEL PLUS) test strip USE TO TEST BLOOD SUGAR FOUR TIMES DAILY OR AS DIRECTED 150 strip 0    blood glucose monitoring (SOFTCLIX) lancets TAKE AS DIRECTED FOUR TIMES DAILY 400 each 11    Continuous Blood Gluc  (DEXCOM G6 ) TESHA Use to read blood sugars as per 's instructions. 1 each 0    Continuous Blood Gluc Sensor (DEXCOM G6 SENSOR) MISC CHANGE EVERY 10 DAYS. 9 each 3    Continuous Blood Gluc Sensor (DEXCOM G6 SENSOR) MISC Change every 10 days. 9 each 3    Continuous Glucose Sensor (DEXCOM G7 SENSOR) MISC Change every 10 days. 3 each 5    Continuous Glucose Transmitter (DEXCOM G6 TRANSMITTER) MISC CHANGE EVERY 90 DAYS 1 each 1    fluticasone (FLONASE) 50 MCG/ACT nasal spray Spray 2 sprays into both nostrils daily 32 g 11    Glucagon (BAQSIMI  TWO PACK) 3 MG/DOSE nasal powder Spray 1 spray (3 mg) in nostril as needed (severe hypoglycemia reaction, dose as directed). 1 each 3    insulin lispro (HUMALOG VIAL) 100 UNIT/ML vial USE WITH INSULIN PUMP, TOTAL DAILY DOSE APPROXIMATELY 120 UNITS. 40 mL 5    insulin pen needle (B-D U/F) 31G X 5 MM miscellaneous Use 1 pen needles daily or as directed. 100 each 3    INSULIN PUMP - OUTPATIENT Date Last Updated: 02/16/22  Tandem X2 with control IQ and Dexcom  BASAL RATES and times:  12am: (midnight): 1.10 units/hour    3am:1.5  5 am: 1.5 units/hour   8am: 2.05  12pm (noon): 1.9 units/hour   4pm: 1.45 units/hour   8 pm: 1.75 units/hour   CARB RATIO and times:  12   AM (midnight): 2.8  5am: 2.5  12  PM (noon):  2.2  8    PM:  2.7  Corection Factor (Sensitivity) and times:  12   AM (midnight): 15 mg/dL  BG target 125 mg/dL  Active Insulin Time: 4 hours      losartan (COZAAR) 25 MG tablet TAKE 1 TABLET(25 MG) BY MOUTH DAILY 90 tablet 2    MOUNJARO 7.5 MG/0.5ML SOAJ ADMINISTER 7.5 MG UNDER THE SKIN EVERY 7 DAYS 2 mL 1    sildenafil (VIAGRA) 100 MG tablet TAKE 1/2 TABLET BY MOUTH 30 MINUTES PRIOR TO INTERCOURSE AS DIRECTED 15 tablet 0    bisacodyl (DULCOLAX) 5 MG EC tablet Two days prior to exam take two (2) tablets at 4pm. One day prior to exam take two (2) tablets at 4pm (Patient not taking: Reported on 2/27/2025) 4 tablet 0    polyethylene glycol (GOLYTELY) 236 g suspension Two days before procedure at 5PM fill first container with water. Mix and drink an 8 oz glass every 15 minutes until HALF of the container is gone. Place the remainder in the refrigerator. One day before procedure at 5PM drink second half of bowel prep. Drink an 8 oz glass every 15 minutes until it is gone. Day of procedure 6 hours before arrival time fill the 2nd container with water. Mix and drink an 8 oz glass every 15 minutes until HALF of the container is gone. Discard the remaining solution. (Patient not taking: Reported on 2/27/2025) 8000 mL 0      No current facility-administered medications for this visit.     Allergies   Allergies   Allergen Reactions    Cats     Seasonal Allergies      grass         Physical Exam       BP: 124/74 Pulse: 67     SpO2: 100 %      Vital Signs with Ranges  Pulse:  [67] 67  BP: (124)/(74) 124/74  SpO2:  [100 %] 100 %  291 lbs 0 oz    Constitutional: Well-appearing, no acute distress  Respiratory: Normal respiratory effort, CTAB  Cardiovascular: RRR, no m/r/g.  JVP difficult to assess due to body habitus.  There is no LE edema.  Normal carotid upstrokes, no carotid bruits.

## 2025-02-27 NOTE — LETTER
"2/27/2025    Radha Milligan MD  [address]  [place] [address]    RE: Jose Apodaca       Dear Colleague,     I had the pleasure of seeing Jose Apodaca in the [facility].  CARDIOLOGY CLINIC FOLLOW-UP NOTE      REASON FOR VISIT:   Chest pain, follow up recent testing    PRIMARY CARE PHYSICIAN:  Radha Milligan        History of Present Illness  Jose Apodaca is an extremely pleasant 49 year old male here for routine follow-up.  His medical history is notable for bifascicular block (RBBB, LAFB), type 1 diabetes mellitus with insulin pump, hypertension, morbid obesity, and PHUONG on CPAP.  He reports that multiple family members appear to have had heart issues, but often did not seek treatment.  Accordingly, he is not aware of anyone with a history of bypass, stenting, etc., but does know that multiple family members in his family tree had \"atherosclerosis\" somewhere on their death certificates.  Don is a never smoker and does not drink alcohol.    I saw him in November 2024 due to chest discomfort.  The trigger was not always obvious, but this was usually nonexertional in nature.  It felt like a pressure-like sensation over his mid-left chest.  He also could get short of breath with exertion.  No other major cardiac symptoms.  Accordingly, I had him do a TTE on 11/29/2024 which was normal, and a coronary CTA on 12/12/2024 which showed a calcium score of 0, with no detectable plaque or stenosis whatsoever.  After getting the results of those tests, his symptoms have since resolved.    ECG in clinic from 11/5/2024 showed sinus rhythm with bifascicular block (RBBB, LAFB).  In addition to the above, I also reviewed his most recent lipid panel, chemistry panel, A1c, and TSH.      Assessment & Plan    Atypical (primarily nonexertional) chest pain, resolved  Normal TTE in 11/2024  Normal CCTA (no plaque, CAC 0) in 12/2024  Bifascicular block (RBBB, LAFB), asymptomatic  Type 1 diabetes " mellitus, managed with insulin pump  Hypertension, well controlled  Morbid obesity  PHUONG on CPAP  FH of heart disease of uncertain severity/onset  Never smoker      It was a pleasure to speak with Don again in clinic today.  We reviewed the results of his above testing, which thankfully was very reassuring.  He is feeling better as well.  From my standpoint, I think it's OK to hold off on aspirin and statin for now.  I would suggest he repeat a coronary calcium score roughly every 5 years or so, as I suspect he will eventually benefit from a statin, but again I think he can hold off for now.        -OK to hold off on aspirin/statin for now as above  -Recommend coronary calcium scan (can be ordered by PCP, Dr. Milligan) roughly every 5 years  -Recommend repeat ECG every 2-3 years given bi-fascicular block, or sooner if concerning symptoms (syncope/pre-syncope primarily)  -Continue losartan 25 mg daily  -Continue diabetes management per primary/endocrinology  -Heart healthy diet, regular aerobic exercise        Follow-up: No routine cardiology follow-up is required at this time, though I would be more than happy to see Don back at any time with future questions or concerns.        Alcon Phelps MD  Interventional Cardiology  February 27, 2025          Medications  Current Outpatient Medications   Medication Sig Dispense Refill     blood glucose (ACCU-CHEK URIEL PLUS) test strip USE TO TEST BLOOD SUGAR FOUR TIMES DAILY OR AS DIRECTED 150 strip 0     blood glucose monitoring (SOFTCLIX) lancets TAKE AS DIRECTED FOUR TIMES DAILY 400 each 11     Continuous Blood Gluc  (DEXCOM G6 ) TESHA Use to read blood sugars as per 's instructions. 1 each 0     Continuous Blood Gluc Sensor (DEXCOM G6 SENSOR) MISC CHANGE EVERY 10 DAYS. 9 each 3     Continuous Blood Gluc Sensor (DEXCOM G6 SENSOR) MISC Change every 10 days. 9 each 3     Continuous Glucose Sensor (DEXCOM G7 SENSOR) MISC Change every 10 days. 3 each  5     Continuous Glucose Transmitter (DEXCOM G6 TRANSMITTER) MISC CHANGE EVERY 90 DAYS 1 each 1     fluticasone (FLONASE) 50 MCG/ACT nasal spray Spray 2 sprays into both nostrils daily 32 g 11     Glucagon (BAQSIMI TWO PACK) 3 MG/DOSE nasal powder Spray 1 spray (3 mg) in nostril as needed (severe hypoglycemia reaction, dose as directed). 1 each 3     insulin lispro (HUMALOG VIAL) 100 UNIT/ML vial USE WITH INSULIN PUMP, TOTAL DAILY DOSE APPROXIMATELY 120 UNITS. 40 mL 5     insulin pen needle (B-D U/F) 31G X 5 MM miscellaneous Use 1 pen needles daily or as directed. 100 each 3     INSULIN PUMP - OUTPATIENT Date Last Updated: 02/16/22  Tandem X2 with control IQ and Dexcom  BASAL RATES and times:  12am: (midnight): 1.10 units/hour    3am:1.5  5 am: 1.5 units/hour   8am: 2.05  12pm (noon): 1.9 units/hour   4pm: 1.45 units/hour   8 pm: 1.75 units/hour   CARB RATIO and times:  12   AM (midnight): 2.8  5am: 2.5  12  PM (noon):  2.2  8    PM:  2.7  Corection Factor (Sensitivity) and times:  12   AM (midnight): 15 mg/dL  BG target 125 mg/dL  Active Insulin Time: 4 hours       losartan (COZAAR) 25 MG tablet TAKE 1 TABLET(25 MG) BY MOUTH DAILY 90 tablet 2     MOUNJARO 7.5 MG/0.5ML SOAJ ADMINISTER 7.5 MG UNDER THE SKIN EVERY 7 DAYS 2 mL 1     sildenafil (VIAGRA) 100 MG tablet TAKE 1/2 TABLET BY MOUTH 30 MINUTES PRIOR TO INTERCOURSE AS DIRECTED 15 tablet 0     bisacodyl (DULCOLAX) 5 MG EC tablet Two days prior to exam take two (2) tablets at 4pm. One day prior to exam take two (2) tablets at 4pm (Patient not taking: Reported on 2/27/2025) 4 tablet 0     polyethylene glycol (GOLYTELY) 236 g suspension Two days before procedure at 5PM fill first container with water. Mix and drink an 8 oz glass every 15 minutes until HALF of the container is gone. Place the remainder in the refrigerator. One day before procedure at 5PM drink second half of bowel prep. Drink an 8 oz glass every 15 minutes until it is gone. Day of procedure 6 hours  before arrival time fill the 2nd container with water. Mix and drink an 8 oz glass every 15 minutes until HALF of the container is gone. Discard the remaining solution. (Patient not taking: Reported on 2/27/2025) 8000 mL 0     No current facility-administered medications for this visit.     Allergies  Allergies   Allergen Reactions     Cats      Seasonal Allergies      grass         Physical Exam      BP: 124/74 Pulse: 67     SpO2: 100 %      Vital Signs with Ranges  Pulse:  [67] 67  BP: (124)/(74) 124/74  SpO2:  [100 %] 100 %  291 lbs 0 oz    Constitutional: Well-appearing, no acute distress  Respiratory: Normal respiratory effort, CTAB  Cardiovascular: RRR, no m/r/g.  JVP difficult to assess due to body habitus.  There is no LE edema.  Normal carotid upstrokes, no carotid bruits.      Thank you for allowing me to participate in the care of your patient.      Sincerely,     Alcon Phelps MD     [facility]  cc:   Alcon Phelps MD  [address]  [place] [address]

## 2025-03-05 ENCOUNTER — VIRTUAL VISIT (OUTPATIENT)
Dept: ENDOCRINOLOGY | Facility: CLINIC | Age: 50
End: 2025-03-05
Payer: COMMERCIAL

## 2025-03-05 DIAGNOSIS — R25.2 LEG CRAMPS: ICD-10-CM

## 2025-03-05 DIAGNOSIS — E13.9 DIABETES MELLITUS TYPE 1.5, MANAGED AS TYPE 2 (H): Primary | ICD-10-CM

## 2025-03-05 DIAGNOSIS — E88.819 INSULIN RESISTANCE: ICD-10-CM

## 2025-03-05 DIAGNOSIS — Z96.41 INSULIN PUMP STATUS: ICD-10-CM

## 2025-03-05 DIAGNOSIS — E66.01 MORBID OBESITY (H): ICD-10-CM

## 2025-03-05 PROCEDURE — 98006 SYNCH AUDIO-VIDEO EST MOD 30: CPT | Performed by: INTERNAL MEDICINE

## 2025-03-05 PROCEDURE — 95251 CONT GLUC MNTR ANALYSIS I&R: CPT | Performed by: INTERNAL MEDICINE

## 2025-03-05 PROCEDURE — G2211 COMPLEX E/M VISIT ADD ON: HCPCS | Performed by: INTERNAL MEDICINE

## 2025-03-05 NOTE — PROGRESS NOTES
Virtual Visit Details    Type of service:  Video Visit     Originating Location (pt. Location):  Home  Distant Location (provider location):  Off-site  Platform used for Video Visit: Tasneem      Recent issues:  Diabetes follow-up evaluation  Continues with Tandem pump, Ozempic med, and DexcomG7 sensor use  Feeling well overall, also using the Mounjaro medication and tolerating well          2011. Diagnosis of diabetes mellitus while living in [place]  Acute symptoms increased thirst, frequent urination, fatigue, weight loss (265 to 203#/30 days)  Hospitalized and diagnosis of DKA  Initial treatment with Apidra and Lantus insulin  Met with an endocrinologist Lara Naik and Dr. Lina Fish  ~6 mo later, switched to [place]ing pump     2012. Continued using this pump for 1 year, then moved to [facility]  Saw Dr. Beena Johnson/Endocrinologist in [place]  Switched to Tandem insulin pump  Previous [facility] labs include:       Subsequent move back to [place]  Recalls taking Victoza medication along with pump management, success with significant weight loss  Moved to [place]  9/2019. Moved to [place], [place]  Medical evaluations with Dr. 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 Started Tandem insulin pump  Previous [facility] labs include:       2/19/20. Med evaluation with Dr. ERIKA Milligan/[facility]  No lab tests ordered  Started DexcomG6 CGMS sensor  ~4/2020. Upgrade to the Control IQ pump         5/26/20. Initial diabetes evaluation with me,  [facility]  Continued use of the Tandem TSlim pump with the DexcomG6  ~6/2020. Restarted (off label) Victoza  10/2020. Insurance coverage for Victoza, used 1.8 mg daily  11/2021. Ran out of Victoza  ~3/2022. Restarted Victoza medication  Switched from Victoza to Ozempic 0.5 mg weekly  10/2023. Ran out of Ozempic medication  10/2024. Changed to Mounjaro medication  Using Tandem  TSlim pump with DexcomG7 sensor              Humalog in pump   Mounjaro 7.5 mg weekly    Current Tandem pump settings:      Recent pump and sensor data:                Recent DexcomG7 data:          Blood glucose (BG) meter:  Accu-check               Tests infrequently  Fam Hx DM:    MA, Yuin, MGM, PGM  Previous [facility] labs include:     Previous [facility] hgbA1c trends include:  Lab Results   Component Value Date    A1C 7.3 (H) 10/31/2024    A1C 6.9 (H) 05/07/2024    A1C 7.3 (H) 10/02/2023    A1C 7.6 (H) 04/28/2023    A1C 7.3 (H) 09/23/2022           Lab Results   Component Value Date    A1C 7.3 (H) 10/31/2024     10/31/2024    POTASSIUM 4.5 10/31/2024    CHLORIDE 104 10/31/2024    CO2 24 10/31/2024    ANIONGAP 12 10/31/2024     (H) 10/31/2024    BUN 9.8 10/31/2024    CR 0.8 12/12/2024    GFRESTIMATED >60 12/12/2024    GFRESTBLACK >90 05/05/2021    MICK 9.1 10/31/2024    CPEPT <0.1 (L) 05/28/2020    CHOL 140 09/23/2024    TRIG 53 09/23/2024    HDL 56 09/23/2024    LDL 73 09/23/2024    NHDL 84 09/23/2024    UCRR 250.0 09/23/2024    MICROL <12.0 09/23/2024    UMALCR  09/23/2024      Comment:      Unable to calculate, urine albumin and/or urine creatinine is outside detectable limits.  Microalbuminuria is defined as an albumin:creatinine ratio of 17 to 299 for males and 25 to 299 for females. A ratio of albumin:creatinine of 300 or higher is indicative of overt proteinuria.  Due to biologic variability, positive results should be confirmed by a second, first-morning random or 24-hour timed urine specimen. If there is discrepancy, a third specimen is recommended. When 2 out of 3 results are in the microalbuminuria range, this is evidence for incipient nephropathy and warrants increased efforts at glucose control, blood pressure control, and institution of therapy with an angiotensin-converting-enzyme (ACE) inhibitor (if the patient can tolerate it).       Lab Results   Component Value Date    TSH 1.63 10/02/2023      Last eye exam at [facility] 3/2024?, keratoconus changes but no DR noted  DM Complications: none        , 2 teenage children ([place]), now lives in [facility] [place]; works for [employer]  Sees Dr. ERIKA Milligan/[facility] for general medicine evaluations.       PMH/PSH:  Past Medical History:   Diagnosis Date    Cataracts, bilateral     Diabetes mellitus type 1.5 (H)     DKA (diabetic ketoacidoses)     Keratoconus of both eyes     Morbid obesity (H)     PHUONG (obstructive sleep apnea)      Past Surgical History:   Procedure Laterality Date    COLONOSCOPY      ENT SURGERY  10 years ago    cyst on neck    EXTRACTION(S) DENTAL      NECK SURGERY  2010    to remove a cyst    PHACOEMULSIFICATION CLEAR CORNEA WITH STANDARD INTRAOCULAR LENS IMPLANT Left 09/21/2020    Procedure: COMPLEX PHACOEMULSIFICATION, CATARACT, WITH INTRAOCULAR LENS IMPLANT;  Surgeon: Andre Jackson MD;  Location: [facility] OR    PHACOEMULSIFICATION CLEAR CORNEA WITH STANDARD INTRAOCULAR LENS IMPLANT Right 09/28/2020    Procedure: PHACOEMULSIFICATION, CATARACT, WITH INTRAOCULAR LENS IMPLANT;  Surgeon: Andre Jackson MD;  Location: [facility] OR    VASECTOMY         Family Hx:  Family History   Problem Relation Age of Onset    Diabetes Maternal Grandmother     Other Cancer Maternal Grandmother         Lung Cancer    Diabetes Paternal Grandmother     Glaucoma Brother     Hypertension Mother     Diabetes Other         Maternal aunt mother of vibha t1    Diabetes Cousin     Diabetes Daughter         T1 Dx 7/4/2021    Macular Degeneration No family hx of          Social Hx:  Social History     Socioeconomic History    Marital status: Single     Spouse name: Not on file    Number of children: Not on file    Years of education: Not on file    Highest education level: Not on file   Occupational History    Not on file   Tobacco Use    Smoking status: Never    Smokeless tobacco: Never   Substance and Sexual Activity     Alcohol use: Never    Drug use: Never    Sexual activity: Yes     Partners: Female     Birth control/protection: Female Surgical   Other Topics Concern    Parent/sibling w/ CABG, MI or angioplasty before 65F 55M? No   Social History Narrative    Not on file     Social Drivers of Health     Financial Resource Strain: Low Risk  (6/5/2024)    Financial Resource Strain     Within the past 12 months, have you or your family members you live with been unable to get utilities (heat, electricity) when it was really needed?: No   Food Insecurity: Low Risk  (6/5/2024)    Food Insecurity     Within the past 12 months, did you worry that your food would run out before you got money to buy more?: No     Within the past 12 months, did the food you bought just not last and you didn t have money to get more?: No   Transportation Needs: Low Risk  (6/5/2024)    Transportation Needs     Within the past 12 months, has lack of transportation kept you from medical appointments, getting your medicines, non-medical meetings or appointments, work, or from getting things that you need?: No   Physical Activity: Insufficiently Active (6/5/2024)    Exercise Vital Sign     Days of Exercise per Week: 2 days     Minutes of Exercise per Session: 30 min   Stress: No Stress Concern Present (6/5/2024)    [employer] [place] [employer] - Occupational Stress Questionnaire     Feeling of Stress : Not at all   Social Connections: Unknown (6/5/2024)    Social Connection and Isolation Panel [NHANES]     Frequency of Communication with Friends and Family: Not on file     Frequency of Social Gatherings with Friends and Family: Never     Attends Yazidism Services: Not on file     Active Member of Clubs or Organizations: Not on file     Attends Club or Organization Meetings: Not on file     Marital Status: Not on file   Interpersonal Safety: Low Risk  (2/28/2024)    Interpersonal Safety     Do you feel physically and emotionally safe where you  currently live?: Yes     Within the past 12 months, have you been hit, slapped, kicked or otherwise physically hurt by someone?: No     Within the past 12 months, have you been humiliated or emotionally abused in other ways by your partner or ex-partner?: No   Housing Stability: Low Risk  (6/5/2024)    Housing Stability     Do you have housing? : Yes     Are you worried about losing your housing?: No          MEDICATIONS:  has a current medication list which includes the following prescription(s): dexcom g7 sensor, fluticasone, insulin lispro, losartan, sildenafil, tirzepatide, bisacodyl, accu-chek justin plus, blood glucose monitoring, dexcom g6 , baqsimi two pack, b-d u/f, insulin pump, and polyethylene glycol.       ROS: 10 point ROS neg other than the symptoms noted above in the HPI.     GENERAL: mild fatigue, mild wt loss (to ~291#); denies fevers, chills, night sweats.   HEENT: no dysphagia, odonophagia, diplopia, neck pain  THYROID:  no apparent hyper or hypothyroid symptoms  CV: no chest pain, pressure, palpitations  LUNGS: no SOB, AGARWAL, cough, wheezing   ABDOMEN: mild nausea and constipation; no diarrhea, abdominal pain  EXTREMITIES: no rashes, ulcers, edema  NEUROLOGY: right leg tingling; no headaches, denies changes in vision, extremitiy numbness   MSK: nocturnal leg cramps; no muscle aches or pains, weakness  SKIN: no rashes or lesions  : decreased erection function; occasional nocturia  PSYCH:  stable mood, no significant anxiety or depression  ENDOCRINE: no heat or cold intolerance    Physical Exam (visual exam)  VS:  no vital signs taken for video visit  CONSTITUTIONAL: healthy, alert and NAD, well dressed, answering questions appropriately  ENT: no nose swelling or nasal discharge, mouth redness or gum changes.  EYES: eyes grossly normal to inspection, conjunctivae and sclerae normal, no exophthalmos or proptosis  THYROID:  no apparent nodules or goiter  LUNGS: no audible wheeze, cough or  visible cyanosis, no visible retractions or increased work of breathing  ABDOMEN: abdomen not evaluated  EXTREMITIES: no hand tremors, limited exam  NEUROLOGY: CN grossly intact, mentation intact and speech normal   SKIN:  no apparent skin lesions, rash, or edema with visualized skin appearance  PSYCH: mentation appears normal, affect normal/bright, judgement and insight intact,   normal speech and appearance well groomed       LABS:     All pertinent notes, labs, and images personally reviewed by me.       A/P:  Encounter Diagnoses   Name Primary?    Diabetes mellitus type 1.5, managed as type 2 (H) Yes    Insulin pump status     Insulin resistance     Morbid obesity (H)     Leg cramps        Comments:  Reviewed health history and diabetes issues.  Health history and lab testing indicates T1.5DM   Recent CGM glucose trends often elevated postmeal related to missed premeal carb bolus  Reviewed and interpreted tests that I previously ordered.   Ordered appropriate tests for the endocrinology disease management.    Management options discussed and implemented after shared medical decision making with the patient.  T1.5DM problem is chronic-stable    Plan:  Reviewed the overall T1.5DM management and insulin pump use.  Discussed optimal BG testing to assess glucose trends.  We reviewed insulin pump settings, basal rate and bolus dosing  Use of automated pump bolus dosing for meal/snack carb & correction dosing  Reviewed recent Tandem pump and DexcomG7 CGM glucose trend data, in detail     Recommend:  Continue the Tandem insulin pump, Dexcom sensor, and diabetes management plan.  No pump setting changes at this time.    Encouraged patient to enter premeal carb bolus value more often  Continue Mounjaro but increase as 10 mg weekly dose, updated Rx sent  We reviewed the T1.5DM diagnosis, also Mounjaro GLP1RA medication use  Patient to message me in 3-4 weeks with update also  Avoid eating carb snack to raise late evening  CGM trends  Monitor for symptom changes, including GI symptoms  Keep focus on diet, exercise, and weight loss management  Consider follow-up appointment with [facility] Diabetes Educator  Cause of nocturnal leg cramps unclear, may relate to neuropathy symptom  Plan repeat non-fasting labs in soon   Discussed [facility] clinic   Lab orders available  Arrange annual dilated eye exam, fasting lipid panel testing.     Addressed patient's questions today.    The longitudinal plan of care for the endocrine problem(s) were addressed during this visit.  Due to added complexity of care,   we will continue to support the patient and the subsequent management of this condition with ongoing continuity of care.    There are no Patient Instructions on file for this visit.    Future labs ordered today:   Orders Placed This Encounter   Procedures    GLUCOSE MONITOR, 72 HOUR, PHYS INTERP    Hemoglobin A1c    Basic metabolic panel    Vitamin D Deficiency    Vitamin B12    Magnesium     Radiology/Consults ordered today: None    Total time spent on day of encounter:  32 min    Follow-up:  6/2025 VVAm,    9/2025 Return    DAVIN Heaton MD, MS  Endocrinology  [facility]    CC:  ERIKA Milligan

## 2025-03-25 DIAGNOSIS — J30.2 SEASONAL ALLERGIC RHINITIS, UNSPECIFIED TRIGGER: ICD-10-CM

## 2025-03-25 RX ORDER — FLUTICASONE PROPIONATE 50 MCG
2 SPRAY, SUSPENSION (ML) NASAL DAILY
Qty: 32 G | Refills: 1 | Status: SHIPPED | OUTPATIENT
Start: 2025-03-25

## 2025-04-01 NOTE — TELEPHONE ENCOUNTER
Central Prior Authorization Team   Phone: [phone]      PA Initiation    Medication: Victoza 18mg/3mL - INITIATED  Insurance Company: [employer] - Phone [phone] Fax [phone]  Pharmacy Filling the Rx: [id number] - [place], [place] - [address] [place] [facility] [address]  Filling Pharmacy Phone: [phone]  Filling Pharmacy Fax: [phone]  Start Date: 6/16/2020            
Central Prior Authorization Team   Phone: [phone]      PRIOR AUTHORIZATION DENIED    Medication: Victoza 18mg/3mL - DENIED    Denial Date: 6/17/2020    Denial Rational: Verbal denial from Samira with [facility]: 14 day trial of metformin is an inadequate duration - pt must have a trial lasting at least 3 months with a total daily dosage of 2000mg - for future requests they will also need a recent A1C.    Appeal Information: [facility] Appeals Department  
Interesting Victoza Rx denial message reviewed.  If this patient would like to continue the metformin for the 3-month time period (and dose increase of Rx to 1000 mg BID), then we can satisfy the insurance rule about Victoza use after this trial period.  If he doesn't wish to try the metformin plan, then the Victoza cannot be used... I will not be able to do a successful appeal, sorry.    Please relay message to patient.    DAVIN Heaton MD, MS  Endocrinology  [facility]        
Message noted, agreed.  New metformin Rx sent to pharmacy.    DAVIN Heaton MD, MS  Endocrinology  [facility]        
Placed call.  Information given to patient from Dr Heaton.  States understood and agreed with advise.    Patient willing to begin Metformin 1000mg BID x 3 mths.    Then, hoping to appeal the PA for approval.      RX and pharmacy pended.    Please review and sign if correct.      Thank you,  Liseth PONCE RN,BSN          
Prior Authorization Retail Medication Request    Medication/Dose: Victoza 18mg/3mL  ICD code (if different than what is on RX):  E10.9, E66.01  Previously Tried and Failed:  Metformin  Rationale:  Patient tried and failed Metformin with no change in blood sugars    Insurance Name:  [facility]  Insurance ID:  [id number]      Pharmacy Information (if different than what is on RX)  Name:  [id number]  Phone:  [phone]    
[de-identified] : TAWNYA KING  is a 78 year woman with a history of cerumen impaction. Her right ear is clicking and is uncomfortable.

## 2025-04-06 DIAGNOSIS — E13.9 DIABETES MELLITUS TYPE 1.5, MANAGED AS TYPE 2 (H): ICD-10-CM

## 2025-04-06 DIAGNOSIS — E55.9 VITAMIN D DEFICIENCY: Primary | ICD-10-CM

## 2025-04-06 RX ORDER — ERGOCALCIFEROL 1.25 MG/1
50000 CAPSULE, LIQUID FILLED ORAL WEEKLY
Qty: 4 CAPSULE | Refills: 2 | Status: SHIPPED | OUTPATIENT
Start: 2025-04-06

## 2025-04-07 ENCOUNTER — OFFICE VISIT (OUTPATIENT)
Dept: OPTOMETRY | Facility: CLINIC | Age: 50
End: 2025-04-07
Payer: COMMERCIAL

## 2025-04-07 DIAGNOSIS — Z96.1 PSEUDOPHAKIA: ICD-10-CM

## 2025-04-07 DIAGNOSIS — E13.9 DIABETES 1.5, MANAGED AS TYPE 2 (H): Primary | ICD-10-CM

## 2025-04-07 DIAGNOSIS — H26.493 POSTERIOR CAPSULE OPACIFICATION, BILATERAL: ICD-10-CM

## 2025-04-07 DIAGNOSIS — H18.613 KERATOCONUS, STABLE, BILATERAL: ICD-10-CM

## 2025-04-07 ASSESSMENT — REFRACTION_MANIFEST
OD_CYLINDER: +0.75
OD_SPHERE: -0.75
OS_CYLINDER: SPHERE
OD_AXIS: 083
OS_SPHERE: PLANO

## 2025-04-07 ASSESSMENT — VISUAL ACUITY
OD_CC: 20/40
OS_CC+: -2
METHOD: SNELLEN - LINEAR
OS_CC: 20/30
CORRECTION_TYPE: CONTACTS
OD_CC+: -1+2

## 2025-04-07 ASSESSMENT — CONF VISUAL FIELD
OS_SUPERIOR_TEMPORAL_RESTRICTION: 0
OS_INFERIOR_TEMPORAL_RESTRICTION: 0
OD_INFERIOR_NASAL_RESTRICTION: 0
OD_SUPERIOR_TEMPORAL_RESTRICTION: 0
OD_NORMAL: 1
OS_SUPERIOR_NASAL_RESTRICTION: 0
OD_INFERIOR_TEMPORAL_RESTRICTION: 0
OS_NORMAL: 1
OD_SUPERIOR_NASAL_RESTRICTION: 0
METHOD: COUNTING FINGERS
OS_INFERIOR_NASAL_RESTRICTION: 0

## 2025-04-07 ASSESSMENT — REFRACTION_CURRENTRX
OS_SPHERE: +4.25
OS_DIAMETER: 15.2
OD_BRAND: ZENLENS PROLATE
OS_BASECURVE: 7.8
OD_DIAMETER: 16.0
OD_SPHERE: +0.75
OS_ADDL_SPECS: OPT EXTRA BLUE
OS_BRAND: ONEFIT

## 2025-04-07 ASSESSMENT — CUP TO DISC RATIO
OS_RATIO: 0.40
OD_RATIO: 0.35

## 2025-04-07 ASSESSMENT — TONOMETRY
OD_IOP_MMHG: 14
IOP_METHOD: ICARE
OS_IOP_MMHG: 14

## 2025-04-07 ASSESSMENT — SLIT LAMP EXAM - LIDS
COMMENTS: MGD
COMMENTS: MGD

## 2025-04-07 ASSESSMENT — EXTERNAL EXAM - LEFT EYE: OS_EXAM: NORMAL

## 2025-04-07 ASSESSMENT — EXTERNAL EXAM - RIGHT EYE: OD_EXAM: NORMAL

## 2025-04-07 NOTE — NURSING NOTE
Chief Complaints and History of Present Illnesses   Patient presents with    Diabetic Eye Exam     Pt here for diabetic eye exam with scleral lenses.      Chief Complaint(s) and History of Present Illness(es)       Diabetic Eye Exam              Comments: Pt here for diabetic eye exam with scleral lenses.               Comments    Pt notes decreased vision since last exam. No issues with scleral lenses. BS was 206- notes inclusion. No other concerns.     Lab Results       Component                Value               Date                       A1C                      6.9                 03/28/2025                 A1C                      7.3                 10/31/2024                 A1C                      6.9                 05/07/2024                 A1C                      7.3                 10/02/2023                 A1C                      7.6                 04/28/2023                 A1C                      7.0                 05/05/2021                 A1C                      7.3                 10/02/2020                 A1C                      7.5                 08/20/2020                 A1C                      8.5                 05/28/2020              KAIDEN Grigsby on 4/7/2025 at 1:54 PM

## 2025-04-07 NOTE — PROGRESS NOTES
A/P  1.) Keratoconus each eye, s/p CE/IOL each eye  -Generally doing well in scleral lens. Has noticed decreased vision overall. Previous BCVA was 20/20 right eye, 20/25 left eye.   -Good comfort while in. Wearing plano PAL's over  -Both fit slightly inferior but without adverse affect, will monitor for now  -Some PCO right eye>left eye but since VA improves with OR we elected to update contact lenses for now. If still bothered by vision despite new lenses would rec eval for YAG right eye>left    2.) Type 1 DM without ophthalmic manifestation   -Last A1c 6.9, no h/o diabetic retinopathy  -Reviewed effects of DM on the eyes and importance of good blood sugar control  -Monitor annually with dilation    Order new lenses and mail direct. F/u prn with lens concerns, otherwise 1 year diabetic eye exam    I have confirmed the patient's CC, HPI and reviewed Past Medical History, Past Surgical History, Social History, Family History, Problem List, Medication List and agree with Tech note.     Adalgisa Simon, OD FAAO FSLS    Contact Lens Billing  V-Code:  - GP scleral  Final Contact Lens Rx         Brand Base Curve Diameter Sphere Lens Addl. Specs    Right Zenlens Prolate 7.5bc, 4.7 sag 16.0 +0.25 quad PC: 2 flat @ 0, std @ 90, 2 flat @ 180, 5 flat @ 270 [place] XO clear    Left Onefit 7.8 15.2 +4.75 xtra limbal, 3 flat sym Opt Extra blue           # of units: 2 lenses  Price per Unit: $235 per lens    This patient requires contact lenses that are medically necessary for either improvement in vision over spectacles, support of the ocular surface, or other therapeutic benefit. These are not cosmetic contact lenses.     Encounter Diagnoses   Name Primary?    Diabetes 1.5, managed as type 2 (H) Yes    Pseudophakia     Keratoconus, stable, bilateral     Posterior capsule opacification, bilateral

## 2025-05-29 DIAGNOSIS — E55.9 VITAMIN D DEFICIENCY: ICD-10-CM

## 2025-05-29 RX ORDER — ERGOCALCIFEROL 1.25 MG/1
50000 CAPSULE, LIQUID FILLED ORAL WEEKLY
Qty: 4 CAPSULE | Refills: 0 | Status: SHIPPED | OUTPATIENT
Start: 2025-05-29

## 2025-05-29 NOTE — TELEPHONE ENCOUNTER
Last Written Prescription Date:  4/6/25  Last Fill Quantity: 4,  # refills: 2   Last office visit: Visit date not found ; last virtual visit: 3/5/2025 with prescribing provider:  Dr. Heaton   Future Office Visit: 6/5/25    Next 5 appointments (look out 90 days)      Jun 13, 2025 1:00 PM  (Arrive by 12:40 PM)  Adult Preventative Visit with Radha Milligan MD  [facility] ([facility] ) [address], [address]  [facility] [address]  [phone]           Requested Prescriptions   Signed Prescriptions Disp Refills    vitamin D2 (ERGOCALCIFEROL) 86586 units (1250 mcg) capsule 4 capsule 0     Sig: TAKE 1 CAPSULE BY MOUTH 1 TIME A WEEK       There is no refill protocol information for this order        Refill sent  Karla Buckner RN

## 2025-06-04 ENCOUNTER — LAB (OUTPATIENT)
Dept: LAB | Facility: CLINIC | Age: 50
End: 2025-06-04
Payer: COMMERCIAL

## 2025-06-04 DIAGNOSIS — E13.9 DIABETES MELLITUS TYPE 1.5, MANAGED AS TYPE 2 (H): ICD-10-CM

## 2025-06-04 DIAGNOSIS — E66.01 MORBID OBESITY (H): ICD-10-CM

## 2025-06-04 DIAGNOSIS — E55.9 VITAMIN D DEFICIENCY: ICD-10-CM

## 2025-06-04 DIAGNOSIS — Z96.41 INSULIN PUMP STATUS: ICD-10-CM

## 2025-06-04 DIAGNOSIS — E88.819 INSULIN RESISTANCE: ICD-10-CM

## 2025-06-04 LAB
ANION GAP SERPL CALCULATED.3IONS-SCNC: 12 MMOL/L (ref 7–15)
BUN SERPL-MCNC: 9.1 MG/DL (ref 6–20)
CALCIUM SERPL-MCNC: 9.1 MG/DL (ref 8.8–10.4)
CHLORIDE SERPL-SCNC: 102 MMOL/L (ref 98–107)
CREAT SERPL-MCNC: 0.82 MG/DL (ref 0.67–1.17)
EGFRCR SERPLBLD CKD-EPI 2021: >90 ML/MIN/1.73M2
EST. AVERAGE GLUCOSE BLD GHB EST-MCNC: 148 MG/DL
GLUCOSE SERPL-MCNC: 130 MG/DL (ref 70–99)
HBA1C MFR BLD: 6.8 % (ref 0–5.6)
HCO3 SERPL-SCNC: 26 MMOL/L (ref 22–29)
POTASSIUM SERPL-SCNC: 3.9 MMOL/L (ref 3.4–5.3)
SODIUM SERPL-SCNC: 140 MMOL/L (ref 135–145)
VIT D+METAB SERPL-MCNC: 34 NG/ML (ref 20–50)

## 2025-06-04 PROCEDURE — 84443 ASSAY THYROID STIM HORMONE: CPT

## 2025-06-04 PROCEDURE — 83036 HEMOGLOBIN GLYCOSYLATED A1C: CPT

## 2025-06-04 PROCEDURE — 36415 COLL VENOUS BLD VENIPUNCTURE: CPT

## 2025-06-04 PROCEDURE — 80048 BASIC METABOLIC PNL TOTAL CA: CPT

## 2025-06-04 PROCEDURE — 82306 VITAMIN D 25 HYDROXY: CPT

## 2025-06-05 ENCOUNTER — VIRTUAL VISIT (OUTPATIENT)
Dept: ENDOCRINOLOGY | Facility: CLINIC | Age: 50
End: 2025-06-05
Payer: COMMERCIAL

## 2025-06-05 DIAGNOSIS — E66.01 MORBID OBESITY (H): ICD-10-CM

## 2025-06-05 DIAGNOSIS — E13.9 DIABETES MELLITUS TYPE 1.5, MANAGED AS TYPE 2 (H): Primary | ICD-10-CM

## 2025-06-05 DIAGNOSIS — E88.819 INSULIN RESISTANCE: ICD-10-CM

## 2025-06-05 DIAGNOSIS — Z96.41 INSULIN PUMP STATUS: ICD-10-CM

## 2025-06-05 LAB — TSH SERPL DL<=0.005 MIU/L-ACNC: 1.02 UIU/ML (ref 0.3–4.2)

## 2025-06-05 NOTE — PROGRESS NOTES
Virtual Visit Details    Type of service:  Video Visit     Originating Location (pt. Location): Home  Distant Location (provider location):  Off-site  Platform used for Video Visit: Tasneem      Recent issues:  Diabetes follow-up evaluation  Continues with Tandem pump, and DexcomG7 sensor use  Feeling well overall, also using the Mounjaro medication,   generally tolerating OK but occasional post inject nausea & diarrhea spells  Perceives some weight loss- clothes fitting more loosely          2011. Diagnosis of diabetes mellitus while living in [place], age 36  Acute symptoms increased thirst, frequent urination, fatigue, weight loss (265 to 203#/30 days)  Hospitalized and diagnosis of DKA  Initial treatment with Apidra and Lantus insulin  Met with an endocrinologist Lara Naik and Dr. Lina Fish  ~6 mo later, switched to [place] Ping pump     2012. Continued using this pump for 1 year, then moved to [facility]  Saw Dr. Beena Johnson/Endocrinologist in [place]  Switched to Tandem insulin pump  Previous [facility] labs include:       Subsequent move back to [place]  Recalls taking Victoza medication along with pump management, success with significant weight loss  Moved to [place]  9/2019. Moved to [place], [place]  Medical evaluations with Dr. 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 Started Tandem insulin pump  Previous [facility] labs include:       2/19/20. Med evaluation with Dr. ERIKA Milligan/[facility]  No lab tests ordered  Started DexcomG6 CGMS sensor  ~4/2020. Upgrade to the Control IQ pump         5/26/20. Initial diabetes evaluation with me, [facility]  Continued use of the Tandem TSlim pump with the DexcomG6  ~6/2020. Restarted (off label) Victoza  10/2020. Insurance coverage for Victoza, used 1.8 mg daily  11/2021. Ran out of Victoza  ~3/2022. Restarted Victoza medication  Switched from Victoza to Ozempic  0.5 mg weekly  10/2023. Ran out of Ozempic medication  10/2024. Changed from Ozempic to Mounjaro medication  Using Tandem TSlim pump with DexcomG7 sensor              Humalog in pump   Mounjaro 10 mg weekly    Uses pump with ICR with most meals  Current Tandem pump settings:      Recent pump and sensor data:                    Recent DexcomG7 data:      Blood glucose (BG) meter:  Accu-check               Tests infrequently  Fam Hx DM:    MA, Mcousin, MGM, PGM  Previous [facility] labs include:     Previous [facility] hgbA1c trends include:  Lab Results   Component Value Date    A1C 6.8 (H) 06/04/2025    A1C 6.9 (H) 03/28/2025    A1C 7.3 (H) 10/31/2024    A1C 6.9 (H) 05/07/2024    A1C 7.3 (H) 10/02/2023           Lab Results   Component Value Date    A1C 6.8 (H) 06/04/2025     06/04/2025    POTASSIUM 3.9 06/04/2025    CHLORIDE 102 06/04/2025    CO2 26 06/04/2025    ANIONGAP 12 06/04/2025     (H) 06/04/2025    BUN 9.1 06/04/2025    CR 0.82 06/04/2025    GFRESTIMATED >90 06/04/2025    GFRESTBLACK >90 05/05/2021    MICK 9.1 06/04/2025    CPEPT <0.1 (L) 05/28/2020    CHOL 140 09/23/2024    TRIG 53 09/23/2024    HDL 56 09/23/2024    LDL 73 09/23/2024    NHDL 84 09/23/2024    UCRR 250.0 09/23/2024    MICROL <12.0 09/23/2024    UMALCR  09/23/2024      Comment:      Unable to calculate, urine albumin and/or urine creatinine is outside detectable limits.  Microalbuminuria is defined as an albumin:creatinine ratio of 17 to 299 for males and 25 to 299 for females. A ratio of albumin:creatinine of 300 or higher is indicative of overt proteinuria.  Due to biologic variability, positive results should be confirmed by a second, first-morning random or 24-hour timed urine specimen. If there is discrepancy, a third specimen is recommended. When 2 out of 3 results are in the microalbuminuria range, this is evidence for incipient nephropathy and warrants increased efforts at glucose control, blood pressure control, and institution of  therapy with an angiotensin-converting-enzyme (ACE) inhibitor (if the patient can tolerate it).       Lab Results   Component Value Date    TSH 1.63 10/02/2023     Last eye exam 4/7/25 with Adalgisa Fuentes O.D./ROSLYN, keratoconus changes but no DR noted  DM Complications: none        , 2 teenage children ([place]), now lives in [facility] in [place]; works for [employer]  Sees Dr. ERIKA Milligan/[facility] for general medicine evaluations.       PMH/PSH:  Past Medical History:   Diagnosis Date    Cataracts, bilateral     Diabetes mellitus type 1.5 (H)     DKA (diabetic ketoacidoses)     Keratoconus of both eyes     Morbid obesity (H)     PHUONG (obstructive sleep apnea)      Past Surgical History:   Procedure Laterality Date    COLONOSCOPY      ENT SURGERY  10 years ago    cyst on neck    EXTRACTION(S) DENTAL      NECK SURGERY  2010    to remove a cyst    PHACOEMULSIFICATION CLEAR CORNEA WITH STANDARD INTRAOCULAR LENS IMPLANT Left 09/21/2020    Procedure: COMPLEX PHACOEMULSIFICATION, CATARACT, WITH INTRAOCULAR LENS IMPLANT;  Surgeon: Andre Jackson MD;  [facility]    PHACOEMULSIFICATION CLEAR CORNEA WITH STANDARD INTRAOCULAR LENS IMPLANT Right 09/28/2020    Procedure: PHACOEMULSIFICATION, CATARACT, WITH INTRAOCULAR LENS IMPLANT;  Surgeon: Andre Jackson MD;  Location: [facility] OR    VASECTOMY         Family Hx:  Family History   Problem Relation Age of Onset    Diabetes Maternal Grandmother     Other Cancer Maternal Grandmother         Lung Cancer    Diabetes Paternal Grandmother     Glaucoma Brother     Hypertension Mother     Diabetes Other         Maternal aunt mother of vibha t1    Diabetes Cousin     Diabetes Daughter         T1 Dx 7/4/2021    Macular Degeneration No family hx of          Social Hx:  Social History     Socioeconomic History    Marital status: Single     Spouse name: Not on file    Number of children: Not on file    Years of education: Not on file     Highest education level: Not on file   Occupational History    Not on file   Tobacco Use    Smoking status: Never    Smokeless tobacco: Never   Substance and Sexual Activity    Alcohol use: Never    Drug use: Never    Sexual activity: Yes     Partners: Female     Birth control/protection: Female Surgical   Other Topics Concern    Parent/sibling w/ CABG, MI or angioplasty before 65F 55M? No   Social History Narrative    Not on file     Social Drivers of Health     Financial Resource Strain: Low Risk  (6/5/2024)    Financial Resource Strain     Within the past 12 months, have you or your family members you live with been unable to get utilities (heat, electricity) when it was really needed?: No   Food Insecurity: Low Risk  (6/5/2024)    Food Insecurity     Within the past 12 months, did you worry that your food would run out before you got money to buy more?: No     Within the past 12 months, did the food you bought just not last and you didn t have money to get more?: No   Transportation Needs: Low Risk  (6/5/2024)    Transportation Needs     Within the past 12 months, has lack of transportation kept you from medical appointments, getting your medicines, non-medical meetings or appointments, work, or from getting things that you need?: No   Physical Activity: Insufficiently Active (6/5/2024)    Exercise Vital Sign     Days of Exercise per Week: 2 days     Minutes of Exercise per Session: 30 min   Stress: No Stress Concern Present (6/5/2024)    [employer] [place] [employer] - Occupational Stress Questionnaire     Feeling of Stress : Not at all   Social Connections: Unknown (6/5/2024)    Social Connection and Isolation Panel [NHANES]     Frequency of Communication with Friends and Family: Not on file     Frequency of Social Gatherings with Friends and Family: Never     Attends L[employer]-day Services: Not on file     Active Member of Clubs or Organizations: Not on file     Attends Club or Organization  Meetings: Not on file     Marital Status: Not on file   Interpersonal Safety: Low Risk  (2/28/2024)    Interpersonal Safety     Do you feel physically and emotionally safe where you currently live?: Yes     Within the past 12 months, have you been hit, slapped, kicked or otherwise physically hurt by someone?: No     Within the past 12 months, have you been humiliated or emotionally abused in other ways by your partner or ex-partner?: No   Housing Stability: Low Risk  (6/5/2024)    Housing Stability     Do you have housing? : Yes     Are you worried about losing your housing?: No          MEDICATIONS:  has a current medication list which includes the following prescription(s): dexcom g7 sensor, tirzepatide, bisacodyl, accu-chek justin plus, blood glucose monitoring, dexcom g6 , fluticasone, baqsimi two pack, insulin lispro, b-d u/f, insulin pump, losartan, polyethylene glycol, sildenafil, and vitamin d2.       ROS: 10 point ROS neg other than the symptoms noted above in the HPI.     GENERAL: mild fatigue, additional mild wt loss; denies fevers, chills, night sweats.   HEENT: no dysphagia, odonophagia, diplopia, neck pain  THYROID:  no apparent hyper or hypothyroid symptoms  CV: no chest pain, pressure, palpitations  LUNGS: no SOB, AGARWAL, cough, wheezing   ABDOMEN: mild nausea and constipation; no diarrhea, abdominal pain  EXTREMITIES: no rashes, ulcers, edema  NEUROLOGY: right leg tingling; no headaches, denies changes in vision, extremitiy numbness   MSK: nocturnal leg cramps; no muscle aches or pains, weakness  SKIN: no rashes or lesions  : decreased erection function; occasional nocturia  PSYCH:  stable mood, no significant anxiety or depression  ENDOCRINE: no heat or cold intolerance    Physical Exam (visual exam)  VS:  no vital signs taken for video visit  CONSTITUTIONAL: healthy, alert and NAD, well dressed, answering questions appropriately  ENT: no nose swelling or nasal discharge, mouth redness or gum  changes.  EYES: eyes grossly normal to inspection, conjunctivae and sclerae normal, no exophthalmos or proptosis  THYROID:  no apparent nodules or goiter  LUNGS: no audible wheeze, cough or visible cyanosis, no visible retractions or increased work of breathing  ABDOMEN: abdomen not evaluated  EXTREMITIES: no hand tremors, limited exam  NEUROLOGY: CN grossly intact, mentation intact and speech normal   SKIN:  no apparent skin lesions, rash, or edema with visualized skin appearance  PSYCH: mentation appears normal, affect normal/bright, judgement and insight intact,   normal speech and appearance well groomed       LABS:     All pertinent notes, labs, and images personally reviewed by me.       A/P:  Encounter Diagnoses   Name Primary?    Diabetes mellitus type 1.5, managed as type 2 (H) Yes    Insulin pump status     Insulin resistance     Morbid obesity (H)      Comments:  Reviewed health history and diabetes issues.  Health history and lab testing indicates T1.5DM   Improvement with CGM glucose trends and weight loss  Reviewed and interpreted tests that I previously ordered.   Ordered appropriate tests for the endocrinology disease management.    Management options discussed and implemented after shared medical decision making with the patient.  T1.5DM problem is chronic-stable    Plan:  Reviewed the overall T1.5DM management and insulin pump use.  Discussed optimal BG testing to assess glucose trends.  We reviewed insulin pump settings, basal rate and bolus dosing  Use of automated pump bolus dosing for meal/snack carb & correction dosing  Reviewed recent Tandem pump and DexcomG7 CGM glucose trend data, in detail     Recommend:  Continue the Tandem insulin pump, Dexcom sensor, and diabetes management plan.  Recommended softening the bolus settings as we advance the Mounjaro dose  Pump setting changes:   ICR all time intervals ~2.5 to 4.0   ISF all time intervals 15 to 25    Encouraged patient to enter premeal  carb bolus value more often  Continue Mounjaro but increase as 12.5 mg weekly dose, updated Rx sent  We reviewed the T1.5DM diagnosis, also Mounjaro GLP1RA medication use  Patient to message me in 3-4 weeks with update also  Avoid eating carb snack to raise late evening CGM trends  Monitor for symptom changes, including GI symptoms  Keep focus on diet, exercise, and weight loss management  Consider follow-up appointment with [facility] Diabetes Educator  Monitor for symptoms, also the nocturnal leg cramps  Will review the recent lab results when resulted  Stop the ergocalciferol and change to lower dose as vitamin D 2000U capsule daily  Arrange annual dilated eye exam, fasting lipid panel testing.     Addressed patient's questions today.    The longitudinal plan of care for the endocrine problem(s) were addressed during this visit.  Due to added complexity of care,   we will continue to support the patient and the subsequent management of this condition with ongoing continuity of care.    There are no Patient Instructions on file for this visit.    Future labs ordered today:   Orders Placed This Encounter   Procedures    GLUCOSE MONITOR, 72 HOUR, PHYS INTERP    Hemoglobin A1c    Basic metabolic panel    TSH    Albumin Random Urine Quantitative with Creat Ratio     Radiology/Consults ordered today: None    Total time spent on day of encounter:  38 min    Follow-up:  9/8/25 at 12 noon, Return    DAVIN Heaton MD, MS  Endocrinology  [facility]    CC:  ERIKA Milligan

## 2025-06-13 ENCOUNTER — OFFICE VISIT (OUTPATIENT)
Dept: FAMILY MEDICINE | Facility: CLINIC | Age: 50
End: 2025-06-13
Payer: COMMERCIAL

## 2025-06-13 VITALS
HEIGHT: 67 IN | RESPIRATION RATE: 20 BRPM | HEART RATE: 95 BPM | TEMPERATURE: 98.2 F | SYSTOLIC BLOOD PRESSURE: 106 MMHG | WEIGHT: 281.6 LBS | OXYGEN SATURATION: 100 % | BODY MASS INDEX: 44.2 KG/M2 | DIASTOLIC BLOOD PRESSURE: 72 MMHG

## 2025-06-13 DIAGNOSIS — E10.69 TYPE 1 DIABETES MELLITUS WITH OTHER SPECIFIED COMPLICATION (H): ICD-10-CM

## 2025-06-13 DIAGNOSIS — J30.2 SEASONAL ALLERGIC RHINITIS, UNSPECIFIED TRIGGER: ICD-10-CM

## 2025-06-13 DIAGNOSIS — Z00.00 ROUTINE HISTORY AND PHYSICAL EXAMINATION OF ADULT: Primary | ICD-10-CM

## 2025-06-13 DIAGNOSIS — Z12.5 SCREENING FOR PROSTATE CANCER: ICD-10-CM

## 2025-06-13 DIAGNOSIS — Z12.11 SCREEN FOR COLON CANCER: ICD-10-CM

## 2025-06-13 LAB — PSA SERPL DL<=0.01 NG/ML-MCNC: 2.01 NG/ML (ref 0–3.5)

## 2025-06-13 PROCEDURE — 3078F DIAST BP <80 MM HG: CPT | Performed by: INTERNAL MEDICINE

## 2025-06-13 PROCEDURE — 36415 COLL VENOUS BLD VENIPUNCTURE: CPT | Performed by: INTERNAL MEDICINE

## 2025-06-13 PROCEDURE — 1126F AMNT PAIN NOTED NONE PRSNT: CPT | Performed by: INTERNAL MEDICINE

## 2025-06-13 PROCEDURE — 99396 PREV VISIT EST AGE 40-64: CPT | Performed by: INTERNAL MEDICINE

## 2025-06-13 PROCEDURE — G0103 PSA SCREENING: HCPCS | Performed by: INTERNAL MEDICINE

## 2025-06-13 PROCEDURE — 3074F SYST BP LT 130 MM HG: CPT | Performed by: INTERNAL MEDICINE

## 2025-06-13 RX ORDER — FLUTICASONE PROPIONATE 50 MCG
2 SPRAY, SUSPENSION (ML) NASAL DAILY
Qty: 32 G | Refills: 5 | Status: SHIPPED | OUTPATIENT
Start: 2025-06-13

## 2025-06-13 SDOH — HEALTH STABILITY: PHYSICAL HEALTH: ON AVERAGE, HOW MANY DAYS PER WEEK DO YOU ENGAGE IN MODERATE TO STRENUOUS EXERCISE (LIKE A BRISK WALK)?: 5 DAYS

## 2025-06-13 SDOH — HEALTH STABILITY: PHYSICAL HEALTH: ON AVERAGE, HOW MANY MINUTES DO YOU ENGAGE IN EXERCISE AT THIS LEVEL?: 30 MIN

## 2025-06-13 ASSESSMENT — PAIN SCALES - GENERAL: PAINLEVEL_OUTOF10: NO PAIN (0)

## 2025-06-13 ASSESSMENT — SOCIAL DETERMINANTS OF HEALTH (SDOH): HOW OFTEN DO YOU GET TOGETHER WITH FRIENDS OR RELATIVES?: NEVER

## 2025-06-13 NOTE — PROGRESS NOTES
"Preventive Care Visit  [facility]  Radha Milligan MD, Internal Medicine  Jun 13, 2025      Assessment & Plan     Screen for colon cancer  - Colonoscopy Screening  Referral    Routine history and physical examination of adult  - diet, exercise    Type 1 diabetes mellitus with other specified complication (H)  - stable, continue current therapy  - continue endocrinology clinic follow up    Seasonal allergic rhinitis, unspecified trigger  - fluticasone (FLONASE) 50 MCG/ACT nasal spray  Dispense: 32 g; Refill: 5    Screening for prostate cancer  - PSA, screen  - PSA, screen      Patient has been advised of split billing requirements and indicates understanding: Yes        BMI  Estimated body mass index is 44.1 kg/m  as calculated from the following:    Height as of this encounter: 1.702 m (5' 7\").    Weight as of this encounter: 127.7 kg (281 lb 9.6 oz).   Weight management plan: Discussed healthy diet and exercise guidelines    Counseling  Appropriate preventive services were addressed with this patient via screening, questionnaire, or discussion as appropriate for fall prevention, nutrition, physical activity, Tobacco-use cessation, social engagement, weight loss and cognition.  Checklist reviewing preventive services available has been given to the patient.  Reviewed patient's diet, addressing concerns and/or questions.   Patient is at risk for social isolation and has been provided with information about the benefit of social connection.       Follow-up in 1 year      Conner Ochoa is a 49 year old, presenting for the following:  Physical (Patient is here for annual preventative physical.)        6/13/2025    12:59 PM   Additional Questions   Roomed by Ian BRIGGS MA   Accompanied by Self         6/13/2025   Forms   Any forms needing to be completed Yes          HPI  Patient presents to internal medicine clinic today for yearly physical exam.    Advance Care Planning    Discussed advance " care planning with patient; informed AVS has link to [employer] [employer].        6/13/2025   General Health   How would you rate your overall physical health? Good   Feel stress (tense, anxious, or unable to sleep) Not at all         6/13/2025   Nutrition   Three or more servings of calcium each day? (!) NO   Diet: Diabetic   How many servings of fruit and vegetables per day? (!) 0-1   How many sweetened beverages each day? 0-1         6/13/2025   Exercise   Days per week of moderate/strenous exercise 5 days   Average minutes spent exercising at this level 30 min         6/13/2025   Social Factors   Frequency of gathering with friends or relatives Never   Worry food won't last until get money to buy more No   Food not last or not have enough money for food? No   Do you have housing? (Housing is defined as stable permanent housing and does not include staying outside in a car, in a tent, in an abandoned building, in an overnight shelter, or couch-surfing.) Yes   Are you worried about losing your housing? No   Lack of transportation? No   Unable to get utilities (heat,electricity)? No   (!) SOCIAL CONNECTIONS CONCERN      6/13/2025   Fall Risk   Fallen 2 or more times in the past year? No   Trouble with walking or balance? No          6/13/2025   Dental   Dentist two times every year? Yes         Today's PHQ-2 Score:       6/13/2025    12:44 PM   PHQ-2 ( 1999 [employer])   Q1: Little interest or pleasure in doing things 0   Q2: Feeling down, depressed or hopeless 0   PHQ-2 Score 0    Q1: Little interest or pleasure in doing things Not at all   Q2: Feeling down, depressed or hopeless Not at all   PHQ-2 Score 0       Patient-reported           6/13/2025   Substance Use   Alcohol more than 3/day or more than 7/wk Not Applicable   Do you use any other substances recreationally? No     Social History     Tobacco Use    Smoking status: Never    Smokeless tobacco: Never   Vaping Use    Vaping status: Never Used   Substance Use  "Topics    Alcohol use: Never    Drug use: Never           6/13/2025   STI Screening   New sexual partner(s) since last STI/HIV test? (!) YES    ASCVD Risk   The 10-year ASCVD risk score (Cathy TEMPLE, et al., 2019) is: 10.5%    Values used to calculate the score:      Age: 49 years      Sex: Male      Is Non- : Yes      Diabetic: Yes      Tobacco smoker: No      Systolic Blood Pressure: 106 mmHg      Is BP treated: Yes      HDL Cholesterol: 56 mg/dL      Total Cholesterol: 140 mg/dL      Reviewed and updated as needed this visit by Provider                    Past Medical History:   Diagnosis Date    Cataracts, bilateral     Diabetes mellitus type 1.5 (H)     DKA (diabetic ketoacidoses)     Keratoconus of both eyes     Morbid obesity (H)     PHUONG (obstructive sleep apnea)          Review of Systems  Constitutional, HEENT, cardiovascular, pulmonary, GI, , musculoskeletal, neuro, skin, endocrine and psych systems are negative, except as otherwise noted.     Objective    Exam  /72 (BP Location: Left arm, Patient Position: Sitting, Cuff Size: Adult Large)   Pulse 95   Temp 98.2  F (36.8  C) (Temporal)   Resp 20   Ht 1.702 m (5' 7\")   Wt 127.7 kg (281 lb 9.6 oz)   SpO2 100%   BMI 44.10 kg/m     Estimated body mass index is 44.1 kg/m  as calculated from the following:    Height as of this encounter: 1.702 m (5' 7\").    Weight as of this encounter: 127.7 kg (281 lb 9.6 oz).    Physical Exam  GENERAL: alert and no distress  EYES: Eyes grossly normal to inspection, conjunctivae and sclerae normal  HENT: ear canals and TM's normal, nose and mouth without ulcers or lesions  NECK: no asymmetry  RESP: lungs clear to auscultation - no rales, rhonchi or wheezes  CV: regular rate and rhythm, normal S1 S2  ABDOMEN: soft, nontender, bowel sounds normal  MS: no gross musculoskeletal defects noted, no edema  SKIN: no suspicious lesions or rashes  NEURO: Normal strength and tone, mentation " intact and speech normal  PSYCH: mentation appears normal, affect normal/bright        Signed Electronically by: Radha Milligan MD

## 2025-07-02 ENCOUNTER — E-VISIT (OUTPATIENT)
Dept: FAMILY MEDICINE | Facility: CLINIC | Age: 50
End: 2025-07-02
Payer: COMMERCIAL

## 2025-07-02 DIAGNOSIS — R21 RASH: Primary | ICD-10-CM

## 2025-07-02 DIAGNOSIS — L30.9 DERMATITIS: ICD-10-CM

## 2025-07-02 RX ORDER — CLOBETASOL PROPIONATE 0.5 MG/G
CREAM TOPICAL 2 TIMES DAILY
Qty: 60 G | Refills: 1 | Status: SHIPPED | OUTPATIENT
Start: 2025-07-02

## 2025-07-02 NOTE — TELEPHONE ENCOUNTER
Provider E-Visit time total (minutes): 13 minutes    Chief Complaint:     E-visit for rash, dermatitis symptoms    HPI:   Patient Jose Apodaca is a very pleasant 49 year old male who presents to Internal Medicine clinic today for E-visit for rash, dermatitis symptoms.            Current Medications:     Current Outpatient Medications   Medication Sig Dispense Refill    bisacodyl (DULCOLAX) 5 MG EC tablet Two days prior to exam take two (2) tablets at 4pm. One day prior to exam take two (2) tablets at 4pm 4 tablet 0    blood glucose (ACCU-CHEK URIEL PLUS) test strip USE TO TEST BLOOD SUGAR FOUR TIMES DAILY OR AS DIRECTED 150 strip 0    blood glucose monitoring (SOFTCLIX) lancets TAKE AS DIRECTED FOUR TIMES DAILY 400 each 11    Continuous Blood Gluc  (DEXCOM G6 ) TESHA Use to read blood sugars as per 's instructions. 1 each 0    Continuous Glucose Sensor (DEXCOM G7 SENSOR) MISC CHANGE EVERY 10 DAYS 3 each 5    fluticasone (FLONASE) 50 MCG/ACT nasal spray Spray 2 sprays into both nostrils daily. SHAKE LIQUID AND USE 32 g 5    Glucagon (BAQSIMI TWO PACK) 3 MG/DOSE nasal powder Spray 1 spray (3 mg) in nostril as needed (severe hypoglycemia reaction, dose as directed). 1 each 3    insulin lispro (HUMALOG VIAL) 100 UNIT/ML vial USE WITH INSULIN PUMP, TOTAL DAILY DOSE APPROXIMATELY 120 UNITS. 40 mL 5    insulin pen needle (B-D U/F) 31G X 5 MM miscellaneous Use 1 pen needles daily or as directed. 100 each 3    INSULIN PUMP - OUTPATIENT Date Last Updated: 02/16/22  Tandem X2 with control IQ and Dexcom  BASAL RATES and times:  12am: (midnight): 1.10 units/hour    3am:1.5  5 am: 1.5 units/hour   8am: 2.05  12pm (noon): 1.9 units/hour   4pm: 1.45 units/hour   8 pm: 1.75 units/hour   CARB RATIO and times:  12   AM (midnight): 2.8  5am: 2.5  12  PM (noon):  2.2  8    PM:  2.7  Corection Factor (Sensitivity) and times:  12   AM (midnight): 15 mg/dL  BG target 125 mg/dL  Active Insulin Time: 4 hours       losartan (COZAAR) 25 MG tablet TAKE 1 TABLET(25 MG) BY MOUTH DAILY 90 tablet 2    polyethylene glycol (GOLYTELY) 236 g suspension Two days before procedure at 5PM fill first container with water. Mix and drink an 8 oz glass every 15 minutes until HALF of the container is gone. Place the remainder in the refrigerator. One day before procedure at 5PM drink second half of bowel prep. Drink an 8 oz glass every 15 minutes until it is gone. Day of procedure 6 hours before arrival time fill the 2nd container with water. Mix and drink an 8 oz glass every 15 minutes until HALF of the container is gone. Discard the remaining solution. 8000 mL 0    sildenafil (VIAGRA) 100 MG tablet TAKE 1/2 TABLET BY MOUTH 30 MINUTES PRIOR TO INTERCOURSE AS DIRECTED 15 tablet 0    tirzepatide (MOUNJARO) 12.5 MG/0.5ML SOAJ auto-injector pen Inject 0.5 mLs (12.5 mg) subcutaneously once a week. 2 mL 2    vitamin D2 (ERGOCALCIFEROL) 31112 units (1250 mcg) capsule TAKE 1 CAPSULE BY MOUTH 1 TIME A WEEK 4 capsule 0         Allergies:      Allergies   Allergen Reactions    Cats     Seasonal Allergies      grass            Past Medical History:     Past Medical History:   Diagnosis Date    Cataracts, bilateral     Diabetes mellitus type 1.5 (H)     DKA (diabetic ketoacidoses)     Keratoconus of both eyes     Morbid obesity (H)     PHUONG (obstructive sleep apnea)          Past Surgical History:     Past Surgical History:   Procedure Laterality Date    COLONOSCOPY      ENT SURGERY  10 years ago    cyst on neck    EXTRACTION(S) DENTAL      NECK SURGERY  2010    to remove a cyst    PHACOEMULSIFICATION CLEAR CORNEA WITH STANDARD INTRAOCULAR LENS IMPLANT Left 09/21/2020    Procedure: COMPLEX PHACOEMULSIFICATION, CATARACT, WITH INTRAOCULAR LENS IMPLANT;  Surgeon: Andre Jackson MD;  Location:  OR    PHACOEMULSIFICATION CLEAR CORNEA WITH STANDARD INTRAOCULAR LENS IMPLANT Right 09/28/2020    Procedure: PHACOEMULSIFICATION, CATARACT, WITH INTRAOCULAR  LENS IMPLANT;  Surgeon: Andre Jackson MD;  Location: [facility] OR    VASECTOMY           Family Medical History:     Family History   Problem Relation Age of Onset    Diabetes Maternal Grandmother     Other Cancer Maternal Grandmother         Lung Cancer    Diabetes Paternal Grandmother     Glaucoma Brother     Hypertension Mother     Diabetes Other         Maternal aunt mother of vibha t1    Diabetes Cousin     Diabetes Daughter         T1 Dx 7/4/2021    Macular Degeneration No family hx of          Social History:     Social History     Socioeconomic History    Marital status: Single     Spouse name: Not on file    Number of children: Not on file    Years of education: Not on file    Highest education level: Not on file   Occupational History    Not on file   Tobacco Use    Smoking status: Never    Smokeless tobacco: Never   Vaping Use    Vaping status: Never Used   Substance and Sexual Activity    Alcohol use: Never    Drug use: Never    Sexual activity: Yes     Partners: Female     Birth control/protection: Female Surgical   Other Topics Concern    Parent/sibling w/ CABG, MI or angioplasty before 65F 55M? No   Social History Narrative    Not on file     Social Drivers of Health     Financial Resource Strain: Low Risk  (6/13/2025)    Financial Resource Strain     Within the past 12 months, have you or your family members you live with been unable to get utilities (heat, electricity) when it was really needed?: No   Food Insecurity: Low Risk  (6/13/2025)    Food Insecurity     Within the past 12 months, did you worry that your food would run out before you got money to buy more?: No     Within the past 12 months, did the food you bought just not last and you didn t have money to get more?: No   Transportation Needs: Low Risk  (6/13/2025)    Transportation Needs     Within the past 12 months, has lack of transportation kept you from medical appointments, getting your medicines, non-medical meetings or  appointments, work, or from getting things that you need?: No   Physical Activity: Sufficiently Active (6/13/2025)    Exercise Vital Sign     Days of Exercise per Week: 5 days     Minutes of Exercise per Session: 30 min   Stress: No Stress Concern Present (6/13/2025)    [employer] [employer] - Occupational Stress Questionnaire     Feeling of Stress : Not at all   Social Connections: Unknown (6/13/2025)    Social Connection and Isolation Panel [NHANES]     Frequency of Communication with Friends and Family: Not on file     Frequency of Social Gatherings with Friends and Family: Never     Attends Mandaen Services: Not on file     Active Member of Clubs or Organizations: Not on file     Attends Club or Organization Meetings: Not on file     Marital Status: Not on file   Interpersonal Safety: Low Risk  (6/13/2025)    Interpersonal Safety     Do you feel physically and emotionally safe where you currently live?: Yes     Within the past 12 months, have you been hit, slapped, kicked or otherwise physically hurt by someone?: No     Within the past 12 months, have you been humiliated or emotionally abused in other ways by your partner or ex-partner?: No   Housing Stability: Low Risk  (6/13/2025)    Housing Stability     Do you have housing? : Yes     Are you worried about losing your housing?: No           Review of System:     Constitutional: Negative for fever or chills  Skin: positive for rashes      Physical Exam:   There were no vitals taken for this visit.      Diagnostic Test Results:     Diagnostic Test Results:  Labs reviewed in Epic    ASSESSMENT/PLAN:       Don was seen today for derm problem.    Diagnoses and all orders for this visit:    Dermatitis  Rash  - symptoms not well controlled  -     clobetasol propionate (TEMOVATE) 0.05 % external cream; Apply topically 2 times daily.          Follow Up Plan:     Patient is instructed to return to Internal Medicine clinic for follow-up visit in 1  month.        Radha Milligan MD  Internal Medicine  [facility]

## 2025-07-08 ENCOUNTER — MYC MEDICAL ADVICE (OUTPATIENT)
Dept: ENDOCRINOLOGY | Facility: CLINIC | Age: 50
End: 2025-07-08
Payer: COMMERCIAL

## 2025-07-09 DIAGNOSIS — E88.819 INSULIN RESISTANCE: ICD-10-CM

## 2025-07-09 DIAGNOSIS — E66.01 MORBID OBESITY (H): ICD-10-CM

## 2025-07-09 DIAGNOSIS — E13.9 DIABETES MELLITUS TYPE 1.5, MANAGED AS TYPE 2 (H): Primary | ICD-10-CM

## 2025-08-06 DIAGNOSIS — E88.819 INSULIN RESISTANCE: ICD-10-CM

## 2025-08-06 DIAGNOSIS — E10.9 TYPE 1 DIABETES MELLITUS WITHOUT COMPLICATION (H): ICD-10-CM

## 2025-08-06 DIAGNOSIS — Z96.41 INSULIN PUMP STATUS: ICD-10-CM

## 2025-08-06 RX ORDER — INSULIN LISPRO 100 [IU]/ML
INJECTION, SOLUTION INTRAVENOUS; SUBCUTANEOUS
Qty: 40 ML | Refills: 5 | Status: SHIPPED | OUTPATIENT
Start: 2025-08-06

## 2025-08-25 ENCOUNTER — MYC MEDICAL ADVICE (OUTPATIENT)
Dept: ENDOCRINOLOGY | Facility: CLINIC | Age: 50
End: 2025-08-25
Payer: COMMERCIAL

## (undated) DEVICE — EYE CANN IRR 25GA CYSTOTOME 581610

## (undated) DEVICE — PACK CATARACT CUSTOM ASC SEY15CPUMC

## (undated) DEVICE — EYE KNIFE STILETTO VISITEC 1.1MM ANG 45DEG SIDEPORT 376620

## (undated) DEVICE — EYE TIP IRRIGATION & ASPIRATION POLYMER CVD 0.3MM 8065751512

## (undated) DEVICE — EYE CANN IRR 27GA ANTERIOR CHAMBER 581280

## (undated) DEVICE — EYE PACK CUSTOM ANTERIOR 30DEG TIP CENTURION PPK6682-04

## (undated) DEVICE — LINEN TOWEL PACK X5 5464

## (undated) DEVICE — EYE KNIFE SLIT XSTAR VISITEC 2.6MM 45DEG 373726

## (undated) DEVICE — EYE SHIELD PLASTIC

## (undated) DEVICE — GLOVE PROTEXIS MICRO 7.5  2D73PM75

## (undated) RX ORDER — FENTANYL CITRATE 50 UG/ML
INJECTION, SOLUTION INTRAMUSCULAR; INTRAVENOUS
Status: DISPENSED
Start: 2020-09-21

## (undated) RX ORDER — METOPROLOL TARTRATE 1 MG/ML
INJECTION, SOLUTION INTRAVENOUS
Status: DISPENSED
Start: 2024-12-12

## (undated) RX ORDER — IVABRADINE 5 MG/1
TABLET, FILM COATED ORAL
Status: DISPENSED
Start: 2024-12-12

## (undated) RX ORDER — ACETAMINOPHEN 325 MG/1
TABLET ORAL
Status: DISPENSED
Start: 2020-09-28

## (undated) RX ORDER — FENTANYL CITRATE 50 UG/ML
INJECTION, SOLUTION INTRAMUSCULAR; INTRAVENOUS
Status: DISPENSED
Start: 2020-09-28

## (undated) RX ORDER — BEVACIZUMAB 2.5 MG/0.1
SYRINGE (ML) INTRAOCULAR
Status: DISPENSED
Start: 2020-09-28

## (undated) RX ORDER — METOPROLOL TARTRATE 50 MG
TABLET ORAL
Status: DISPENSED
Start: 2024-12-12